# Patient Record
Sex: MALE | Race: AMERICAN INDIAN OR ALASKA NATIVE | URBAN - METROPOLITAN AREA
[De-identification: names, ages, dates, MRNs, and addresses within clinical notes are randomized per-mention and may not be internally consistent; named-entity substitution may affect disease eponyms.]

---

## 2017-01-12 ENCOUNTER — APPOINTMENT (OUTPATIENT)
Dept: CARDIOLOGY | Facility: CLINIC | Age: 47
DRG: 286 | End: 2017-01-12
Attending: INTERNAL MEDICINE
Payer: MEDICAID

## 2017-01-12 ENCOUNTER — HOSPITAL ENCOUNTER (INPATIENT)
Facility: CLINIC | Age: 47
LOS: 31 days | Discharge: ANOTHER HOSPITAL | DRG: 286 | End: 2017-02-12
Attending: INTERNAL MEDICINE | Admitting: INTERNAL MEDICINE
Payer: MEDICAID

## 2017-01-12 DIAGNOSIS — K29.71 GASTROINTESTINAL HEMORRHAGE ASSOCIATED WITH GASTRITIS, UNSPECIFIED GASTRITIS TYPE: ICD-10-CM

## 2017-01-12 DIAGNOSIS — M54.6 CHRONIC BILATERAL THORACIC BACK PAIN: ICD-10-CM

## 2017-01-12 DIAGNOSIS — R10.11 RUQ ABDOMINAL PAIN: Primary | ICD-10-CM

## 2017-01-12 DIAGNOSIS — R04.0 EPISTAXIS: ICD-10-CM

## 2017-01-12 DIAGNOSIS — Z79.899 LONG TERM PRESCRIPTION BENZODIAZEPINE USE: ICD-10-CM

## 2017-01-12 DIAGNOSIS — F11.20 UNCOMPLICATED OPIOID DEPENDENCE (H): ICD-10-CM

## 2017-01-12 DIAGNOSIS — B37.2 CANDIDIASIS OF SKIN: ICD-10-CM

## 2017-01-12 DIAGNOSIS — J98.01 ACUTE BRONCHOSPASM: ICD-10-CM

## 2017-01-12 DIAGNOSIS — F41.9 ANXIETY: ICD-10-CM

## 2017-01-12 DIAGNOSIS — G89.29 CHRONIC BILATERAL THORACIC BACK PAIN: ICD-10-CM

## 2017-01-12 DIAGNOSIS — R11.0 NAUSEA: ICD-10-CM

## 2017-01-12 DIAGNOSIS — K59.00 CONSTIPATION, UNSPECIFIED CONSTIPATION TYPE: ICD-10-CM

## 2017-01-12 DIAGNOSIS — E16.2 HYPOGLYCEMIA: ICD-10-CM

## 2017-01-12 DIAGNOSIS — E03.8 OTHER SPECIFIED HYPOTHYROIDISM: ICD-10-CM

## 2017-01-12 DIAGNOSIS — I95.3 HEMODIALYSIS-ASSOCIATED HYPOTENSION: ICD-10-CM

## 2017-01-12 PROBLEM — I50.9 CHF (CONGESTIVE HEART FAILURE) (H): Status: ACTIVE | Noted: 2017-01-12

## 2017-01-12 LAB
ALBUMIN SERPL-MCNC: 2.6 G/DL (ref 3.4–5)
ALP SERPL-CCNC: 148 U/L (ref 40–150)
ALT SERPL W P-5'-P-CCNC: 208 U/L (ref 0–70)
ANION GAP SERPL CALCULATED.3IONS-SCNC: 11 MMOL/L (ref 3–14)
AST SERPL W P-5'-P-CCNC: 171 U/L (ref 0–45)
BILIRUB DIRECT SERPL-MCNC: 1.3 MG/DL (ref 0–0.2)
BILIRUB SERPL-MCNC: 3.2 MG/DL (ref 0.2–1.3)
BUN SERPL-MCNC: 58 MG/DL (ref 7–30)
CALCIUM SERPL-MCNC: 7.5 MG/DL (ref 8.5–10.1)
CHLORIDE SERPL-SCNC: 96 MMOL/L (ref 94–109)
CO2 SERPL-SCNC: 28 MMOL/L (ref 20–32)
COPATH REPORT: NORMAL
CREAT SERPL-MCNC: 2.02 MG/DL (ref 0.66–1.25)
DAT POLY-SP REAG RBC QL: NORMAL
DIGOXIN SERPL-MCNC: 0.7 UG/L (ref 0.5–2)
ERYTHROCYTE [DISTWIDTH] IN BLOOD BY AUTOMATED COUNT: 18.1 % (ref 10–15)
GFR SERPL CREATININE-BSD FRML MDRD: 35 ML/MIN/1.7M2
GLUCOSE SERPL-MCNC: 127 MG/DL (ref 70–99)
HCT VFR BLD AUTO: 24.4 % (ref 40–53)
HGB BLD-MCNC: 8.3 G/DL (ref 13.3–17.7)
HGB FREE PLAS-MCNC: 160 MG/DL
INR PPP: 2.71 (ref 0.86–1.14)
INR PPP: 3.1 (ref 0.86–1.14)
LACTATE BLD-SCNC: 1.9 MMOL/L (ref 0.7–2.1)
LDH SERPL L TO P-CCNC: 494 U/L (ref 85–227)
LDH SERPL L TO P-CCNC: NORMAL U/L (ref 85–227)
LMWH PPP CHRO-ACNC: NORMAL IU/ML
MCH RBC QN AUTO: 31.2 PG (ref 26.5–33)
MCHC RBC AUTO-ENTMCNC: 34 G/DL (ref 31.5–36.5)
MCV RBC AUTO: 92 FL (ref 78–100)
NT-PROBNP SERPL-MCNC: ABNORMAL PG/ML (ref 0–450)
PLATELET # BLD AUTO: 62 10E9/L (ref 150–450)
POTASSIUM SERPL-SCNC: 4.1 MMOL/L (ref 3.4–5.3)
PROT SERPL-MCNC: 5.2 G/DL (ref 6.8–8.8)
RBC # BLD AUTO: 2.66 10E12/L (ref 4.4–5.9)
SODIUM SERPL-SCNC: 136 MMOL/L (ref 133–144)
TROPONIN I SERPL-MCNC: 0.09 UG/L (ref 0–0.04)
WBC # BLD AUTO: 11.5 10E9/L (ref 4–11)

## 2017-01-12 PROCEDURE — 80162 ASSAY OF DIGOXIN TOTAL: CPT

## 2017-01-12 PROCEDURE — 83615 LACTATE (LD) (LDH) ENZYME: CPT | Performed by: INTERNAL MEDICINE

## 2017-01-12 PROCEDURE — 83010 ASSAY OF HAPTOGLOBIN QUANT: CPT

## 2017-01-12 PROCEDURE — 36415 COLL VENOUS BLD VENIPUNCTURE: CPT | Performed by: INTERNAL MEDICINE

## 2017-01-12 PROCEDURE — 25900017 H RX MED GY IP 259 OP 259 PS 637: Performed by: INTERNAL MEDICINE

## 2017-01-12 PROCEDURE — 40000264 ECHO COMPLETE WITH LUMASON

## 2017-01-12 PROCEDURE — 25000125 ZZHC RX 250: Performed by: INTERNAL MEDICINE

## 2017-01-12 PROCEDURE — 25000132 ZZH RX MED GY IP 250 OP 250 PS 637: Performed by: INTERNAL MEDICINE

## 2017-01-12 PROCEDURE — S0171 BUMETANIDE 0.5 MG: HCPCS

## 2017-01-12 PROCEDURE — 84484 ASSAY OF TROPONIN QUANT: CPT | Performed by: INTERNAL MEDICINE

## 2017-01-12 PROCEDURE — 85027 COMPLETE CBC AUTOMATED: CPT | Performed by: INTERNAL MEDICINE

## 2017-01-12 PROCEDURE — 25500064 ZZH RX 255 OP 636: Performed by: INTERNAL MEDICINE

## 2017-01-12 PROCEDURE — 25000132 ZZH RX MED GY IP 250 OP 250 PS 637

## 2017-01-12 PROCEDURE — 36415 COLL VENOUS BLD VENIPUNCTURE: CPT

## 2017-01-12 PROCEDURE — 80076 HEPATIC FUNCTION PANEL: CPT | Performed by: INTERNAL MEDICINE

## 2017-01-12 PROCEDURE — 86880 COOMBS TEST DIRECT: CPT | Performed by: INTERNAL MEDICINE

## 2017-01-12 PROCEDURE — 83605 ASSAY OF LACTIC ACID: CPT | Performed by: INTERNAL MEDICINE

## 2017-01-12 PROCEDURE — 40000611 ZZHCL STATISTIC MORPHOLOGY W/INTERP HEMEPATH TC 85060: Performed by: INTERNAL MEDICINE

## 2017-01-12 PROCEDURE — 83880 ASSAY OF NATRIURETIC PEPTIDE: CPT | Performed by: INTERNAL MEDICINE

## 2017-01-12 PROCEDURE — 83051 HEMOGLOBIN PLASMA: CPT

## 2017-01-12 PROCEDURE — 99223 1ST HOSP IP/OBS HIGH 75: CPT | Mod: 25 | Performed by: INTERNAL MEDICINE

## 2017-01-12 PROCEDURE — 25000125 ZZHC RX 250

## 2017-01-12 PROCEDURE — 93005 ELECTROCARDIOGRAM TRACING: CPT

## 2017-01-12 PROCEDURE — 87040 BLOOD CULTURE FOR BACTERIA: CPT | Performed by: INTERNAL MEDICINE

## 2017-01-12 PROCEDURE — 21400006 ZZH R&B CCU INTERMEDIATE UMMC

## 2017-01-12 PROCEDURE — 93306 TTE W/DOPPLER COMPLETE: CPT | Mod: 26 | Performed by: INTERNAL MEDICINE

## 2017-01-12 PROCEDURE — 83615 LACTATE (LD) (LDH) ENZYME: CPT

## 2017-01-12 PROCEDURE — 85610 PROTHROMBIN TIME: CPT | Performed by: INTERNAL MEDICINE

## 2017-01-12 PROCEDURE — 80048 BASIC METABOLIC PNL TOTAL CA: CPT | Performed by: INTERNAL MEDICINE

## 2017-01-12 PROCEDURE — 85520 HEPARIN ASSAY: CPT | Performed by: INTERNAL MEDICINE

## 2017-01-12 RX ORDER — METHOCARBAMOL 750 MG/1
750 TABLET, FILM COATED ORAL 2 TIMES DAILY
Status: DISCONTINUED | OUTPATIENT
Start: 2017-01-12 | End: 2017-02-01

## 2017-01-12 RX ORDER — ACETAMINOPHEN 650 MG/1
650 SUPPOSITORY RECTAL EVERY 4 HOURS PRN
Status: DISCONTINUED | OUTPATIENT
Start: 2017-01-12 | End: 2017-01-16

## 2017-01-12 RX ORDER — OXYCODONE HYDROCHLORIDE 5 MG/1
5 TABLET ORAL EVERY 4 HOURS PRN
Status: DISCONTINUED | OUTPATIENT
Start: 2017-01-12 | End: 2017-01-12

## 2017-01-12 RX ORDER — SENNOSIDES 8.6 MG
8.6 TABLET ORAL 2 TIMES DAILY PRN
Status: DISCONTINUED | OUTPATIENT
Start: 2017-01-12 | End: 2017-02-09

## 2017-01-12 RX ORDER — ALBUTEROL SULFATE 90 UG/1
2 AEROSOL, METERED RESPIRATORY (INHALATION) 4 TIMES DAILY PRN
Status: DISCONTINUED | OUTPATIENT
Start: 2017-01-12 | End: 2017-02-12 | Stop reason: HOSPADM

## 2017-01-12 RX ORDER — ALUMINA, MAGNESIA, AND SIMETHICONE 2400; 2400; 240 MG/30ML; MG/30ML; MG/30ML
15-30 SUSPENSION ORAL EVERY 4 HOURS PRN
Status: DISCONTINUED | OUTPATIENT
Start: 2017-01-12 | End: 2017-02-12 | Stop reason: HOSPADM

## 2017-01-12 RX ORDER — BUMETANIDE 1 MG/1
1 TABLET ORAL DAILY
Status: DISCONTINUED | OUTPATIENT
Start: 2017-01-12 | End: 2017-01-12

## 2017-01-12 RX ORDER — LEVOTHYROXINE SODIUM 112 UG/1
224 TABLET ORAL
Status: DISCONTINUED | OUTPATIENT
Start: 2017-01-12 | End: 2017-01-30

## 2017-01-12 RX ORDER — WARFARIN SODIUM 7.5 MG/1
7.5 TABLET ORAL
Status: COMPLETED | OUTPATIENT
Start: 2017-01-12 | End: 2017-01-12

## 2017-01-12 RX ORDER — HEPARIN SODIUM 10000 [USP'U]/100ML
0-3500 INJECTION, SOLUTION INTRAVENOUS CONTINUOUS
Status: DISCONTINUED | OUTPATIENT
Start: 2017-01-12 | End: 2017-01-12

## 2017-01-12 RX ORDER — POLYETHYLENE GLYCOL 3350 17 G/17G
17 POWDER, FOR SOLUTION ORAL DAILY PRN
Status: DISCONTINUED | OUTPATIENT
Start: 2017-01-12 | End: 2017-02-06

## 2017-01-12 RX ORDER — BUMETANIDE 0.25 MG/ML
2 INJECTION INTRAMUSCULAR; INTRAVENOUS EVERY 12 HOURS
Status: DISCONTINUED | OUTPATIENT
Start: 2017-01-12 | End: 2017-01-20

## 2017-01-12 RX ORDER — LIDOCAINE 40 MG/G
CREAM TOPICAL
Status: DISCONTINUED | OUTPATIENT
Start: 2017-01-12 | End: 2017-02-01

## 2017-01-12 RX ORDER — BUMETANIDE 2 MG/1
2 TABLET ORAL
Status: DISCONTINUED | OUTPATIENT
Start: 2017-01-13 | End: 2017-01-12

## 2017-01-12 RX ORDER — ACETAMINOPHEN 325 MG/1
650 TABLET ORAL EVERY 4 HOURS PRN
Status: DISCONTINUED | OUTPATIENT
Start: 2017-01-12 | End: 2017-01-16

## 2017-01-12 RX ORDER — NALOXONE HYDROCHLORIDE 0.4 MG/ML
.1-.4 INJECTION, SOLUTION INTRAMUSCULAR; INTRAVENOUS; SUBCUTANEOUS
Status: DISCONTINUED | OUTPATIENT
Start: 2017-01-12 | End: 2017-02-12 | Stop reason: HOSPADM

## 2017-01-12 RX ORDER — NITROGLYCERIN 0.4 MG/1
0.4 TABLET SUBLINGUAL EVERY 5 MIN PRN
Status: DISCONTINUED | OUTPATIENT
Start: 2017-01-12 | End: 2017-02-12 | Stop reason: HOSPADM

## 2017-01-12 RX ORDER — OXYCODONE HYDROCHLORIDE 5 MG/1
5-10 TABLET ORAL EVERY 4 HOURS PRN
Status: DISCONTINUED | OUTPATIENT
Start: 2017-01-12 | End: 2017-01-23

## 2017-01-12 RX ADMIN — ACETAMINOPHEN 650 MG: 325 TABLET, FILM COATED ORAL at 15:23

## 2017-01-12 RX ADMIN — BUMETANIDE 1 MG: 1 TABLET ORAL at 13:32

## 2017-01-12 RX ADMIN — LEVOTHYROXINE SODIUM 224 MCG: 112 TABLET ORAL at 13:32

## 2017-01-12 RX ADMIN — WARFARIN SODIUM 7.5 MG: 7.5 TABLET ORAL at 18:26

## 2017-01-12 RX ADMIN — OXYCODONE HYDROCHLORIDE 5 MG: 5 TABLET ORAL at 12:02

## 2017-01-12 RX ADMIN — OXYCODONE HYDROCHLORIDE 10 MG: 5 TABLET ORAL at 20:59

## 2017-01-12 RX ADMIN — METHOCARBAMOL 750 MG: 750 TABLET ORAL at 19:09

## 2017-01-12 RX ADMIN — AMIODARONE HYDROCHLORIDE 300 MG: 200 TABLET ORAL at 15:20

## 2017-01-12 RX ADMIN — OXYCODONE HYDROCHLORIDE 5 MG: 5 TABLET ORAL at 16:49

## 2017-01-12 RX ADMIN — Medication 62.5 MCG: at 15:37

## 2017-01-12 RX ADMIN — Medication 12.5 MG: at 16:49

## 2017-01-12 RX ADMIN — SULFUR HEXAFLUORIDE 3 ML: KIT at 13:15

## 2017-01-12 RX ADMIN — BUMETANIDE 2 MG: 0.25 INJECTION, SOLUTION INTRAMUSCULAR; INTRAVENOUS at 19:10

## 2017-01-12 RX ADMIN — HEPARIN SODIUM 1800 UNITS/HR: 10000 INJECTION, SOLUTION INTRAVENOUS at 13:06

## 2017-01-12 NOTE — LETTER
[address] [address]  [phone]      February 11, 2017      Ezra Santacruz [place]  Fax: [phone]  Attn: Alex Rodriguez    To whom it may concern:   Mr. Samir Rodriguez was admitted to the [facility] from January 12th, 2017 to February 12th, 2017.  He wanted us to inform you that his sister, Susanne Hui, came to visit him at the [facility] on February 4th, 2017.      Sincerely,      Amador Carter MD

## 2017-01-12 NOTE — H&P
History and Physical  January 12, 2017      Samir Rodriguez MRN# [id number]   Age: 46 year old YOB: 1970     Date of Admission: 1/12/2017    Primary care provider: No primary care provider on file.         Assessment and Plan:   Samir Rodriguez is a 46 year old  male with past medical history of Rheumatic Heart Disease s/p mechanical MVR x 2 (1980s and 1992) on warfarin (INR goal 2.5-3.5), biventricular CHF (EF 25-30%) s/p dual chamber ICD 2008, chronic afib on amiodarone and digoxine, WPW ablation at age 11, CKD III, hypothyroid,  who is transferred from formerly [facility] for further eval of CHF and concern of hemolysis in setting of mechanical valve.      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 Since 11/16 pt has had worsening LE edema. CHAPMAN at baseline with 2-3 blocks. + orthopnea. + JVD and 3+ LE edema. BNP 50721. Weight on admission 111kg.  - losartan held at OSH due to Hypotension - will resume later  - Continue metoprolol xl 12.5mg daily   - Not on spironolactone - will resume later  - bumex 2mg IV BID   - Device interrogation   - Daily weights   - Strict I/O    # Mechanical mitral valve (MV diastolic gradient 6.5)  # Aortic Insufficiency (mod - severe)  # TR (moderate)  # Anticoagulation  Concern for endocarditis vs MV thrombus   - Blood cultures x 2  - Continue Warfarin   - Stop Heparin, INR is therapeutic   - Awaiting OSH EDEL imaging     # Anemia and thrombocytopenia (pancytopenia at OSH, concern for  KACY)  - Smear  - Haptoglobin   - Hemoglobin plasma  - heme onc consult     # Direct and Indirect hyperbilirubinemia, sclera icterus:   # Transaminates:   Congestive hepatopathy vs hemolysis vs endocarditis   - Abdominal US     # Chronic afib:   - Continue Amiodarone, digoxin    # Right tibia fx and R knee trauma:   - brace in place   - Ortho consult    FEN: cardiac diet   PPX: on warfarin     Code Status: Full CODE     Patient discussed with staff attending, Dr. Cartagena.  Please feel free to page with questions.    Meggan Landers MD   Internal Medicine PGY1  Cardiology Service II  Pager: [phone]         History of Present Illness:   Samir Rodriguez is a 46 year old  male with past medical history of Rheumatic Heart Disease s/p mechanical MVR x 2 (1980s and 1992) on warfarin (INR goal 2.5-3.5), biventricular CHF (EF 25-30%) s/p dual chamber ICD 2008, chronic afib on amiodarone and digoxine, WPW ablation at age 11, CKD III, hypothyroid,  who is transferred from [facility] for management of acute on chronic biventricular heart failure and cytopenia in the setting of MVR.     Patient was last in his normal state of health in November 2016. In 11/2016 he sustained a mechanical fall and R tibia fx for which he was hospitalized and developed cellulitis. During this hospitalization his bumex was decreased from 2mg to 1mg due to hypotensive episodes. Subsequently he has developed worsening LE edema, weight gain. He has orthopnea, CHAPMAN with 2-3 blocks. Denies fever, chills, chest pain, dizziness, syncope, palpitations.     He was re-hospitalized at [facility] on 12/22 after a second mechanical fall. He was found to have pancytopenia. There was a question of abnormal MV. Cardiac fluoroscopy showed normal leaflets without restrictions. Valve scintiangiography 12/31/16 showed normal valves. Patient was transferred due to concern of hemolysis from malfunctioning  MV.            Review of Systems:   General: no fever, chills, + weight gain  HEENT: Denies HA, vision changes  Pulmonary: + CHAPMAN, no wheezing, cough  CVS: No chest pain, palpitations  GI: No changes with BM  Renal: No difficulty with urination  Musculoskeletal: Right leg pain   Skin: + ecchymosis, sclera icterus         Past Medical History:       Rheumatic heart disease    MVR x 2  (1980s and 1992) on warfarin (INR goal 2.5-3.5)    biventricular CHF (EF 25-30%) s/p dual chamber ICD 2008    WPW ablation at age 11    CKD III    Hypothyroid    Chronic afib            Past Surgical History:     Cholecystectomy     Open heart surgery x 2        Allergies:     Allergies   Allergen Reactions     Asa [Aspirin] Other (See Comments) and Diarrhea     goosebumps  nausea     Gabapentin Nausea     Nabumetone Nausea     Sulfamethoxazole Unknown     Trimethoprim Unknown     Allopurinol Rash     Clindamycin Rash            Social History:      Smoking: remote hx of smoking in high school      Alcohol: denies      Illicit drug: denies      Lives in [place] with his son, daughter and two grand children         Family History:   No family history on file.             Medications:     Current Facility-Administered Medications   Medication     lidocaine 1 % 1 mL     lidocaine (LMX4) kit     sodium chloride (PF) 0.9% PF flush 3 mL     sodium chloride (PF) 0.9% PF flush 3 mL     medication instruction     nitroglycerin (NITROSTAT) sublingual tablet 0.4 mg     alum & mag hydroxide-simethicone (MYLANTA ES/MAALOX  ES) suspension 15-30 mL     acetaminophen (TYLENOL) tablet 650 mg     acetaminophen (TYLENOL) Suppository 650 mg     polyethylene glycol (MIRALAX/GLYCOLAX) Packet 17 g     albuterol (PROAIR HFA/PROVENTIL HFA/VENTOLIN HFA) Inhaler 2 puff     oxyCODONE (ROXICODONE) IR tablet 5 mg     sennosides (SENOKOT) tablet 8.6 mg     naloxone (NARCAN) injection 0.1-0.4 mg     levothyroxine (SYNTHROID/LEVOTHROID) tablet 224 mcg     [START ON  1/13/2017] omeprazole (priLOSEC) CR capsule 20 mg     methocarbamol (ROBAXIN) tablet 750 mg     amiodarone (PACERONE/CODARONE) tablet 300 mg     Warfarin Therapy Reminder (Check START DATE - warfarin may be starting in the FUTURE)     digoxin (LANOXIN) half-tab 62.5 mcg     metoprolol (TOPROL-XL) half-tab 12.5 mg     bumetanide (BUMEX) injection 2 mg               Physical Examination:   Temp:  [97.7  F (36.5  C)-98  F (36.7  C)] 98  F (36.7  C)  Heart Rate:  [60] 60  Resp:  [16-18] 18  BP: ()/(48-63) 102/57 mmHg  SpO2:  [98 %-100 %] 98 %    Wt Readings from Last 4 Encounters:   01/12/17 111 kg (244 lb 11.4 oz)     Physical exam:  Gen: AA&Ox3, no acute distress  HEENT: NACT, PERRLA, EOMI, MMM, OP clear  PULM: Clear to auscultation bilaterally, no rales/rhonchi/wheezes  CV: paced rhythm, ABHI at LLSB. + JVD  ABD:  soft, nontender, nondistended.   EXT: +3 sonam edema, no clubbing or cyanosis. Right arm ecchymosis   NEURO: CN II-XII intact, strength 5/5 throughout except R leg that is limited due to pain, sensory intact, moves all extremities   SKIN: Right arm ecchymosis            Data:   CBC    Recent Labs  Lab 01/12/17  1121   WBC 11.5*   RBC 2.66*   HGB 8.3*   HCT 24.4*   MCV 92   MCH 31.2   MCHC 34.0   RDW 18.1*   PLT 62*     CMP    Recent Labs  Lab 01/12/17  1121      POTASSIUM 4.1   CHLORIDE 96   CO2 28   ANIONGAP 11   *   BUN 58*   CR 2.02*   GFRESTIMATED 35*   GFRESTBLACK 43*   DELFINO 7.5*   PROTTOTAL 5.2*   ALBUMIN 2.6*   BILITOTAL 3.2*   ALKPHOS 148   *   *     INR    Recent Labs  Lab 01/12/17  1556   INR 3.10*     Arterial Blood GasNo lab results found in last 7 days.          Imaging/ Studies      Stress Test: OSH 11/14/17:   Scar inferolateral and portion of the anterolateral, inferior wall, apex, and apical septum. No ischemia. EF 27%    Cardiac fluoroscopy: OSH 1/5/17  Normal leaflets without restrictions    Valve scintiangiography: OSH 12/31/16:  Normal valves    Echo: TTE  1/12/17  Severe left ventricular dilation with severe diffuse hypokinesis is present.  The Ejection Fraction is estimated at 20-25% (calculated, 24%.)  Mild right ventricular dilation is present. Global right ventricular function  is mildly reduced.  A mechanical mitral valve is present. The mean diastolic gradient is 6.5 mmHg  at a heart rate of 60 bpm.  Moderate to severe aortic insufficiency is present. The ERO is 30 mm2 and  RVol is 56 mL. The vena contracta width is 0.54 cm.  Moderate tricuspid insufficiency is present. The right ventricular systolic  pressure is 27 mmHg above the right atrial pressure.  The inferior vena cava is dilated at 3.2 cm without respiratory variability,  consistent with increased right atrial pressure. the estimated right atrial  pressure is > 15 mmHg.  No pericardial effusion is present.  Previous study not available for comparison.              I personally saw and examined this patient, reviewed imaging and laboratory studies, confirmed physical examination and discussed results and plan with patient and or family.

## 2017-01-12 NOTE — PROGRESS NOTES
SPIRITUAL HEALTH SERVICES  SPIRITUAL ASSESSMENT Progress Note  [facility] ([facility] 6C     REFERRAL SOURCE: attempted initial 6C unit  per request for hospital  visit as noted in initial nursing assessment. I first reviewed chart and consulted with nursing; my understanding is that pt is here for LVAD work-up.     Pt busy with cares and consults much of day. When I stopped by mid-afternoon, pt sleeping. I let bedside nurse know I had attempted and would come by again Friday morning, but if  visit requested this afternoon I am available until 5:00.    PLAN: visit pt on Friday to assess needs.    Gian Luciano) Lizbeth Mathias M.Div., [facility]  Staff   Pager [phone]

## 2017-01-12 NOTE — IP AVS SNAPSHOT
[address] [address]    Phone:  [phone]                                       After Visit Summary   1/12/2017    Samir Rodriguez    MRN: [id number]           After Visit Summary Signature Page     I have received my discharge instructions, and my questions have been answered. I have discussed any challenges I see with this plan with the nurse or doctor.    ..........................................................................................................................................  Patient/Patient Representative Signature      ..........................................................................................................................................  Patient Representative Print Name and Relationship to Patient    ..................................................               ................................................  Date                                            Time    ..........................................................................................................................................  Reviewed by Signature/Title    ...................................................              ..............................................  Date                                                            Time

## 2017-01-12 NOTE — PHARMACY-ANTICOAGULATION SERVICE
Clinical Pharmacy - Warfarin Dosing Consult     Pharmacy has been consulted to manage this patient s warfarin therapy.  Indication: Mechanical Mitral Valve Replacement  Therapy Goal: INR 2.5-3.5  Warfarin Prior to Admission: Yes  Warfarin PTA Regimen: 7.5 mg daily  Dose Comments: pt says last dose was 1/11.    INR   Date Value Ref Range Status   01/12/2017 3.10* 0.86 - 1.14 Final       Recommend warfarin 7.5 mg today.  Pharmacy will monitor Samir Rodriguez daily and order warfarin doses to achieve specified goal.      Please contact pharmacy as soon as possible if the warfarin needs to be held for a procedure or if the warfarin goals change.

## 2017-01-12 NOTE — PROGRESS NOTES
[facility]  WO Nurse Inpatient Adult Pressure INJURY (PI) Wound Assessment     Initial assessment of PU(s) on pt's:   Bilateral heels and sacrum     Data:   Patient History:      per MD note(s):  Samir Rodriguez is a 46 year old male  male with past medical history of Rheumatic Heart Disease s/p mechanical MVR x 2 ( and ) on warfarin (INR goal 2.5-3.5), biventricular CHF (EF 25-30%) s/p dual chamber ICD , WPW ablation at age 11, CKD III, hypothyroid,  who is transferred from [facility]      Current mattress:  AtmosAir, overlay ordered  Current pressure relieving devices:  Heel lift boots and Pillows    Moisture Management:  Incontinence Protocol    Catheter secured? Not applicable    Current Diet / Nutrition:           Active Diet Order  NPO    Maximilian Assessment and sub scores:   Maximilian Score  Av  Min: 16  Max: 16   Labs:   Recent Labs   Lab Test  17   1121   ALBUMIN  2.6*   HGB  8.3*   WBC  11.5*                                                                                                                          Pressure Injury Assessment : Bilateral heels   17 Left heel   17 right heel    Wound History:   Present on admit  Left posterior heel: 1.1 x 1 x 0.1 cm yellow red moist dermis with erosion of epidermis surrounding.  Draining small amount of serous fluid.    Right posterior heel: 2 x 2 x 0 cm soft, non-blanchable draining blister.  Periwound skin inact, macerated.  Draining moderate amount serous fluid    Temperature  cool      Odor: none    Pain:  absent     Pressure Injury Assessment : sacrum  Unable to obtain picture  Wound History:  Present on admit    Wound Base: mixed yellow     Specific Dimensions (length x width x depth, in cm) :  5 x 6 x 0.1 cm    Palpation of the wound bed:  normal    Slough appearance:  none    Eschar appearance:  none    Periwound Skin: denuded and maceration,      Color: normal and  consistent with surrounding tissue    Temperature  normal     Drainage:  Small amount serous      Odor: mild    Pain:  minimal , aching           Intervention:     Patient's chart evaluated.      Maximilian Interventions:  Current Maximilian Interventions and Care Plan reviewed and updated, appropriate at this time.    Wound was assessed.    Wound Care: was done: Removal of existing dressing    Visual inspection    Cleansing with normal saline solution    Application of clean dressing,    Orders  Written    Supplies  Reviewed    Discussed plan of care with Patient and Nurse           Assessment:     Pressure Injury (PI) located on Sacrum: stage 2, Left heel: Stage 2, Right heel: unstagable    Status: wound  n/a- initial assessment, Stable    Wounds present on admit, Per pt, his wounds are improving.           Plan:     Nursing to notify the Provider(s) and re-consult the WOC Nurse if wound(s) deteriorate(s).    Plan of care for wound located on Sacrum and bilateral heels: cleanse with microklenz and pat dry, apply no sting barrier around wound, cover with mepilex.  Change every other day.    Heel lift boots on at all times while in bed.    WOC Nurse will return: weekly  Face to face time: 25 minutes.

## 2017-01-12 NOTE — IP AVS SNAPSHOT
MRN:[id number]                      After Visit Summary   1/12/2017    Samir Rodriguez    MRN: [id number]           Thank you!     Thank you for choosing [facility] for your care. Our goal is always to provide you with excellent care. Hearing back from our patients is one way we can continue to improve our services. Please take a few minutes to complete the written survey that you may receive in the mail after you visit with us. Thank you!        Patient Information     Date Of Birth          1970        About your hospital stay     You were admitted on:  January 12, 2017 You last received care in the:  Unit [address] [facility]    You were discharged on:  February 8, 2017       Who to Call     For medical emergencies, please call 911.  For non-urgent questions about your medical care, please call your primary care provider or clinic, None  For questions related to your surgery, please call your surgery clinic        Attending Provider     Provider    Shin Cartagena MD Adkisson, MD Elvia Lee Scott, MD Hirsch, Griffin Valles MD       Primary Care Provider    Physician No Ref-Primary       No address on file        Your next 10 appointments already scheduled     Feb 09, 2017  3:00 PM   IR CVC TUNNEL PLACEMENT > 5 YRS OF AGE with [id number]   [facility], [facility], Interventional Radiology ([facility], [facility])    [address] [place] [id number] [phone]           1. Your doctor will need to do a history and physical within 7 days before this procedure. 2. Your doctor will which medications should not be taken the morning of the exam. 3. Laboratory tests are to be obtained by your doctor prior to the exam (Basic Metabolic Panel, CBCP, PTT and INR) (No labs needed if you are having a tunneled catheter exchange or removal) 4. If you have allergies to x-ray contrast or iodine, contact your doctor or a Radiology nurse  prior to the exam day for instructions. 5. Someone will need to drive you to and from the hospital. 6. If you are or may be pregnant, contact your doctor or a Radiology nurse prior to the day of the exam. 7. If you have diabetes, check with your doctor or a Radiology nurse to see if your insulin needs to be adjusted for the exam. 8. If you are taking a medication called Glucophage or Glucovance; these medications need to be held the day of the exam and for approximately 48 hours following. A blood sample must be drawn so your creatinine level can be checked before resuming this medication. 9. If you are taking Coumadin (to thin you blood) please contact your doctor or a Radiology nurse at least 3 days before the exam for special instructions. 10. You should not have received contrast within 48 hours of this exam. 11. The day before your exam you may eat your regular diet and are encouraged to drink at least 2 quarts of clear liquids. Drink no alcoholic beverages for 24 hours prior to the exam. 12. If you have a colostomy you will need to irrigate it with tap water at 8PM the evening before and again at 6AM the morning of the exam. 13. Do not smoke for 24 hours prior to the procedure. 14. Birth to 4 years: - Breast feeding must be stopped 4 hours prior to exam - Solid food or formula must be stopped 6 hours prior to exam - Tube feedings must be stopped 6 hours prior to exam 15. 4-10 years old: - Nothing to eat or drink 6 hours prior to exam 16. 10+ years old: - Nothing to eat or drink 8 hours prior to exam 17. The morning of the exam you may brush your teeth and take medications as directed with a sip of water. 18. When discharged, you cannot drive until morning, and an adult must be with you until then. You should stay in the [facility] overnight. 19. Bring a list of all drugs you are taking; include supplements and over-the-counter medications. Wear comfortable clothes and leave your valuables at home.         "      Pending Results     Date and Time Order Name Status Description    2017 1422 IR PICC Vascular In process             Admission Information        Provider Department Dept Phone    2017 Griffin Skinner MD [facility] [phone]      Your Vitals Were     Blood Pressure Pulse Temperature    91/47 mmHg 80 98.1  F (36.7  C) (Oral)    Respirations Height Weight    16 1.829 m (6') 100.245 kg (221 lb)    BMI (Body Mass Index) Pulse Oximetry       29.97 kg/m2 97%       MyChart Information     [employer] lets you send messages to your doctor, view your test results, renew your prescriptions, schedule appointments and more. To sign up, go to [email or web address] . Click on \"Log in\" on the left side of the screen, which will take you to the Welcome page. Then click on \"Sign up Now\" on the right side of the page.     You will be asked to enter the access code listed below, as well as some personal information. Please follow the directions to create your username and password.     Your access code is: [email or web address]  Expires: 2017  9:36 AM     Your access code will  in 90 days. If you need help or a new code, please call your [facility] clinic or [phone].        Care EveryWhere ID     This is your Care EveryWhere ID. This could be used by other organizations to access your [facility] medical records  [id number]           Review of your medicines      Notice     You have not been prescribed any medications.             Protect others around you: Learn how to safely use, store and throw away your medicines at www.disposemymeds.org.             Medication List: This is a list of all your medications and when to take them. Check marks below indicate your daily home schedule. Keep this list as a reference.      Notice     You have not been prescribed any medications.      "

## 2017-01-13 ENCOUNTER — APPOINTMENT (OUTPATIENT)
Dept: CARDIOLOGY | Facility: CLINIC | Age: 47
DRG: 286 | End: 2017-01-13
Attending: INTERNAL MEDICINE
Payer: MEDICAID

## 2017-01-13 ENCOUNTER — APPOINTMENT (OUTPATIENT)
Dept: ULTRASOUND IMAGING | Facility: CLINIC | Age: 47
DRG: 286 | End: 2017-01-13
Attending: INTERNAL MEDICINE
Payer: MEDICAID

## 2017-01-13 ENCOUNTER — APPOINTMENT (OUTPATIENT)
Dept: GENERAL RADIOLOGY | Facility: CLINIC | Age: 47
DRG: 286 | End: 2017-01-13
Attending: INTERNAL MEDICINE
Payer: MEDICAID

## 2017-01-13 DIAGNOSIS — I50.23 ACUTE ON CHRONIC SYSTOLIC CONGESTIVE HEART FAILURE (H): Primary | ICD-10-CM

## 2017-01-13 LAB
ALBUMIN SERPL-MCNC: 2.5 G/DL (ref 3.4–5)
ALBUMIN UR-MCNC: NEGATIVE MG/DL
ALP SERPL-CCNC: 138 U/L (ref 40–150)
ALT SERPL W P-5'-P-CCNC: 229 U/L (ref 0–70)
ANION GAP SERPL CALCULATED.3IONS-SCNC: 8 MMOL/L (ref 3–14)
ANION GAP SERPL CALCULATED.3IONS-SCNC: 9 MMOL/L (ref 3–14)
APPEARANCE UR: CLEAR
APTT PPP: 35 SEC (ref 22–37)
AST SERPL W P-5'-P-CCNC: 186 U/L (ref 0–45)
BASOPHILS # BLD AUTO: 0 10E9/L (ref 0–0.2)
BASOPHILS NFR BLD AUTO: 0 %
BILIRUB SERPL-MCNC: 3.6 MG/DL (ref 0.2–1.3)
BILIRUB UR QL STRIP: NEGATIVE
BUN SERPL-MCNC: 52 MG/DL (ref 7–30)
BUN SERPL-MCNC: 52 MG/DL (ref 7–30)
CALCIUM SERPL-MCNC: 7.2 MG/DL (ref 8.5–10.1)
CALCIUM SERPL-MCNC: 7.6 MG/DL (ref 8.5–10.1)
CHLORIDE SERPL-SCNC: 98 MMOL/L (ref 94–109)
CHLORIDE SERPL-SCNC: 98 MMOL/L (ref 94–109)
CO2 SERPL-SCNC: 29 MMOL/L (ref 20–32)
CO2 SERPL-SCNC: 30 MMOL/L (ref 20–32)
COLOR UR AUTO: YELLOW
COPATH REPORT: NORMAL
CREAT SERPL-MCNC: 1.97 MG/DL (ref 0.66–1.25)
CREAT SERPL-MCNC: 2.1 MG/DL (ref 0.66–1.25)
CRP SERPL-MCNC: 6.2 MG/L (ref 0–8)
DIFFERENTIAL METHOD BLD: ABNORMAL
EOSINOPHIL # BLD AUTO: 0.1 10E9/L (ref 0–0.7)
EOSINOPHIL NFR BLD AUTO: 0.6 %
ERYTHROCYTE [DISTWIDTH] IN BLOOD BY AUTOMATED COUNT: 18.6 % (ref 10–15)
ERYTHROCYTE [SEDIMENTATION RATE] IN BLOOD BY WESTERGREN METHOD: 17 MM/H (ref 0–15)
FERRITIN SERPL-MCNC: 2664 NG/ML (ref 26–388)
FIBRINOGEN PPP-MCNC: 121 MG/DL (ref 200–420)
FOLATE SERPL-MCNC: 10.8 NG/ML
GFR SERPL CREATININE-BSD FRML MDRD: 34 ML/MIN/1.7M2
GFR SERPL CREATININE-BSD FRML MDRD: 37 ML/MIN/1.7M2
GLUCOSE SERPL-MCNC: 80 MG/DL (ref 70–99)
GLUCOSE SERPL-MCNC: 83 MG/DL (ref 70–99)
GLUCOSE UR STRIP-MCNC: NEGATIVE MG/DL
HAPTOGLOB SERPL-MCNC: ABNORMAL MG/DL (ref 15–200)
HAPTOGLOB SERPL-MCNC: NORMAL MG/DL (ref 15–200)
HCT VFR BLD AUTO: 23.1 % (ref 40–53)
HGB BLD-MCNC: 7.8 G/DL (ref 13.3–17.7)
HGB BLD-MCNC: 8.5 G/DL (ref 13.3–17.7)
HGB UR QL STRIP: NEGATIVE
IMM GRANULOCYTES # BLD: 0 10E9/L (ref 0–0.4)
IMM GRANULOCYTES NFR BLD: 0.3 %
INR PPP: 2.99 (ref 0.86–1.14)
INTERPRETATION ECG - MUSE: NORMAL
IRON SATN MFR SERPL: 32 % (ref 15–46)
IRON SERPL-MCNC: 51 UG/DL (ref 35–180)
KETONES UR STRIP-MCNC: NEGATIVE MG/DL
LEUKOCYTE ESTERASE UR QL STRIP: NEGATIVE
LYMPHOCYTES # BLD AUTO: 0.3 10E9/L (ref 0.8–5.3)
LYMPHOCYTES NFR BLD AUTO: 3.5 %
MCH RBC QN AUTO: 31.2 PG (ref 26.5–33)
MCHC RBC AUTO-ENTMCNC: 33.8 G/DL (ref 31.5–36.5)
MCV RBC AUTO: 92 FL (ref 78–100)
MONOCYTES # BLD AUTO: 0.6 10E9/L (ref 0–1.3)
MONOCYTES NFR BLD AUTO: 6.4 %
NEUTROPHILS # BLD AUTO: 7.8 10E9/L (ref 1.6–8.3)
NEUTROPHILS NFR BLD AUTO: 89.2 %
NITRATE UR QL: NEGATIVE
NRBC # BLD AUTO: 0 10*3/UL
NRBC BLD AUTO-RTO: 0 /100
PH UR STRIP: 6 PH (ref 5–7)
PLATELET # BLD AUTO: 63 10E9/L (ref 150–450)
POTASSIUM SERPL-SCNC: 3.8 MMOL/L (ref 3.4–5.3)
POTASSIUM SERPL-SCNC: 3.9 MMOL/L (ref 3.4–5.3)
PROT SERPL-MCNC: 5.1 G/DL (ref 6.8–8.8)
RBC # BLD AUTO: 2.5 10E12/L (ref 4.4–5.9)
RETICS # AUTO: 190 10E9/L (ref 25–95)
RETICS/RBC NFR AUTO: 7.6 % (ref 0.5–2)
SODIUM SERPL-SCNC: 135 MMOL/L (ref 133–144)
SODIUM SERPL-SCNC: 136 MMOL/L (ref 133–144)
SP GR UR STRIP: 1.01 (ref 1–1.03)
T4 FREE SERPL-MCNC: 2.18 NG/DL (ref 0.76–1.46)
TIBC SERPL-MCNC: 158 UG/DL (ref 240–430)
TSH SERPL DL<=0.05 MIU/L-ACNC: 0.25 MU/L (ref 0.4–4)
URN SPEC COLLECT METH UR: NORMAL
UROBILINOGEN UR STRIP-MCNC: NORMAL MG/DL (ref 0–2)
VIT B12 SERPL-MCNC: 1023 PG/ML (ref 193–986)
WBC # BLD AUTO: 8.8 10E9/L (ref 4–11)

## 2017-01-13 PROCEDURE — 93284 PRGRMG EVAL IMPLANTABLE DFB: CPT | Mod: ZF

## 2017-01-13 PROCEDURE — 36415 COLL VENOUS BLD VENIPUNCTURE: CPT | Performed by: INTERNAL MEDICINE

## 2017-01-13 PROCEDURE — 76000 FLUOROSCOPY <1 HR PHYS/QHP: CPT | Mod: 26 | Performed by: INTERNAL MEDICINE

## 2017-01-13 PROCEDURE — 93289 INTERROG DEVICE EVAL HEART: CPT | Mod: 26 | Performed by: INTERNAL MEDICINE

## 2017-01-13 PROCEDURE — 85027 COMPLETE CBC AUTOMATED: CPT | Performed by: INTERNAL MEDICINE

## 2017-01-13 PROCEDURE — 99221 1ST HOSP IP/OBS SF/LOW 40: CPT | Performed by: INTERNAL MEDICINE

## 2017-01-13 PROCEDURE — 80053 COMPREHEN METABOLIC PANEL: CPT | Performed by: INTERNAL MEDICINE

## 2017-01-13 PROCEDURE — 21400006 ZZH R&B CCU INTERMEDIATE UMMC

## 2017-01-13 PROCEDURE — 84439 ASSAY OF FREE THYROXINE: CPT | Performed by: INTERNAL MEDICINE

## 2017-01-13 PROCEDURE — 85025 COMPLETE CBC W/AUTO DIFF WBC: CPT | Performed by: INTERNAL MEDICINE

## 2017-01-13 PROCEDURE — 82728 ASSAY OF FERRITIN: CPT | Performed by: INTERNAL MEDICINE

## 2017-01-13 PROCEDURE — 82668 ASSAY OF ERYTHROPOIETIN: CPT | Performed by: INTERNAL MEDICINE

## 2017-01-13 PROCEDURE — 80048 BASIC METABOLIC PNL TOTAL CA: CPT | Performed by: INTERNAL MEDICINE

## 2017-01-13 PROCEDURE — 76000 FLUOROSCOPY <1 HR PHYS/QHP: CPT

## 2017-01-13 PROCEDURE — 85384 FIBRINOGEN ACTIVITY: CPT | Performed by: INTERNAL MEDICINE

## 2017-01-13 PROCEDURE — 82746 ASSAY OF FOLIC ACID SERUM: CPT | Performed by: INTERNAL MEDICINE

## 2017-01-13 PROCEDURE — 84443 ASSAY THYROID STIM HORMONE: CPT | Performed by: INTERNAL MEDICINE

## 2017-01-13 PROCEDURE — 82607 VITAMIN B-12: CPT | Performed by: INTERNAL MEDICINE

## 2017-01-13 PROCEDURE — 71010 XR CHEST PORT 1 VW: CPT

## 2017-01-13 PROCEDURE — 83540 ASSAY OF IRON: CPT | Performed by: INTERNAL MEDICINE

## 2017-01-13 PROCEDURE — 85652 RBC SED RATE AUTOMATED: CPT | Performed by: INTERNAL MEDICINE

## 2017-01-13 PROCEDURE — 99233 SBSQ HOSP IP/OBS HIGH 50: CPT | Mod: 25 | Performed by: INTERNAL MEDICINE

## 2017-01-13 PROCEDURE — 76700 US EXAM ABDOM COMPLETE: CPT

## 2017-01-13 PROCEDURE — 25000128 H RX IP 250 OP 636

## 2017-01-13 PROCEDURE — 85045 AUTOMATED RETICULOCYTE COUNT: CPT | Performed by: INTERNAL MEDICINE

## 2017-01-13 PROCEDURE — 85610 PROTHROMBIN TIME: CPT | Performed by: INTERNAL MEDICINE

## 2017-01-13 PROCEDURE — 36415 COLL VENOUS BLD VENIPUNCTURE: CPT | Performed by: PHYSICIAN ASSISTANT

## 2017-01-13 PROCEDURE — S0171 BUMETANIDE 0.5 MG: HCPCS

## 2017-01-13 PROCEDURE — 25000132 ZZH RX MED GY IP 250 OP 250 PS 637: Performed by: INTERNAL MEDICINE

## 2017-01-13 PROCEDURE — 81003 URINALYSIS AUTO W/O SCOPE: CPT | Performed by: INTERNAL MEDICINE

## 2017-01-13 PROCEDURE — 83550 IRON BINDING TEST: CPT | Performed by: INTERNAL MEDICINE

## 2017-01-13 PROCEDURE — 83010 ASSAY OF HAPTOGLOBIN QUANT: CPT | Performed by: INTERNAL MEDICINE

## 2017-01-13 PROCEDURE — 25000125 ZZHC RX 250

## 2017-01-13 PROCEDURE — 85018 HEMOGLOBIN: CPT | Performed by: PHYSICIAN ASSISTANT

## 2017-01-13 PROCEDURE — 86140 C-REACTIVE PROTEIN: CPT | Performed by: INTERNAL MEDICINE

## 2017-01-13 PROCEDURE — 85730 THROMBOPLASTIN TIME PARTIAL: CPT | Performed by: INTERNAL MEDICINE

## 2017-01-13 RX ORDER — WARFARIN SODIUM 6 MG/1
6 TABLET ORAL
Status: COMPLETED | OUTPATIENT
Start: 2017-01-13 | End: 2017-01-13

## 2017-01-13 RX ORDER — PHYTONADIONE 1 MG/.5ML
3 INJECTION, EMULSION INTRAMUSCULAR; INTRAVENOUS; SUBCUTANEOUS ONCE
Status: DISCONTINUED | OUTPATIENT
Start: 2017-01-13 | End: 2017-01-13

## 2017-01-13 RX ORDER — LANOLIN ALCOHOL/MO/W.PET/CERES
3 CREAM (GRAM) TOPICAL ONCE
Status: DISCONTINUED | OUTPATIENT
Start: 2017-01-13 | End: 2017-01-15

## 2017-01-13 RX ORDER — PHYTONADIONE 1 MG/.5ML
1 INJECTION, EMULSION INTRAMUSCULAR; INTRAVENOUS; SUBCUTANEOUS ONCE
Status: DISCONTINUED | OUTPATIENT
Start: 2017-01-13 | End: 2017-01-13

## 2017-01-13 RX ADMIN — AMIODARONE HYDROCHLORIDE 300 MG: 200 TABLET ORAL at 10:18

## 2017-01-13 RX ADMIN — WARFARIN SODIUM 6 MG: 6 TABLET ORAL at 18:42

## 2017-01-13 RX ADMIN — BUMETANIDE 2 MG: 0.25 INJECTION, SOLUTION INTRAMUSCULAR; INTRAVENOUS at 20:42

## 2017-01-13 RX ADMIN — METHOCARBAMOL 750 MG: 750 TABLET ORAL at 10:18

## 2017-01-13 RX ADMIN — OXYCODONE HYDROCHLORIDE 10 MG: 5 TABLET ORAL at 18:42

## 2017-01-13 RX ADMIN — OXYCODONE HYDROCHLORIDE 10 MG: 5 TABLET ORAL at 23:08

## 2017-01-13 RX ADMIN — OXYCODONE HYDROCHLORIDE 10 MG: 5 TABLET ORAL at 01:39

## 2017-01-13 RX ADMIN — OXYCODONE HYDROCHLORIDE 10 MG: 5 TABLET ORAL at 13:34

## 2017-01-13 RX ADMIN — BUMETANIDE 2 MG: 0.25 INJECTION, SOLUTION INTRAMUSCULAR; INTRAVENOUS at 10:19

## 2017-01-13 RX ADMIN — PHYTONADIONE 1 MG: 10 INJECTION, EMULSION INTRAMUSCULAR; INTRAVENOUS; SUBCUTANEOUS at 20:43

## 2017-01-13 RX ADMIN — LEVOTHYROXINE SODIUM 224 MCG: 112 TABLET ORAL at 10:18

## 2017-01-13 RX ADMIN — OXYCODONE HYDROCHLORIDE 10 MG: 5 TABLET ORAL at 06:54

## 2017-01-13 RX ADMIN — OMEPRAZOLE 20 MG: 20 CAPSULE, DELAYED RELEASE ORAL at 10:17

## 2017-01-13 RX ADMIN — METHOCARBAMOL 750 MG: 750 TABLET ORAL at 20:42

## 2017-01-13 RX ADMIN — Medication 62.5 MCG: at 10:19

## 2017-01-13 RX ADMIN — Medication 12.5 MG: at 10:18

## 2017-01-13 NOTE — PROGRESS NOTES
Progress Note  January 13, 2017    Samir Rodriguez MRN# [id number]   Age: 46 year old YOB: 1970   Date of Admission: 1/12/2017           Assessment and Plan:   Samir Rodriguez is a 46 year old  male with past medical history of Rheumatic Heart Disease s/p mechanical MVR x 2 (1980s and 1992) on warfarin (INR goal 2.5-3.5), biventricular CHF (EF 25-30%) s/p dual chamber ICD 2008, chronic afib on amiodarone and digoxine, WPW ablation at age 11, CKD III, hypothyroid,  who is transferred from [facility] for further eval of CHF and concern of hemolysis in setting of mechanical valve.    Today's changes:    Abd US    Hold warfarin and when INR below 2.5 bridge with heparin.     Awaiting EDEL images from OSH     Heme/onc consult    Ortho consult    Fluoroscopy of MV    Problem list:    Acute decompensated heart failure    Mitral valve replacement      New AI    TR    Anemia and thrombocytopenia (pancytopenia at OSH, concern for MAHA)    Hyperbilirubinemia, sclera icterus    Transaminates      Chronic afib    # Non-ischemic dilated cardiomyopathy 2/2 valvular heart disease  # Acute on chronic biventricular heart failure, EF 37% (12/2016) s/p dual chamber ICD 2008  NYHA class III  Patient's bumex dose was decreased from 2 to 1 per day due to hypotension in 11/2016. Since 11/16 pt has had worsening LE edema. CHAPMAN at baseline with 2-3 blocks. + orthopnea. + JVD and 3+ LE edema. BNP 01678. Weight on admission 111kg.  - losartan held at OSH due to Hypotension - will resume later  - Continue metoprolol xl 12.5mg daily    - Not on spironolactone - will resume later  - bumex 2mg IV BID    - Device interrogation    - Daily weights    - Strict I/O    # Mechanical mitral valve (MV diastolic gradient 6.5)  # Aortic Insufficiency (mod - severe)  # TR (moderate)  # Anticoagulation  Concern for endocarditis vs perivalvular leak vs hemolysis from MVR shearing    - Blood cultures x 2:  NGTD   - hold Warfarin    - Awaiting OSH EDEL imaging     # Anemia and thrombocytopenia (pancytopenia at OSH, concern for MAHA)  # positive occult blood at OSH: patient denies melena, hematochezia  # Hematoma right arm  Blood smear: blood smear- RBCs show some target cells, hypochromia, increased polychromasia, no schistocytes or spherocytes. A few PMNs are hypersegmented. Decreased platelets, those present are normal appearing.   Iron 51, TIBC 158, Iron sat 32, ferritin 2664, hemoglobin plasma elevated 160, haptoglobin low < 6, fibrinogen low at 121, INR 3.10, UA normal, CRP/ESR nl, Coomb's test negative,   - Epo pending   - heme onc consult - appreciate recs    # Direct and Indirect hyperbilirubinemia, sclera icterus:   # Transaminates:   Congestive hepatopathy vs hemolysis vs endocarditis. Complete ABd US shows no cirrhosis, splenomegaly or splanchnic venous thrombosis. Mild - mod ascites     # Chronic afib:  digoxin level 0.7  - Continue Amiodarone, digoxin    # Hyperthyroid: hx of hypothyroid but here decreased TSH 0.25 and T4 elevated 2.18 in setting of acute illness. No changes   - levothyroxine 224mcg     # Right tibia fx and R knee trauma:    - brace in place    - Ortho consult    FEN: cardiac diet   PPX: on warfarin     Code Status: Full CODE      Patient discussed with staff attending, Dr. Cartagena.  Please feel free to page with questions.    Meggan Landers MD    Internal Medicine PGY1  Cardiology Service II  Pager: [phone]           Interval History/Subjective   No acute events overnight.  Patient reports doing well this am. Denies CP, SOB, dizziness.         Objective   Temp:  [97.7  F (36.5  C)-98  F (36.7  C)] 97.8  F (36.6  C)  Heart Rate:  [60] 60  Resp:  [16-18] 18  BP: ()/(48-63) 100/57 mmHg  SpO2:  [98 %-100 %] 100 %    Wt Readings from Last 4 Encounters:   01/12/17 111 kg (244 lb 11.4 oz)       Intake/Output Summary (Last 24 hours) at 01/13/17 0706  Last data filed at 01/13/17  0000   Gross per 24 hour   Intake    480 ml   Output    675 ml   Net   -195 ml     Physical exam:  Gen: AA&Ox3, no acute distress  HEENT: NACT, PERRLA, EOMI, MMM, OP clear  PULM: Clear to auscultation bilaterally, no rales/rhonchi/wheezes  CV: paced rhythm, ABHI at LLSB. + JVD  ABD:  soft, nontender, nondistended.    EXT: +3 sonam edema, no clubbing or cyanosis. Right arm ecchymosis    NEURO: CN II-XII intact, strength 5/5 throughout except R leg that is limited due to pain, sensory intact, moves all extremities   SKIN: Right arm ecchymosis          Data:   CBC  Recent Labs  Lab 01/12/17  1121   WBC 11.5*   RBC 2.66*   HGB 8.3*   HCT 24.4*   MCV 92   MCH 31.2   MCHC 34.0   RDW 18.1*   PLT 62*     CMP  Recent Labs  Lab 01/12/17  1121      POTASSIUM 4.1   CHLORIDE 96   CO2 28   ANIONGAP 11   *   BUN 58*   CR 2.02*   GFRESTIMATED 35*   GFRESTBLACK 43*   DELFINO 7.5*   PROTTOTAL 5.2*   ALBUMIN 2.6*   BILITOTAL 3.2*   ALKPHOS 148   *   *     INR  Recent Labs  Lab 01/12/17 2023 01/12/17  1556   INR 2.71* 3.10*             Medications:     Current Facility-Administered Medications   Medication     lidocaine 1 % 1 mL     lidocaine (LMX4) kit     sodium chloride (PF) 0.9% PF flush 3 mL     sodium chloride (PF) 0.9% PF flush 3 mL     medication instruction     nitroglycerin (NITROSTAT) sublingual tablet 0.4 mg     alum & mag hydroxide-simethicone (MYLANTA ES/MAALOX  ES) suspension 15-30 mL     acetaminophen (TYLENOL) tablet 650 mg     acetaminophen (TYLENOL) Suppository 650 mg     polyethylene glycol (MIRALAX/GLYCOLAX) Packet 17 g     albuterol (PROAIR HFA/PROVENTIL HFA/VENTOLIN HFA) Inhaler 2 puff     sennosides (SENOKOT) tablet 8.6 mg     naloxone (NARCAN) injection 0.1-0.4 mg     levothyroxine (SYNTHROID/LEVOTHROID) tablet 224 mcg     omeprazole (priLOSEC) CR capsule 20 mg     methocarbamol (ROBAXIN) tablet 750 mg     amiodarone (PACERONE/CODARONE) tablet 300 mg     Warfarin Therapy Reminder (Check  START DATE - warfarin may be starting in the FUTURE)     digoxin (LANOXIN) half-tab 62.5 mcg     metoprolol (TOPROL-XL) half-tab 12.5 mg     bumetanide (BUMEX) injection 2 mg     oxyCODONE (ROXICODONE) IR tablet 5-10 mg            I personally saw and examined this patient, reviewed imaging and laboratory studies, confirmed physical examination and discussed results and plan with patient and or family.

## 2017-01-13 NOTE — PLAN OF CARE
Problem: Goal Outcome Summary  Goal: Goal Outcome Summary  Outcome: No Change  Arrived from [place], [place] 0800 with CHF exacerbation. VSS. AV paced. C/o RUE pain and right knee pain. Oxycodone and tylenol partially effective. Large RUE hematoma; borders appear to be spreading and marked by nursing. Heparin gtt discontinued. Therapeutic INR. R/L heel and sacrum suspected pressure ulcers. WOC consulted. Specialty mattress ordered. NPO- awaiting further inpatient admission orders.  Continue to monitor and notify MD of changes.

## 2017-01-13 NOTE — PLAN OF CARE
Problem: Goal Outcome Summary  Goal: Goal Outcome Summary  PT 6C: PT orders received. Per chart review, pt has R tibial fracture and has consults to see Ortho for recommendations and weightbearing status. Ortho has not been in to see pt yet, therefore, PT will hold eval at this time. Will check in with pt tomorrow.

## 2017-01-13 NOTE — PROGRESS NOTES
CLINICAL NUTRITION SERVICES - ASSESSMENT NOTE     Nutrition Prescription    RECOMMENDATIONS FOR MDs/PROVIDERS TO ORDER:  1. Given stage 2 pressure injuries would rec order Thera-Vit-M daily (multivitamin w/ minerals)  2. If patient with EF <30% or NYHA Class III-IV heart failure, recommend thiamin supplementation    Malnutrition Status:    Difficult to assess given fluid status may be main contributor to edema and reported weight fluctuations    Recommendations already ordered by Registered Dietitian (RD):  None at this time    Future/Additional Recommendations:  1. Monitor PO intakes and pressure injury healing.  If PO intake poor and/or pressure injuries not improving, would recommend ordering additional wound healing vitamins/minerals per nutrition wound care protocol (10 day course of 500 mg/day Vitamin C, 220 mg/day Zn sulfate, and up to 25,000 international units/day Vitamin A if appropriate)  2. If patient consuming <75% of meals TID or the equivalent, recommend order calorie counts and/or supplements as appropriate     REASON FOR ASSESSMENT  Samir Rodriguez is a/an 46 year old male assessed by the dietitian for Admission Nutrition Risk Screen for stageable pressure injuries or large/non-healing wound or burn    NUTRITION HISTORY  - Patient reports eating well PTA and has been following a low sodium diet and fluid restriction.  Pt avoids grapefruit per MD recommendation given medication interaction.  - Per chart PMH includes Rheumatic Heart Disease, biventricular CHF (EF 25-30%), and CKD III.    CURRENT NUTRITION ORDERS  Diet: Low Saturated Fat/2400 mg Sodium and 1500 mL Fluid Restriction    Intake/Tolerance: Pt reports he is hungry and thirsty and had just ordered breakfast before writer arrived.  Pt reports he is aware of Na+ an Fluid restrictions and reports he can count Na+ intake.      LABS  Labs reviewed  - BUN 52 (H), trending down  - Cr 1.97 (H), trending down  - GFR 37 (L), trending  "up    MEDICATIONS  Medications reviewed  - Bumex IV    ANTHROPOMETRICS  Height: 182.9 cm (6' 0\") per patient report  Most Recent Weight: 111 kg (244 lb 11.4 oz) (with RLE brace in place)    IBW: 80.9 kg   BMI: Obesity Grade I BMI 30-34.9  Weight History: Pt reports weight fluctuates 2/2 fluid and is unsure what his dry weight is.  Wt Readings from Last 10 Encounters:   01/12/17 111 kg (244 lb 11.4 oz)      Dosing Weight: 88 kg (adjusted based on admit wt and IBW 80.9 kg)    ASSESSED NUTRITION NEEDS  Estimated Energy Needs: 7669-2971 kcals/day (25 - 30 kcals/kg)  Justification: Maintenance  Estimated Protein Needs: 114-132 grams protein/day (1.3 - 1.5 grams of pro/kg)  Justification: Increased needs for CHF and Wound healing, pending renal function  Estimated Fluid Needs: 1.5 L fluid restriction per provider  Justification: Per provider pending fluid status    PHYSICAL FINDINGS  See malnutrition section below.  - Pt w/ hyperbilirubinemia, sclera icterus (per provider note \"Congestive hepatopathy vs hemolysis vs endocarditis\")  - WOC note 1/12: stage 2 pressure injury on sacrum; stage 2 pressure injury on left heel; unstagable pressure injury on right heel.  WOC note indicates pt reports wounds are improving.    MALNUTRITION  % Intake: No decreased intake noted  % Weight Loss: Difficult to assess given lack of documented wt hx and pt report of fluid related wt fluctuations  Subcutaneous Fat Loss: None observed per brief visual (RN came in before writer could do full physical assessment)  Muscle Loss: Temporal:  mild and Lower arm  (forearm):  Mild per brief visual (RN came in before writer could do full physical assessment)  Fluid Accumulation/Edema: Moderate per provider note 1/12, suspect edema may not be nutrition related  Malnutrition Diagnosis: Difficult to assess given fluid status may be main contributor to edema and reported weight fluctuations    NUTRITION DIAGNOSIS  Increased nutrient needs (protein) related " to stagable pressure injuries and CHF as evidenced by pt requiring 1.3 - 1.5 grams of pro/kg    INTERVENTIONS  Implementation  Nutrition Education: Provided education on role of RD and nutrition POC.  Explained w/ fluid restriction pt is able to order half his fluid allotment (750 mL/day) from kitchen, and half from nursing  Collaboration with other providers:  Paged team with above recs  Multivitamin/mineral supplement therapy    Goals  Patient to consume % of nutritionally adequate meal trays TID, or the equivalent with supplements/snacks.    Monitoring/Evaluation  Progress toward goals will be monitored and evaluated per protocol.     Barbara Polanco, RD, LD  6C RD Pager: [phone]

## 2017-01-13 NOTE — PROGRESS NOTES
Pt seen on PCU 6C for evaluation and iterative programming of his [employer] CRT-D, per MD order.  Pt was admitted yesterday for CHF, he lives in [facility] and he had his ICD placed there in 2013.  His ICD check does not show any episodes of atrial or ventricular arrhythmias, however when I slowed him down for a sensing test, it looks like he has undersensed P waves and is in an atrial tachycardia with a VR of 30-40's that is irregular.  He is currently atrialy paced at outputs of 4.5V @ 0.6 mV 100% of the time which is just wasting his battery.  I spoke to the cardiology fellow and recommended an ECG without pacing to confirm.  He is Bi V pacing 95% of the time, his ICD battery estimates 3.5 years left and the ICD is functioning normally with the exception of the atrial lead under sensing.      CRT-D

## 2017-01-13 NOTE — PLAN OF CARE
Problem: Goal Outcome Summary  Goal: Goal Outcome Summary    Pt admitted w/ CHF exacerbation. AV paced at 60 on tele, VSS, room air. Oxy given q4h for generalized pain r/t tibial fracture, various hematomas. Diet ordered, pt ate a large meal and is now NPO for abdominal US this am. Turned side-side to use bedpan, +BM. Good UO. 1500 ml FR. Pleasant and cooperative w/ cares. Continue to monitor, notify cards w/ changes.

## 2017-01-13 NOTE — CONSULTS
[facility] Hematology Consult    Samir Rodriguez MRN# [id number]   Age: 46 year old YOB: 1970          Reason for Consult:     Anemia  Thrombocytopenia          Assessment and Plan:   Samir Rodriguez is a 46 year old man with history of rheumatic heart disease s/p mechanical mitral replacement in 1992 on warfarin (INR goal 2.5-3.5), atrial fibrillation with dual-chamber pacing, CKD, HTN, hypothyroidism, and CHF transferred to [facility] on 1/12/2017 from [place] for CHF exacerbation and anemia and thrombocytopenia of unknown origin.     Based on the patients absolute reticulocyte count, his bone marrow appears to be responding to the anemia appropriately. The patient's iron, folate, and B12 are also normal. On review of the patient's peripheral blood smear, many target cells are present. On common cause of target cells is iron deficiency, which does not appear to be the issue in this case. Liver disease can also cause target cells, but aside from mildly elevated transaminases, the patient has no signs of liver disease. Direct antiglobulin test was negative, suggesting against autoimmune hemolytic anemia. Increased plasma free hemoglobin and LD are suggestive of anemia, but very few schistocytes were present on the blood smear.     Considering the patient's physical exam, multiple expanding areas of ecchymosis, bleeding likely is playing some role in anemia and thrombocytopenia. Given significant bleeding risk, plan to transition to short-acting anticoagulation from warfarin.     Recommendations:  - 5 mg IV Vitamin K to reverse warfarin  - 1 day without anticoagulation to stabilize hematoma if possible; otherwise low-intensity heparin  - Transfuse pRBCs and platelets as necessary   - Follow up on Haptoglobin  - Follow up on EPO level     Attending Note:  I have reviewed the patient chart, and interviewed and examined the patient.  I agree with the assessment and plan. Lexa macario  "has acted as a scribe.  I have reviewed and edited the note to reflect my findings and assessment and plan.  Lucy Hess MD  hematology         History of Present Illness:   History obtained from chart review and confirmed with patient.    Today, the patient states that he feels well. The patient denies any fever, chills, SOB, myalgias, cough, abdominal pain, hematuria, or hematochezia. The patient states that he felt like he \"had the flu\" when he was in [place], but feel well now.     The patient states that the bruising on his arms has progressed since arriving in [place]. An additional bruise to the patient's left upper chest is pointed out and the patient believes it is new.     History from [place]:   The patient's issues began on 11/14/2016, when the patient fell at home, fracturing his right tibial plateau. The patient was treated conservatively, without surgery, and discharged home in a brace. Following discharge, the patient had increasing swelling in both of his legs, but right > left, prompting him to return to the ED. The patient was admitted to the hospital 12/8/2016-12/13/2016 for left lower extremity cellulitis and CHF exacerbation. Initially, the patient was treated with vancomycin and ceftriaxone. The patient was quickly narrowed to ceftriaxone only by ID. The patient was discharged on linezolid 600 mg BID x10 days.     The patient initially presented to the hospital again 12/22/2016 complaining of chest pain, SOB, increasing leg swelling, and myalgias, particularly in his right leg. His initial CBC showed a hemoglobin of 9.8, platelets of 112, and WBC of 4.4. A chest x-ray at that time showed a right basilar opacification, questionable for pneumonia. His creatinine upon admission was 2.2, up from his baseline of 1.8.     The patient had negative troponins at that time, but was found to have a CHF exacerbation. The patient was significantly fluid overloaded, and diuresis with " bumetanide was initiated. However, the patient had concurrent, persistently low blood pressures, requiring continuous dobutamine infusion.     Because of the inability to adequately remove fluid on IV medications, a central line was placed on 1/6/2017, in the patient's right internal jugular vein, which served as a dialysis catheter. The patient underwent dialysis on 1/7 and 1/9/2017. Following the second round of dialysis, the dobutamine infusion was finally stopped.     Over the course of his hospitalization in [place], the patient required 5 units of pRBCs and 2 packs of platelets.   - PRBCs: 12/30/2016 x2, 1/7/2017, 1/9/2017, 1/10/2017.   - Platelets: 1/2/2017 x2     During his hospitalization in [place], the prevailing theory was that the patient's anemia and thrombocytopenia were secondary to hemolysis and likely associated with his mechanical mitral valve. Bone marrow biopsy was also discussed, but transfer was initiated before this test could be performed.     Labs/Radiology from [place]:   Cardiology:   - Lexiscan Cardiolite Stress Test (11/14/2016): Evidence of a large scar of the entire inferolateral and portion of the anterolateral, inferior alls, apex, and apical septum. No ischemia. EF 27%.     - Echocardiogram (11/15/2016): EF 25-30%. Global hypokinesis with akinesis of the distal inferior apical and distal anterior segments. Left atrial volume index of 97 mL/m2. Ascending aorta 3.9 cm. Mechanical St. Sebastian prosthetic valve well seated and normally functioning. No paravalvular regurgitation. Mild to moderate AI. Trace TR. Mild pulmonic insufficiency. Mild pulmonary HTN, RVSP 42 mmHg. No change from 3/14/2016.     - Echocardiogram (12/30/2016): mean mitral inflow gradient elevated at 7-8 mmHg at a heart rate of 75 bpm. EF 37%. (Improved EF from 11/15.) Apical akinesis, moderate LV systolic dysfunction. Severe biatrial enlargement. Mild pulmonary HTN. Moderately dilated let ventricle.  "Dilated right ventricle with preserved systolic function. Mild mitral regurgitation. Moderate tricuspid regurgitation.     - Valve Scintiangiography (12/31/2016): \"...normal opening of both tilting disk leaflets. There is no restricted mobility or fixation of either of the tilting disk leaflets.\" Normal valve scintiangiography.     - EDEL (1/3/3017): Mildly dilated left ventricle with normal wall thickness, EF 35% with moderate global systolic dysfunction, the inferior wall appears somewhat worse. Moderate biatrial enlargement, mild right ventricular enlargement with normal systolic function. PFO suspected with a negative agitated saline. Left apical appendage free of thrombus and with relatively low ejection velocities. Sclerotic aortic valve with mild to moderate regurgitation and no stenosis. Mechanical mitral valve, bileaflet prosthesis with possibly limited posterior leaflet mobility, overall mild closure related regurgitation, and moderate stenosis. Moderate to severe tricuspid valve regurgitation.     - Cardiac Fluoroscopy (1/5/2017): Normal functioning bileaflet tilting disk St. Sebastian mechanical mitral valve.      Nephrology:  - Dialysis Dates: 1/7/2017, 1/9/2017     Radiology:  - Renal US (12/22/2016): The size of the kidneys is borderline for being too small. However, both kidneys appear to have a normal cortical thickness and no evidence of hydronephrosis or obstruction.     - CXR (12/22/2016): Low-grade vascular congestion. Right basilar opacity may represent atelectasis or pneumonia.     - CXR (12/30/2016): Improvement on the congestive heart failure/fluid overload changes.     - Abdominal US (12/30/2017): Spleen is normal in size. Liver slightly enlarged but otherwise normal. Gallbladder absent. Normal prominence of the common bile duct post-cholecystectomy.     - Right Upper Extremity US (1/9/2017): multiple focal hyperechoic collections in the right arm, compatible with evolving hematoma/seromas, " hypoechoic collection in the upper arm and shoulder area, possible joint effusions.     - Right Upper Extremity US (1/10/2017): Organizing hematomas are seen in the right upper extremity. Overall size appears similar to the previous exam.          Past Medical History:     Past Medical History   Diagnosis Date     Rheumatic heart disease           Past Surgical History:     Past Surgical History   Procedure Laterality Date     Cholecystectomy       Hernia repair       Appendectomy       Mitral valve replacement       Mechanical (St. Sebastian Tilting Disc); 1992          Social History:     Social History     Social History     Marital Status: Single     Spouse Name: N/A     Number of Children: N/A     Years of Education: N/A     Occupational History     Not on file.     Social History Main Topics     Smoking status: Never Smoker      Smokeless tobacco: Not on file     Alcohol Use: No     Drug Use: No     Sexual Activity: Not on file     Other Topics Concern     Not on file     Social History Narrative          Family History:     Family History   Problem Relation Age of Onset     DIABETES Mother      Hypertension Brother           Allergies:     Allergies   Allergen Reactions     Asa [Aspirin] Other (See Comments) and Diarrhea     goosebumps  nausea     Gabapentin Nausea     Nabumetone Nausea     Sulfamethoxazole Unknown     Trimethoprim Unknown     Allopurinol Rash     Clindamycin Rash          Medications:     No prescriptions prior to admission          Physical Exam:   BP 97/47 mmHg  Temp(Src) 98.1  F (36.7  C) (Oral)  Resp 18  Wt 111 kg (244 lb 11.4 oz)  SpO2 98%  Filed Vitals:    01/12/17 0800 01/12/17 0818   Weight: 107 kg (235 lb 14.3 oz) 111 kg (244 lb 11.4 oz)     General: Appears well, sitting in bed, in no acute distress.  Heme/Lymph: No overt bleeding.   Skin: Area of ecchymosis to right upper extremity (forearm, arm, extending down right flank) is expanding beyond markings made on 1/12/2017. Area of  ecchymosis to left upper extremity (antecubital fossa, associated with old IV) is stable. Area of ecchymosis to left upper chest.   HEENT: NCAT. EOMI, anicteric sclera. Oral mucosa pink and moist with no lesions or thrush.  Respiratory: Non-labored breathing, good air exchange. Pulmonary auscultation limited by inability of patient to move in bed. Did not want to move patient secondary to bleeding risk.   Cardiovascular: Regular rate and rhythm. Prominent S3. Normal S1. S2 difficult to hear secondary to S3.    Gastrointestinal: Normoactive bowel sounds. Abdomen soft, non-distended, and non-tender.  Extremities: 3+ BLE edema extending to pelvis  Neurologic: A&O x 3, CNs 2-12 grossly intact, speech normal, sensation to light touch grossly WNL. Grossly non-focal.          Data:   CBC    Recent Labs  Lab 01/13/17  0650 01/12/17  1121   WBC 8.8 11.5*   RBC 2.50* 2.66*   HGB 7.8* 8.3*   HCT 23.1* 24.4*   MCV 92 92   MCH 31.2 31.2   MCHC 33.8 34.0   RDW 18.6* 18.1*   PLT 63* 62*     CMP    Recent Labs  Lab 01/13/17  0650 01/12/17  1121    136   POTASSIUM 3.8 4.1   CHLORIDE 98 96   CO2 30 28   ANIONGAP 8 11   GLC 83 127*   BUN 52* 58*   CR 1.97* 2.02*   GFRESTIMATED 37* 35*   GFRESTBLACK 44* 43*   DELFINO 7.6* 7.5*   PROTTOTAL 5.1* 5.2*   ALBUMIN 2.5* 2.6*   BILITOTAL 3.6* 3.2*   ALKPHOS 138 148   * 171*   * 208*   TSH (1/13/2017): 0.25  Free T4 (1/13/2017): 2.18  Folate (1/13/2017): 10.8  B12 (1/13/2017): 1023 (high)     INR    Recent Labs  Lab 01/13/17  0650 01/12/17 2023 01/12/17  1556   INR 2.99* 2.71* 3.10*   PTT (1/13/2017): 35  Fibrinogen (1/13/2017): 121  Haptoglobin (1/13/2017): in process   Erythropoetin (1/13/2017): in process   DANK (1/13/2017): negative   Lactate Dehydrogenase (1/13/2017): 494 (high)   Plasma Hemoglobin (1/12/2017): 160 (high)   Reticulocyte % (1/13/2017): 7.6 (high)   Absolute Reticulocyte Count (1/13/2017): 190 (high)  Lactic Acid (1/12/2017): 1.9     Iron (1/13/2017):  51  TIBC (1/13/2017): 158 (low)   Iron Saturation (1/13/2017): 32   Ferritin (1/13/2017): 2664 (high)     Peripheral Blood Smear (1/12/2017):  - Marked normochromic, normocytic anemia; increased erythrocyte regeneration; occasional echinocytes; occasional target cells; very rare red blood cell fragments.   - Slight leukocytosis; toxic neutrophilia   - Lymphocytopenia   - Moderate thrombocytopenia       Recent Labs  Lab 01/13/17  0650   NEUTROPHIL 89.2   LYMPH 3.5   MONOCYTE 6.4   EOSINOPHIL 0.6   BASOPHIL 0.0   IG 0.3   ANEU 7.8   ALYM 0.3*   STEPH 0.6   AEOS 0.1   ABAS 0.0   AIG 0.0   NRBC 0   ANRBC 0.0     CRP (1/13/2017): 6.2  Sed Rate (1/13/2017): 17    UA RESULTS:  Recent Labs   Lab Test  01/13/17   0800   COLOR  Yellow   APPEARANCE  Clear   URINEGLC  Negative   URINEBILI  Negative   URINEKETONE  Negative   SG  1.008   UBLD  Negative   URINEPH  6.0   PROTEIN  Negative   NITRITE  Negative   LEUKEST  Negative     ___________________________________________________________  Lexa Umanzor, MS4  Pager: [phone]  Cell: [phone]    Lexa Umanzor, acting as a scribe for Dr. Lucy Hess.

## 2017-01-13 NOTE — CONSULTS
Attending Note:  I have reviewed the patient chart, and interviewed and examined the patient.  Full note to follow. Complicated patient with anemia and thrombocytopenia transferred from a hospital in [place] with heart failure issues. He has a large hematoma covering his whole R arm, and also large ecchymoses left chest. He had a dialysis catheter in the R chest, above the site of the hematoma, which has been pulled.  He says he did not have nay sort of PICC or IV in the R arm. Reportedly + blood in stool at other hospital.  Patient denies melena, hematochezia or gross hematuria. He has anasarca of legs, abdomen.  Complete ABd US shows no cirrhosis, splenomegaly or splanchnic venous thrombosis.  IINR is 2.22 due to warfarin, PTT is normal, fibrinogen is low.  Mildly elevated bili and LD,a dn elevated liver enzymes. . I reviewed the blood smear- RBCs show some target cells, hypochromia, increased polychromasia, no schistocytes or spherocytes. A few PMNs are hypersegmented. Decreased platelets, those present are normal appearing.   Differential of the anemia and thrombocytopenia includes bleeding, low grade DIC, marrow suppression from medications (He was on linezolid at other hospital).  I am concerned about this large hematoma R arm that is expanding, with no h/o trauma. It is the same side as the previous dialysis catheter in the chest.  I don't know if they are in any way related.   -For now with the low Hgb and expanding hematoma, it may be best to reverse the warfarin with vitamin K 5 mg IV. If can get by without anticoagulation for a day to see if the hematoma will stabilize, that would be good, otherwise start low intensity heparin since he has the artificial valve. Otherwise supportive care for the Hgb and platelets.   Lucy Hess MD

## 2017-01-13 NOTE — PLAN OF CARE
Problem: Goal Outcome Summary  Goal: Goal Outcome Summary  D/I/A: Samir Rodriguez was transferred to [facility] yesterday from [place] for evaluation and management of CHF exacerbation. Bumex 2 mg IV twice daily with adequate urine output, however, presents with +3 edema in bilateral lower extremities and +4 swelling in scrotal and perineal area. Doppler required to get pedal pulses secondary to edema. Perineal cares done, fungal rash noticed with redness and pt c/o itchiness. Order for miconazole powder requested. Pt also has right tibial fracture d/t fall, non-weight bearing status until Ortho Team sees pt; ortho consult placed last evening. Pt shifting weight in bed, staff also assist with turning at least every two hours to help pt stay off coccyx pressure ulcer. Pt given oxycodone for pain in right knee pain with adequate relief. Right arm has extensive bruising/hematoma from unknown etiology. Hgb low at 7.8, Platelets low at 63. Heme consult done today. CHF teaching re: sodium intake; pt able to read meal tickets to identify his sodium intake per meal and is keeping track of his daily sodium intake.   P: Pt will get labs drawn again at 1800. Ortho to see pt by tomorrow am per consult request. Continue with CHF management and teaching. Notify team for any concerns and/or changes.    Ivory Maldonado, ADRI  Cardiology

## 2017-01-14 ENCOUNTER — APPOINTMENT (OUTPATIENT)
Dept: GENERAL RADIOLOGY | Facility: CLINIC | Age: 47
DRG: 286 | End: 2017-01-14
Attending: INTERNAL MEDICINE
Payer: MEDICAID

## 2017-01-14 LAB
ALBUMIN SERPL-MCNC: 2.4 G/DL (ref 3.4–5)
ALP SERPL-CCNC: 135 U/L (ref 40–150)
ALT SERPL W P-5'-P-CCNC: 191 U/L (ref 0–70)
ANION GAP SERPL CALCULATED.3IONS-SCNC: 5 MMOL/L (ref 3–14)
AST SERPL W P-5'-P-CCNC: 110 U/L (ref 0–45)
BACTERIA SPEC CULT: NORMAL
BILIRUB SERPL-MCNC: 3.6 MG/DL (ref 0.2–1.3)
BUN SERPL-MCNC: 45 MG/DL (ref 7–30)
CALCIUM SERPL-MCNC: 7.2 MG/DL (ref 8.5–10.1)
CHLORIDE SERPL-SCNC: 97 MMOL/L (ref 94–109)
CO2 SERPL-SCNC: 32 MMOL/L (ref 20–32)
CREAT SERPL-MCNC: 1.93 MG/DL (ref 0.66–1.25)
ERYTHROCYTE [DISTWIDTH] IN BLOOD BY AUTOMATED COUNT: 19.2 % (ref 10–15)
ERYTHROCYTE [DISTWIDTH] IN BLOOD BY AUTOMATED COUNT: 19.5 % (ref 10–15)
GFR SERPL CREATININE-BSD FRML MDRD: 37 ML/MIN/1.7M2
GLUCOSE SERPL-MCNC: 107 MG/DL (ref 70–99)
HCT VFR BLD AUTO: 24.1 % (ref 40–53)
HCT VFR BLD AUTO: 24.3 % (ref 40–53)
HGB BLD-MCNC: 7.9 G/DL (ref 13.3–17.7)
HGB BLD-MCNC: 8.1 G/DL (ref 13.3–17.7)
INR PPP: 2.86 (ref 0.86–1.14)
LDH SERPL L TO P-CCNC: 423 U/L (ref 85–227)
MCH RBC QN AUTO: 30.9 PG (ref 26.5–33)
MCH RBC QN AUTO: 31.4 PG (ref 26.5–33)
MCHC RBC AUTO-ENTMCNC: 32.5 G/DL (ref 31.5–36.5)
MCHC RBC AUTO-ENTMCNC: 33.6 G/DL (ref 31.5–36.5)
MCV RBC AUTO: 93 FL (ref 78–100)
MCV RBC AUTO: 95 FL (ref 78–100)
MICRO REPORT STATUS: NORMAL
PLATELET # BLD AUTO: 63 10E9/L (ref 150–450)
PLATELET # BLD AUTO: 66 10E9/L (ref 150–450)
POTASSIUM SERPL-SCNC: 3.7 MMOL/L (ref 3.4–5.3)
PROT SERPL-MCNC: 5.1 G/DL (ref 6.8–8.8)
RBC # BLD AUTO: 2.56 10E12/L (ref 4.4–5.9)
RBC # BLD AUTO: 2.58 10E12/L (ref 4.4–5.9)
SODIUM SERPL-SCNC: 134 MMOL/L (ref 133–144)
SPECIMEN SOURCE: NORMAL
WBC # BLD AUTO: 7.8 10E9/L (ref 4–11)
WBC # BLD AUTO: 8.6 10E9/L (ref 4–11)

## 2017-01-14 PROCEDURE — 73590 X-RAY EXAM OF LOWER LEG: CPT | Mod: RT

## 2017-01-14 PROCEDURE — 73560 X-RAY EXAM OF KNEE 1 OR 2: CPT | Mod: RT

## 2017-01-14 PROCEDURE — 25000125 ZZHC RX 250

## 2017-01-14 PROCEDURE — 83615 LACTATE (LD) (LDH) ENZYME: CPT | Performed by: INTERNAL MEDICINE

## 2017-01-14 PROCEDURE — 80053 COMPREHEN METABOLIC PANEL: CPT | Performed by: INTERNAL MEDICINE

## 2017-01-14 PROCEDURE — 25000132 ZZH RX MED GY IP 250 OP 250 PS 637: Performed by: INTERNAL MEDICINE

## 2017-01-14 PROCEDURE — 36415 COLL VENOUS BLD VENIPUNCTURE: CPT | Performed by: INTERNAL MEDICINE

## 2017-01-14 PROCEDURE — S0171 BUMETANIDE 0.5 MG: HCPCS

## 2017-01-14 PROCEDURE — 85610 PROTHROMBIN TIME: CPT | Performed by: INTERNAL MEDICINE

## 2017-01-14 PROCEDURE — 25000125 ZZHC RX 250: Performed by: INTERNAL MEDICINE

## 2017-01-14 PROCEDURE — 99233 SBSQ HOSP IP/OBS HIGH 50: CPT | Mod: GC | Performed by: INTERNAL MEDICINE

## 2017-01-14 PROCEDURE — 21400006 ZZH R&B CCU INTERMEDIATE UMMC

## 2017-01-14 PROCEDURE — 85027 COMPLETE CBC AUTOMATED: CPT | Performed by: INTERNAL MEDICINE

## 2017-01-14 RX ORDER — WARFARIN SODIUM 6 MG/1
6 TABLET ORAL
Status: COMPLETED | OUTPATIENT
Start: 2017-01-14 | End: 2017-01-14

## 2017-01-14 RX ADMIN — Medication 62.5 MCG: at 08:54

## 2017-01-14 RX ADMIN — WARFARIN SODIUM 6 MG: 6 TABLET ORAL at 19:50

## 2017-01-14 RX ADMIN — METHOCARBAMOL 750 MG: 750 TABLET ORAL at 08:54

## 2017-01-14 RX ADMIN — METHOCARBAMOL 750 MG: 750 TABLET ORAL at 21:02

## 2017-01-14 RX ADMIN — OXYCODONE HYDROCHLORIDE 10 MG: 5 TABLET ORAL at 15:26

## 2017-01-14 RX ADMIN — Medication: at 08:55

## 2017-01-14 RX ADMIN — OMEPRAZOLE 20 MG: 20 CAPSULE, DELAYED RELEASE ORAL at 08:54

## 2017-01-14 RX ADMIN — OXYCODONE HYDROCHLORIDE 10 MG: 5 TABLET ORAL at 09:04

## 2017-01-14 RX ADMIN — BUMETANIDE 2 MG: 0.25 INJECTION, SOLUTION INTRAMUSCULAR; INTRAVENOUS at 08:55

## 2017-01-14 RX ADMIN — Medication 12.5 MG: at 08:55

## 2017-01-14 RX ADMIN — Medication: at 21:02

## 2017-01-14 RX ADMIN — LEVOTHYROXINE SODIUM 224 MCG: 112 TABLET ORAL at 08:54

## 2017-01-14 RX ADMIN — OXYCODONE HYDROCHLORIDE 10 MG: 5 TABLET ORAL at 21:02

## 2017-01-14 RX ADMIN — BUMETANIDE 2 MG: 0.25 INJECTION, SOLUTION INTRAMUSCULAR; INTRAVENOUS at 21:02

## 2017-01-14 RX ADMIN — AMIODARONE HYDROCHLORIDE 300 MG: 200 TABLET ORAL at 08:53

## 2017-01-14 RX ADMIN — OXYCODONE HYDROCHLORIDE 10 MG: 5 TABLET ORAL at 04:41

## 2017-01-14 NOTE — PROGRESS NOTES
"SPIRITUAL HEALTH SERVICES  SPIRITUAL ASSESSMENT Progress Note  [facility] 6C     PRIMARY FOCUS:     Goals of care    Support for coping    ILLNESS CIRCUMSTANCES:   Initial 6C unit  visit with pt, per pt request. Reviewed documentation. Reflective conversation shared with pt which integrated elements of illness and family narratives.     Context of Serious Illness/Symptom(s) - pt shared details of his various surgeries throughout his life, and of his present condition of work-up for LVAD and possible transplant.    Resources for Support - pt said he feels deeply connected to his [facility] community where he lives in [place], [place] and on the [facility] - \"my family has very deep roots there, going way back to the 1800s.     DISTRESS:     Emotional/Spiritual/Existential Distress - pt concerned about his \"slowing down\" (\"still I feel pretty good, but I believe the doctors when they say my heart is failing and something needs to be done...\")    Orthodoxy Distress - Not Discussed     Social/Cultural/Economic Distress - Not Discussed     SPIRITUAL/Sabianist COPING:     Sikhism/Aminata - pt follows and practices native spiritual traditions of the [place] - \"I speak [place], and as a relatively young person-not many people of my age still speak the language- that's given me a role in the community of praying for the sick, and singing sacred songs, all those things...being of service to the community\". I asked pt if he would like a visit from Chaplain Resident Francisco Amezquita, a native spiritual leader doing Clinical Pastoral Education at [facility] - pt said he would appreciate that.    Spiritual Practice(s) - pt requested prayer. I asked pt if he could share one of the prayers in [place], which he did.    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    GOALS OF CARE:    Goals of Care - pt shared a desire for healing and strength, to be able to continue serving his community - he is quite appreciative of the chance to receive treatment at [facility].    Meaning/Sense-Making - pt finds meaning in making deep connections with other people and in serving his community.    PLAN: continue to follow, visiting at least 2x weekly while pt on unit. Also I will request visit from Chaplain Resident Francisco Amezquita.    Gian Mathias M.Div. ([place]), [facility]  Staff   Pager [phone]

## 2017-01-14 NOTE — PLAN OF CARE
Problem: Cardiac: Heart Failure (Adult)  Goal: Signs and Symptoms of Listed Potential Problems Will be Absent or Manageable (Cardiac: Heart Failure)  Signs and symptoms of listed potential problems will be absent or manageable by discharge/transition of care (reference Cardiac: Heart Failure (Adult) CPG).  Outcome: No Change  D: Admitted 1/12 from [place] with CHF exacerbation and possible LVAD work up.     No acute events overnight.  Vitamin K given.   Hgb 7.8 -->8.5.   Miconazole added for nicola area.     I: Monitored vitals and assessed pt status.   PRN:Oxycodone 10mg (x2)    A: A0x4. VSS, on RA. Paced. Afebrile. Leg pain controlled with PRN medications. Q2H turns, legs elevated throughout night. Attempting BM without success x2. Bumex given IV push, roughly 1L of UO. 1.5L FR, patient appears to be complying. Weight down 4-5lbs since last weigh in. Dressing CDI, with leg brace on. Patient slept between cares.     Temp:  [97.5  F (36.4  C)-98.4  F (36.9  C)] 98.1  F (36.7  C)  Heart Rate:  [60-89] 60  Resp:  [14-18] 16  BP: ()/(47-57) 94/48 mmHg  SpO2:  [97 %-99 %] 99 %      P: Continue to monitor Pt status and report changes to treatment team.

## 2017-01-14 NOTE — PLAN OF CARE
Problem: Goal Outcome Summary  Goal: Goal Outcome Summary  PT: No orders from ortho have been received regarding WB status of R LE, per RN staff currently using lift to transfer.  Pt busy getting x-ray at this time, will attempt to see tomorrow pending WB orders.      I personally saw and examined this patient, reviewed imaging and laboratory studies, confirmed physical examination and discussed results and plan with patient and or family.

## 2017-01-14 NOTE — PROGRESS NOTES
Progress Note  January 13, 2017    Samir Rodriguez MRN# [id number]   Age: 46 year old YOB: 1970   Date of Admission: 1/12/2017           Assessment and Plan:   Samir Rodriguez is a 46 year old  male with past medical history of Rheumatic Heart Disease s/p mechanical MVR x 2 (1980s and 1992) on warfarin (INR goal 2.5-3.5), biventricular CHF (EF 25-30%) s/p dual chamber ICD 2008, chronic afib on amiodarone and digoxine, WPW ablation at age 11, CKD III, hypothyroid,  who is transferred from [facility] for further eval of CHF and concern of hemolysis in setting of mechanical valve.    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 Since 11/16 pt has had worsening LE edema. CHAPMAN at baseline with 2-3 blocks. + orthopnea. + JVD and 3+ LE edema. BNP 44419. Weight on admission 111kg.  - losartan will resume renal function stable and no longer actively diuresing  - Continue metoprolol xl 12.5mg daily    - Not on spironolactone - will resume later  - bumex 2mg IV BID    - Device interrogation    - Daily weights    - Strict I/O    # Mechanical mitral valve (MV diastolic gradient 6.5)  # Aortic Insufficiency (mod - severe)  # TR (moderate)  # Anticoagulation  Concern for endocarditis vs perivalvular leak vs hemolysis from MVR shearing    - Blood cultures x 2: NGTD   - Awaiting OSH EDEL imaging     # Anemia and thrombocytopenia  (pancytopenia at OSH, concern for MAHA)  # positive occult blood at OSH: patient denies melena, hematochezia  # Hematoma right arm  Blood smear: blood smear- RBCs show some target cells, hypochromia, increased polychromasia, no schistocytes or spherocytes. A few PMNs are hypersegmented. Decreased platelets, those present are normal appearing.   Iron 51, TIBC 158, Iron sat 32, ferritin 2664, hemoglobin plasma elevated 160, haptoglobin low < 6, fibrinogen low at 121, INR 3.10, UA normal, CRP/ESR nl, Coomb's test negative,   - Epo pending   - heme onc consult - appreciate recs    # Direct and Indirect hyperbilirubinemia, sclera icterus:   # Transaminates:   Congestive hepatopathy vs hemolysis vs endocarditis. Complete ABd US shows no cirrhosis, splenomegaly or splanchnic venous thrombosis. Mild - mod ascites     # Chronic afib:  digoxin level 0.7  - Continue Amiodarone, digoxin    # Hyperthyroid: hx of hypothyroid but here decreased TSH 0.25 and T4 elevated 2.18 in setting of acute illness. No changes   - levothyroxine 224mcg     # Right tibia fx and R knee trauma:    - brace in place    - Ortho consult- appreciate recs  - xray tibia and knee     FEN: cardiac diet   PPX: on warfarin     Code Status: Full CODE      Patient discussed with staff attending, Dr. Cartagena.  Please feel free to page with questions.    Meggan Landers MD    Internal Medicine PGY1  Cardiology Service II  Pager: [phone]           Interval History/Subjective   No acute events overnight.  Patient doing well. ecchymosis stable on R arm.          Objective   Temp:  [97.5  F (36.4  C)-98.4  F (36.9  C)] 98  F (36.7  C)  Heart Rate:  [59-89] 62  Resp:  [14-16] 16  BP: ()/(48-57) 95/57 mmHg  SpO2:  [95 %-99 %] 98 %    Weight 245lbs --> 240lbs     Intake/Output Summary (Last 24 hours) at 01/13/17 0706  Last data filed at 01/13/17 0000   Gross per 24 hour   Intake    480 ml   Output    675 ml   Net   -195 ml     Physical exam:  Gen: AA&Ox3,  no acute distress  HEENT: NACT, PERRLA, EOMI, MMM, OP clear  PULM: Clear to auscultation bilaterally, no rales/rhonchi/wheezes  CV: paced rhythm, ABHI at RUSB LLSB. + JVD  ABD:  soft, nontender, nondistended.    EXT: +3 sonam edema, no clubbing or cyanosis. Right arm ecchymosis with normal sensory and strength   NEURO: CN II-XII intact, strength 5/5 throughout except R leg that is limited due to pain, sensory intact, moves all extremities   SKIN: Right arm ecchymosis          Data:   CBC    Recent Labs  Lab 01/14/17  0847 01/13/17  2230 01/13/17  0650 01/13/17  0019 01/12/17  1121   WBC 7.8  --  8.8 8.6 11.5*   RBC 2.56*  --  2.50* 2.58* 2.66*   HGB 7.9* 8.5* 7.8* 8.1* 8.3*   HCT 24.3*  --  23.1* 24.1* 24.4*   MCV 95  --  92 93 92   MCH 30.9  --  31.2 31.4 31.2   MCHC 32.5  --  33.8 33.6 34.0   RDW 19.5*  --  18.6* 19.2* 18.1*   PLT 66*  --  63* 63* 62*     CMP    Recent Labs  Lab 01/14/17  1345 01/13/17  1800 01/13/17  0650 01/12/17  1121    136 135 136   POTASSIUM 3.7 3.9 3.8 4.1   CHLORIDE 97 98 98 96   CO2 32 29 30 28   ANIONGAP 5 9 8 11   * 80 83 127*   BUN 45* 52* 52* 58*   CR 1.93* 2.10* 1.97* 2.02*   GFRESTIMATED 37* 34* 37* 35*   GFRESTBLACK 45* 41* 44* 43*   DELFINO 7.2* 7.2* 7.6* 7.5*   PROTTOTAL 5.1*  --  5.1* 5.2*   ALBUMIN 2.4*  --  2.5* 2.6*   BILITOTAL 3.6*  --  3.6* 3.2*   ALKPHOS 135  --  138 148   *  --  186* 171*   *  --  229* 208*     INR    Recent Labs  Lab 01/14/17  1345 01/13/17  0650 01/12/17 2023 01/12/17  1556   INR 2.86* 2.99* 2.71* 3.10*             Medications:     Current Facility-Administered Medications   Medication     warfarin (COUMADIN) tablet 6 mg     miconazole (MICATIN; MICRO GUARD) 2 % powder     melatonin tablet 3 mg     lidocaine 1 % 1 mL     lidocaine (LMX4) kit     sodium chloride (PF) 0.9% PF flush 3 mL     sodium chloride (PF) 0.9% PF flush 3 mL     medication instruction     nitroglycerin (NITROSTAT) sublingual tablet 0.4 mg     alum & mag  hydroxide-simethicone (MYLANTA ES/MAALOX  ES) suspension 15-30 mL     acetaminophen (TYLENOL) tablet 650 mg     acetaminophen (TYLENOL) Suppository 650 mg     polyethylene glycol (MIRALAX/GLYCOLAX) Packet 17 g     albuterol (PROAIR HFA/PROVENTIL HFA/VENTOLIN HFA) Inhaler 2 puff     sennosides (SENOKOT) tablet 8.6 mg     naloxone (NARCAN) injection 0.1-0.4 mg     levothyroxine (SYNTHROID/LEVOTHROID) tablet 224 mcg     omeprazole (priLOSEC) CR capsule 20 mg     methocarbamol (ROBAXIN) tablet 750 mg     amiodarone (PACERONE/CODARONE) tablet 300 mg     Warfarin Therapy Reminder (Check START DATE - warfarin may be starting in the FUTURE)     digoxin (LANOXIN) half-tab 62.5 mcg     metoprolol (TOPROL-XL) half-tab 12.5 mg     bumetanide (BUMEX) injection 2 mg     oxyCODONE (ROXICODONE) IR tablet 5-10 mg            I personally saw and examined this patient, reviewed imaging and laboratory studies, confirmed physical examination and discussed results and plan with patient and or family.

## 2017-01-15 ENCOUNTER — APPOINTMENT (OUTPATIENT)
Dept: GENERAL RADIOLOGY | Facility: CLINIC | Age: 47
DRG: 286 | End: 2017-01-15
Attending: INTERNAL MEDICINE
Payer: MEDICAID

## 2017-01-15 ENCOUNTER — APPOINTMENT (OUTPATIENT)
Dept: PHYSICAL THERAPY | Facility: CLINIC | Age: 47
DRG: 286 | End: 2017-01-15
Attending: INTERNAL MEDICINE
Payer: MEDICAID

## 2017-01-15 LAB
ANION GAP SERPL CALCULATED.3IONS-SCNC: 6 MMOL/L (ref 3–14)
BUN SERPL-MCNC: 42 MG/DL (ref 7–30)
CALCIUM SERPL-MCNC: 7.3 MG/DL (ref 8.5–10.1)
CHLORIDE SERPL-SCNC: 97 MMOL/L (ref 94–109)
CO2 SERPL-SCNC: 30 MMOL/L (ref 20–32)
CREAT SERPL-MCNC: 1.77 MG/DL (ref 0.66–1.25)
ERYTHROCYTE [DISTWIDTH] IN BLOOD BY AUTOMATED COUNT: 20.2 % (ref 10–15)
GFR SERPL CREATININE-BSD FRML MDRD: 41 ML/MIN/1.7M2
GLUCOSE SERPL-MCNC: 77 MG/DL (ref 70–99)
HCT VFR BLD AUTO: 23.8 % (ref 40–53)
HGB BLD-MCNC: 8 G/DL (ref 13.3–17.7)
INR PPP: 3.1 (ref 0.86–1.14)
MAGNESIUM SERPL-MCNC: 1.9 MG/DL (ref 1.6–2.3)
MCH RBC QN AUTO: 31.6 PG (ref 26.5–33)
MCHC RBC AUTO-ENTMCNC: 33.6 G/DL (ref 31.5–36.5)
MCV RBC AUTO: 94 FL (ref 78–100)
PLATELET # BLD AUTO: 67 10E9/L (ref 150–450)
POTASSIUM SERPL-SCNC: 3.6 MMOL/L (ref 3.4–5.3)
RBC # BLD AUTO: 2.53 10E12/L (ref 4.4–5.9)
SODIUM SERPL-SCNC: 133 MMOL/L (ref 133–144)
WBC # BLD AUTO: 6.1 10E9/L (ref 4–11)

## 2017-01-15 PROCEDURE — S0171 BUMETANIDE 0.5 MG: HCPCS

## 2017-01-15 PROCEDURE — 97116 GAIT TRAINING THERAPY: CPT | Mod: GP | Performed by: REHABILITATION PRACTITIONER

## 2017-01-15 PROCEDURE — 36415 COLL VENOUS BLD VENIPUNCTURE: CPT | Performed by: INTERNAL MEDICINE

## 2017-01-15 PROCEDURE — 97162 PT EVAL MOD COMPLEX 30 MIN: CPT | Mod: GP | Performed by: REHABILITATION PRACTITIONER

## 2017-01-15 PROCEDURE — 97530 THERAPEUTIC ACTIVITIES: CPT | Mod: GP | Performed by: REHABILITATION PRACTITIONER

## 2017-01-15 PROCEDURE — 25000125 ZZHC RX 250

## 2017-01-15 PROCEDURE — 85610 PROTHROMBIN TIME: CPT | Performed by: INTERNAL MEDICINE

## 2017-01-15 PROCEDURE — 25000132 ZZH RX MED GY IP 250 OP 250 PS 637: Performed by: INTERNAL MEDICINE

## 2017-01-15 PROCEDURE — 83735 ASSAY OF MAGNESIUM: CPT | Performed by: INTERNAL MEDICINE

## 2017-01-15 PROCEDURE — 80048 BASIC METABOLIC PNL TOTAL CA: CPT | Performed by: INTERNAL MEDICINE

## 2017-01-15 PROCEDURE — 97110 THERAPEUTIC EXERCISES: CPT | Mod: GP | Performed by: REHABILITATION PRACTITIONER

## 2017-01-15 PROCEDURE — 21400006 ZZH R&B CCU INTERMEDIATE UMMC

## 2017-01-15 PROCEDURE — 99233 SBSQ HOSP IP/OBS HIGH 50: CPT | Mod: GC | Performed by: INTERNAL MEDICINE

## 2017-01-15 PROCEDURE — 85027 COMPLETE CBC AUTOMATED: CPT | Performed by: INTERNAL MEDICINE

## 2017-01-15 PROCEDURE — 40000193 ZZH STATISTIC PT WARD VISIT: Performed by: REHABILITATION PRACTITIONER

## 2017-01-15 PROCEDURE — 73560 X-RAY EXAM OF KNEE 1 OR 2: CPT | Mod: RT

## 2017-01-15 RX ORDER — METOLAZONE 5 MG/1
5 TABLET ORAL ONCE
Status: COMPLETED | OUTPATIENT
Start: 2017-01-15 | End: 2017-01-15

## 2017-01-15 RX ORDER — CLONAZEPAM 0.5 MG/1
0.25 TABLET ORAL DAILY PRN
Status: DISCONTINUED | OUTPATIENT
Start: 2017-01-15 | End: 2017-01-19

## 2017-01-15 RX ORDER — LANOLIN ALCOHOL/MO/W.PET/CERES
3 CREAM (GRAM) TOPICAL
Status: DISCONTINUED | OUTPATIENT
Start: 2017-01-15 | End: 2017-02-12 | Stop reason: HOSPADM

## 2017-01-15 RX ORDER — WARFARIN SODIUM 4 MG/1
4 TABLET ORAL
Status: COMPLETED | OUTPATIENT
Start: 2017-01-15 | End: 2017-01-15

## 2017-01-15 RX ADMIN — METHOCARBAMOL 750 MG: 750 TABLET ORAL at 09:23

## 2017-01-15 RX ADMIN — OXYCODONE HYDROCHLORIDE 10 MG: 5 TABLET ORAL at 09:22

## 2017-01-15 RX ADMIN — AMIODARONE HYDROCHLORIDE 300 MG: 200 TABLET ORAL at 09:24

## 2017-01-15 RX ADMIN — LEVOTHYROXINE SODIUM 224 MCG: 112 TABLET ORAL at 09:24

## 2017-01-15 RX ADMIN — BUMETANIDE 2 MG: 0.25 INJECTION, SOLUTION INTRAMUSCULAR; INTRAVENOUS at 09:31

## 2017-01-15 RX ADMIN — Medication 12.5 MG: at 09:24

## 2017-01-15 RX ADMIN — OXYCODONE HYDROCHLORIDE 10 MG: 5 TABLET ORAL at 22:18

## 2017-01-15 RX ADMIN — METOLAZONE 5 MG: 5 TABLET ORAL at 12:08

## 2017-01-15 RX ADMIN — WARFARIN SODIUM 4 MG: 4 TABLET ORAL at 17:08

## 2017-01-15 RX ADMIN — OXYCODONE HYDROCHLORIDE 10 MG: 5 TABLET ORAL at 01:17

## 2017-01-15 RX ADMIN — OMEPRAZOLE 20 MG: 20 CAPSULE, DELAYED RELEASE ORAL at 09:23

## 2017-01-15 RX ADMIN — Medication: at 20:18

## 2017-01-15 RX ADMIN — Medication: at 09:24

## 2017-01-15 RX ADMIN — OXYCODONE HYDROCHLORIDE 10 MG: 5 TABLET ORAL at 13:56

## 2017-01-15 RX ADMIN — OXYCODONE HYDROCHLORIDE 10 MG: 5 TABLET ORAL at 18:24

## 2017-01-15 RX ADMIN — BUMETANIDE 2 MG: 0.25 INJECTION, SOLUTION INTRAMUSCULAR; INTRAVENOUS at 20:18

## 2017-01-15 RX ADMIN — METHOCARBAMOL 750 MG: 750 TABLET ORAL at 20:18

## 2017-01-15 RX ADMIN — Medication 62.5 MCG: at 09:23

## 2017-01-15 RX ADMIN — Medication 0.25 MG: at 22:18

## 2017-01-15 NOTE — CONSULTS
[facility] Orthopedic Consultation    Samir Rodriguez MRN# [id number]   Age: 46 year old YOB: 1970   Date of Admission:      1/12/2017    Reason for consult: Remote R tibia plateau fx   Requesting physician: Shin Cartagena, *   Level of consult: One-time consult to assist in determining a diagnosis and to recommend an appropriate treatment plan          Impression and Recommendation:   Impression:  Samir Rodriguez is an otherwise healthy 46 year old male who presents s/p R tib plateau fx sustained from GLF on 11/14/16; transferred to  for heart failure w/u.     Recomendations:  Activity:  Guarded WBAT (Ok to bear if not painful, increase as able).     ROM as tolerated (sig deficit, needs to begin AROM/PROM now)  Splint: OK to use HKB when upright and ambulating. OK to remove for hygiene. DC brace in 4wk  DVT PPx:  DVT ppx per primary team.   Imaging:  Repeat lateral R knee (needs brace removed)  PT/OT:  Work on ROM, strengthening, gait training, mobility, and ADLs  Consults:  ortho  Dispo:  Per Primary team    Follow Up:  Patient to follow up with Orthopedic surgery in 4-6 weeks w/ XR R knee ap/lat         Chief Complaint:   R knee stiffness         History of Present Illness:   This patient is a 46 year old male with a significant past medical history of anemia and congestive heart failure who presents with remote nondisplaced tib plateau fracture after GLF in November 2016. Per pt he was treated in a long leg splint initially and transitioned to a HKB before his transfer to the . He has been NWB since the injury. He has no been working on knee ROM. After reviewing his knee XR from today , which were negative; I called the hospital in [place] where he was transferred from to review images and medical history to gather how he came to have the brace in place.      History obtained from patient interview and chart review.        Past Medical History:     Past Medical History    Diagnosis Date     Rheumatic heart disease              Past Surgical History:     Past Surgical History   Procedure Laterality Date     Cholecystectomy       Hernia repair       Appendectomy       Mitral valve replacement       Mechanical ([place] Tilting Disc); 1992             Social History:     Social History     Social History     Marital Status: Single     Spouse Name: N/A     Number of Children: N/A     Years of Education: N/A     Occupational History     Not on file.     Social History Main Topics     Smoking status: Never Smoker      Smokeless tobacco: Not on file     Alcohol Use: No     Drug Use: No     Sexual Activity: Not on file     Other Topics Concern     Not on file     Social History Narrative             Family History:     Family History   Problem Relation Age of Onset     DIABETES Mother      Hypertension Brother               Allergies:     Allergies   Allergen Reactions     Asa [Aspirin] Other (See Comments) and Diarrhea     goosebumps  nausea     Gabapentin Nausea     Nabumetone Nausea     Sulfamethoxazole Unknown     Trimethoprim Unknown     Allopurinol Rash     Clindamycin Rash             Medications:     Current Facility-Administered Medications   Medication     miconazole (MICATIN; MICRO GUARD) 2 % powder     melatonin tablet 3 mg     lidocaine 1 % 1 mL     lidocaine (LMX4) kit     sodium chloride (PF) 0.9% PF flush 3 mL     sodium chloride (PF) 0.9% PF flush 3 mL     medication instruction     nitroglycerin (NITROSTAT) sublingual tablet 0.4 mg     alum & mag hydroxide-simethicone (MYLANTA ES/MAALOX  ES) suspension 15-30 mL     acetaminophen (TYLENOL) tablet 650 mg     acetaminophen (TYLENOL) Suppository 650 mg     polyethylene glycol (MIRALAX/GLYCOLAX) Packet 17 g     albuterol (PROAIR HFA/PROVENTIL HFA/VENTOLIN HFA) Inhaler 2 puff     sennosides (SENOKOT) tablet 8.6 mg     naloxone (NARCAN) injection 0.1-0.4 mg     levothyroxine (SYNTHROID/LEVOTHROID) tablet 224 mcg     omeprazole  (priLOSEC) CR capsule 20 mg     methocarbamol (ROBAXIN) tablet 750 mg     amiodarone (PACERONE/CODARONE) tablet 300 mg     Warfarin Therapy Reminder (Check START DATE - warfarin may be starting in the FUTURE)     digoxin (LANOXIN) half-tab 62.5 mcg     metoprolol (TOPROL-XL) half-tab 12.5 mg     bumetanide (BUMEX) injection 2 mg     oxyCODONE (ROXICODONE) IR tablet 5-10 mg             Physical Exam:     BP 93/53 mmHg  Temp(Src) 98.3  F (36.8  C) (Oral)  Resp 14  Wt 108.863 kg (240 lb)  SpO2 97%  General: awake, alert, cooperative, no apparent distress, appears stated age  HEENT: normocephalic, atraumatic, PERRL, EOMI, no scleral icterus, MMM  Respiratory: breathing non-labored, no wheezing  Cardiovascular: peripheral pulses faint, 2+ edema above knees bilaterally  Skin: thin, frail appearing, trophic changes to BLE  Neurological: A&Ox3, CN II-XII grossly intact  Musculoskeletal:  RLE: Skin intact. Knee ROM limited 0-30, very stiff. Min pain until ends of ROM. Fires TA/Gastroc/EHL/FHL with 4/5 strength. SILT in deep peroneal, superficial peroneal, and tibial nerve distributions. Pulses faint, difficult to palp through sig edema. TTP diffuse about knee (patella, med, lat, post), not localized to plateau.           Imaging:   Review of ap/lat knee and tib/fib  films from 1/14/2017 demonstrate no acute fracture. No abnormality to suggest displaced and possibly healed plateau fracture.     Called OSH to review, per report negative R knee XR from 12/20 as well.     Initial diagnosis is from a knee CT done in November reporting nondisplaced plateau fracture at site of PCL insertion. Images cannot be pushed and are not available for review.           Laboratory date:   CBC:  Lab Results   Component Value Date    WBC 7.8 01/14/2017    HGB 7.9* 01/14/2017    PLT 66* 01/14/2017       BMP:  Lab Results   Component Value Date     01/14/2017    POTASSIUM 3.7 01/14/2017    CHLORIDE 97 01/14/2017    CO2 32 01/14/2017     BUN 45* 01/14/2017    CR 1.93* 01/14/2017    ANIONGAP 5 01/14/2017    DELFINO 7.2* 01/14/2017    * 01/14/2017       Inflammatory Markers:  Lab Results   Component Value Date    WBC 7.8 01/14/2017    CRP 6.2 01/13/2017    SED 17* 01/13/2017       Donaldo Nuñez MD  01/14/2017  Pager [phone]    Attestation:  This patient was discussed with Dr Uriarte who agrees with the above.    ----  7:25 AM 1/15/2017  Reviewed repeat R knee lateral XR. No new changes. No fracture or deformity observed. Continue with plan as above.    Donaldo Nuñez MD  01/15/2017  Pager [phone]

## 2017-01-15 NOTE — PROGRESS NOTES
Progress Note  January 13, 2017    Samir Rodriguez MRN# [id number]   Age: 46 year old YOB: 1970   Date of Admission: 1/12/2017           Assessment and Plan:   Samir Rodriguez is a 46 year old  male with past medical history of Rheumatic Heart Disease s/p mechanical MVR x 2 (1980s and 1992) on warfarin (INR goal 2.5-3.5), biventricular CHF (EF 25-30%) s/p dual chamber ICD 2008, chronic afib on amiodarone and digoxine, WPW ablation at age 11, CKD III, hypothyroid,  who is transferred from [facility] for further eval of CHF and concern of hemolysis in setting of mechanical valve.    Today's changes:    Diuresis  - add metolazone. Goal -2 to -3L UOP per 24 hours    Daily INR     AM LDH, CBC, BMP    Awaiting outside EDEL disc    Melatonin for sleep    Patient wants home clonazepam.   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 Since 11/16 pt has had worsening LE edema. CHAPMAN at baseline with 2-3 blocks. + orthopnea. + JVD and 3+ LE edema. BNP 99845. Weight on admission 111kg.  - losartan will resume renal function stable and no longer actively diuresing  - Continue metoprolol xl 12.5mg daily    - Not on spironolactone - will resume later  - bumex 2mg IV BID    - Device interrogation    - Daily weights    - Strict I/O    # Mechanical mitral valve (MV diastolic gradient 6.5)  # Aortic Insufficiency (mod - severe)  # TR (moderate)  # Anticoagulation  Concern for endocarditis vs perivalvular leak vs hemolysis from  MVR shearing    - Blood cultures x 2: NGTD   - Awaiting OSH EDEL imaging     # Anemia and thrombocytopenia (pancytopenia at OSH, concern for MAHA)  # positive occult blood at OSH: patient denies melena, hematochezia  # Hematoma right arm  Blood smear: blood smear- RBCs show some target cells, hypochromia, increased polychromasia, no schistocytes or spherocytes. A few PMNs are hypersegmented. Decreased platelets, those present are normal appearing.   Iron 51, TIBC 158, Iron sat 32, ferritin 2664, hemoglobin plasma elevated 160, haptoglobin low < 6, fibrinogen low at 121, INR 3.10, UA normal, CRP/ESR nl, Coomb's test negative,   - Epo pending   - heme onc consult - appreciate recs    # Direct and Indirect hyperbilirubinemia, sclera icterus:   # Transaminates:   Congestive hepatopathy vs hemolysis vs endocarditis. Complete ABd US shows no cirrhosis, splenomegaly or splanchnic venous thrombosis. Mild - mod ascites     # Chronic afib:  digoxin level 0.7  - Continue Amiodarone, digoxin    # Hyperthyroid: hx of hypothyroid but here decreased TSH 0.25 and T4 elevated 2.18 in setting of acute illness. No changes here. Will repeat levels at outpatient after 4 weeks of discharge   - levothyroxine 224mcg     # Anxiety    # Right tibia fx and R knee trauma:  xray tibia and knee no fx  - brace in place    - Ortho consult- appreciate recs    FEN: cardiac diet   PPX: on warfarin     Code Status: Full CODE      Patient discussed with staff attending, Dr. Cartagena.  Please feel free to page with questions.    Meggan Landers MD    Internal Medicine PGY1  Cardiology Service II  Pager: [phone]           Interval History/Subjective   No acute events overnight.  Patient doing well. ecchymosis stable on R arm.          Objective   Temp:  [98  F (36.7  C)-98.3  F (36.8  C)] 98  F (36.7  C)  Heart Rate:  [60] 60  Resp:  [10-16] 14  BP: ()/(45-56) 96/45 mmHg  SpO2:  [97 %-99 %] 97 %    Weight 245lbs --> 240lbs --> 233lbs      Intake/Output Summary (Last 24 hours) at 01/13/17 0706  Last data filed at 01/13/17 0000   Gross per 24 hour   Intake    480 ml   Output    675 ml   Net   -195 ml     Physical exam:  Gen: AA&Ox3, no acute distress  HEENT: NACT, poor dentition   PULM: Clear to auscultation bilaterally, no rales/rhonchi/wheezes  CV: paced rhythm, ABHI at RUSB and LLSB. + JVD  ABD:  soft, nontender, nondistended.    EXT: +3 sonam edema, no clubbing or cyanosis. Right arm ecchymosis with normal sensory and strength   NEURO: CN II-XII intact, moves all extremities   SKIN: Right arm ecchymosis          Data:   CBC    Recent Labs  Lab 01/15/17  0742 01/14/17  0847 01/13/17  2230 01/13/17  0650 01/13/17  0019   WBC 6.1 7.8  --  8.8 8.6   RBC 2.53* 2.56*  --  2.50* 2.58*   HGB 8.0* 7.9* 8.5* 7.8* 8.1*   HCT 23.8* 24.3*  --  23.1* 24.1*   MCV 94 95  --  92 93   MCH 31.6 30.9  --  31.2 31.4   MCHC 33.6 32.5  --  33.8 33.6   RDW 20.2* 19.5*  --  18.6* 19.2*   PLT 67* 66*  --  63* 63*     CMP    Recent Labs  Lab 01/15/17  0742 01/14/17  1345 01/13/17  1800 01/13/17  0650 01/12/17  1121    134 136 135 136   POTASSIUM 3.6 3.7 3.9 3.8 4.1   CHLORIDE 97 97 98 98 96   CO2 30 32 29 30 28   ANIONGAP 6 5 9 8 11   GLC 77 107* 80 83 127*   BUN 42* 45* 52* 52* 58*   CR 1.77* 1.93* 2.10* 1.97* 2.02*   GFRESTIMATED 41* 37* 34* 37* 35*   GFRESTBLACK 50* 45* 41* 44* 43*   DELFINO 7.3* 7.2* 7.2* 7.6* 7.5*   MAG 1.9  --   --   --   --    PROTTOTAL  --  5.1*  --  5.1* 5.2*   ALBUMIN  --  2.4*  --  2.5* 2.6*   BILITOTAL  --  3.6*  --  3.6* 3.2*   ALKPHOS  --  135  --  138 148   AST  --  110*  --  186* 171*   ALT  --  191*  --  229* 208*     INR    Recent Labs  Lab 01/15/17  0742 01/14/17  1345 01/13/17  0650 01/12/17 2023   INR 3.10* 2.86* 2.99* 2.71*             Medications:     Current Facility-Administered Medications   Medication     metolazone (ZAROXOLYN) tablet 5 mg     clonazePAM (klonoPIN) half-tab 0.25 mg     melatonin tablet 3 mg     miconazole  (MICATIN; MICRO GUARD) 2 % powder     lidocaine 1 % 1 mL     lidocaine (LMX4) kit     sodium chloride (PF) 0.9% PF flush 3 mL     sodium chloride (PF) 0.9% PF flush 3 mL     medication instruction     nitroglycerin (NITROSTAT) sublingual tablet 0.4 mg     alum & mag hydroxide-simethicone (MYLANTA ES/MAALOX  ES) suspension 15-30 mL     acetaminophen (TYLENOL) tablet 650 mg     acetaminophen (TYLENOL) Suppository 650 mg     polyethylene glycol (MIRALAX/GLYCOLAX) Packet 17 g     albuterol (PROAIR HFA/PROVENTIL HFA/VENTOLIN HFA) Inhaler 2 puff     sennosides (SENOKOT) tablet 8.6 mg     naloxone (NARCAN) injection 0.1-0.4 mg     levothyroxine (SYNTHROID/LEVOTHROID) tablet 224 mcg     omeprazole (priLOSEC) CR capsule 20 mg     methocarbamol (ROBAXIN) tablet 750 mg     amiodarone (PACERONE/CODARONE) tablet 300 mg     Warfarin Therapy Reminder (Check START DATE - warfarin may be starting in the FUTURE)     digoxin (LANOXIN) half-tab 62.5 mcg     metoprolol (TOPROL-XL) half-tab 12.5 mg     bumetanide (BUMEX) injection 2 mg     oxyCODONE (ROXICODONE) IR tablet 5-10 mg      I personally saw and examined this patient, reviewed imaging and laboratory studies, confirmed physical examination and discussed results and plan with patient and or family.

## 2017-01-15 NOTE — PLAN OF CARE
Problem: Cardiac: Heart Failure (Adult)  Goal: Signs and Symptoms of Listed Potential Problems Will be Absent or Manageable (Cardiac: Heart Failure)  Signs and symptoms of listed potential problems will be absent or manageable by discharge/transition of care (reference Cardiac: Heart Failure (Adult) CPG).   Outcome: Improving  D: Admitted 1/12 from [place] with CHF exacerbation and Right Tibia fracture / Knee trauma.    No acute events overnight.  RLE X-ray completed, for better picture.  Patient stating right arm looks darker and more bruised than before, and appears to cross over previously drawn line. (From admit)    I: Monitored vitals and assessed pt status.    PRN:Oxycodone 10mg (x2)  Changed: Coccyx dressing    A: A0x4. VSS, on RA. MAPs noted at 61, but improved upon recheck. Paced. Afebrile. Leg pain controlled with PRN medications. Q2H turns, legs elevated throughout night. Bumex given IV push, roughly 1.4L of UO. 1.5L FR, patient appears to be complying. Weight down 7-8bs since last weigh in. Dressing CDI, with leg brace on. Patient slept between cares.     Temp:  [98  F (36.7  C)-98.3  F (36.8  C)] 98.3  F (36.8  C)  Heart Rate:  [59-62] 60  Resp:  [10-16] 14  BP: ()/(46-57) 99/56 mmHg  SpO2:  [95 %-99 %] 97 %      P: Continue to monitor Pt status and report changes to treatment team.

## 2017-01-15 NOTE — PLAN OF CARE
Problem: Goal Outcome Summary  Goal: Goal Outcome Summary  D/I/A: Pt admitted with CHF exacerbation and possible workup for LVAD. Continues to receive Bumex 2 mg twice daily. Weight down ~4.5 lbs from overnight; continues to have extensive generalized swelling of +3, scrotum and penile swelling of +4. Right arm hematoma appears to be a deeper blue/purple, however, has not changed in size from yesterday. Frequent lab draws today, lab having difficulty getting blood; PICC requested-awaiting to learn if order will be placed for this.  P: Continue with diuresis/CHF management. Notify team of any changes and/or concerns.    Ivory Maldonado RN  Cardiology

## 2017-01-15 NOTE — PLAN OF CARE
Problem: Goal Outcome Summary  Goal: Goal Outcome Summary  D/I/A: Pt transferred from OSH in [place] with CHF exacerbation and possible workup for LVAD. Weight continues to trend down with wt loss of ~7.5 lbs overnight; lower extremity and scrotal area remain edematous at +3-4. Scrotal area elevated on towel; OT eval ordered to assist with scrotal edema. Metolazone 5 mg ordered today, in addition to twice daily bumex; adequate response to addition of metolazone with large amount of urine output of 1450 cc's. Left upper chest and underarm area ecchymosis noticed by pt today when washing up. Area marked and Cards 2 notified. Up in chair today with PT; gait belt, walker, and assist of 2 required. Oxycodone given x2 for pain in right arm pain with relief.  P: Continue with diuresis, lab monitoring, pain management and increasing activity as able. Contact Cards 2 for any concerns and/or changes.\    Ivory Maldonado RN  Cardiology

## 2017-01-15 NOTE — PLAN OF CARE
Problem: Goal Outcome Summary  Goal: Goal Outcome Summary  Outcome: Therapy, progress toward functional goals as expected  6C PT- Evaluation completed and treatment initiated. Pt requires min-mod Ax2r transfers and CGA to min Ax2 for up to chair with FWW. Currently recommend TCU to progress transfers and gait, and adjust to advancement of RLE wt bear status from NWB to WBAT.  Pt reports 24 hr A at home, but high risk of falls, thus would Not recommend home at this time.  Pt highly motivated.    Pt would benefit from OT & lymphedema eval/treat orders to address ADLs and scrotal/LE edema respectively.  Pt to elevate scrotum on linens whenever resting in the mean time.

## 2017-01-16 ENCOUNTER — ALLIED HEALTH/NURSE VISIT (OUTPATIENT)
Dept: CARDIOLOGY | Facility: CLINIC | Age: 47
DRG: 286 | End: 2017-01-16
Attending: INTERNAL MEDICINE
Payer: MEDICAID

## 2017-01-16 ENCOUNTER — APPOINTMENT (OUTPATIENT)
Dept: ULTRASOUND IMAGING | Facility: CLINIC | Age: 47
DRG: 286 | End: 2017-01-16
Attending: INTERNAL MEDICINE
Payer: MEDICAID

## 2017-01-16 ENCOUNTER — APPOINTMENT (OUTPATIENT)
Dept: OCCUPATIONAL THERAPY | Facility: CLINIC | Age: 47
DRG: 286 | End: 2017-01-16
Attending: INTERNAL MEDICINE
Payer: MEDICAID

## 2017-01-16 ENCOUNTER — APPOINTMENT (OUTPATIENT)
Dept: PHYSICAL THERAPY | Facility: CLINIC | Age: 47
DRG: 286 | End: 2017-01-16
Attending: INTERNAL MEDICINE
Payer: MEDICAID

## 2017-01-16 DIAGNOSIS — I50.9 CHF (CONGESTIVE HEART FAILURE) (H): Primary | ICD-10-CM

## 2017-01-16 PROBLEM — Z95.810 ICD (IMPLANTABLE CARDIOVERTER-DEFIBRILLATOR), BIVENTRICULAR, IN SITU: Status: ACTIVE | Noted: 2017-01-16

## 2017-01-16 LAB
ANION GAP SERPL CALCULATED.3IONS-SCNC: 10 MMOL/L (ref 3–14)
BUN SERPL-MCNC: 37 MG/DL (ref 7–30)
CALCIUM SERPL-MCNC: 7.5 MG/DL (ref 8.5–10.1)
CHLORIDE SERPL-SCNC: 96 MMOL/L (ref 94–109)
CO2 SERPL-SCNC: 31 MMOL/L (ref 20–32)
CREAT SERPL-MCNC: 1.87 MG/DL (ref 0.66–1.25)
DIGOXIN SERPL-MCNC: 0.7 UG/L (ref 0.5–2)
ERYTHROCYTE [DISTWIDTH] IN BLOOD BY AUTOMATED COUNT: 20.5 % (ref 10–15)
FIBRINOGEN PPP-MCNC: 191 MG/DL (ref 200–420)
GFR SERPL CREATININE-BSD FRML MDRD: 39 ML/MIN/1.7M2
GLUCOSE SERPL-MCNC: 73 MG/DL (ref 70–99)
HCT VFR BLD AUTO: 26.2 % (ref 40–53)
HGB BLD-MCNC: 8.6 G/DL (ref 13.3–17.7)
INR PPP: 4.1 (ref 0.86–1.14)
LDH SERPL L TO P-CCNC: 418 U/L (ref 85–227)
MAGNESIUM SERPL-MCNC: 1.9 MG/DL (ref 1.6–2.3)
MCH RBC QN AUTO: 31.3 PG (ref 26.5–33)
MCHC RBC AUTO-ENTMCNC: 32.8 G/DL (ref 31.5–36.5)
MCV RBC AUTO: 95 FL (ref 78–100)
PLATELET # BLD AUTO: 93 10E9/L (ref 150–450)
POTASSIUM SERPL-SCNC: 3.3 MMOL/L (ref 3.4–5.3)
POTASSIUM SERPL-SCNC: 3.8 MMOL/L (ref 3.4–5.3)
RBC # BLD AUTO: 2.75 10E12/L (ref 4.4–5.9)
SODIUM SERPL-SCNC: 136 MMOL/L (ref 133–144)
WBC # BLD AUTO: 5.8 10E9/L (ref 4–11)

## 2017-01-16 PROCEDURE — 36415 COLL VENOUS BLD VENIPUNCTURE: CPT | Performed by: INTERNAL MEDICINE

## 2017-01-16 PROCEDURE — S0171 BUMETANIDE 0.5 MG: HCPCS

## 2017-01-16 PROCEDURE — 97166 OT EVAL MOD COMPLEX 45 MIN: CPT | Mod: GO | Performed by: OCCUPATIONAL THERAPIST

## 2017-01-16 PROCEDURE — 97530 THERAPEUTIC ACTIVITIES: CPT | Mod: GP

## 2017-01-16 PROCEDURE — 25000125 ZZHC RX 250: Performed by: INTERNAL MEDICINE

## 2017-01-16 PROCEDURE — 80048 BASIC METABOLIC PNL TOTAL CA: CPT | Performed by: INTERNAL MEDICINE

## 2017-01-16 PROCEDURE — 97535 SELF CARE MNGMENT TRAINING: CPT | Mod: GO | Performed by: OCCUPATIONAL THERAPIST

## 2017-01-16 PROCEDURE — 83615 LACTATE (LD) (LDH) ENZYME: CPT | Performed by: INTERNAL MEDICINE

## 2017-01-16 PROCEDURE — 40000133 ZZH STATISTIC OT WARD VISIT: Performed by: OCCUPATIONAL THERAPIST

## 2017-01-16 PROCEDURE — 97116 GAIT TRAINING THERAPY: CPT | Mod: GP

## 2017-01-16 PROCEDURE — 93931 UPPER EXTREMITY STUDY: CPT | Mod: RT

## 2017-01-16 PROCEDURE — 83735 ASSAY OF MAGNESIUM: CPT | Performed by: INTERNAL MEDICINE

## 2017-01-16 PROCEDURE — 21400006 ZZH R&B CCU INTERMEDIATE UMMC

## 2017-01-16 PROCEDURE — 93284 PRGRMG EVAL IMPLANTABLE DFB: CPT | Mod: ZF

## 2017-01-16 PROCEDURE — 84132 ASSAY OF SERUM POTASSIUM: CPT | Performed by: INTERNAL MEDICINE

## 2017-01-16 PROCEDURE — 25000132 ZZH RX MED GY IP 250 OP 250 PS 637: Performed by: INTERNAL MEDICINE

## 2017-01-16 PROCEDURE — 80162 ASSAY OF DIGOXIN TOTAL: CPT | Performed by: INTERNAL MEDICINE

## 2017-01-16 PROCEDURE — 85027 COMPLETE CBC AUTOMATED: CPT | Performed by: INTERNAL MEDICINE

## 2017-01-16 PROCEDURE — 25000125 ZZHC RX 250

## 2017-01-16 PROCEDURE — 40000193 ZZH STATISTIC PT WARD VISIT

## 2017-01-16 PROCEDURE — 85610 PROTHROMBIN TIME: CPT | Performed by: INTERNAL MEDICINE

## 2017-01-16 PROCEDURE — 85384 FIBRINOGEN ACTIVITY: CPT | Performed by: INTERNAL MEDICINE

## 2017-01-16 PROCEDURE — 93971 EXTREMITY STUDY: CPT | Mod: RT

## 2017-01-16 RX ORDER — ASCORBIC ACID 500 MG
500 TABLET ORAL DAILY
Status: DISCONTINUED | OUTPATIENT
Start: 2017-01-16 | End: 2017-01-22

## 2017-01-16 RX ORDER — POTASSIUM CHLORIDE 750 MG/1
20-40 TABLET, EXTENDED RELEASE ORAL
Status: DISCONTINUED | OUTPATIENT
Start: 2017-01-16 | End: 2017-02-12 | Stop reason: HOSPADM

## 2017-01-16 RX ORDER — POTASSIUM CHLORIDE 1.5 G/1.58G
20-40 POWDER, FOR SOLUTION ORAL
Status: DISCONTINUED | OUTPATIENT
Start: 2017-01-16 | End: 2017-02-12 | Stop reason: HOSPADM

## 2017-01-16 RX ORDER — ZINC SULFATE 50(220)MG
220 CAPSULE ORAL DAILY
Status: DISCONTINUED | OUTPATIENT
Start: 2017-01-16 | End: 2017-01-22

## 2017-01-16 RX ORDER — MAGNESIUM SULFATE HEPTAHYDRATE 40 MG/ML
4 INJECTION, SOLUTION INTRAVENOUS EVERY 4 HOURS PRN
Status: DISCONTINUED | OUTPATIENT
Start: 2017-01-16 | End: 2017-02-12 | Stop reason: HOSPADM

## 2017-01-16 RX ORDER — POTASSIUM CHLORIDE 7.45 MG/ML
10 INJECTION INTRAVENOUS
Status: DISCONTINUED | OUTPATIENT
Start: 2017-01-16 | End: 2017-02-12 | Stop reason: HOSPADM

## 2017-01-16 RX ORDER — POTASSIUM CHLORIDE 29.8 MG/ML
20 INJECTION INTRAVENOUS
Status: DISCONTINUED | OUTPATIENT
Start: 2017-01-16 | End: 2017-02-12 | Stop reason: HOSPADM

## 2017-01-16 RX ORDER — MULTIPLE VITAMINS W/ MINERALS TAB 9MG-400MCG
1 TAB ORAL DAILY
Status: DISCONTINUED | OUTPATIENT
Start: 2017-01-16 | End: 2017-02-08

## 2017-01-16 RX ADMIN — POTASSIUM CHLORIDE 20 MEQ: 750 TABLET, EXTENDED RELEASE ORAL at 20:32

## 2017-01-16 RX ADMIN — OXYCODONE HYDROCHLORIDE 10 MG: 5 TABLET ORAL at 06:06

## 2017-01-16 RX ADMIN — ZINC SULFATE CAP 220 MG (50 MG ELEMENTAL ZN) 220 MG: 220 (50 ZN) CAP at 17:31

## 2017-01-16 RX ADMIN — METHOCARBAMOL 750 MG: 750 TABLET ORAL at 08:46

## 2017-01-16 RX ADMIN — Medication 20000 UNITS: at 17:33

## 2017-01-16 RX ADMIN — OXYCODONE HYDROCHLORIDE 10 MG: 5 TABLET ORAL at 01:33

## 2017-01-16 RX ADMIN — AMIODARONE HYDROCHLORIDE 300 MG: 200 TABLET ORAL at 08:46

## 2017-01-16 RX ADMIN — SENNOSIDES 8.6 MG: 8.6 TABLET, FILM COATED ORAL at 06:06

## 2017-01-16 RX ADMIN — MULTIPLE VITAMINS W/ MINERALS TAB 1 TABLET: TAB at 17:32

## 2017-01-16 RX ADMIN — Medication 2 G: at 09:04

## 2017-01-16 RX ADMIN — OXYCODONE HYDROCHLORIDE 10 MG: 5 TABLET ORAL at 22:46

## 2017-01-16 RX ADMIN — BUMETANIDE 2 MG: 0.25 INJECTION, SOLUTION INTRAMUSCULAR; INTRAVENOUS at 20:32

## 2017-01-16 RX ADMIN — Medication 12.5 MG: at 08:46

## 2017-01-16 RX ADMIN — POTASSIUM CHLORIDE 40 MEQ: 750 TABLET, EXTENDED RELEASE ORAL at 09:02

## 2017-01-16 RX ADMIN — OXYCODONE HYDROCHLORIDE 10 MG: 5 TABLET ORAL at 10:25

## 2017-01-16 RX ADMIN — Medication: at 08:47

## 2017-01-16 RX ADMIN — Medication: at 22:18

## 2017-01-16 RX ADMIN — OXYCODONE HYDROCHLORIDE 10 MG: 5 TABLET ORAL at 18:43

## 2017-01-16 RX ADMIN — METHOCARBAMOL 750 MG: 750 TABLET ORAL at 20:32

## 2017-01-16 RX ADMIN — Medication 0.25 MG: at 22:20

## 2017-01-16 RX ADMIN — OXYCODONE HYDROCHLORIDE 10 MG: 5 TABLET ORAL at 15:12

## 2017-01-16 RX ADMIN — LEVOTHYROXINE SODIUM 224 MCG: 112 TABLET ORAL at 08:46

## 2017-01-16 RX ADMIN — Medication 62.5 MCG: at 08:46

## 2017-01-16 RX ADMIN — OXYCODONE HYDROCHLORIDE AND ACETAMINOPHEN 500 MG: 500 TABLET ORAL at 17:32

## 2017-01-16 RX ADMIN — BUMETANIDE 2 MG: 0.25 INJECTION, SOLUTION INTRAMUSCULAR; INTRAVENOUS at 10:25

## 2017-01-16 RX ADMIN — OMEPRAZOLE 20 MG: 20 CAPSULE, DELAYED RELEASE ORAL at 08:46

## 2017-01-16 RX ADMIN — POTASSIUM CHLORIDE 20 MEQ: 750 TABLET, EXTENDED RELEASE ORAL at 12:50

## 2017-01-16 ASSESSMENT — PAIN DESCRIPTION - DESCRIPTORS
DESCRIPTORS: ACHING
DESCRIPTORS: ACHING

## 2017-01-16 ASSESSMENT — ACTIVITIES OF DAILY LIVING (ADL): IADL_COMMENTS: FAMILY ABLE TO ASSIST

## 2017-01-16 NOTE — PLAN OF CARE
Problem: Cardiac: Heart Failure (Adult)  Goal: Signs and Symptoms of Listed Potential Problems Will be Absent or Manageable (Cardiac: Heart Failure)  Signs and symptoms of listed potential problems will be absent or manageable by discharge/transition of care (reference Cardiac: Heart Failure (Adult) CPG).   Outcome: Improving  D: Admitted 1/12 from [facility] with CHF exacerbation - Needing Valve replacements.     No acute events overnight.  Okay to have brace off while in bed, per Ortho MD.   Patient appears to be weeping from Right flank.   MAPs mostly 60-65, noted lightheadedness and fatigue standing.     I: Monitored vitals and assessed pt status.     PRN:Oxycodone 10mg (Q4H) Senna (x1 - for a start of a bowel plan)  Changed: Coccyx pressure ulcer dressing - (stage 3)    A: A0x4. VSS, on RA. MAPs noted at 61, but improved upon recheck. Paced. Afebrile. Leg pain controlled with PRN medications. Q2H turns, legs elevated throughout night. Bumex given IV push, roughly 3,175cc of UO. 1.5L FR, patient appears to be complying. BM +. Weight down 11bs since last weigh in. Dressing CDI, with leg brace on. Patient slept between cares.     Temp:  [98  F (36.7  C)-98.3  F (36.8  C)] 98.3  F (36.8  C)  Heart Rate:  [59-63] 59  Resp:  [7-16] 16  BP: ()/(45-64) 97/50 mmHg  SpO2:  [97 %-100 %] 100 %      P: Continue to monitor Pt status and report changes to treatment team.

## 2017-01-16 NOTE — PROGRESS NOTES
01/16/17 0938   Quick Adds   Type of Visit Initial Occupational Therapy Evaluation   Living Environment   Lives With child(kendrick), adult;grandchild(kendrick)  (son 28, daughter 22, grandchildren 8 and 13 mo)   Living Arrangements apartment   Home Accessibility tub/shower is not walk in   Number of Stairs to Enter Home 0   Number of Stairs Within Home 0   Transportation Available car;family or friend will provide   Living Environment Comment Home alone during day for a few hours at a time, extended family able to check in   Self-Care   Dominant Hand ambidextrous   Usual Activity Tolerance good   Current Activity Tolerance fair   Regular Exercise yes   Activity/Exercise Type walking   Exercise Amount/Frequency 3-5 times/wk   Equipment Currently Used at Home cane, straight;walker, standard   Activity/Exercise/Self-Care Comment Using walker since LLE fx, had cane prior due to neuropathy   Functional Level Prior   Ambulation 1-->assistive equipment   Transferring 1-->assistive equipment   Toileting 0-->independent   Bathing 0-->independent   Dressing 0-->independent   Eating 0-->independent   Communication 0-->understands/communicates without difficulty   Swallowing 0-->swallows foods/liquids without difficulty   Cognition 0 - no cognition issues reported   Fall history within last six months yes   Number of times patient has fallen within last six months 2   Which of the above functional risks had a recent onset or change? ambulation;transferring;dressing;bathing;toileting;fall history   General Information   Onset of Illness/Injury or Date of Surgery - Date 01/12/17   Referring Physician Susanne Natarajan MD   Patient/Family Goals Statement Pt would like to return home   Additional Occupational Profile Info/Pertinent History of Current Problem Samir Rodriguez is an otherwise healthy 46 year old male who presents s/p R tib plateau fx sustained from GLF on 11/14/16; transferred to  for heart failure w/u.     Precautions/Limitations fall precautions   Weight-Bearing Status - RLE weight-bearing as tolerated   General Observations Pt supine in bed upon arrival   General Info Comments Activity: Up with assist with RLE WBAT   Cognitive Status Examination   Orientation orientation to person, place and time   Level of Consciousness alert   Able to Follow Commands WNL/WFL   Personal Safety (Cognitive) mild impairment   Memory impaired   Cognitive Comment Pt would benefit from continued monitoring and assessment of cognition   Visual Perception   Visual Perception Wears glasses   Sensory Examination   Sensory Quick Adds No deficits were identified   Pain Assessment   Patient Currently in Pain No   Integumentary/Edema   Integumentary/Edema Comments scrotal and sonam LE and RUE edema   Range of Motion (ROM)   ROM Comment ROM WFL   Strength   Strength Comments Generalized weakness noted, RUE hematoma limiting testing   Hand Strength   Hand Strength Comments WFL   Muscle Tone Assessment   Muscle Tone Quick Adds No deficits were identified   Coordination   Upper Extremity Coordination No deficits were identified   Transfer Skill: Bed to Chair/Chair to Bed   Level of [place]: Bed to Chair minimum assist (75% patients effort)   Physical Assist/Nonphysical Assist: Bed to Chair 2 persons   Transfer Skill: Sit to Stand   Level of [place]: Sit/Stand minimum assist (75% patients effort)   Physical Assist/Nonphysical Assist: Sit/Stand 2 persons   Lower Body Dressing   Level of [place]: Dress Lower Body dependent (less than 25% patients effort)   Instrumental Activities of Daily Living (IADL)   IADL Comments Family able to assist   Activities of Daily Living Analysis   Impairments Contributing to Impaired Activities of Daily Living cognition impaired;balance impaired;coordination impaired;fear and anxiety;pain;post surgical precautions;strength decreased   General Therapy Interventions   Planned Therapy Interventions ADL  "retraining;IADL retraining;bed mobility training;cognition;balance training;risk factor education;progressive activity/exercise;home program guidelines;transfer training;strengthening   Clinical Impression   Criteria for Skilled Therapeutic Interventions Met yes, treatment indicated   OT Diagnosis Decreased ADL I   Influenced by the following impairments medical status, WB precautions, pain, strength   Assessment of Occupational Performance 5 or more Performance Deficits   Identified Performance Deficits dressing, transfers, home management, driving, hygiene, ambulation   Clinical Decision Making (Complexity) Moderate complexity   Therapy Frequency 5 times/wk   Predicted Duration of Therapy Intervention (days/wks) 3 weeks   Anticipated Equipment Needs at Discharge (TBD)   Anticipated Discharge Disposition Transitional Care Facility   Risks and Benefits of Treatment have been explained. Yes   Patient, Family & other staff in agreement with plan of care Yes   [facility] TM \"6 Clicks\"   2016, Trustees of [facility], under license to [employer].  All rights reserved.   6 Clicks Short Forms Daily Activity Inpatient Short Form   [facility]  \"6 Clicks\" Daily Activity Inpatient Short Form   1. Putting on and taking off regular lower body clothing? 1 - Total   2. Bathing (including washing, rinsing, drying)? 2 - A Lot   3. Toileting, which includes using toilet, bedpan or urinal? 2 - A Lot   4. Putting on and taking off regular upper body clothing? 3 - A Little   5. Taking care of personal grooming such as brushing teeth? 3 - A Little   6. Eating meals? 3 - A Little   Daily Activity Raw Score (Score out of 24.Lower scores equate to lower levels of function) 14   Total Evaluation Time   Total Evaluation Time (Minutes) 10     "

## 2017-01-16 NOTE — PLAN OF CARE
Problem: Goal Outcome Summary  Goal: Goal Outcome Summary  OT/6C: Eval complete. Pt limited by RLE, strength, transfers, ADL I. Pt min Ax2 bed to chair transfer. Pt completed seated ADLs with set-up.     REC: TCU due to decreased ADL I, transfers, ambulation, safety

## 2017-01-16 NOTE — PLAN OF CARE
Problem: Goal Outcome Summary  Goal: Goal Outcome Summary  Pt AOx4 VSS. Pt continues to diurese with excellent output. Pacer numbers changed now paced at 80 bpm. Oxy 10 mg x 3 for pain. Up to chair x 2. Pt has some anxiety but can be redirected. 2 gram mg give for 1.9 and 60 meq of K for 3.3. Will continue with care plan, continue to monitor and notify MD's of any changes.

## 2017-01-16 NOTE — PLAN OF CARE
Problem: Goal Outcome Summary  Goal: Goal Outcome Summary  PT 6C: Ambulates 6ft and 3 ft with FWW and CGAx1.  STS with modAx 1-2.  Bed mobility with mod Ax1 for BLE due to weakness and pain.  C/o pain in RLE limiting mobility.  Motivated to work with PT.  PT recommends d/c to ARU when medically stable in order to maximize functional mobility and endurance.

## 2017-01-16 NOTE — PROGRESS NOTES
01/15/17 1413   Quick Adds   Type of Visit Initial PT Evaluation   Living Environment   Lives With child(kendrick), adult;grandchild(kendrick)  (son, dtr and 13 mo grandson & 8 yr granddtr)   Living Arrangements apartment   Home Accessibility tub/shower is not walk in   Number of Stairs to Enter Home 0   Number of Stairs Within Home 0   Transportation Available car;family or friend will provide   Self-Care   Dominant Hand ambidextrous   Usual Activity Tolerance good   Current Activity Tolerance fair   Regular Exercise no   Equipment Currently Used at Home walker, rolling;wheelchair;cane, straight   Activity/Exercise/Self-Care Comment Using manual wc since RLE fracture and walker for transfers, used cane prior to fracture   Functional Level Prior   Ambulation 1-->assistive equipment   Transferring 1-->assistive equipment   Toileting 0-->independent   Bathing 0-->independent   Dressing 0-->independent   Eating 0-->independent   Communication 0-->understands/communicates without difficulty   Swallowing 0-->swallows foods/liquids without difficulty   Cognition 0 - no cognition issues reported   Fall history within last six months yes   Number of times patient has fallen within last six months 2   Which of the above functional risks had a recent onset or change? ambulation;fall history   General Information   Onset of Illness/Injury or Date of Surgery - Date 01/12/16   Referring Physician Meggan Landers MD   Patient/Family Goals Statement return to walking   Pertinent History of Current Problem (include personal factors and/or comorbidities that impact the POC) 46 year old  male with past medical history of Rheumatic Heart Disease s/p mechanical MVR x 2 (1980s and 1992) on warfarin (INR goal 2.5-3.5), biventricular CHF (EF 25-30%) s/p dual chamber ICD 2008, chronic afib on amiodarone and digoxine, WPW ablation at age 11, CKD III, hypothyroid,  who is transferred from [facility] for further  "eval of CHF and concern of hemolysis in setting of mechanical valve.   Precautions/Limitations fall precautions   Weight-Bearing Status - RUE weight-bearing as tolerated   General Info Comments Per 1/14/16 ortho note: \"Guarded WBAT (Ok to bear if not painful, increase as able).  ROM as tolerated (sig deficit, needs to begin AROM/PROM now) Splint:  OK to use HKB when upright and ambulating. OK to remove for hygiene. DC brace in 4wk\"   Cognitive Status Examination   Orientation orientation to person, place and time   Level of Consciousness alert   Follows Commands and Answers Questions 100% of the time   Personal Safety and Judgment intact   Memory intact   Pain Assessment   Patient Currently in Pain No  (not at rest, but increased R knee pain when extended to 0)   Integumentary/Edema   Integumentary/Edema Comments painful moderate scrotal and penile edema with BLE edema as well   Posture    Posture Kyphosis;Protracted shoulders;Forward head position   Range of Motion (ROM)   ROM Comment R knee flexion to ~ 35 degrees AAROM, ~ 45 degree passively during transfers with hinged knee brace on   Strength   Strength Comments RLE NT due to knee discomfort, but demos less than 3/5 hip flexion and abduction on R.  3+/5 LLE except ankle DF and knee flexion 4/5 and hip flexion 2+/5   Bed Mobility   Bed Mobility Comments min A for roll and sup to sit   Transfer Skills   Transfer Comments sit to stand unable with mod Ax2 from elevated surface   Balance   Balance Comments close SBA when sitting EOB, decreased with stance, requiring BUE support and Ax2   Sensory Examination   Sensory Perception Comments reports numbness knee and R elbow deep purple bruise   Muscle Tone   Muscle Tone no deficits were identified   Muscle Tone Comments BLE   General Therapy Interventions   Planned Therapy Interventions gait training;transfer training;strengthening;balance training;bed mobility training;neuromuscular re-education;ROM;risk factor " "education;home program guidelines;progressive activity/exercise   Clinical Impression   Criteria for Skilled Therapeutic Intervention yes, treatment indicated   PT Diagnosis impaired functional mobility   Influenced by the following impairments decreased ROM, strength, balance, endurance, posture, increased pain and edema   Functional limitations due to impairments Up to mod Ax2 for transfers   Clinical Presentation Evolving/Changing   Clinical Presentation Rationale advanced wt bearing status, edema fluctuation with heart status, overwhelmed by medical status   Clinical Decision Making (Complexity) High complexity   Therapy Frequency` (6x/wk)   Predicted Duration of Therapy Intervention (days/wks) 1/29/17   Anticipated Discharge Disposition Transitional Care Facility;Acute Rehabilitation Facility   Risk & Benefits of therapy have been explained Yes   Patient, Family & other staff in agreement with plan of care Yes   Clinical Impression Comments Although pt reports 24 hr A at home, would strongly recommend TCU or ARU (motivated, concern if diagnosis would be accepted) for intensity of therapy   [facility] TM \"6 Clicks\"   2016, Trustees of [facility], under license to [employer].  All rights reserved.   6 Clicks Short Forms Basic Mobility Inpatient Short Form   [facility]  \"6 Clicks\" V.2 Basic Mobility Inpatient Short Form   1. Turning from your back to your side while in a flat bed without using bedrails? 3 - A Little   2. Moving from lying on your back to sitting on the side of a flat bed without using bedrails? 3 - A Little   3. Moving to and from a bed to a chair (including a wheelchair)? 2 - A Lot   4. Standing up from a chair using your arms (e.g., wheelchair, or bedside chair)? 2 - A Lot   5. To walk in hospital room? 2 - A Lot   6. Climbing 3-5 steps with a railing? 1 - Total   Basic Mobility Raw Score (Score out of 24.Lower scores equate to lower levels of function) 13 "   Total Evaluation Time   Total Evaluation Time (Minutes) 8

## 2017-01-16 NOTE — PROGRESS NOTES
CLINICAL NUTRITION SERVICES     Nutrition Prescription    RECOMMENDATIONS FOR MDs/PROVIDERS TO ORDER:  None at this time.    Recommendations already ordered by Registered Dietitian (RD):  Due to stage II and unstageable pressure injuries noted in 1/12 WOC nurse note, ordered the following:   - Multivitamin with minerals.    - Ten-day wound protocol recommended and ordered for stage II wounds:   Vitamin A (20,000 International Units) x 10 days   Vitamin C (500 mg/day) x 10 days   Zinc sulfate (220 mg/day) x 10 days    Future/Additional Recommendations:  For all recommendations, see nutrition note 1/13/17.      INTERVENTIONS:  Implementation:  Collaboration with other providers, Multivitamin/mineral supplement therapy: Paged team to notify of wound protocol ordered. Team in agreement.    Follow up/Monitoring:  Will continue to follow pt.    Giana Damon, MS, RD, LD, [facility]   6C Pgr:  [phone]

## 2017-01-16 NOTE — PROGRESS NOTES
Heme Onc Progress Note    Patient name: Samir Rodriguez    MRN: [id number]    Admission date: 1/12/2017          Assessment and plan:     # anemia  # thrombocytopenia  # R arm and L chest hematoma  Cytopenias appear 2/2 meds, hematoma. May have had component of hemolysis related to dialysis and valves. Blood counts have improved. Hematoma stable. Still has valve concerns.    Recs  -appreciate cares by cardiology team  -continue to monitor hematoma  -would be careful about making INR therapeutic as this could result in hematoma expansion  -agree with RUE ultrasound  Patient discussed with Dr. Hess who agrees with assessment and plan.   Ezio Chan MD   PGY4  Heme Onc Fellow        Subjective:   Nursing notes reviewed. Overnight no significant events. Notes hematoma arm is about the same size. Also has mild hematoma L chest. No new significant aches or pains. No overt bruising or bleeding in other areas.     ROS: 4 point ROS including Respiratory, CV, GI and , other than that noted in the HPI, is negative           Objective:   Temp:  [98  F (36.7  C)-98.3  F (36.8  C)] 98.3  F (36.8  C)  Heart Rate:  [59-63] 60  Resp:  [7-16] 16  BP: ()/(45-64) 87/49 mmHg  SpO2:  [97 %-100 %] 98 %  Weights  Filed Vitals:    01/12/17 0800 01/12/17 0818 01/14/17 0400 01/15/17 0200   Weight: 107 kg (235 lb 14.3 oz) 111 kg (244 lb 11.4 oz) 108.863 kg (240 lb) 105.461 kg (232 lb 8 oz)    01/16/17 0100   Weight: 101.379 kg (223 lb 8 oz)       I/O  I/O last 3 completed shifts:  In: 1500 [P.O.:1500]  Out: 5850 [Urine:5850]  General: NAD, appears comfortable  HEENT: MMM. No oral lesions  CV: ABHI L sternal border, normal S1 and S2  Resp: CTA bilaterally, normal vesicular breath sounds    GI: soft, non-tender, non-distended. Normoactive bs    Skin: ecchymosis R arm appears similar in size. Mild ecchymosis L chest area-unchanged from before.     Extremities: 2+ LE edema    Neuro: alert, conversing appropriately. Grossly w/o  focal deficits.     Msk: 5/5 R hand .          Medications:       warfarin-No DOSE today  1 each Does not apply no dose today (warfarin)     miconazole   Topical BID     sodium chloride (PF)  3 mL Intracatheter Q8H     levothyroxine  224 mcg Oral QAM AC     omeprazole  20 mg Oral QAM AC     methocarbamol  750 mg Oral BID     amiodarone  300 mg Oral Daily     digoxin  62.5 mcg Oral Daily     metoprolol  12.5 mg Oral Daily     bumetanide  2 mg Intravenous Q12H            Labs:   The following labs were reviewed  CMP:  Recent Labs  Lab 01/16/17  0709 01/15/17  0742 01/14/17  1345 01/13/17  1800 01/13/17  0650 01/12/17  1121    133 134 136 135 136   POTASSIUM 3.3* 3.6 3.7 3.9 3.8 4.1   CHLORIDE 96 97 97 98 98 96   CO2 31 30 32 29 30 28   ANIONGAP 10 6 5 9 8 11   GLC 73 77 107* 80 83 127*   BUN 37* 42* 45* 52* 52* 58*   CR 1.87* 1.77* 1.93* 2.10* 1.97* 2.02*   GFRESTIMATED 39* 41* 37* 34* 37* 35*   GFRESTBLACK 47* 50* 45* 41* 44* 43*   DELFINO 7.5* 7.3* 7.2* 7.2* 7.6* 7.5*   MAG 1.9 1.9  --   --   --   --    PROTTOTAL  --   --  5.1*  --  5.1* 5.2*   ALBUMIN  --   --  2.4*  --  2.5* 2.6*   BILITOTAL  --   --  3.6*  --  3.6* 3.2*   ALKPHOS  --   --  135  --  138 148   AST  --   --  110*  --  186* 171*   ALT  --   --  191*  --  229* 208*     CBC:  Recent Labs  Lab 01/16/17  0709 01/15/17  0742 01/14/17  0847 01/13/17  2230 01/13/17  0650   WBC 5.8 6.1 7.8  --  8.8   RBC 2.75* 2.53* 2.56*  --  2.50*   HGB 8.6* 8.0* 7.9* 8.5* 7.8*   HCT 26.2* 23.8* 24.3*  --  23.1*   MCV 95 94 95  --  92   MCH 31.3 31.6 30.9  --  31.2   MCHC 32.8 33.6 32.5  --  33.8   RDW 20.5* 20.2* 19.5*  --  18.6*   PLT 93* 67* 66*  --  63*     INR:  Recent Labs  Lab 01/16/17  0709 01/15/17  0742 01/14/17  1345 01/13/17  0650   INR 4.10* 3.10* 2.86* 2.99*           Studies:   reviewed

## 2017-01-16 NOTE — PROGRESS NOTES
Patient seen on 6C for evaluation and iterative programming of his [employer] multi lead ICD per MD orders.   No arrythmias recorded.  Intrinsic rhythm = Atrial tachycardia (atrial rate ~ 120 bpm) with ventricular rate @ ~ 40-60 bpm.  AP = 100%.   = 96%.  BVP = 95%.  Undersensing of P waves noted.  Unable to obtain P wave measurements today with atrial sensitivity programmed at 0.15 mV.  Unable to assess atrial threshold.  AP = 100%.  Atrial pacing output is programmed 4.5 V @ 0.6 ms.  Estimated battery longevity to JOSELUIS = 3 years. Dr. Cartagena notified of interrogation results.  Pacing mode programmed from DDDR to VVIR.  LRL increased from 60 to 80 bpm.  Programming changes made per Dr. Cartagena's orders.    Multi lead ICD

## 2017-01-16 NOTE — PROGRESS NOTES
Care Coordinator Progress Note     Admission Date/Time:  1/12/2017  Attending MD:  Shin Cartagena   Data  Chart reviewed, discussed with interdisciplinary team.   Patient with h/o Rheumatic Heart Disease s/p mechanical MVR x 2 (1980s and 1992) on warfarin (INR goal 2.5-3.5), biventricular CHF (EF 25-30%) s/p dual chamber ICD 2008, chronic afib on amiodarone and digoxine, WPW ablation at age 11, CKD III, hypothyroid; transferred [facility] for further eval of CHF and concern of hemolysis in setting of mechanical valve.      Barriers to Discharge: need for diuresis   Assessment  Pt currently receiving Bumex IV, telemetry cardiac monitoring.   PT/OT currently recommending TCU.    Plan  Anticipated Discharge Date:  TBD  Anticipated Discharge Plan:  TBD  CC will continue to monitor pt's medical condition and progress toward discharge.   QUINTON FUNG, RN BSN  Care Coordinator

## 2017-01-17 ENCOUNTER — APPOINTMENT (OUTPATIENT)
Dept: OCCUPATIONAL THERAPY | Facility: CLINIC | Age: 47
DRG: 286 | End: 2017-01-17
Attending: INTERNAL MEDICINE
Payer: MEDICAID

## 2017-01-17 LAB
ANION GAP SERPL CALCULATED.3IONS-SCNC: 6 MMOL/L (ref 3–14)
BUN SERPL-MCNC: 31 MG/DL (ref 7–30)
CALCIUM SERPL-MCNC: 7.7 MG/DL (ref 8.5–10.1)
CHLORIDE SERPL-SCNC: 90 MMOL/L (ref 94–109)
CO2 SERPL-SCNC: 34 MMOL/L (ref 20–32)
CREAT SERPL-MCNC: 1.81 MG/DL (ref 0.66–1.25)
EPO SERPL-ACNC: 33
ERYTHROCYTE [DISTWIDTH] IN BLOOD BY AUTOMATED COUNT: 20.8 % (ref 10–15)
GFR SERPL CREATININE-BSD FRML MDRD: 40 ML/MIN/1.7M2
GLUCOSE SERPL-MCNC: 77 MG/DL (ref 70–99)
HCT VFR BLD AUTO: 24.5 % (ref 40–53)
HGB BLD-MCNC: 8.1 G/DL (ref 13.3–17.7)
INR PPP: 4.34 (ref 0.86–1.14)
LDH SERPL L TO P-CCNC: 401 U/L (ref 85–227)
MAGNESIUM SERPL-MCNC: 2 MG/DL (ref 1.6–2.3)
MCH RBC QN AUTO: 31.4 PG (ref 26.5–33)
MCHC RBC AUTO-ENTMCNC: 33.1 G/DL (ref 31.5–36.5)
MCV RBC AUTO: 95 FL (ref 78–100)
PHOSPHATE SERPL-MCNC: 2.4 MG/DL (ref 2.5–4.5)
PLATELET # BLD AUTO: 93 10E9/L (ref 150–450)
POTASSIUM SERPL-SCNC: 3.7 MMOL/L (ref 3.4–5.3)
RBC # BLD AUTO: 2.58 10E12/L (ref 4.4–5.9)
SODIUM SERPL-SCNC: 130 MMOL/L (ref 133–144)
WBC # BLD AUTO: 5.8 10E9/L (ref 4–11)

## 2017-01-17 PROCEDURE — 84100 ASSAY OF PHOSPHORUS: CPT | Performed by: INTERNAL MEDICINE

## 2017-01-17 PROCEDURE — 36415 COLL VENOUS BLD VENIPUNCTURE: CPT | Performed by: INTERNAL MEDICINE

## 2017-01-17 PROCEDURE — 85610 PROTHROMBIN TIME: CPT | Performed by: INTERNAL MEDICINE

## 2017-01-17 PROCEDURE — 25000125 ZZHC RX 250

## 2017-01-17 PROCEDURE — 99233 SBSQ HOSP IP/OBS HIGH 50: CPT | Mod: GC | Performed by: INTERNAL MEDICINE

## 2017-01-17 PROCEDURE — 25000125 ZZHC RX 250: Performed by: INTERNAL MEDICINE

## 2017-01-17 PROCEDURE — 25000132 ZZH RX MED GY IP 250 OP 250 PS 637: Performed by: INTERNAL MEDICINE

## 2017-01-17 PROCEDURE — 85027 COMPLETE CBC AUTOMATED: CPT | Performed by: INTERNAL MEDICINE

## 2017-01-17 PROCEDURE — 83615 LACTATE (LD) (LDH) ENZYME: CPT | Performed by: INTERNAL MEDICINE

## 2017-01-17 PROCEDURE — 80048 BASIC METABOLIC PNL TOTAL CA: CPT | Performed by: INTERNAL MEDICINE

## 2017-01-17 PROCEDURE — 21400006 ZZH R&B CCU INTERMEDIATE UMMC

## 2017-01-17 PROCEDURE — 40000141 ZZH STATISTIC PERIPHERAL IV START W/O US GUIDANCE

## 2017-01-17 PROCEDURE — 97535 SELF CARE MNGMENT TRAINING: CPT | Mod: GO

## 2017-01-17 PROCEDURE — 83735 ASSAY OF MAGNESIUM: CPT | Performed by: INTERNAL MEDICINE

## 2017-01-17 PROCEDURE — S0171 BUMETANIDE 0.5 MG: HCPCS

## 2017-01-17 PROCEDURE — 99232 SBSQ HOSP IP/OBS MODERATE 35: CPT | Mod: GC | Performed by: INTERNAL MEDICINE

## 2017-01-17 PROCEDURE — 40000133 ZZH STATISTIC OT WARD VISIT

## 2017-01-17 RX ADMIN — Medication: at 08:30

## 2017-01-17 RX ADMIN — Medication 2 G: at 20:22

## 2017-01-17 RX ADMIN — ZINC SULFATE CAP 220 MG (50 MG ELEMENTAL ZN) 220 MG: 220 (50 ZN) CAP at 08:30

## 2017-01-17 RX ADMIN — MULTIPLE VITAMINS W/ MINERALS TAB 1 TABLET: TAB at 08:28

## 2017-01-17 RX ADMIN — LEVOTHYROXINE SODIUM 224 MCG: 112 TABLET ORAL at 08:27

## 2017-01-17 RX ADMIN — OXYCODONE HYDROCHLORIDE AND ACETAMINOPHEN 500 MG: 500 TABLET ORAL at 08:29

## 2017-01-17 RX ADMIN — POTASSIUM PHOSPHATE, MONOBASIC AND POTASSIUM PHOSPHATE, DIBASIC 15 MMOL: 224; 236 INJECTION, SOLUTION INTRAVENOUS at 21:27

## 2017-01-17 RX ADMIN — POTASSIUM CHLORIDE 20 MEQ: 750 TABLET, EXTENDED RELEASE ORAL at 11:33

## 2017-01-17 RX ADMIN — BUMETANIDE 2 MG: 0.25 INJECTION, SOLUTION INTRAMUSCULAR; INTRAVENOUS at 08:27

## 2017-01-17 RX ADMIN — OMEPRAZOLE 20 MG: 20 CAPSULE, DELAYED RELEASE ORAL at 08:28

## 2017-01-17 RX ADMIN — Medication 0.25 MG: at 22:59

## 2017-01-17 RX ADMIN — OXYCODONE HYDROCHLORIDE 10 MG: 5 TABLET ORAL at 21:00

## 2017-01-17 RX ADMIN — Medication 20000 UNITS: at 08:27

## 2017-01-17 RX ADMIN — METHOCARBAMOL 750 MG: 750 TABLET ORAL at 08:28

## 2017-01-17 RX ADMIN — Medication 12.5 MG: at 08:29

## 2017-01-17 RX ADMIN — AMIODARONE HYDROCHLORIDE 300 MG: 200 TABLET ORAL at 08:29

## 2017-01-17 RX ADMIN — OXYCODONE HYDROCHLORIDE 10 MG: 5 TABLET ORAL at 04:14

## 2017-01-17 RX ADMIN — OXYCODONE HYDROCHLORIDE 10 MG: 5 TABLET ORAL at 16:47

## 2017-01-17 RX ADMIN — BUMETANIDE 2 MG: 0.25 INJECTION, SOLUTION INTRAMUSCULAR; INTRAVENOUS at 20:23

## 2017-01-17 RX ADMIN — METHOCARBAMOL 750 MG: 750 TABLET ORAL at 20:23

## 2017-01-17 RX ADMIN — Medication: at 20:23

## 2017-01-17 RX ADMIN — Medication 2.5 MG: at 16:47

## 2017-01-17 RX ADMIN — OXYCODONE HYDROCHLORIDE 10 MG: 5 TABLET ORAL at 08:28

## 2017-01-17 RX ADMIN — OXYCODONE HYDROCHLORIDE 10 MG: 5 TABLET ORAL at 13:14

## 2017-01-17 ASSESSMENT — PAIN DESCRIPTION - DESCRIPTORS
DESCRIPTORS: ACHING
DESCRIPTORS: ACHING

## 2017-01-17 NOTE — PROGRESS NOTES
Heme Progress Note    Samir Rodriguez MRN# [id number]   Age: 46 year old YOB: 1970           Assessment and Plan:   Recommendations:     # hematomas particularly R arm - stable- fotunately not worse with supratherapueitc INR.  Cause of this is not clear.  Patient denies any trauma in the few days prior to transfer to [facility], and thesea are clearly not due to his fall in November- much newer than ta.   Continue to monitor size    #supratheraputic INR- likely due to vitamin K deficiency from previous antibiotics and poor po intake.   - consider reversal with vit K as INR is trending upward. Considering the risk of stroke is 10 percent per year without anticoagulation wheras the risk of the hematoma expanding and his counts dropping further are higher. But defer decision to cardiology.    #bicytopenia: improved  Probably due to meds,hematoma, anemia of chronic disease, and hemolysis related to mechanical valves.  Also, epo level inappropriately low for degree of anemia.     Patient seen and discussed with Dr. Hess, who agrees with above plan.    Ezio Chan MD   PGY4  Heme Onc Fellow    Scribed by avel perez    Attending Note:  I have reviewed the patient chart, and interviewed and examined the patient.  I agree with the assessment and plan.  I have edited the note to refect my findings, assessment and plan.  Lucy Hess MD  Hematology          Interval History:   New bruise on the right upper back. The large hematoma in the right arm, R lateral chest and L anterior chest has remained stable.Denies fever, chills, sweats, headache, dizziness, chest pain, cough, shortness of breath, abdominal pain, N/V/D/C, dysuria, extremity numbness/tingling, signs of active bleeding. His edema is improving. No other immediate concerns or acute issues.     ROS: Negative other than as stated in above interval history.         Medications:     Medications reviewed.           Physical Exam:   Vitals  were reviewed  Blood pressure 98/57, temperature 99.1  F (37.3  C), temperature source Oral, resp. rate 16, height 1.829 m (6'), weight 97.75 kg (215 lb 8 oz), SpO2 96 %.    Physical Exam:   General: awake, laying in bed, appears comfortable, in NAD  HEENT: MMM, EOM intact, sclerae clear and anicteric  CV: RRR, no murmurs appreciated  Resp: Clear to auscultation bilaterally, breathing comfortably on room air, no wheezes, rhonchi  Abd: Soft, non-tender, BS+, no masses appreciated  MSK:  1+ edema bothlegs- significantly improved from a few days ago no longer has significant edema of the arm.   Skin: no rash or lesions on limited exam, Huge eccymoses noted on R lateral chest,R upper back and right arm and L anterior chest- unchanged from a few days ago.   Neuro: CN2-12 grossly intact, no lateralizing symptoms or focal neurologic deficits  Psych: Mood and affect WNL           Data:     Labs and imaging reviewed.     CBC  Recent Labs  Lab 01/17/17  0825 01/16/17  0709 01/15/17  0742 01/14/17  0847   WBC 5.8 5.8 6.1 7.8   RBC 2.58* 2.75* 2.53* 2.56*   HGB 8.1* 8.6* 8.0* 7.9*   HCT 24.5* 26.2* 23.8* 24.3*   MCV 95 95 94 95   MCH 31.4 31.3 31.6 30.9   MCHC 33.1 32.8 33.6 32.5   RDW 20.8* 20.5* 20.2* 19.5*   PLT 93* 93* 67* 66*     CMP  Recent Labs  Lab 01/16/17  1828 01/16/17  0709 01/15/17  0742 01/14/17  1345 01/13/17  1800 01/13/17  0650 01/12/17  1121   NA  --  136 133 134 136 135 136   POTASSIUM 3.8 3.3* 3.6 3.7 3.9 3.8 4.1   CHLORIDE  --  96 97 97 98 98 96   CO2  --  31 30 32 29 30 28   ANIONGAP  --  10 6 5 9 8 11   GLC  --  73 77 107* 80 83 127*   BUN  --  37* 42* 45* 52* 52* 58*   CR  --  1.87* 1.77* 1.93* 2.10* 1.97* 2.02*   GFRESTIMATED  --  39* 41* 37* 34* 37* 35*   GFRESTBLACK  --  47* 50* 45* 41* 44* 43*   DELFINO  --  7.5* 7.3* 7.2* 7.2* 7.6* 7.5*   MAG  --  1.9 1.9  --   --   --   --    PROTTOTAL  --   --   --  5.1*  --  5.1* 5.2*   ALBUMIN  --   --   --  2.4*  --  2.5* 2.6*   BILITOTAL  --   --   --  3.6*  --   3.6* 3.2*   ALKPHOS  --   --   --  135  --  138 148   AST  --   --   --  110*  --  186* 171*   ALT  --   --   --  191*  --  229* 208*     INR  Recent Labs  Lab 01/17/17  0825 01/16/17  0709 01/15/17  0742 01/14/17  1345 01/13/17  1252   INR 4.34* 4.10* 3.10* 2.86*  --    PTT  --   --   --   --  35

## 2017-01-17 NOTE — PROGRESS NOTES
"CLINICAL NUTRITION SERVICES    Reason for Assessment:  Low-sodium (2 g/day) nutrition education, received consult for \"Patient with CHF. Needs low sodium diet. Patient very motivated and interested in learning about monitoring his sodium and eating better. He would benefit from a session with dietician. thank you.\"    Diet History:  Pt reports he was watching his sodium intake PTA. States he is aware of his fluid restriction. Per discussion with pt, interested in learning more about low-sodium meal plan.     Nutrition Diagnosis:  Food- and nutrition-related knowledge deficit r/t no previous knowledge of low-sodium diet AEB pt requesting additional nutrition education on low-sodium diet.    Nutrition Prescription/Recs:  Rec low-sodium diet order. Continue fluid restriction.    Interventions:  Nutrition Education  1.  Provided verbal instruction on a heart-healthy diet with emphasis on low-sodium meal planning. Discussed food choices to limit that are especially high in sodium. Encouraged low-sodium foods. Showed label reading. Discussed fluid restriction.   2.  Provided the following handouts:  How to Read Nutrition Labels, Low-Sodium Foods and Drinks, Tips for a Low-Sodium Diet, Seasoning Your Foods Without Adding Salt, Managing Fluid Restriction, and Nutrition Care Manual handout on the sodium amounts in various foods and beverages.      Collaboration with other providers: Paged team to rec transition to a low-sodium diet instead of the current cardiac diet.     Goals:    Pt will verbalize at least five high sodium foods and the importance of avoiding added salt to foods for cooking or seasoning foods.     Follow-up:   Patient to ask any further nutrition-related questions before discharge.  In addition, pt may request outpatient RD appointment.     Nutrition will continue to follow.    Giana Damon, MS, RD, LD, [facility]   6C Pgr:  [phone]   "

## 2017-01-17 NOTE — PLAN OF CARE
Problem: Goal Outcome Summary  Goal: Goal Outcome Summary  Vss. Monitor shows paced rhythm.  Voiding well after iv bumex given on pms.  Continues with edema, and bruising.  New area of bruising noted on upper right back.  md here to see it.  Area marked and remains unchanged.  Pt denies pain at the site.  Continues to weep at right thigh.  Linens changed q 2 hours.  Leg brace off while pt in bed.  Oxycodone given prn for leg pain.  K+ 3.8 on pms and pt given 20 meq replacement.  Pressure sores on sacrum and heels and abrasion under penis covered with mepilex.  Patient expresses anxiety about need for valve surgery.  Will monitor and notify md of changes or issues.

## 2017-01-17 NOTE — PROGRESS NOTES
Brief Social Work Consult Note:    Outcome:   SW introduced self and role in consult.  Pt was asking for help with his family for hotel rooms and meal tickets.  Pt is registered as a  [employer].  SW discussed with pt calling his [employer] liaison at the [facility] to get coverage for pt's family.  SW explained that the liaison can call accomodations or call the unit SW with any documentation needed to get coverage.  SHAQUILLE further explained that each [employer] contract with the hospital for various services which is why it is important for the family to work with the [employer] liaison for any and all service needs.  SW will continue to follow if the pt needs a letter from [employer] or other documentation for his [employer].  Updated bedside RN.    REJI Hernandez, APSW  6C Unit   Pager: [phone]  Phone: [phone]

## 2017-01-17 NOTE — CONSULTS
[facility] Consultation by CVTS    Samir Rodriguez MRN# [id number]   Age: 46 year old YOB: 1970     Date of Admission:  1/12/2017    Reason for consult: Evaluate for valve replacement       Requesting physician: Dr. Landers       Level of consult: Consult, follow and place orders           Assessment and Plan:   Assessment:   Mr. Rodriguez is a 46 year old gentleman with history of rheumatic valve disease s/p mechanical MVR x 2, now with moderate aortic and tricuspid valve insufficiency with EF of 24% per echo..        Plan:   Mr. Rodriguez is a challenging patient since this would be a 3rd time redo.  Currently his creatinine is elevated at 1.81 and he is actively being diuresed with bumex 2 mg IV bid. INR is 4.34.  Blood cultures no growth x 5 days. He remains thrombocytopenic with platelets mid 90s. At this time Samir will need medical optimization prior to any surgery.  Recommend dental consult due to poor dentition.  Most likely he will benefit from rehabilitation prior to surgery due to sternal precautions postop and his current right tibial fracture.    Attending Staff Note:    Patient is seen and examined by me.  History reviewed. Lab data and image studies were reviewed.   Very sick patient with CHF and low LVEF.  Not a good candidate for only AVR , TVR.  He might need advanced therapy such as AVR+ LVAD and/or heart Tx.  Need heart failure service to evaluate patient's candidacy for VAD.    Discussed with Dr. Lance and patient. The different options were discucssed with the patient. Patient will be evaluated and managed by Dr. Lance.     Total time spent in this consultation was 60 minutes.             Chief Complaint:   Worsening leg swelling and weight gain.     History is obtained from the patient and the patient and chart         History of Present Illness:   Mr. Samir Rodriguez is a 46 year old gentleman who presented to [facility]  with concern for worsening leg swelling as well as weight gain.      Mr. Rodriguez has a history of rheumatic heart disease s/p mechanical MVR x 2 in 1980s and 1992 who is maintained on warfarin with INR goal 2.5 - 3.5.  As well, he has biventricular CHF with an EF 25-30% s/p dual chamber ICD 2008 and chronic AFib on amiodarone and digoxin.  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  He has a history of CKD III, hypothyroidism.      Patient reports he was doing well at home until he tripped on concrete patio 11/14/16 fracturing his right tibia.  He was hospitalized, no surgery was done and he was discharged home in a brace.  Bumex dose was decreased due to hypotension. He was readmitted to the hospital from 12/8 to 12/13 after he developed cellulitis and CHF exacerbation.  Discharged home on linezolid.     He was returned to the hospital on 12/22/16 after a second fall when he slipped on ice.  In addition to weight gain and peripheral edema, he was noted to have low blood counts, felt to be due to his mechanical valve.  However cardiac echo revealed normal MV leaflets, no restrictions.       Mr. Rodriguez was transferred from OSH to this facility for management of acute biventricular heart failure and pancytopenia.  He was admitted to the cardiology service.  Seen by heme-onc for cytopenias. Subsequent evaluation including ECHO and catheterization show moderate to severe aortic insufficiency and moderate tricuspid insufficiency; mechanical mitral valve MIRZA Dawson denies any recent fevers, chills, CP, SOB, palpitations, orthopnea. Occasional headaches. Hearing loss L ear.  No recent sore throat, nasal drainage.  No abscessed teeth or gums but dentition is poor and few teeth were removed couple of years ago. Mild N/V this morning. No abdominal issues, blood in stool or urine or BRBPR, no dysuria. Still has right HD cath present, received hemodialysis twice during first hospitalization. Right leg in brace due to  fracture.     CVTS has been requested to assess for possible aortic and tricuspid valve replacement.           Past Medical History:     Past Medical History   Diagnosis Date     Rheumatic heart disease           Past Surgical History:     Past Surgical History   Procedure Laterality Date     Cholecystectomy ~ 1985       Hernia repair L groin 2013       Appendectomy       Mitral valve replacement       Mechanical ([employer] Tilting Disc); 1992 and 1981             Social History:     Social History     Social History     Marital Status:      Spouse Name: Dinora     Number of Children: Son and daughter     Years of Education: N/A     Occupational History     disabled.     Social History Main Topics     Smoking status: Never Smoker      Smokeless tobacco: No     Alcohol Use: No     Drug Use: No     Sexual Activity: Not on file     Other Topics Concern     Lives in [place], [place].  Part of [facility]               Family History:     Family History   Problem Relation Age of Onset     DIABETES Mother      Hypertension Brother            Immunizations:   Immunization status is unknown          Allergies:     Allergies   Allergen Reactions     Asa [Aspirin] Other (See Comments) and Diarrhea     goosebumps  nausea     Gabapentin Nausea     Nabumetone Nausea     Sulfamethoxazole Unknown     Trimethoprim Unknown     Allopurinol Rash     Clindamycin Rash             Medications:     No prescriptions prior to admission          Review of Systems:   The Review of Systems is negative other than noted in the HPI          Physical Exam:   Vitals were reviewed  Temp: 98.2  F (36.8  C) Temp src: Axillary BP: 100/54 mmHg   Heart Rate: 80 Resp: 16 SpO2: 97 % O2 Device: None (Room air)    Constitutional:   awake, alert, cooperative, no apparent distress, and appears stated age; sitting up in chair     Eyes:   Lids and lashes normal, pupils equal, round and reactive to light, extra ocular muscles intact, sclera clear,  conjunctiva normal     ENT:   Normocephalic, atraumatic, sinuses nontender on palpation, oral pharynx with moist mucous membranes, tonsils without erythema or exudates.  Poor dentition with few teeth missing     Lungs:   No increased work of breathing, good air exchange, clear to auscultation bilaterally, no crackles or wheezing     Cardiovascular:   S1, S2, no murmurs, rubs or gallops, loud valve click, paced rhythm.  Well healed sternal incision     Abdomen:   No scars, normal bowel sounds, soft, non-distended, non-tender, no masses palpated, no hepatosplenomegally     Genitounirinary:   deferred     Musculoskeletal:   There is no redness, warmth, or swelling of the joints.  RLE with brace due to tibial fracture, normal ROM all other extremities     Neurologic:   Awake, alert, oriented to name, place and time.  Cranial nerves II-XII are grossly intact.      Neuropsychiatric:   General: normal, calm and normal eye contact     Skin:   no redness, warmth, or swelling, no rashes, no lesions and no jaundice             Data:   INR     4.34   1/17/2017  INR     4.10   1/16/2017  INR     3.10   1/15/2017    WBC      5.8   1/17/2017  RBC     2.58   1/17/2017  HGB      8.1   1/17/2017  HCT     24.5   1/17/2017  MCV       95   1/17/2017  MCH     31.4   1/17/2017  MCHC     33.1   1/17/2017  RDW     20.8   1/17/2017  PLT       93   1/17/2017    Last Basic Metabolic Panel:  NA      130   1/17/2017   POTASSIUM      3.7   1/17/2017  CHLORIDE       90   1/17/2017  DELFINO      7.7   1/17/2017  CO2       34   1/17/2017  BUN       31   1/17/2017  CR     1.81   1/17/2017  GLC       77   1/17/2017    Liver Function Studies -   Recent Labs   Lab Test  01/14/17   1345   PROTTOTAL  5.1*   ALBUMIN  2.4*   BILITOTAL  3.6*   ALKPHOS  135   AST  110*   ALT  191*     COLOR URINE (no units)   Date Value   01/13/2017 Yellow     APPEARANCE URINE (no units)   Date Value   01/13/2017 Clear     GLUCOSE URINE (mg/dL)   Date Value   01/13/2017 Negative      BILIRUBIN URINE (no units)   Date Value   01/13/2017 Negative     KETONES URINE (mg/dL)   Date Value   01/13/2017 Negative     SPECIFIC GRAVITY URINE (no units)   Date Value   01/13/2017 1.008     PH URINE (pH)   Date Value   01/13/2017 6.0     PROTEIN ALBUMIN URINE (mg/dL)   Date Value   01/13/2017 Negative     NITRITE URINE (no units)   Date Value   01/13/2017 Negative     LEUKOCYTE ESTERASE URINE (no units)   Date Value   01/13/2017 Negative     Lab Results   Component Value Date    TROPI 0.091* 01/12/2017     ECHO 1/12/17: Interpretation Summary  Severe left ventricular dilation with severe diffuse hypokinesis is present.  The Ejection Fraction is estimated at 20-25% (calculated, 24%.)  Mild right ventricular dilation is present. Global right ventricular function  is mildly reduced.  A mechanical mitral valve is present. The mean diastolic gradient is 6.5 mmHg  at a heart rate of 60 bpm.  Moderate to severe aortic insufficiency is present. The ERO is 30 mm2 and  RVol is 56 mL. The vena contracta width is 0.54 cm.  Moderate tricuspid insufficiency is present. The right ventricular systolic  pressure is 27 mmHg above the right atrial pressure.  The inferior vena cava is dilated at 3.2 cm without respiratory variability,  consistent with increased right atrial pressure. the estimated right atrial  pressure is > 15 mmHg.  No pericardial effusion is present.  Previous study not available for comparison.    Left Ventricle  Severe left ventricular dilation is present. The Ejection Fraction is  estimated at 20-25%. Severe diffuse hypokinesis is present. The LV diastolic  function cannot be evaluated.     Right Ventricle  Mild right ventricular dilation is present. Global right ventricular function  is mildly reduced.  Atria  Severe biatrial enlargement is present. A pacemaker lead is noted in the  right atrium.     Mitral Valve  A mechanical mitral valve is present. The mean diastolic gradient is 6.5 mmHg  at a  heart rate of 60 bpm.     Aortic Valve  Moderate to severe aortic insufficiency is present. The ERO is 30 mm2 and  RVol is 56 mL. The vena contracta width is 0.54 cm.     Tricuspid Valve  Moderate tricuspid insufficiency is present. Right ventricular systolic  pressure is 27mmHg above the right atrial pressure.     Pulmonic Valve  Trace pulmonic insufficiency is present.     Vessels  The aorta root is normal. The inferior vena cava was dilated at 3.2 cm  without respiratory variability, consistent with increased right atrial  pressure. Estimated right atrial pressure is > 15 mmHg.  Pericardium  No pericardial effusion is present.     Compared to Previous Study  Previous study not available for comparison.     MMode/2D Measurements & Calculations  IVSd: 0.72 cm  LVIDd: 7.2 cm  LVIDs: 6.1 cm  LVPWd: 0.76 cm  FS: 15.6 %  EDV(Teich): 273.7 ml  ESV(Teich): 186.2 ml  LV mass(C)d: 236.8 grams  asc Aorta Diam: 3.2 cm  LVOT diam: 2.4 cm  LVOT area: 4.4 cm2  LA Volume (BP): 209.9 ml        Time Measurements  Mitral HR: 67.9 BPM  MM HR: 60.2 BPM        Doppler Measurements & Calculations  MV max P.6 mmHg  MV mean P.5 mmHg  MV V2 VTI: 57.5 cm  MVA(VTI): 1.4 cm2  AI P1/2t: 394.9 msec  LV V1 max P.7 mmHg  LV V1 max: 82.1 cm/sec  LV V1 VTI: 18.4 cm  SV(LVOT): 81.4 ml  TR max jonah: 253.5 cm/sec  TR max P.6 mmHg        Chaparrita [facility]  Pager: [phone]  Rounding pager: [phone]

## 2017-01-18 ENCOUNTER — APPOINTMENT (OUTPATIENT)
Dept: PHYSICAL THERAPY | Facility: CLINIC | Age: 47
DRG: 286 | End: 2017-01-18
Attending: INTERNAL MEDICINE
Payer: MEDICAID

## 2017-01-18 ENCOUNTER — APPOINTMENT (OUTPATIENT)
Dept: OCCUPATIONAL THERAPY | Facility: CLINIC | Age: 47
DRG: 286 | End: 2017-01-18
Attending: INTERNAL MEDICINE
Payer: MEDICAID

## 2017-01-18 ENCOUNTER — APPOINTMENT (OUTPATIENT)
Dept: GENERAL RADIOLOGY | Facility: CLINIC | Age: 47
DRG: 286 | End: 2017-01-18
Attending: INTERNAL MEDICINE
Payer: MEDICAID

## 2017-01-18 LAB
ALBUMIN SERPL-MCNC: 2.1 G/DL (ref 3.4–5)
ALP SERPL-CCNC: 128 U/L (ref 40–150)
ALT SERPL W P-5'-P-CCNC: 102 U/L (ref 0–70)
ANION GAP SERPL CALCULATED.3IONS-SCNC: 9 MMOL/L (ref 3–14)
AST SERPL W P-5'-P-CCNC: 60 U/L (ref 0–45)
BACTERIA SPEC CULT: NO GROWTH
BILIRUB SERPL-MCNC: 3.8 MG/DL (ref 0.2–1.3)
BLD PROD TYP BPU: NORMAL
BLD UNIT ID BPU: 0
BLOOD PRODUCT CODE: NORMAL
BPU ID: NORMAL
BUN SERPL-MCNC: 26 MG/DL (ref 7–30)
CALCIUM SERPL-MCNC: 6.9 MG/DL (ref 8.5–10.1)
CHLORIDE SERPL-SCNC: 91 MMOL/L (ref 94–109)
CO2 SERPL-SCNC: 31 MMOL/L (ref 20–32)
CREAT SERPL-MCNC: 1.8 MG/DL (ref 0.66–1.25)
ERYTHROCYTE [DISTWIDTH] IN BLOOD BY AUTOMATED COUNT: 20.7 % (ref 10–15)
GFR SERPL CREATININE-BSD FRML MDRD: 41 ML/MIN/1.7M2
GLUCOSE SERPL-MCNC: 77 MG/DL (ref 70–99)
HCT VFR BLD AUTO: 22.2 % (ref 40–53)
HGB BLD-MCNC: 7.4 G/DL (ref 13.3–17.7)
HGB BLD-MCNC: 8.7 G/DL (ref 13.3–17.7)
INR PPP: 3.4 (ref 0.86–1.14)
MCH RBC QN AUTO: 31.8 PG (ref 26.5–33)
MCHC RBC AUTO-ENTMCNC: 33.3 G/DL (ref 31.5–36.5)
MCV RBC AUTO: 95 FL (ref 78–100)
MICRO REPORT STATUS: NORMAL
PHOSPHATE SERPL-MCNC: 2.8 MG/DL (ref 2.5–4.5)
PLATELET # BLD AUTO: 91 10E9/L (ref 150–450)
POTASSIUM SERPL-SCNC: 3.6 MMOL/L (ref 3.4–5.3)
PROT SERPL-MCNC: 5.1 G/DL (ref 6.8–8.8)
RBC # BLD AUTO: 2.33 10E12/L (ref 4.4–5.9)
SODIUM SERPL-SCNC: 131 MMOL/L (ref 133–144)
SPECIMEN SOURCE: NORMAL
TRANSFUSION STATUS PATIENT QL: NORMAL
TRANSFUSION STATUS PATIENT QL: NORMAL
WBC # BLD AUTO: 5.2 10E9/L (ref 4–11)

## 2017-01-18 PROCEDURE — 70355 PANORAMIC X-RAY OF JAWS: CPT

## 2017-01-18 PROCEDURE — 25000132 ZZH RX MED GY IP 250 OP 250 PS 637: Performed by: INTERNAL MEDICINE

## 2017-01-18 PROCEDURE — 97535 SELF CARE MNGMENT TRAINING: CPT | Mod: GO | Performed by: OCCUPATIONAL THERAPIST

## 2017-01-18 PROCEDURE — 93010 ELECTROCARDIOGRAM REPORT: CPT | Performed by: INTERNAL MEDICINE

## 2017-01-18 PROCEDURE — 93005 ELECTROCARDIOGRAM TRACING: CPT

## 2017-01-18 PROCEDURE — 40000133 ZZH STATISTIC OT WARD VISIT: Performed by: OCCUPATIONAL THERAPIST

## 2017-01-18 PROCEDURE — 86900 BLOOD TYPING SEROLOGIC ABO: CPT | Performed by: INTERNAL MEDICINE

## 2017-01-18 PROCEDURE — 25000125 ZZHC RX 250

## 2017-01-18 PROCEDURE — S0171 BUMETANIDE 0.5 MG: HCPCS

## 2017-01-18 PROCEDURE — 99233 SBSQ HOSP IP/OBS HIGH 50: CPT | Mod: GC | Performed by: INTERNAL MEDICINE

## 2017-01-18 PROCEDURE — 86905 BLOOD TYPING RBC ANTIGENS: CPT | Performed by: INTERNAL MEDICINE

## 2017-01-18 PROCEDURE — 85018 HEMOGLOBIN: CPT | Performed by: INTERNAL MEDICINE

## 2017-01-18 PROCEDURE — 86901 BLOOD TYPING SEROLOGIC RH(D): CPT | Performed by: INTERNAL MEDICINE

## 2017-01-18 PROCEDURE — 97530 THERAPEUTIC ACTIVITIES: CPT | Mod: GP

## 2017-01-18 PROCEDURE — 97116 GAIT TRAINING THERAPY: CPT | Mod: GP

## 2017-01-18 PROCEDURE — 36415 COLL VENOUS BLD VENIPUNCTURE: CPT | Performed by: INTERNAL MEDICINE

## 2017-01-18 PROCEDURE — 86870 RBC ANTIBODY IDENTIFICATION: CPT | Performed by: INTERNAL MEDICINE

## 2017-01-18 PROCEDURE — 86922 COMPATIBILITY TEST ANTIGLOB: CPT | Performed by: INTERNAL MEDICINE

## 2017-01-18 PROCEDURE — 21400006 ZZH R&B CCU INTERMEDIATE UMMC

## 2017-01-18 PROCEDURE — P9016 RBC LEUKOCYTES REDUCED: HCPCS | Performed by: INTERNAL MEDICINE

## 2017-01-18 PROCEDURE — 86902 BLOOD TYPE ANTIGEN DONOR EA: CPT | Performed by: INTERNAL MEDICINE

## 2017-01-18 PROCEDURE — 40000193 ZZH STATISTIC PT WARD VISIT

## 2017-01-18 PROCEDURE — 80053 COMPREHEN METABOLIC PANEL: CPT | Performed by: INTERNAL MEDICINE

## 2017-01-18 PROCEDURE — 84100 ASSAY OF PHOSPHORUS: CPT | Performed by: INTERNAL MEDICINE

## 2017-01-18 PROCEDURE — 86850 RBC ANTIBODY SCREEN: CPT | Performed by: INTERNAL MEDICINE

## 2017-01-18 PROCEDURE — 85027 COMPLETE CBC AUTOMATED: CPT | Performed by: INTERNAL MEDICINE

## 2017-01-18 PROCEDURE — 25000125 ZZHC RX 250: Performed by: INTERNAL MEDICINE

## 2017-01-18 PROCEDURE — 85610 PROTHROMBIN TIME: CPT | Performed by: INTERNAL MEDICINE

## 2017-01-18 RX ORDER — DOCUSATE SODIUM 100 MG/1
100 CAPSULE, LIQUID FILLED ORAL 2 TIMES DAILY
Status: DISCONTINUED | OUTPATIENT
Start: 2017-01-18 | End: 2017-02-09

## 2017-01-18 RX ORDER — ACETAMINOPHEN 325 MG/1
650 TABLET ORAL EVERY 8 HOURS PRN
Status: DISCONTINUED | OUTPATIENT
Start: 2017-01-18 | End: 2017-01-19

## 2017-01-18 RX ORDER — ONDANSETRON 2 MG/ML
4 INJECTION INTRAMUSCULAR; INTRAVENOUS ONCE
Status: COMPLETED | OUTPATIENT
Start: 2017-01-18 | End: 2017-01-18

## 2017-01-18 RX ORDER — ONDANSETRON 4 MG/1
4 TABLET, FILM COATED ORAL ONCE
Status: DISCONTINUED | OUTPATIENT
Start: 2017-01-18 | End: 2017-01-18

## 2017-01-18 RX ADMIN — ALUMINUM HYDROXIDE, MAGNESIUM HYDROXIDE, AND DIMETHICONE 30 ML: 400; 400; 40 SUSPENSION ORAL at 14:05

## 2017-01-18 RX ADMIN — METHOCARBAMOL 750 MG: 750 TABLET ORAL at 07:53

## 2017-01-18 RX ADMIN — Medication: at 08:06

## 2017-01-18 RX ADMIN — METHOCARBAMOL 750 MG: 750 TABLET ORAL at 20:37

## 2017-01-18 RX ADMIN — BUMETANIDE 2 MG: 0.25 INJECTION, SOLUTION INTRAMUSCULAR; INTRAVENOUS at 20:37

## 2017-01-18 RX ADMIN — Medication 0.25 MG: at 22:47

## 2017-01-18 RX ADMIN — OXYCODONE HYDROCHLORIDE 10 MG: 5 TABLET ORAL at 13:23

## 2017-01-18 RX ADMIN — OXYCODONE HYDROCHLORIDE AND ACETAMINOPHEN 500 MG: 500 TABLET ORAL at 07:53

## 2017-01-18 RX ADMIN — BUMETANIDE 2 MG: 0.25 INJECTION, SOLUTION INTRAMUSCULAR; INTRAVENOUS at 07:52

## 2017-01-18 RX ADMIN — Medication: at 20:46

## 2017-01-18 RX ADMIN — Medication 12.5 MG: at 07:53

## 2017-01-18 RX ADMIN — POLYETHYLENE GLYCOL 3350 17 G: 17 POWDER, FOR SOLUTION ORAL at 09:38

## 2017-01-18 RX ADMIN — OXYCODONE HYDROCHLORIDE 10 MG: 5 TABLET ORAL at 03:02

## 2017-01-18 RX ADMIN — ZINC SULFATE CAP 220 MG (50 MG ELEMENTAL ZN) 220 MG: 220 (50 ZN) CAP at 07:53

## 2017-01-18 RX ADMIN — ONDANSETRON 4 MG: 2 INJECTION INTRAMUSCULAR; INTRAVENOUS at 09:38

## 2017-01-18 RX ADMIN — OXYCODONE HYDROCHLORIDE 10 MG: 5 TABLET ORAL at 22:47

## 2017-01-18 RX ADMIN — Medication 20000 UNITS: at 07:52

## 2017-01-18 RX ADMIN — MULTIPLE VITAMINS W/ MINERALS TAB 1 TABLET: TAB at 07:53

## 2017-01-18 RX ADMIN — DOCUSATE SODIUM 100 MG: 100 CAPSULE, LIQUID FILLED ORAL at 20:38

## 2017-01-18 RX ADMIN — OXYCODONE HYDROCHLORIDE 10 MG: 5 TABLET ORAL at 07:52

## 2017-01-18 RX ADMIN — OMEPRAZOLE 20 MG: 20 CAPSULE, DELAYED RELEASE ORAL at 07:53

## 2017-01-18 RX ADMIN — LEVOTHYROXINE SODIUM 224 MCG: 112 TABLET ORAL at 07:51

## 2017-01-18 RX ADMIN — ALUMINUM HYDROXIDE, MAGNESIUM HYDROXIDE, AND DIMETHICONE 30 ML: 400; 400; 40 SUSPENSION ORAL at 09:41

## 2017-01-18 RX ADMIN — POTASSIUM CHLORIDE 20 MEQ: 750 TABLET, EXTENDED RELEASE ORAL at 09:46

## 2017-01-18 RX ADMIN — AMIODARONE HYDROCHLORIDE 300 MG: 200 TABLET ORAL at 07:52

## 2017-01-18 RX ADMIN — OXYCODONE HYDROCHLORIDE 10 MG: 5 TABLET ORAL at 17:29

## 2017-01-18 ASSESSMENT — PAIN DESCRIPTION - DESCRIPTORS
DESCRIPTORS: ACHING
DESCRIPTORS: ACHING
DESCRIPTORS: HEADACHE;ACHING

## 2017-01-18 NOTE — PLAN OF CARE
Problem: Goal Outcome Summary  Goal: Goal Outcome Summary  OT 6C: Recommend discharge to TCU to increase ADL I and activity tolerance. Pt limited by pain, weakness, and RLE fracture. Pt completed seated ADLs at the EOB, requiring max A to transfer from supine to sit to manage RLE. Pt transferred from EOB to chair with max AX2 using FWW.

## 2017-01-18 NOTE — PLAN OF CARE
Problem: Goal Outcome Summary  Goal: Goal Outcome Summary  Temp 99.4-99.5.  Other vss.  Monitor shows paced rhythm.  Weight down this am.  Voiding large amounts after iv bumex.  Mg+ and phos replaced on pms.  C/o headache all day yesterday.  neuros intact.  Headache went away after he was able to eat most of his dinner and with oxycodone. No coumadin yesterday due to high inr.  Recheck inr in the am.   Continues with hip,perineal,scrotal and leg edema.  Right hip continues to weep clear fluid.  Leg edema seems less.  Uses leg brace to right leg when oob.  Plans for dental consult.  Requesting an opthalmology consult as he needs new glasses.  Plans for rehab stay after diuresis then to return for tvr and avr in the future.  Will monitor and notify md of changes or issues.

## 2017-01-18 NOTE — PLAN OF CARE
Problem: Goal Outcome Summary  Goal: Goal Outcome Summary  Pt AOx4 VSS. Pt c/o of headache today treated with oxy x 10 x 3. Pt had emesis after morning medications. Did take on empty stomach, suggested that eat first then take meds. Pt's coccyx wound dressing changed. Pt continues to have good UO. Edema slowly moving out. Pt talked with  and is happy that kids will be able to get help with lodging and meals when they are here. Plan is to continue to diurese, strengthen and then schedule valve surgery. Probably being handed off to [facility] toorrow. Will continue with care plan, continue to monitor and notify MD's of any changes.

## 2017-01-18 NOTE — PROGRESS NOTES
Brief heme onc note:    Chart reviewed. Notably INR in goal, stable hematoma. No other bleeding.Bicytopenia improving. Getting coumadin. HD stable. Team called to anticipate discharge plans    Recs  -check factors 2,5,7,8 can given indication of patient's coagulation and liver function and this may be helpful for anticoagulation assessment  -consider a goal INR closer to 2.5 for a while considering risk of hematoma expanison  -holding warfarin for a day would be reasonable    Ezio Chan MD   PGY4  Heme Onc Fellow    Scribe:Lauro Hernandez.,MS    Discussed wt Dr. Jones

## 2017-01-18 NOTE — PLAN OF CARE
Problem: Goal Outcome Summary  Goal: Goal Outcome Summary  PT 6C: Ambulates 6ft with FWW and CGAx1.  Experiences significant pain limiting mobility in RLE.  STS with mod Ax2 and FWW.  PT recommends d/c to TCU when medically stable.

## 2017-01-18 NOTE — PLAN OF CARE
Problem: Goal Outcome Summary  Goal: Goal Outcome Summary  Outcome: Therapy, progress towards functional goals is fair  OT-6C: Pt completed the [facility] cognitive screen scoring 23/30 indicating a possible mild impairment in cognition. Pt had most difficulty with STM, and executive function tasks. Therapist educated pt on compensatory strategies for STM. Pt tolerated therapy session well. VSS.     REC: Discharge to TCU when medically appropriate.

## 2017-01-18 NOTE — PLAN OF CARE
Problem: Goal Outcome Summary  Goal: Goal Outcome Summary  PT 6C: Per chart review, INR is above 4.0 increasing risk of falls and bleeding.  RN notified.  Will reschedule tomorrow.

## 2017-01-18 NOTE — PROGRESS NOTES
"SPIRITUAL HEALTH SERVICES  SPIRITUAL ASSESSMENT Progress Note  [facility] ([place] 6C     Follow-up visit with pt. I let pt know that Chaplain Resident Francisco Amezquita had hoped to visit yesterday but was not able to because of on-call duties. She will attempt to visit on Wednesday. Pt shared that \"I had a pretty hard day, even though I slept well last night. But I'm doing better now. And the good thing is now we have a plan. It seems the doctors are saying they think it's better for me to have surgery, and do it pretty soon - that this would be the plan instead of a transplant. That sounds good to me.\"  Pt requested a prayer - I shared a prayer in song.    PLAN: I will be gone Wednesday. Chaplain Resident Francisco Amezquita will attempt to see pt.    Gian Mathias M.Div. (Bill), [facility]  Staff   Pager [phone]      "

## 2017-01-18 NOTE — PROGRESS NOTES
Cardiology 1 Progress Note    Samir Rodriguez MRN# [id number]   Age: 46 year old YOB: 1970   Date of Admission: 1/12/2017           Assessment and Plan:   Samir Rodriguez is a 46 year old  male with past medical history of Rheumatic Heart Disease s/p mechanical MVR x 2 (1980s and 1992) on warfarin (INR goal 2.5-3.5), biventricular CHF (EF 25-30%) s/p dual chamber ICD 2008, chronic afib on amiodarone and previously on digoxine (stopped 1/16/17 due to concern for toxicity), WPW ablation at age 11, CKD III, hypothyroid,  who is transferred from [facility] for further eval of CHF and concern of hemolysis in setting of mechanical valve.    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 Since 11/16 pt has had worsening LE edema. CHAPMAN at baseline with 2-3 blocks. + orthopnea. + JVD and 3+ LE edema. BNP 73730. Weight on admission 111kg. 1/16/17 device interrogation: atrial tachycardia to 150s with AV block. Lower rate setting changed to 80bpm from 60bmp and device changed from DDDR to VVIR on 01/16. Elevated LFTs likely due to congestive hepatopathy.  - Continue Bumex 2mg IV bid with goal -2 to -3L net negative daily  - Continue metoprolol xl 12.5mg daily; holding ACEI and spironolactone with resolving CARSON    - Daily weights; Strict I/O  - dietician consult - appreciate recs     # Mechanical mitral valve (MV diastolic gradient 6.5)  # Aortic Insufficiency (mod -  severe)  # TR (moderate)  History of BiV failure attributed to valvular disease. Echocardiogram on admission demonstrated moderate-severe aortic insufficiency which is his most acute cardiac pathology. His tricuspid regurgitation is likely due to his signficantly fluid overloaded state. Blood cultures x 2: NGTD. Mechanical valve maintained on warfarin w/ INR 2.5-3.5 prior to this admission.  - Continue diuresis and obtain EDEL when near euvolemic  - Dental consult to evaluate for pre-valve replacement work-up  - Cardiovascular surgery consulted; appreciate recommendations   -> anticipate diuresis, short term rehab, repeat imaging, and pre-operative optimization for valve replacement procedure    # Anemia and thrombocytopenia (pancytopenia at OSH, concern for MAHA)  # Right Arm Hematoma   Blood smear: blood smear- RBCs show some target cells, hypochromia, increased polychromasia, no schistocytes or spherocytes. Low haptoglobin, high LDH, and plasma free hemoglobin concerning for intra-vascular hemolysis. No evidence of overt infection causing DIC. EPO inappropriately low. Etiology of hematoma unclear besides supratherapeutic INR- 4.6 on arrival.  - H/O following; appreciate recommendations  - Will continue to hold warfarin and let INR drift towards his baseline  - Can start heparin gtt if sub-therapeutic  - Planning for DC on warfarin w/ goal INR 2.5-3.5    # Paroxsymal afib:  digoxin level 0.7 on 1/12 and 1/17. Device interrogation with atrial tachycardia and AV block and V pacing. The patient has been in an atrial flutter that is undersensed, hence the device was switched to VVI mode. There no no point continuing amiodarone at this stage. We will use the follwing days to  whether restoration of sinus rhythm is needed. It does look like it is not for the time being. We will stop amiodarone.    # Hyperthyroid: Will need repeat TSH/T4 1-2 months post DC. Continue levothyroxine 224mcg   # Anxiety and insomnia: On  home clonazepam.    # Right tibia fx and R knee trauma:  Fall in November prompted decompensation. Xray tibia and knee no fx this admission.  - Ortho evaluated; planning for outpatient follow up with orthopedic surgery in 4-6 weeks  - PT/OT    FEN: cardiac diet   PPX: Holding warfarin for now    Code Status: Full CODE      Patient discussed with staff attending, Dr. Carmichael.  Please feel free to page with questions.    Dwight Zurita DO  Internal Medicine PG2  Cardiology Service I  Pager: [phone]    CARDIOLOGY STAFF NOTE  I have seen and examined the patient with the Cards 1 team. I agree with the note above that reflects my assessment and plan of care.  Lev Carmichael MD [facility]  Cardiology - Electrophysiology           Interval History/Subjective   No acute events overnight. Hematoma stable. No evidence of further bleeding. Nauseous this morning with multiple episodes of emesis following breakfast/medications. No chest pain or shortness of breath. Headache worse in last 24 hours.          Objective   Temp:  [98  F (36.7  C)-99.5  F (37.5  C)] 99.4  F (37.4  C)  Heart Rate:  [79-80] 80  Resp:  [15-16] 16  BP: (86-96)/(47-54) 96/52 mmHg  SpO2:  [92 %-99 %] 95 %      Intake/Output Summary (Last 24 hours) at 01/18/17 1330  Last data filed at 01/18/17 1117   Gross per 24 hour   Intake    530 ml   Output   3950 ml   Net  -3420 ml         Physical exam:  Gen: AA&Ox3, no acute distress  HEENT: NACT, poor dentition   PULM: Crackles in bases b/l, lung sounds distant, no wheezing  CV: RRR w/ 2+ systolic murmur at LUSB and LISB  ABD:  soft, nontender, nondistended.    EXT: +3 sonam edema to thighs, no clubbing or cyanosis. Right arm ecchymosis with normal sensory and strength.   NEURO: CN II-XII intact, moves all extremities   SKIN: Right arm ecchymosis outlined w/ marker and table. Sacral pressure ulcer. Heel ulcers bilateral heels.           Data:   CBC    Recent Labs  Lab 01/18/17  0707 01/17/17  0825 01/16/17  0709  01/15/17  0742   WBC 5.2 5.8 5.8 6.1   RBC 2.33* 2.58* 2.75* 2.53*   HGB 7.4* 8.1* 8.6* 8.0*   HCT 22.2* 24.5* 26.2* 23.8*   MCV 95 95 95 94   MCH 31.8 31.4 31.3 31.6   MCHC 33.3 33.1 32.8 33.6   RDW 20.7* 20.8* 20.5* 20.2*   PLT 91* 93* 93* 67*     CMP    Recent Labs  Lab 01/18/17  0707 01/17/17  0825 01/16/17  1828 01/16/17  0709 01/15/17  0742 01/14/17  1345  01/13/17  0650 01/12/17  1121   * 130*  --  136 133 134  < > 135 136   POTASSIUM 3.6 3.7 3.8 3.3* 3.6 3.7  < > 3.8 4.1   CHLORIDE 91* 90*  --  96 97 97  < > 98 96   CO2 31 34*  --  31 30 32  < > 30 28   ANIONGAP 9 6  --  10 6 5  < > 8 11   GLC 77 77  --  73 77 107*  < > 83 127*   BUN 26 31*  --  37* 42* 45*  < > 52* 58*   CR 1.80* 1.81*  --  1.87* 1.77* 1.93*  < > 1.97* 2.02*   GFRESTIMATED 41* 40*  --  39* 41* 37*  < > 37* 35*   GFRESTBLACK 49* 49*  --  47* 50* 45*  < > 44* 43*   DELFINO 6.9* 7.7*  --  7.5* 7.3* 7.2*  < > 7.6* 7.5*   MAG  --  2.0  --  1.9 1.9  --   --   --   --    PHOS 2.8 2.4*  --   --   --   --   --   --   --    PROTTOTAL 5.1*  --   --   --   --  5.1*  --  5.1* 5.2*   ALBUMIN 2.1*  --   --   --   --  2.4*  --  2.5* 2.6*   BILITOTAL 3.8*  --   --   --   --  3.6*  --  3.6* 3.2*   ALKPHOS 128  --   --   --   --  135  --  138 148   AST 60*  --   --   --   --  110*  --  186* 171*   *  --   --   --   --  191*  --  229* 208*   < > = values in this interval not displayed.  INR    Recent Labs  Lab 01/18/17  0707 01/17/17  0825 01/16/17  0709 01/15/17  0742   INR 3.40* 4.34* 4.10* 3.10*             Medications:     Current Facility-Administered Medications   Medication     potassium chloride SA (K-DUR/KLOR-CON M) CR tablet 20-40 mEq     potassium chloride (KLOR-CON) Packet 20-40 mEq     potassium chloride 10 mEq in 100 mL intermittent infusion     potassium chloride 10 mEq in 100 mL intermittent infusion with 10 mg lidocaine     potassium chloride 20 mEq in 50 mL intermittent infusion     magnesium sulfate 2 g in NS intermittent infusion  (PharMEDium or FV Cmpd)     magnesium sulfate 4 g in 100 mL sterile water (premade)     potassium phosphate 15 mmol in D5W 250 mL intermittent infusion     potassium phosphate 20 mmol in D5W 500 mL intermittent infusion     potassium phosphate 20 mmol in D5W 250 mL intermittent infusion     potassium phosphate 25 mmol in D5W 500 mL intermittent infusion     multivitamin, therapeutic with minerals (THERA-VIT-M) tablet 1 tablet     zinc sulfate (ZINCATE) capsule 220 mg     ascorbic acid (VITAMIN C) tablet 500 mg     vitamin A capsule 20,000 Units     clonazePAM (klonoPIN) half-tab 0.25 mg     melatonin tablet 3 mg     miconazole (MICATIN; MICRO GUARD) 2 % powder     lidocaine 1 % 1 mL     lidocaine (LMX4) kit     sodium chloride (PF) 0.9% PF flush 3 mL     sodium chloride (PF) 0.9% PF flush 3 mL     medication instruction     nitroglycerin (NITROSTAT) sublingual tablet 0.4 mg     alum & mag hydroxide-simethicone (MYLANTA ES/MAALOX  ES) suspension 15-30 mL     polyethylene glycol (MIRALAX/GLYCOLAX) Packet 17 g     albuterol (PROAIR HFA/PROVENTIL HFA/VENTOLIN HFA) Inhaler 2 puff     sennosides (SENOKOT) tablet 8.6 mg     naloxone (NARCAN) injection 0.1-0.4 mg     levothyroxine (SYNTHROID/LEVOTHROID) tablet 224 mcg     omeprazole (priLOSEC) CR capsule 20 mg     methocarbamol (ROBAXIN) tablet 750 mg     metoprolol (TOPROL-XL) half-tab 12.5 mg     bumetanide (BUMEX) injection 2 mg     oxyCODONE (ROXICODONE) IR tablet 5-10 mg

## 2017-01-18 NOTE — PROGRESS NOTES
Progress Note  January 13, 2017    Samir Rodriguez MRN# [id number]   Age: 46 year old YOB: 1970   Date of Admission: 1/12/2017           Assessment and Plan:   Samir Rodriguez is a 46 year old  male with past medical history of Rheumatic Heart Disease s/p mechanical MVR x 2 (1980s and 1992) on warfarin (INR goal 2.5-3.5), biventricular CHF (EF 25-30%) s/p dual chamber ICD 2008, chronic afib on amiodarone and previously on digoxine (stopped 1/16/17 due to concern for toxicity), WPW ablation at age 11, CKD III, hypothyroid,  who is transferred from [facility] for further eval of CHF and concern of hemolysis in setting of mechanical valve.    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 Since 11/16 pt has had worsening LE edema. CHAPMAN at baseline with 2-3 blocks. + orthopnea. + JVD and 3+ LE edema. BNP 47973. Weight on admission 111kg. 1/16/17 device interrogation: tachycardia to 150s with AV block. Lower rate setting changed to 80bpm from 60bmp  - losartan will resume once renal function stable and no longer actively diuresing  - Continue metoprolol xl 12.5mg daily    - Not on spironolactone - will resume later  - bumex 2mg IV BID      - Daily weights    - Strict I/O  - dietician  consult - appreciate recs     # Mechanical mitral valve (MV diastolic gradient 6.5)  # Aortic Insufficiency (mod - severe)  # TR (moderate)  # Anticoagulation  Concern for endocarditis vs perivalvular leak vs hemolysis from MVR shearing vs hemolysis from right IJ/subclavian vein clot   - Blood cultures x 2: NGTD   - Awaiting OSH EDEL imaging     # Anemia and thrombocytopenia (pancytopenia at OSH, concern for MAHA)  # positive occult bleeding at OSH: patient denies melena, hematochezia  # Hematoma right arm  Blood smear: blood smear- RBCs show some target cells, hypochromia, increased polychromasia, no schistocytes or spherocytes. A few PMNs are hypersegmented. Decreased platelets, those present are normal appearing.   Iron 51, TIBC 158, Iron sat 32, ferritin 2664, hemoglobin plasma elevated 160, haptoglobin low < 6, fibrinogen low at 121, INR 3.10, UA normal, CRP/ESR nl, Coomb's test negative. Right UE Ultrasound: no arterial occlusive disease and right internal jugular vein was not seen and multiple collaterals were visualized in the low neck, suggestive of chronic thrombosis.  - Epo pending   - heme onc consult - appreciate recs    # Direct and Indirect hyperbilirubinemia, sclera icterus:   # Transaminates: improving  Complete Abd US shows no cirrhosis, splenomegaly or splanchnic venous thrombosis. Mild - mod ascites. Likely congestive hepatopathy     # Paroxsymal afib:  digoxin level 0.7 on 1/12 and 1/17. Device interrogation with atrial tachycardia and AV block  - Continue Amiodarone  - Stop digoxin    # Hyperthyroid: hx of hypothyroid but here decreased TSH 0.25 and T4 elevated 2.18 in setting of acute illness. No changes here. Will repeat levels at outpatient after 4 weeks of discharge   - levothyroxine 224mcg     # Anxiety and insomnia: On home clonazepam. Discussed with patient concern for long term use of clonazepam. Patient agreed to take only as needed for right now for anxiety/worrying about his on going  health concerns.     # Right tibia fx and R knee trauma:  xray tibia and knee no fx  - brace in place    - Ortho consult- appreciate recs  - PT/OT    # Wound: sacral pressure ulcer, stage 2. Left heel and right heel wound.   - Wound consult     FEN: cardiac diet   PPX: on warfarin     Code Status: Full CODE      Patient discussed with staff attending, Dr. Lance.  Please feel free to page with questions.    Meggan Landers MD    Internal Medicine PGY1  Cardiology Service II  Pager: [phone]           Interval History/Subjective   No acute events overnight. INR above 4. New bruising at right flank. Ecchymosis stable on R arm.          Objective   Temp:  [98  F (36.7  C)-99.1  F (37.3  C)] 98.9  F (37.2  C)  Heart Rate:  [79-80] 79  Resp:  [15-16] 16  BP: ()/(49-57) 86/49 mmHg  SpO2:  [92 %-100 %] 92 %    Weight 245lbs --> 240lbs --> 233lbs  --> 215lbs    Intake/Output Summary (Last 24 hours) at 01/13/17 0706  Last data filed at 01/13/17 0000   Gross per 24 hour   Intake    480 ml   Output    675 ml   Net   -195 ml     Physical exam:  Gen: AA&Ox3, no acute distress  HEENT: NACT, poor dentition   PULM: Clear to auscultation bilaterally, no rales/rhonchi/wheezes  CV: paced rhythm, ABHI at RUSB and LLSB. + JVD  ABD:  soft, nontender, nondistended.    EXT: +3 sonam edema, no clubbing or cyanosis. Right arm ecchymosis with normal sensory and strength. Right arm circumference at biceps 9 cm and forearm 6 cm.   NEURO: CN II-XII intact, moves all extremities   SKIN: Right arm ecchymosis. Sacral pressure ulcer. Heel ulcers bilateral heels.           Data:   CBC    Recent Labs  Lab 01/17/17  0825 01/16/17  0709 01/15/17  0742 01/14/17  0847   WBC 5.8 5.8 6.1 7.8   RBC 2.58* 2.75* 2.53* 2.56*   HGB 8.1* 8.6* 8.0* 7.9*   HCT 24.5* 26.2* 23.8* 24.3*   MCV 95 95 94 95   MCH 31.4 31.3 31.6 30.9   MCHC 33.1 32.8 33.6 32.5   RDW 20.8* 20.5* 20.2* 19.5*   PLT 93* 93* 67* 66*     CMP    Recent Labs  Lab 01/17/17  0838  01/16/17  1828 01/16/17  0709 01/15/17  0742 01/14/17  1345  01/13/17  0650 01/12/17  1121   *  --  136 133 134  < > 135 136   POTASSIUM 3.7 3.8 3.3* 3.6 3.7  < > 3.8 4.1   CHLORIDE 90*  --  96 97 97  < > 98 96   CO2 34*  --  31 30 32  < > 30 28   ANIONGAP 6  --  10 6 5  < > 8 11   GLC 77  --  73 77 107*  < > 83 127*   BUN 31*  --  37* 42* 45*  < > 52* 58*   CR 1.81*  --  1.87* 1.77* 1.93*  < > 1.97* 2.02*   GFRESTIMATED 40*  --  39* 41* 37*  < > 37* 35*   GFRESTBLACK 49*  --  47* 50* 45*  < > 44* 43*   DELFINO 7.7*  --  7.5* 7.3* 7.2*  < > 7.6* 7.5*   MAG 2.0  --  1.9 1.9  --   --   --   --    PHOS 2.4*  --   --   --   --   --   --   --    PROTTOTAL  --   --   --   --  5.1*  --  5.1* 5.2*   ALBUMIN  --   --   --   --  2.4*  --  2.5* 2.6*   BILITOTAL  --   --   --   --  3.6*  --  3.6* 3.2*   ALKPHOS  --   --   --   --  135  --  138 148   AST  --   --   --   --  110*  --  186* 171*   ALT  --   --   --   --  191*  --  229* 208*   < > = values in this interval not displayed.  INR    Recent Labs  Lab 01/17/17  0825 01/16/17  0709 01/15/17  0742 01/14/17  1345   INR 4.34* 4.10* 3.10* 2.86*             Medications:     Current Facility-Administered Medications   Medication     warfarin-No DOSE today     potassium chloride SA (K-DUR/KLOR-CON M) CR tablet 20-40 mEq     potassium chloride (KLOR-CON) Packet 20-40 mEq     potassium chloride 10 mEq in 100 mL intermittent infusion     potassium chloride 10 mEq in 100 mL intermittent infusion with 10 mg lidocaine     potassium chloride 20 mEq in 50 mL intermittent infusion     magnesium sulfate 2 g in NS intermittent infusion (PharMEDium or FV Cmpd)     magnesium sulfate 4 g in 100 mL sterile water (premade)     potassium phosphate 15 mmol in D5W 250 mL intermittent infusion     potassium phosphate 20 mmol in D5W 500 mL intermittent infusion     potassium phosphate 20 mmol in D5W 250 mL intermittent infusion     potassium phosphate 25 mmol in D5W 500 mL intermittent infusion      multivitamin, therapeutic with minerals (THERA-VIT-M) tablet 1 tablet     zinc sulfate (ZINCATE) capsule 220 mg     ascorbic acid (VITAMIN C) tablet 500 mg     vitamin A capsule 20,000 Units     clonazePAM (klonoPIN) half-tab 0.25 mg     melatonin tablet 3 mg     miconazole (MICATIN; MICRO GUARD) 2 % powder     lidocaine 1 % 1 mL     lidocaine (LMX4) kit     sodium chloride (PF) 0.9% PF flush 3 mL     sodium chloride (PF) 0.9% PF flush 3 mL     medication instruction     nitroglycerin (NITROSTAT) sublingual tablet 0.4 mg     alum & mag hydroxide-simethicone (MYLANTA ES/MAALOX  ES) suspension 15-30 mL     polyethylene glycol (MIRALAX/GLYCOLAX) Packet 17 g     albuterol (PROAIR HFA/PROVENTIL HFA/VENTOLIN HFA) Inhaler 2 puff     sennosides (SENOKOT) tablet 8.6 mg     naloxone (NARCAN) injection 0.1-0.4 mg     levothyroxine (SYNTHROID/LEVOTHROID) tablet 224 mcg     omeprazole (priLOSEC) CR capsule 20 mg     methocarbamol (ROBAXIN) tablet 750 mg     amiodarone (PACERONE/CODARONE) tablet 300 mg     metoprolol (TOPROL-XL) half-tab 12.5 mg     bumetanide (BUMEX) injection 2 mg     oxyCODONE (ROXICODONE) IR tablet 5-10 mg

## 2017-01-19 ENCOUNTER — APPOINTMENT (OUTPATIENT)
Dept: PHYSICAL THERAPY | Facility: CLINIC | Age: 47
DRG: 286 | End: 2017-01-19
Attending: INTERNAL MEDICINE
Payer: MEDICAID

## 2017-01-19 ENCOUNTER — APPOINTMENT (OUTPATIENT)
Dept: OCCUPATIONAL THERAPY | Facility: CLINIC | Age: 47
DRG: 286 | End: 2017-01-19
Attending: INTERNAL MEDICINE
Payer: MEDICAID

## 2017-01-19 LAB
ANION GAP SERPL CALCULATED.3IONS-SCNC: 10 MMOL/L (ref 3–14)
BACTERIA SPEC CULT: NO GROWTH
BUN SERPL-MCNC: 23 MG/DL (ref 7–30)
CALCIUM SERPL-MCNC: 7.5 MG/DL (ref 8.5–10.1)
CHLORIDE SERPL-SCNC: 89 MMOL/L (ref 94–109)
CO2 SERPL-SCNC: 31 MMOL/L (ref 20–32)
CREAT SERPL-MCNC: 1.76 MG/DL (ref 0.66–1.25)
ERYTHROCYTE [DISTWIDTH] IN BLOOD BY AUTOMATED COUNT: 20.6 % (ref 10–15)
FIBRINOGEN PPP-MCNC: 243 MG/DL (ref 200–420)
GFR SERPL CREATININE-BSD FRML MDRD: 42 ML/MIN/1.7M2
GLUCOSE SERPL-MCNC: 92 MG/DL (ref 70–99)
HCT VFR BLD AUTO: 27.3 % (ref 40–53)
HGB BLD-MCNC: 9.2 G/DL (ref 13.3–17.7)
INR PPP: 2.18 (ref 0.86–1.14)
INTERPRETATION ECG - MUSE: NORMAL
LMWH PPP CHRO-ACNC: 0.14 IU/ML
MCH RBC QN AUTO: 31.6 PG (ref 26.5–33)
MCHC RBC AUTO-ENTMCNC: 33.7 G/DL (ref 31.5–36.5)
MCV RBC AUTO: 94 FL (ref 78–100)
MICRO REPORT STATUS: NORMAL
PLATELET # BLD AUTO: 106 10E9/L (ref 150–450)
POTASSIUM SERPL-SCNC: 3.7 MMOL/L (ref 3.4–5.3)
RBC # BLD AUTO: 2.91 10E12/L (ref 4.4–5.9)
SODIUM SERPL-SCNC: 129 MMOL/L (ref 133–144)
SPECIMEN SOURCE: NORMAL
WBC # BLD AUTO: 7.3 10E9/L (ref 4–11)

## 2017-01-19 PROCEDURE — 85610 PROTHROMBIN TIME: CPT | Performed by: INTERNAL MEDICINE

## 2017-01-19 PROCEDURE — 97140 MANUAL THERAPY 1/> REGIONS: CPT | Mod: GO

## 2017-01-19 PROCEDURE — 21400006 ZZH R&B CCU INTERMEDIATE UMMC

## 2017-01-19 PROCEDURE — 85220 BLOOC CLOT FACTOR V TEST: CPT | Performed by: INTERNAL MEDICINE

## 2017-01-19 PROCEDURE — 25000125 ZZHC RX 250

## 2017-01-19 PROCEDURE — 93010 ELECTROCARDIOGRAM REPORT: CPT | Performed by: INTERNAL MEDICINE

## 2017-01-19 PROCEDURE — 00000328 ZZHCL STATISTIC PTT NC: Performed by: INTERNAL MEDICINE

## 2017-01-19 PROCEDURE — 80048 BASIC METABOLIC PNL TOTAL CA: CPT | Performed by: INTERNAL MEDICINE

## 2017-01-19 PROCEDURE — 97530 THERAPEUTIC ACTIVITIES: CPT | Mod: GP | Performed by: REHABILITATION PRACTITIONER

## 2017-01-19 PROCEDURE — 25000132 ZZH RX MED GY IP 250 OP 250 PS 637: Performed by: INTERNAL MEDICINE

## 2017-01-19 PROCEDURE — 00000401 ZZHCL STATISTIC THROMBIN TIME NC: Performed by: INTERNAL MEDICINE

## 2017-01-19 PROCEDURE — 25000125 ZZHC RX 250: Performed by: INTERNAL MEDICINE

## 2017-01-19 PROCEDURE — 85230 CLOT FACTOR VII PROCONVERTIN: CPT | Performed by: INTERNAL MEDICINE

## 2017-01-19 PROCEDURE — 00000167 ZZHCL STATISTIC INR NC: Performed by: INTERNAL MEDICINE

## 2017-01-19 PROCEDURE — 97535 SELF CARE MNGMENT TRAINING: CPT | Mod: GO | Performed by: OCCUPATIONAL THERAPIST

## 2017-01-19 PROCEDURE — 85520 HEPARIN ASSAY: CPT | Performed by: INTERNAL MEDICINE

## 2017-01-19 PROCEDURE — 93005 ELECTROCARDIOGRAM TRACING: CPT

## 2017-01-19 PROCEDURE — 97110 THERAPEUTIC EXERCISES: CPT | Mod: GP | Performed by: REHABILITATION PRACTITIONER

## 2017-01-19 PROCEDURE — 85027 COMPLETE CBC AUTOMATED: CPT | Performed by: INTERNAL MEDICINE

## 2017-01-19 PROCEDURE — 85210 CLOT FACTOR II PROTHROM SPEC: CPT | Performed by: INTERNAL MEDICINE

## 2017-01-19 PROCEDURE — S0171 BUMETANIDE 0.5 MG: HCPCS

## 2017-01-19 PROCEDURE — 85240 CLOT FACTOR VIII AHG 1 STAGE: CPT | Performed by: INTERNAL MEDICINE

## 2017-01-19 PROCEDURE — 85384 FIBRINOGEN ACTIVITY: CPT | Performed by: INTERNAL MEDICINE

## 2017-01-19 PROCEDURE — 40000193 ZZH STATISTIC PT WARD VISIT: Performed by: REHABILITATION PRACTITIONER

## 2017-01-19 PROCEDURE — 99212 OFFICE O/P EST SF 10 MIN: CPT

## 2017-01-19 PROCEDURE — 36415 COLL VENOUS BLD VENIPUNCTURE: CPT | Performed by: INTERNAL MEDICINE

## 2017-01-19 PROCEDURE — 40000133 ZZH STATISTIC OT WARD VISIT: Performed by: OCCUPATIONAL THERAPIST

## 2017-01-19 PROCEDURE — 99233 SBSQ HOSP IP/OBS HIGH 50: CPT | Mod: GC | Performed by: INTERNAL MEDICINE

## 2017-01-19 PROCEDURE — 40000133 ZZH STATISTIC OT WARD VISIT

## 2017-01-19 RX ORDER — HEPARIN SODIUM 10000 [USP'U]/100ML
0-3500 INJECTION, SOLUTION INTRAVENOUS CONTINUOUS
Status: DISCONTINUED | OUTPATIENT
Start: 2017-01-19 | End: 2017-01-22

## 2017-01-19 RX ORDER — CLONAZEPAM 0.5 MG/1
0.25 TABLET ORAL 2 TIMES DAILY PRN
Status: DISCONTINUED | OUTPATIENT
Start: 2017-01-19 | End: 2017-01-28

## 2017-01-19 RX ORDER — ONDANSETRON 4 MG/1
4 TABLET, FILM COATED ORAL
Status: COMPLETED | OUTPATIENT
Start: 2017-01-19 | End: 2017-01-19

## 2017-01-19 RX ORDER — ACETAMINOPHEN 325 MG/1
650 TABLET ORAL
Status: DISCONTINUED | OUTPATIENT
Start: 2017-01-19 | End: 2017-01-22

## 2017-01-19 RX ADMIN — HEPARIN SODIUM 1150 UNITS/HR: 10000 INJECTION, SOLUTION INTRAVENOUS at 12:33

## 2017-01-19 RX ADMIN — Medication 20000 UNITS: at 08:08

## 2017-01-19 RX ADMIN — BUMETANIDE 2 MG: 0.25 INJECTION, SOLUTION INTRAMUSCULAR; INTRAVENOUS at 07:30

## 2017-01-19 RX ADMIN — ALUMINUM HYDROXIDE, MAGNESIUM HYDROXIDE, AND DIMETHICONE 30 ML: 400; 400; 40 SUSPENSION ORAL at 07:36

## 2017-01-19 RX ADMIN — Medication 12.5 MG: at 08:08

## 2017-01-19 RX ADMIN — Medication: at 08:13

## 2017-01-19 RX ADMIN — ACETAMINOPHEN 650 MG: 325 TABLET, FILM COATED ORAL at 21:38

## 2017-01-19 RX ADMIN — ACETAMINOPHEN 650 MG: 325 TABLET, FILM COATED ORAL at 16:37

## 2017-01-19 RX ADMIN — OXYCODONE HYDROCHLORIDE 10 MG: 5 TABLET ORAL at 12:33

## 2017-01-19 RX ADMIN — OXYCODONE HYDROCHLORIDE 10 MG: 5 TABLET ORAL at 17:14

## 2017-01-19 RX ADMIN — METHOCARBAMOL 750 MG: 750 TABLET ORAL at 08:08

## 2017-01-19 RX ADMIN — POTASSIUM CHLORIDE 20 MEQ: 750 TABLET, EXTENDED RELEASE ORAL at 12:37

## 2017-01-19 RX ADMIN — MULTIPLE VITAMINS W/ MINERALS TAB 1 TABLET: TAB at 08:07

## 2017-01-19 RX ADMIN — ACETAMINOPHEN 650 MG: 325 TABLET, FILM COATED ORAL at 09:59

## 2017-01-19 RX ADMIN — DOCUSATE SODIUM 100 MG: 100 CAPSULE, LIQUID FILLED ORAL at 08:08

## 2017-01-19 RX ADMIN — Medication 0.25 MG: at 08:11

## 2017-01-19 RX ADMIN — OMEPRAZOLE 20 MG: 20 CAPSULE, DELAYED RELEASE ORAL at 07:29

## 2017-01-19 RX ADMIN — DOCUSATE SODIUM 100 MG: 100 CAPSULE, LIQUID FILLED ORAL at 19:24

## 2017-01-19 RX ADMIN — OXYCODONE HYDROCHLORIDE 10 MG: 5 TABLET ORAL at 21:38

## 2017-01-19 RX ADMIN — ONDANSETRON HYDROCHLORIDE 4 MG: 4 TABLET, FILM COATED ORAL at 08:06

## 2017-01-19 RX ADMIN — Medication: at 19:25

## 2017-01-19 RX ADMIN — OXYCODONE HYDROCHLORIDE AND ACETAMINOPHEN 500 MG: 500 TABLET ORAL at 08:08

## 2017-01-19 RX ADMIN — METHOCARBAMOL 750 MG: 750 TABLET ORAL at 19:23

## 2017-01-19 RX ADMIN — OXYCODONE HYDROCHLORIDE 10 MG: 5 TABLET ORAL at 07:27

## 2017-01-19 RX ADMIN — LEVOTHYROXINE SODIUM 224 MCG: 112 TABLET ORAL at 07:29

## 2017-01-19 RX ADMIN — ZINC SULFATE CAP 220 MG (50 MG ELEMENTAL ZN) 220 MG: 220 (50 ZN) CAP at 08:08

## 2017-01-19 RX ADMIN — POLYETHYLENE GLYCOL 3350 17 G: 17 POWDER, FOR SOLUTION ORAL at 19:23

## 2017-01-19 ASSESSMENT — PAIN DESCRIPTION - DESCRIPTORS: DESCRIPTORS: ACHING

## 2017-01-19 NOTE — PROGRESS NOTES
D: Rheumatic heart disease, [facility] MVR 1980, again in 1992, CHF, Broken bone right leg w/ brace.    I: Monitored vitals and assessed pt status.   Changed: Heel and coccyx mepliex  Running:  PRN: zofran, oxycodone, K replaced    A: A0x4. VSS. Afebrile. Up with assist 2 and walker. Up with therapy. Generalized, RLE/LLE, scrotal edema. Unchanged since yesterday. Push bumex BID. Adequate urine output. Constipated. Headache throughout day, patient suspects that it is related to his glasses and incorrect prescription.     P: Continue to monitor Pt status and report changes to treatment team.

## 2017-01-19 NOTE — PROGRESS NOTES
"Brief Social Work Note    SW called and left a message with Valencia Liang ([phone]) and the \"[facility] office\" at [phone].  SW called to ask for help with getting the pt and his family accomodations support.  SHAQUILLE was able to talk with Adali Polanco directly.  Per Adali the family needs to apply at the [facility] office before they can get accommodation support.  SHAQUILLE asked that if the family is in the [facility], if there was a way to apply from the hospital.  SW will wait to hear back from [facility] office.    Secondly, SHAQUILLE left a message with [facility] about insurance coverage for TCU in the [facility].  Pt has [place] Medicaid and  Health which, may not cover services in the area.  Plan is for pt to participate in rehab and pt is electing to be nearby the hospital.    REJI Hernandez, APSW  [address]   Pager: [phone]  Phone: [phone]        "

## 2017-01-19 NOTE — PROGRESS NOTES
Cardiology 1 Progress Note    Samir Rodriguez MRN# [id number]   Age: 46 year old YOB: 1970   Date of Admission: 1/12/2017           Assessment and Plan:   Samir Rodriguez is a 46 year old  male with past medical history of Rheumatic Heart Disease s/p mechanical MVR x 2 (1980s and 1992) on warfarin (INR goal 2.5-3.5), biventricular CHF (EF 25-30%) s/p dual chamber ICD 2008, chronic afib on amiodarone and previously on digoxine (stopped 1/16/17 due to concern for toxicity), WPW ablation at age 11, CKD III, hypothyroid,  who was transferred from [facility] on 01/12 for further eval of CHF and concern of hemolysis in setting of mechanical valve.    Today:  - Continue diuresis with 2mg IV bid  - Plan for biplane angiogram in AM  - Start heparin gtt w/ INR <2.5; will restart warfarin tomorrow  - Music therapy consultation  - Increased clonazepam to bid      # Non-ischemic dilated cardiomyopathy 2/2 valvular heart disease  # Acute on chronic biventricular heart failure, EF 37% (12/2016) s/p dual chamber ICD 2008  NYHA class III  Patient's bumex dose was decreased from 2 to 1 per day due to hypotension in 11/2016. Since 11/16 pt has had worsening LE edema. CHAPMAN at baseline with 2-3 blocks. + orthopnea. + JVD and 3+ LE edema. BNP 16353. Weight on admission 111kg. 1/16/17 device interrogation: atrial tachycardia to 150s with AV block. Lower rate setting changed to 80bpm from 60bmp and device changed from DDDR to VVIR on 01/16. Elevated LFTs likely due to congestive hepatopathy.  - Continue Bumex 2mg IV bid with goal -1 to -2L net negative today  - Continue metoprolol xl 12.5mg daily; holding ACEI and spironolactone with resolving CARSON  and low BP  - Daily weights; Strict I/O    # Mechanical mitral valve (MV diastolic gradient 6.5)  # Aortic Insufficiency (mod - severe)  # TR (moderate)  History of BiV failure attributed to valvular disease. Echocardiogram on admission  demonstrated moderate-severe aortic insufficiency which is his most acute cardiac pathology. His tricuspid regurgitation is likely due to his signficantly fluid overloaded state. Blood cultures x 2: NGTD. Mechanical valve maintained on warfarin w/ INR 2.5-3.5 prior to this admission.  - Continue diuresis and obtain EDEL early next week (Monday) when euvolemic  - Dental advising mandibular teeth extraction before valve surgery  -> procedure planning appointment scheduled for 01/25 @ 1100; needs INR and Amoxicillin 2g 1 hr before procedure  - Cardiovascular surgery consulted; appreciate recommendations   - Angiogram tomorrow (biplane with poor renal function)    # Anemia and thrombocytopenia (pancytopenia at OSH, concern for MAHA)  # Right Arm Hematoma   Blood smear: blood smear- RBCs show some target cells, hypochromia, increased polychromasia, no schistocytes or spherocytes. Low haptoglobin, high LDH, and plasma free hemoglobin concerning for intra-vascular hemolysis. No evidence of overt infection causing DIC. EPO inappropriately low. Etiology of hematoma unclear besides supratherapeutic INR- 4.6 on arrival, now improved to 2.2  - H/O following; appreciate recommendations  - Factor levels pending  - Will continue to hold warfarin w/ procedure tomorrow; restart warfarin tomorrow PM  - Start low dose heparin gtt  - Planning for DC on warfarin w/ goal INR 2.5-3.5    # Paroxsymal afib:  digoxin level 0.7 on 1/12 and 1/17. Device interrogation with atrial tachycardia and AV block and V pacing. The patient has been in an atrial flutter that is undersensed, hence the device was switched to VVI mode. There no no point continuing amiodarone at this stage. We will use the follwing days to  whether restoration of sinus rhythm is needed. It does look like it is not for the time being. We will   - stopped amiodarone.    # Hyperthyroid: Will need repeat TSH/T4 1-2 months post DC. Continue levothyroxine 224mcg   # Anxiety and  insomnia: Increased clonazepam to bid. Consulted music therapy.    # Right tibia fx and R knee trauma:  Fall in November prompted decompensation. Xray tibia and knee no fx this admission.  - Ortho evaluated; planning for outpatient follow up with orthopedic surgery in 4-6 weeks  - PT/OT    FEN: cardiac diet   PPX: Heparin gtt    Code Status: Full CODE      Patient discussed with staff attending, Dr. Carmichael.  Please feel free to page with questions.    Dwight Zurita DO  Internal Medicine PG2  Cardiology Service I  Pager: [phone]    CARDIOLOGY STAFF NOTE  I have seen and examined the patient with the Cards 1 team. I agree with the note above that reflects my assessment and plan of care.  Lev Carmichael MD [facility]  Cardiology - Electrophysiology         Interval History/Subjective   No acute events overnight. No evidence of active bleeding. Headache persists. No nausea/vomiting this AM. No fever, chills, abdominal pain. Still weak but working well with therapy.         Objective   Temp:  [97.4  F (36.3  C)-99.6  F (37.6  C)] 99  F (37.2  C)  Heart Rate:  [79-82] 80  Resp:  [16] 16  BP: ()/(48-57) 91/52 mmHg  SpO2:  [96 %-97 %] 96 %      Intake/Output Summary (Last 24 hours) at 01/19/17 1321  Last data filed at 01/19/17 0900   Gross per 24 hour   Intake   1300 ml   Output   2100 ml   Net   -800 ml         Physical exam:  Gen: AA&Ox3, no acute distress  HEENT: NACT, poor dentition   PULM: Crackles in bases b/l, lung sounds distant, no wheezing  CV: RRR w/ 2+ systolic murmur at LUSB and LISB  ABD:  soft, nontender, nondistended.    EXT: +3 sonam edema to thighs, no clubbing or cyanosis. Right arm ecchymosis with normal sensory and strength.   NEURO: CN II-XII intact, moves all extremities   SKIN: Right arm ecchymosis outlined w/ marker and table. Sacral pressure ulcer. Heel ulcers bilateral heels.           Data:   CBC    Recent Labs  Lab 01/19/17  0900 01/18/17  2113 01/18/17  0707 01/17/17  0825  01/16/17  0709   WBC 7.3  --  5.2 5.8 5.8   RBC 2.91*  --  2.33* 2.58* 2.75*   HGB 9.2* 8.7* 7.4* 8.1* 8.6*   HCT 27.3*  --  22.2* 24.5* 26.2*   MCV 94  --  95 95 95   MCH 31.6  --  31.8 31.4 31.3   MCHC 33.7  --  33.3 33.1 32.8   RDW 20.6*  --  20.7* 20.8* 20.5*   *  --  91* 93* 93*     CMP    Recent Labs  Lab 01/19/17  0900 01/18/17  0707 01/17/17  0825 01/16/17  1828 01/16/17  0709 01/15/17  0742 01/14/17  1345  01/13/17  0650   * 131* 130*  --  136 133 134  < > 135   POTASSIUM 3.7 3.6 3.7 3.8 3.3* 3.6 3.7  < > 3.8   CHLORIDE 89* 91* 90*  --  96 97 97  < > 98   CO2 31 31 34*  --  31 30 32  < > 30   ANIONGAP 10 9 6  --  10 6 5  < > 8   GLC 92 77 77  --  73 77 107*  < > 83   BUN 23 26 31*  --  37* 42* 45*  < > 52*   CR 1.76* 1.80* 1.81*  --  1.87* 1.77* 1.93*  < > 1.97*   GFRESTIMATED 42* 41* 40*  --  39* 41* 37*  < > 37*   GFRESTBLACK 50* 49* 49*  --  47* 50* 45*  < > 44*   DELFINO 7.5* 6.9* 7.7*  --  7.5* 7.3* 7.2*  < > 7.6*   MAG  --   --  2.0  --  1.9 1.9  --   --   --    PHOS  --  2.8 2.4*  --   --   --   --   --   --    PROTTOTAL  --  5.1*  --   --   --   --  5.1*  --  5.1*   ALBUMIN  --  2.1*  --   --   --   --  2.4*  --  2.5*   BILITOTAL  --  3.8*  --   --   --   --  3.6*  --  3.6*   ALKPHOS  --  128  --   --   --   --  135  --  138   AST  --  60*  --   --   --   --  110*  --  186*   ALT  --  102*  --   --   --   --  191*  --  229*   < > = values in this interval not displayed.  INR    Recent Labs  Lab 01/19/17  0900 01/18/17  0707 01/17/17  0825 01/16/17  0709   INR 2.18* 3.40* 4.34* 4.10*             Medications:     Current Facility-Administered Medications   Medication     clonazePAM (klonoPIN) half-tab 0.25 mg     acetaminophen (TYLENOL) tablet 650 mg     heparin  drip 25,000 units in 0.45% NaCl 250 mL (see additional administration details for dose)     heparin bolus from infusion pump     docusate sodium (COLACE) capsule 100 mg     potassium chloride SA (K-DUR/KLOR-CON M) CR tablet 20-40 mEq      potassium chloride (KLOR-CON) Packet 20-40 mEq     potassium chloride 10 mEq in 100 mL intermittent infusion     potassium chloride 10 mEq in 100 mL intermittent infusion with 10 mg lidocaine     potassium chloride 20 mEq in 50 mL intermittent infusion     magnesium sulfate 2 g in NS intermittent infusion (PharMEDium or [facility] Cmpd)     magnesium sulfate 4 g in 100 mL sterile water (premade)     potassium phosphate 15 mmol in D5W 250 mL intermittent infusion     potassium phosphate 20 mmol in D5W 500 mL intermittent infusion     potassium phosphate 20 mmol in D5W 250 mL intermittent infusion     potassium phosphate 25 mmol in D5W 500 mL intermittent infusion     multivitamin, therapeutic with minerals (THERA-VIT-M) tablet 1 tablet     zinc sulfate (ZINCATE) capsule 220 mg     ascorbic acid (VITAMIN C) tablet 500 mg     vitamin A capsule 20,000 Units     melatonin tablet 3 mg     miconazole (MICATIN; MICRO GUARD) 2 % powder     lidocaine 1 % 1 mL     lidocaine (LMX4) kit     sodium chloride (PF) 0.9% PF flush 3 mL     sodium chloride (PF) 0.9% PF flush 3 mL     medication instruction     nitroglycerin (NITROSTAT) sublingual tablet 0.4 mg     alum & mag hydroxide-simethicone (MYLANTA ES/MAALOX  ES) suspension 15-30 mL     polyethylene glycol (MIRALAX/GLYCOLAX) Packet 17 g     albuterol (PROAIR HFA/PROVENTIL HFA/VENTOLIN HFA) Inhaler 2 puff     sennosides (SENOKOT) tablet 8.6 mg     naloxone (NARCAN) injection 0.1-0.4 mg     levothyroxine (SYNTHROID/LEVOTHROID) tablet 224 mcg     omeprazole (priLOSEC) CR capsule 20 mg     methocarbamol (ROBAXIN) tablet 750 mg     metoprolol (TOPROL-XL) half-tab 12.5 mg     bumetanide (BUMEX) injection 2 mg     oxyCODONE (ROXICODONE) IR tablet 5-10 mg

## 2017-01-19 NOTE — PROGRESS NOTES
Brief Social Work Note    SHAQUILLE discussed POC with pt.  Pt will be calling his son to go to the [employer] office at the [facility] for help with accommodations in the [facility].    SHAQUILLE also got the phone number for pt's Medicaid : [place] ([phone]).  SHAQUILLE will call tomorrow to start working on TCU placement.  Placement may be difficult due to pt having [place] and [employer] insurances.  Will start a referral with [facility] in case pt cannot be placed close to the hospital.    REJI Hernandez, APSW  6C Unit   Pager: [phone]  Phone: [phone]

## 2017-01-19 NOTE — PROGRESS NOTES
01/19/17 0938   General Information   Discipline OT   Onset of Edema 01/12/17  (per pt)   Affected Body Part(s) Left LE;Right LE;Other (see comments)  (penis)   Etiology Comments HF, CKD 3, elevated creatinine (1.8), hypoalbuminemia (2.1)   Pertinent history of current problem (PT: include personal factors and/or comorbidities that impact the POC; OT: include additional occupational profile info) 46M with two prior MVR for rheumatic disease. Now with AI and TI, severely dilated LV, and volume overload.   Edema Precautions Cardiac Edema/CHF   Edema Precaution Comments Watch platelets (106), significant weeping noted in R hip   Pain   Patient currently in pain Yes, see Vital Signs flowsheet   Edema Examination / Assessment   Skin Condition Pitting;Dryness   Skin Condition Comments Pt with significant edema in groin and B LE. Penis bulbous, though only briefly visualized by writer. Pt declined intervention to penis, states that it is less swollen than it has been. B LEs with bumpy texture, skin dry and scaly. Pt with shiny, taut skin. Pt with soft, pillowy 3+ edema in R LE to groin, 2+-3+ in L LE. Pt wearing knee stabilizer to R LE, OK per Cards MDs to wrap under brace. Feet dry with calluses on plantar surface. Toenails thick. Pt with mepilex dressings to bilateral heels, WOC RN present to examine.    Capillary Refill Comments ZAK, thick toenails   Dorsal Pedal Pulse Symmetrical   Girth Measurements   Girth Measurements Refer to separate Girth Measurement flowsheet   Sensation   Sensation (WFL) no deficits were identified   Assessment/Plan   Patient presents with Edema   Assessment Pt with significant edema in B LEs and groin. Pt will benefit from skilled OT intervention to facilitate fluid mobilization, skin integrity, and increase IND/ease with ADLs and mobility. See daily flowsheet for details regarding today's treatment.   Assessment of Occupational Performance (See prior OT eval)   Planned Edema Interventions  Gradient compression bandaging;Fit for compression garment;Exercises;Precautions to prevent infection/exacerbation;Education;ADL training   Treatment Frequency 5 times/wk  (BID OT/edema)   Treatment Duration 1/26   Patient, Family and/or Staff in agreement with plan of care. Yes   Risks and benefits of treatment have been explained. Yes   Total Evaluation Time   Total Evaluation Time (Minutes) 3

## 2017-01-19 NOTE — PLAN OF CARE
Problem: Goal Outcome Summary  Goal: Goal Outcome Summary  OT 6C: Recommend discharge to TCU when medically appropriate to increase ADL I and activity tolerance. Pt completed ADLs while seated with A for set-up. Pt required max A to done RLE brace and manage RLE while transferring from supine to EOB. Pt required max Ax2 for sit<>stand transfer. Pt ambulated to chair with min Ax2 using FWW.

## 2017-01-19 NOTE — PROGRESS NOTES
CVTS Fellow Note      D/W Dr Naqvi      See note from NP Xavier Garcia for full history      46M with two prior MVR for rheumatic disease. Now with AI and TI, severely dilated LV, and volume overload.      Needs medical optimization prior to surgery (eg diuresis, further rehab from leg injury)  Will need evaluation of coronary arteries pre op, determine if any ischemic component to his cardiomyopathy  Will need dental evaluation pre op  Severely decreased LV function, may need mechanical support post operatively

## 2017-01-19 NOTE — PLAN OF CARE
Problem: Goal Outcome Summary  Goal: Goal Outcome Summary  PT - per plan established by the Physical Therapist, according to functional mobility the  discharge recommendation is TCU for cont skilled PT to progress functional mobility. Pt needing min to mod A for sit to stand x 3 to WW. Pt able to stand for up to 3-4 min each stand with WW. Pt limited by pain weakness and fatigue. Pt demo seated there ex program x 10

## 2017-01-19 NOTE — PLAN OF CARE
Problem: Goal Outcome Summary  Goal: Goal Outcome Summary  Outcome: No Change  D/I/A: Vital signs stable. Alert, oriented x4. Oxycodone 10mg given for headache and R leg/knee pain. Nausea managed with aromatherapy. Voiding good amounts. Diet: Cardiac with 1500ml FR. RBC running at 75ml/hr, no reaction observed/reported.   P: Update MD if questions/concerns.   Christine Hamilton RN

## 2017-01-19 NOTE — PROGRESS NOTES
SPIRITUAL HEALTH SERVICES  SPIRITUAL ASSESSMENT Progress Note  [id number]   ON-CALL VISIT    REFERRAL SOURCE: Request to see Samir by Chaplain Pereyra.    Outcome:  Samir shared some of his family history and his journey with his heart.  Samir requested a pray one was spoken in my language to the Spirits of his ancestors for his health, strength, and family.  A gift of sacred cedar oil was given to Samir.         PLAN: [facility] will be available for Samir for the duration of his stay.  I will check in with Samir during his time at the hospital.    Francisco Amezquita  Chaplain Resident  Pager [phone]

## 2017-01-19 NOTE — PLAN OF CARE
Problem: Goal Outcome Summary  Goal: Goal Outcome Summary  Edema 6C: Assessment completed, pt with significant 2+-3+ pillowy edema in B LEs. OK per MD to wrap under R leg brace. GCBs applied using quick wrap technique to facilitate additional fluid mobilization. OK for pt to wear x 24 hours, but please remove if they cause numbness, tingling, pain, or become soiled. Will follow up with pt.

## 2017-01-19 NOTE — CONSULTS
"Dental Service Consultation        Samir Rodriguez MRN# [id number]   YOB: 1970 Age: 46 year old   Date of Admission: 1/12/2017     Reason for consult: I was asked by Dr. Dwight Zurita MD to evaluate this patient for dental clearance before heart valve replacement.           Assessment and Plan:   Assessment: Patient is edentulous on the maxilla and has teeth #18, 19, 20, 21, 22, 23, 24, 25, 26, 27, 28, 29, 30, and 31 remaining on the mandible. Majority of the teeth on the mandible are grossly decayed and the teeth that appear grossly intact have caries approaching the pulp as indicated by the orthopantomogram. The remaining teeth on the mandible could potentially be sources of infection and are indicated for extraction before heart valve replacement.     Plan: The patient should be referred to the [facility] ([phone]) for comprehensive patient clearance exam and extractions of the remaining mandibular teeth when medically stable and before heart valve replacement. An INR will need to be taken before the exam and the patient should take 2g Amoxicillin 1 hr prior to dental appt for premed.              Chief Complaint:   \"I got my upper teeth out a couple of years ago and they left my lower teeth. I think my lower teeth are doing pretty good.\"    History is obtained from the patient         History of Present Illness:   This patient is a 46 year old  male who presents with past medical history of Rheumatic Heart Disease s/p mechanical MVR x 2 (1980s and 1992) on warfarin (INR goal 2.5-3.5), biventricular CHF (EF 25-30%) s/p dual chamber ICD 2008, chronic afib on amiodarone and previously on digoxine (stopped 1/16/17 due to concern for toxicity), WPW ablation at age 11, CKD III, hypothyroid,  who is transferred from [facility] for further eval of CHF and concern of hemolysis in setting of mechanical valve. The patient does not have a maxillary " complete denture or mandibular partial. He states that he has no problem eating and has no dental pain or dental complaints.               Past Medical History:     Past Medical History   Diagnosis Date     Rheumatic heart disease              Past Surgical History:     Past Surgical History   Procedure Laterality Date     Cholecystectomy       Hernia repair       Appendectomy       Mitral valve replacement       Mechanical ([place] Tilting Disc); 1992               Social History:     Social History   Substance Use Topics     Smoking status: Never Smoker      Smokeless tobacco: Not on file     Alcohol Use: No             Family History:     Family History   Problem Relation Age of Onset     DIABETES Mother      Hypertension Brother              Immunizations:     There is no immunization history on file for this patient.          Allergies:     Allergies   Allergen Reactions     Asa [Aspirin] Other (See Comments) and Diarrhea     goosebumps  nausea     Gabapentin Nausea     Nabumetone Nausea     Sulfamethoxazole Unknown     Trimethoprim Unknown     Allopurinol Rash     Clindamycin Rash             Medications:     Current Facility-Administered Medications Ordered in Epic   Medication Dose Route Frequency Last Rate Last Dose     docusate sodium (COLACE) capsule 100 mg  100 mg Oral BID   100 mg at 01/18/17 2038     acetaminophen (TYLENOL) tablet 650 mg  650 mg Oral Q8H PRN         potassium chloride SA (K-DUR/KLOR-CON M) CR tablet 20-40 mEq  20-40 mEq Oral Q2H PRN   20 mEq at 01/18/17 0946     potassium chloride (KLOR-CON) Packet 20-40 mEq  20-40 mEq Oral or Feeding Tube Q2H PRN         potassium chloride 10 mEq in 100 mL intermittent infusion  10 mEq Intravenous Q1H PRN         potassium chloride 10 mEq in 100 mL intermittent infusion with 10 mg lidocaine  10 mEq Intravenous Q1H PRN         potassium chloride 20 mEq in 50 mL intermittent infusion  20 mEq Intravenous Q1H PRN         magnesium sulfate 2 g in NS  intermittent infusion (PharMEDium or [facility] Cmpd)  2 g Intravenous Daily PRN   2 g at 01/17/17 2022     magnesium sulfate 4 g in 100 mL sterile water (premade)  4 g Intravenous Q4H PRN         potassium phosphate 15 mmol in D5W 250 mL intermittent infusion  15 mmol Intravenous Daily PRN   15 mmol at 01/17/17 2127     potassium phosphate 20 mmol in D5W 500 mL intermittent infusion  20 mmol Intravenous Q6H PRN         potassium phosphate 20 mmol in D5W 250 mL intermittent infusion  20 mmol Intravenous Q6H PRN         potassium phosphate 25 mmol in D5W 500 mL intermittent infusion  25 mmol Intravenous Q8H PRN         multivitamin, therapeutic with minerals (THERA-VIT-M) tablet 1 tablet  1 tablet Oral Daily   1 tablet at 01/18/17 0753     zinc sulfate (ZINCATE) capsule 220 mg  220 mg Oral Daily   220 mg at 01/18/17 0753     ascorbic acid (VITAMIN C) tablet 500 mg  500 mg Oral Daily   500 mg at 01/18/17 0753     vitamin A capsule 20,000 Units  20,000 Units Oral Daily   20,000 Units at 01/18/17 0752     clonazePAM (klonoPIN) half-tab 0.25 mg  0.25 mg Oral Daily PRN   0.25 mg at 01/17/17 2259     melatonin tablet 3 mg  3 mg Oral At Bedtime PRN         miconazole (MICATIN; MICRO GUARD) 2 % powder   Topical BID         lidocaine 1 % 1 mL  1 mL Other Q1H PRN         lidocaine (LMX4) kit   Topical Q1H PRN         sodium chloride (PF) 0.9% PF flush 3 mL  3 mL Intracatheter Q1H PRN         sodium chloride (PF) 0.9% PF flush 3 mL  3 mL Intracatheter Q8H   3 mL at 01/18/17 1707     medication instruction   Does not apply Continuous PRN         nitroglycerin (NITROSTAT) sublingual tablet 0.4 mg  0.4 mg Sublingual Q5 Min PRN         alum & mag hydroxide-simethicone (MYLANTA ES/MAALOX  ES) suspension 15-30 mL  15-30 mL Oral Q4H PRN   30 mL at 01/18/17 1405     polyethylene glycol (MIRALAX/GLYCOLAX) Packet 17 g  17 g Oral Daily PRN   17 g at 01/18/17 0938     albuterol (PROAIR HFA/PROVENTIL HFA/VENTOLIN HFA) Inhaler 2 puff  2 puff  Inhalation 4x Daily PRN         sennosides (SENOKOT) tablet 8.6 mg  8.6 mg Oral BID PRN   8.6 mg at 01/16/17 0606     naloxone (NARCAN) injection 0.1-0.4 mg  0.1-0.4 mg Intravenous Q2 Min PRN         levothyroxine (SYNTHROID/LEVOTHROID) tablet 224 mcg  224 mcg Oral QAM AC   224 mcg at 01/18/17 0751     omeprazole (priLOSEC) CR capsule 20 mg  20 mg Oral QAM AC   20 mg at 01/18/17 0753     methocarbamol (ROBAXIN) tablet 750 mg  750 mg Oral BID   750 mg at 01/18/17 2037     metoprolol (TOPROL-XL) half-tab 12.5 mg  12.5 mg Oral Daily   12.5 mg at 01/18/17 0753     bumetanide (BUMEX) injection 2 mg  2 mg Intravenous Q12H   2 mg at 01/18/17 2037     oxyCODONE (ROXICODONE) IR tablet 5-10 mg  5-10 mg Oral Q4H PRN   10 mg at 01/18/17 1729     No current [facility]-ordered outpatient prescriptions on file.             Review of Systems:   The 10 point Review of Systems is negative other than noted in the HPI            Physical Exam:   Vitals were reviewed  Temp: 98.4  F (36.9  C) Temp src: Oral BP: 102/55 mmHg   Heart Rate: 80 Resp: 16 SpO2: 96 % O2 Device: None (Room air)      Head and neck exam: No lymphadenopathy detected. No gross swelling noted. Opening within normal limits. No pain upon opening.    Intraoral exam: No gross swelling noted. No pain upon palpation of maxillary and mandibular alveolar ridges. No soft tissue lesions noted. No teeth present in maxilla. Areas of trauma or possibly retained root near the edentulous sites of #6 and #12. Buccal exostosis in the maxillary posterior left. Gross decay noted on most of the remaining mandibular teeth: #18, 19, 24, 25, 26, 27, 28, 29, 30, and 31. Generalized erythemic margins on remaining mandibular teeth.       Data:   Radiographic interpretation: Normal bone trabeculation. Teeth #18, 19, 30, and 31 retained root tips on mandible. Coronal radiolucencies indicative of caries approaching the pulp #20, 21, 27, 28, and 29. Unable to visualize most of the apices of the  remaining mandibular teeth. Slight widening of the periodontal ligament at apex #29. Unable to visualize midline and condyles on orthopantomogram.  Orthopantomogram: taken and interpreted  Osseous pathology: none detected  Pulpal Pathology: Slight widening of the periodontal ligament at apex #29  Periodontal Pathology: none detected (probing not done at consult)  Caries: detected on all remaining mandibular teeth (teeth #18, 19, 20, 21, 22, 23, 24, 25, 26, 27, 28, 29, 30, 31)  Odontogenic pathology: none detected    Manolo Cruz DDS  PGY1  Pager: [phone]

## 2017-01-19 NOTE — PROGRESS NOTES
[facility]  WO Nurse Inpatient Adult Pressure INJURY (PI) Wound Assessment     Initial assessment of PU(s) on pt's:   Bilateral heels and sacrum     Data:   Patient History:      per MD note(s):  Samir Rodriguez is a 46 year old male  male with past medical history of Rheumatic Heart Disease s/p mechanical MVR x 2 ( and ) on warfarin (INR goal 2.5-3.5), biventricular CHF (EF 25-30%) s/p dual chamber ICD , WPW ablation at age 11, CKD III, hypothyroid,  who is transferred from [facility]      Current mattress:  AtmosAir, overlay ordered  Current pressure relieving devices:  Heel lift boots and Pillows    Moisture Management:  Incontinence Protocol    Catheter secured? Not applicable    Current Diet / Nutrition:       Active Diet Order  Low Saturated Fat Na <2400 mg    Maximilian Assessment and sub scores:   Maximilian Score  Av  Min: 16  Max: 16   Labs:   Recent Labs   Lab Test  17   1121   ALBUMIN  2.6*   HGB  8.3*   WBC  11.5*                                                                                                                          Pressure Injury Assessment : Bilateral heels   17 Left heel   17 right heel    Wound History:   Present on admit  Left posterior heel: 1.1 x 1 x 0.1 cm yellow red moist dermis with erosion of epidermis surrounding.  Draining small amount of seroussangounous fluid.    Right posterior heel: 2 x 2 x 0 cm soft, non-blanchable draining blister.  Periwound skin inact, macerated.  Draining scant amount serous fluid    Temperature  cool      Odor: none    Pain:  absent   Bilateral legs were +3-4 pitting edema on admit, now improved and lymphedema seeing to wrap legs.       Pressure Injury Assessment : sacrum  Unable to obtain picture  Wound History:  Present on admit, edema to pelvic region remains.      Wound Base: mixed yellow red dermis with hair follicles    Specific Dimensions (length x width x depth,  in cm) :  4.1 x 5.2 x 0.1 cm    Palpation of the wound bed:  normal    Slough appearance:  none    Eschar appearance:  none    Periwound Skin:erosion of epidermis decreasing      Color: normal and consistent with surrounding tissue    Temperature  normal     Drainage:  Small amount serous      Odor: mild    Pain:  minimal , aching           Intervention:     Patient's chart evaluated.      Maximilian Interventions:  Current Maximilian Interventions and Care Plan reviewed and updated, appropriate at this time.    Wound was assessed.    Wound Care: was done: Removal of existing dressing    Visual inspection    Cleansing with normal saline solution    Application of clean dressing,    Orders  Written    Supplies  Reviewed    Discussed plan of care with Patient and Nurse           Assessment:     Pressure Injury (PI) located on Sacrum: stage 2, Left heel: Stage 2, Right heel: unstagable    Status: wound  Stable, decreased size on sacral wound    Wounds present on admit, Per pt, his wounds are improving.           Plan:     Nursing to notify the Provider(s) and re-consult the [facility] Nurse if wound(s) deteriorate(s).    Plan of care for wound located on Sacrum and bilateral heels: cleanse with microklenz and pat dry, apply no sting barrier around wound, cover with mepilex.  Change every other day.    Heel lift boots on at all times while in bed.    [facility] Nurse will return: weekly  Face to face time: 20 minutes.

## 2017-01-20 ENCOUNTER — APPOINTMENT (OUTPATIENT)
Dept: OCCUPATIONAL THERAPY | Facility: CLINIC | Age: 47
DRG: 286 | End: 2017-01-20
Attending: INTERNAL MEDICINE
Payer: MEDICAID

## 2017-01-20 ENCOUNTER — APPOINTMENT (OUTPATIENT)
Dept: CARDIOLOGY | Facility: CLINIC | Age: 47
DRG: 286 | End: 2017-01-20
Attending: INTERNAL MEDICINE
Payer: MEDICAID

## 2017-01-20 LAB
ANION GAP SERPL CALCULATED.3IONS-SCNC: 6 MMOL/L (ref 3–14)
ANION GAP SERPL CALCULATED.3IONS-SCNC: 9 MMOL/L (ref 3–14)
BLD PROD TYP BPU: NORMAL
BLD UNIT ID BPU: 0
BLOOD PRODUCT CODE: NORMAL
BPU ID: NORMAL
BUN SERPL-MCNC: 22 MG/DL (ref 7–30)
BUN SERPL-MCNC: 23 MG/DL (ref 7–30)
CALCIUM SERPL-MCNC: 7 MG/DL (ref 8.5–10.1)
CALCIUM SERPL-MCNC: 7.2 MG/DL (ref 8.5–10.1)
CHLORIDE SERPL-SCNC: 89 MMOL/L (ref 94–109)
CHLORIDE SERPL-SCNC: 90 MMOL/L (ref 94–109)
CO2 SERPL-SCNC: 30 MMOL/L (ref 20–32)
CO2 SERPL-SCNC: 32 MMOL/L (ref 20–32)
CREAT SERPL-MCNC: 1.73 MG/DL (ref 0.66–1.25)
CREAT SERPL-MCNC: 1.74 MG/DL (ref 0.66–1.25)
ERYTHROCYTE [DISTWIDTH] IN BLOOD BY AUTOMATED COUNT: 20.3 % (ref 10–15)
ERYTHROCYTE [DISTWIDTH] IN BLOOD BY AUTOMATED COUNT: 20.3 % (ref 10–15)
FACT V ACT/NOR PPP: 58 % (ref 60–140)
FACT VII ACT/NOR PPP: 31 % (ref 50–129)
FACT VIII ACT/NOR PPP: 350 % (ref 60–140)
GFR SERPL CREATININE-BSD FRML MDRD: 42 ML/MIN/1.7M2
GFR SERPL CREATININE-BSD FRML MDRD: 42 ML/MIN/1.7M2
GLUCOSE SERPL-MCNC: 82 MG/DL (ref 70–99)
GLUCOSE SERPL-MCNC: 90 MG/DL (ref 70–99)
HCT VFR BLD AUTO: 23.6 % (ref 40–53)
HCT VFR BLD AUTO: 24.6 % (ref 40–53)
HGB BLD-MCNC: 7.7 G/DL (ref 13.3–17.7)
HGB BLD-MCNC: 8.1 G/DL (ref 13.3–17.7)
INR PPP: 2.16 (ref 0.86–1.14)
INR PPP: 2.29 (ref 0.86–1.14)
INTERPRETATION ECG - MUSE: NORMAL
LMWH PPP CHRO-ACNC: 0.34 IU/ML
MAGNESIUM SERPL-MCNC: 2.1 MG/DL (ref 1.6–2.3)
MCH RBC QN AUTO: 31 PG (ref 26.5–33)
MCH RBC QN AUTO: 31.5 PG (ref 26.5–33)
MCHC RBC AUTO-ENTMCNC: 32.6 G/DL (ref 31.5–36.5)
MCHC RBC AUTO-ENTMCNC: 32.9 G/DL (ref 31.5–36.5)
MCV RBC AUTO: 95 FL (ref 78–100)
MCV RBC AUTO: 96 FL (ref 78–100)
PLATELET # BLD AUTO: 80 10E9/L (ref 150–450)
PLATELET # BLD AUTO: 85 10E9/L (ref 150–450)
POTASSIUM SERPL-SCNC: 3.6 MMOL/L (ref 3.4–5.3)
POTASSIUM SERPL-SCNC: 3.6 MMOL/L (ref 3.4–5.3)
PROTHROM ACT/NOR PPP: 22 % (ref 60–140)
RBC # BLD AUTO: 2.48 10E12/L (ref 4.4–5.9)
RBC # BLD AUTO: 2.57 10E12/L (ref 4.4–5.9)
SODIUM SERPL-SCNC: 127 MMOL/L (ref 133–144)
SODIUM SERPL-SCNC: 129 MMOL/L (ref 133–144)
TRANSFUSION STATUS PATIENT QL: NORMAL
TRANSFUSION STATUS PATIENT QL: NORMAL
WBC # BLD AUTO: 4.9 10E9/L (ref 4–11)
WBC # BLD AUTO: 5 10E9/L (ref 4–11)

## 2017-01-20 PROCEDURE — 80048 BASIC METABOLIC PNL TOTAL CA: CPT | Performed by: INTERNAL MEDICINE

## 2017-01-20 PROCEDURE — 40000133 ZZH STATISTIC OT WARD VISIT: Performed by: OCCUPATIONAL THERAPIST

## 2017-01-20 PROCEDURE — 93458 L HRT ARTERY/VENTRICLE ANGIO: CPT

## 2017-01-20 PROCEDURE — 85027 COMPLETE CBC AUTOMATED: CPT | Performed by: INTERNAL MEDICINE

## 2017-01-20 PROCEDURE — 27211089 ZZH KIT ACIST INJECTOR CR3

## 2017-01-20 PROCEDURE — S0171 BUMETANIDE 0.5 MG: HCPCS | Performed by: INTERNAL MEDICINE

## 2017-01-20 PROCEDURE — 99233 SBSQ HOSP IP/OBS HIGH 50: CPT | Mod: GC | Performed by: INTERNAL MEDICINE

## 2017-01-20 PROCEDURE — 25000132 ZZH RX MED GY IP 250 OP 250 PS 637: Performed by: INTERNAL MEDICINE

## 2017-01-20 PROCEDURE — 27210787 ZZH MANIFOLD CR2

## 2017-01-20 PROCEDURE — 25000128 H RX IP 250 OP 636: Performed by: INTERNAL MEDICINE

## 2017-01-20 PROCEDURE — 83735 ASSAY OF MAGNESIUM: CPT | Performed by: INTERNAL MEDICINE

## 2017-01-20 PROCEDURE — 85610 PROTHROMBIN TIME: CPT | Performed by: INTERNAL MEDICINE

## 2017-01-20 PROCEDURE — 4A023N7 MEASUREMENT OF CARDIAC SAMPLING AND PRESSURE, LEFT HEART, PERCUTANEOUS APPROACH: ICD-10-PCS | Performed by: INTERNAL MEDICINE

## 2017-01-20 PROCEDURE — 99153 MOD SED SAME PHYS/QHP EA: CPT

## 2017-01-20 PROCEDURE — 21400006 ZZH R&B CCU INTERMEDIATE UMMC

## 2017-01-20 PROCEDURE — P9016 RBC LEUKOCYTES REDUCED: HCPCS | Performed by: INTERNAL MEDICINE

## 2017-01-20 PROCEDURE — 25000125 ZZHC RX 250: Performed by: INTERNAL MEDICINE

## 2017-01-20 PROCEDURE — 97110 THERAPEUTIC EXERCISES: CPT | Mod: GO | Performed by: OCCUPATIONAL THERAPIST

## 2017-01-20 PROCEDURE — 36415 COLL VENOUS BLD VENIPUNCTURE: CPT | Performed by: INTERNAL MEDICINE

## 2017-01-20 PROCEDURE — C1769 GUIDE WIRE: HCPCS

## 2017-01-20 PROCEDURE — 27210742 ZZH CATH CR1

## 2017-01-20 PROCEDURE — 27210946 ZZH KIT HC TOTES DISP CR8

## 2017-01-20 PROCEDURE — 85520 HEPARIN ASSAY: CPT | Performed by: INTERNAL MEDICINE

## 2017-01-20 PROCEDURE — 93458 L HRT ARTERY/VENTRICLE ANGIO: CPT | Mod: 26 | Performed by: INTERNAL MEDICINE

## 2017-01-20 PROCEDURE — B2111ZZ FLUOROSCOPY OF MULTIPLE CORONARY ARTERIES USING LOW OSMOLAR CONTRAST: ICD-10-PCS | Performed by: INTERNAL MEDICINE

## 2017-01-20 PROCEDURE — 99152 MOD SED SAME PHYS/QHP 5/>YRS: CPT

## 2017-01-20 PROCEDURE — C1894 INTRO/SHEATH, NON-LASER: HCPCS

## 2017-01-20 PROCEDURE — 97535 SELF CARE MNGMENT TRAINING: CPT | Mod: GO | Performed by: OCCUPATIONAL THERAPIST

## 2017-01-20 RX ORDER — FENTANYL CITRATE 50 UG/ML
25-50 INJECTION, SOLUTION INTRAMUSCULAR; INTRAVENOUS
Status: DISCONTINUED | OUTPATIENT
Start: 2017-01-20 | End: 2017-01-20 | Stop reason: HOSPADM

## 2017-01-20 RX ORDER — POTASSIUM CHLORIDE 29.8 MG/ML
20 INJECTION INTRAVENOUS
Status: DISCONTINUED | OUTPATIENT
Start: 2017-01-20 | End: 2017-01-20 | Stop reason: HOSPADM

## 2017-01-20 RX ORDER — NITROGLYCERIN 5 MG/ML
100-200 VIAL (ML) INTRAVENOUS
Status: DISCONTINUED | OUTPATIENT
Start: 2017-01-20 | End: 2017-01-20 | Stop reason: HOSPADM

## 2017-01-20 RX ORDER — LORAZEPAM 2 MG/ML
.5-2 INJECTION INTRAMUSCULAR EVERY 4 HOURS PRN
Status: DISCONTINUED | OUTPATIENT
Start: 2017-01-20 | End: 2017-01-20 | Stop reason: HOSPADM

## 2017-01-20 RX ORDER — NALOXONE HYDROCHLORIDE 0.4 MG/ML
0.4 INJECTION, SOLUTION INTRAMUSCULAR; INTRAVENOUS; SUBCUTANEOUS EVERY 5 MIN PRN
Status: DISCONTINUED | OUTPATIENT
Start: 2017-01-20 | End: 2017-01-20 | Stop reason: HOSPADM

## 2017-01-20 RX ORDER — FLUMAZENIL 0.1 MG/ML
0.2 INJECTION, SOLUTION INTRAVENOUS
Status: ACTIVE | OUTPATIENT
Start: 2017-01-20 | End: 2017-01-21

## 2017-01-20 RX ORDER — DOBUTAMINE HYDROCHLORIDE 200 MG/100ML
2-20 INJECTION INTRAVENOUS CONTINUOUS PRN
Status: DISCONTINUED | OUTPATIENT
Start: 2017-01-20 | End: 2017-01-20 | Stop reason: HOSPADM

## 2017-01-20 RX ORDER — WARFARIN SODIUM 4 MG/1
4 TABLET ORAL
Status: COMPLETED | OUTPATIENT
Start: 2017-01-20 | End: 2017-01-20

## 2017-01-20 RX ORDER — FENTANYL CITRATE 50 UG/ML
25-50 INJECTION, SOLUTION INTRAMUSCULAR; INTRAVENOUS
Status: ACTIVE | OUTPATIENT
Start: 2017-01-20 | End: 2017-01-21

## 2017-01-20 RX ORDER — NITROGLYCERIN 20 MG/100ML
.07-2 INJECTION INTRAVENOUS CONTINUOUS PRN
Status: DISCONTINUED | OUTPATIENT
Start: 2017-01-20 | End: 2017-01-20 | Stop reason: HOSPADM

## 2017-01-20 RX ORDER — PROTAMINE SULFATE 10 MG/ML
25-100 INJECTION, SOLUTION INTRAVENOUS EVERY 5 MIN PRN
Status: DISCONTINUED | OUTPATIENT
Start: 2017-01-20 | End: 2017-01-20 | Stop reason: HOSPADM

## 2017-01-20 RX ORDER — METHYLPREDNISOLONE SODIUM SUCCINATE 125 MG/2ML
125 INJECTION, POWDER, LYOPHILIZED, FOR SOLUTION INTRAMUSCULAR; INTRAVENOUS
Status: DISCONTINUED | OUTPATIENT
Start: 2017-01-20 | End: 2017-01-20 | Stop reason: HOSPADM

## 2017-01-20 RX ORDER — VERAPAMIL HYDROCHLORIDE 2.5 MG/ML
1-2.5 INJECTION, SOLUTION INTRAVENOUS
Status: COMPLETED | OUTPATIENT
Start: 2017-01-20 | End: 2017-01-20

## 2017-01-20 RX ORDER — NITROGLYCERIN 5 MG/ML
100-500 VIAL (ML) INTRAVENOUS
Status: COMPLETED | OUTPATIENT
Start: 2017-01-20 | End: 2017-01-20

## 2017-01-20 RX ORDER — CLOPIDOGREL BISULFATE 75 MG/1
75 TABLET ORAL
Status: DISCONTINUED | OUTPATIENT
Start: 2017-01-20 | End: 2017-01-20 | Stop reason: HOSPADM

## 2017-01-20 RX ORDER — ARGATROBAN 1 MG/ML
150 INJECTION, SOLUTION INTRAVENOUS
Status: DISCONTINUED | OUTPATIENT
Start: 2017-01-20 | End: 2017-01-20 | Stop reason: HOSPADM

## 2017-01-20 RX ORDER — POTASSIUM CHLORIDE 7.45 MG/ML
10 INJECTION INTRAVENOUS
Status: DISCONTINUED | OUTPATIENT
Start: 2017-01-20 | End: 2017-01-20 | Stop reason: HOSPADM

## 2017-01-20 RX ORDER — ENALAPRILAT 1.25 MG/ML
1.25-2.5 INJECTION INTRAVENOUS
Status: DISCONTINUED | OUTPATIENT
Start: 2017-01-20 | End: 2017-01-20 | Stop reason: HOSPADM

## 2017-01-20 RX ORDER — DOPAMINE HYDROCHLORIDE 160 MG/100ML
2-20 INJECTION, SOLUTION INTRAVENOUS CONTINUOUS PRN
Status: DISCONTINUED | OUTPATIENT
Start: 2017-01-20 | End: 2017-01-20 | Stop reason: HOSPADM

## 2017-01-20 RX ORDER — BUMETANIDE 0.25 MG/ML
2 INJECTION INTRAMUSCULAR; INTRAVENOUS
Status: DISCONTINUED | OUTPATIENT
Start: 2017-01-20 | End: 2017-01-20

## 2017-01-20 RX ORDER — LIDOCAINE HYDROCHLORIDE 10 MG/ML
30 INJECTION, SOLUTION EPIDURAL; INFILTRATION; INTRACAUDAL; PERINEURAL
Status: DISCONTINUED | OUTPATIENT
Start: 2017-01-20 | End: 2017-01-20 | Stop reason: HOSPADM

## 2017-01-20 RX ORDER — HEPARIN SODIUM 1000 [USP'U]/ML
1000-10000 INJECTION, SOLUTION INTRAVENOUS; SUBCUTANEOUS EVERY 5 MIN PRN
Status: DISCONTINUED | OUTPATIENT
Start: 2017-01-20 | End: 2017-01-20 | Stop reason: HOSPADM

## 2017-01-20 RX ORDER — HYDRALAZINE HYDROCHLORIDE 20 MG/ML
10-20 INJECTION INTRAMUSCULAR; INTRAVENOUS
Status: DISCONTINUED | OUTPATIENT
Start: 2017-01-20 | End: 2017-01-20 | Stop reason: HOSPADM

## 2017-01-20 RX ORDER — PRASUGREL 10 MG/1
10-60 TABLET, FILM COATED ORAL
Status: DISCONTINUED | OUTPATIENT
Start: 2017-01-20 | End: 2017-01-20 | Stop reason: HOSPADM

## 2017-01-20 RX ORDER — BUMETANIDE 0.25 MG/ML
2 INJECTION INTRAMUSCULAR; INTRAVENOUS
Status: DISCONTINUED | OUTPATIENT
Start: 2017-01-20 | End: 2017-01-21

## 2017-01-20 RX ORDER — DIPHENHYDRAMINE HYDROCHLORIDE 50 MG/ML
25-50 INJECTION INTRAMUSCULAR; INTRAVENOUS
Status: DISCONTINUED | OUTPATIENT
Start: 2017-01-20 | End: 2017-01-20 | Stop reason: HOSPADM

## 2017-01-20 RX ORDER — NIFEDIPINE 10 MG/1
10 CAPSULE ORAL
Status: DISCONTINUED | OUTPATIENT
Start: 2017-01-20 | End: 2017-01-20 | Stop reason: HOSPADM

## 2017-01-20 RX ORDER — ADENOSINE 3 MG/ML
12-12000 INJECTION, SOLUTION INTRAVENOUS
Status: DISCONTINUED | OUTPATIENT
Start: 2017-01-20 | End: 2017-01-20 | Stop reason: HOSPADM

## 2017-01-20 RX ORDER — LIDOCAINE 40 MG/G
CREAM TOPICAL
Status: DISCONTINUED | OUTPATIENT
Start: 2017-01-20 | End: 2017-01-30

## 2017-01-20 RX ORDER — FUROSEMIDE 10 MG/ML
20-100 INJECTION INTRAMUSCULAR; INTRAVENOUS
Status: DISCONTINUED | OUTPATIENT
Start: 2017-01-20 | End: 2017-01-20 | Stop reason: HOSPADM

## 2017-01-20 RX ORDER — NALOXONE HYDROCHLORIDE 0.4 MG/ML
.1-.4 INJECTION, SOLUTION INTRAMUSCULAR; INTRAVENOUS; SUBCUTANEOUS
Status: DISCONTINUED | OUTPATIENT
Start: 2017-01-20 | End: 2017-01-20

## 2017-01-20 RX ORDER — NITROGLYCERIN 0.4 MG/1
0.4 TABLET SUBLINGUAL EVERY 5 MIN PRN
Status: DISCONTINUED | OUTPATIENT
Start: 2017-01-20 | End: 2017-01-20 | Stop reason: HOSPADM

## 2017-01-20 RX ORDER — NALOXONE HYDROCHLORIDE 0.4 MG/ML
.2-.4 INJECTION, SOLUTION INTRAMUSCULAR; INTRAVENOUS; SUBCUTANEOUS
Status: ACTIVE | OUTPATIENT
Start: 2017-01-20 | End: 2017-01-21

## 2017-01-20 RX ORDER — IOPAMIDOL 755 MG/ML
46 INJECTION, SOLUTION INTRAVASCULAR ONCE
Status: COMPLETED | OUTPATIENT
Start: 2017-01-20 | End: 2017-01-20

## 2017-01-20 RX ORDER — ARGATROBAN 1 MG/ML
350 INJECTION, SOLUTION INTRAVENOUS
Status: DISCONTINUED | OUTPATIENT
Start: 2017-01-20 | End: 2017-01-20 | Stop reason: HOSPADM

## 2017-01-20 RX ORDER — ONDANSETRON 2 MG/ML
4 INJECTION INTRAMUSCULAR; INTRAVENOUS EVERY 4 HOURS PRN
Status: DISCONTINUED | OUTPATIENT
Start: 2017-01-20 | End: 2017-01-20 | Stop reason: HOSPADM

## 2017-01-20 RX ORDER — PROMETHAZINE HYDROCHLORIDE 25 MG/ML
6.25-25 INJECTION, SOLUTION INTRAMUSCULAR; INTRAVENOUS EVERY 4 HOURS PRN
Status: DISCONTINUED | OUTPATIENT
Start: 2017-01-20 | End: 2017-01-20 | Stop reason: HOSPADM

## 2017-01-20 RX ORDER — PHENYLEPHRINE HCL IN 0.9% NACL 1 MG/10 ML
20-100 SYRINGE (ML) INTRAVENOUS
Status: DISCONTINUED | OUTPATIENT
Start: 2017-01-20 | End: 2017-01-20 | Stop reason: HOSPADM

## 2017-01-20 RX ORDER — FLUMAZENIL 0.1 MG/ML
0.2 INJECTION, SOLUTION INTRAVENOUS
Status: DISCONTINUED | OUTPATIENT
Start: 2017-01-20 | End: 2017-01-20 | Stop reason: HOSPADM

## 2017-01-20 RX ORDER — SODIUM NITROPRUSSIDE 25 MG/ML
100-200 INJECTION INTRAVENOUS
Status: DISCONTINUED | OUTPATIENT
Start: 2017-01-20 | End: 2017-01-20 | Stop reason: HOSPADM

## 2017-01-20 RX ORDER — NICARDIPINE HYDROCHLORIDE 2.5 MG/ML
100 INJECTION INTRAVENOUS
Status: DISCONTINUED | OUTPATIENT
Start: 2017-01-20 | End: 2017-01-20 | Stop reason: HOSPADM

## 2017-01-20 RX ORDER — CLOPIDOGREL BISULFATE 75 MG/1
300-600 TABLET ORAL
Status: DISCONTINUED | OUTPATIENT
Start: 2017-01-20 | End: 2017-01-20 | Stop reason: HOSPADM

## 2017-01-20 RX ORDER — DEXTROSE MONOHYDRATE 25 G/50ML
12.5-5 INJECTION, SOLUTION INTRAVENOUS EVERY 30 MIN PRN
Status: DISCONTINUED | OUTPATIENT
Start: 2017-01-20 | End: 2017-01-20 | Stop reason: HOSPADM

## 2017-01-20 RX ORDER — PROTAMINE SULFATE 10 MG/ML
1-5 INJECTION, SOLUTION INTRAVENOUS
Status: DISCONTINUED | OUTPATIENT
Start: 2017-01-20 | End: 2017-01-20 | Stop reason: HOSPADM

## 2017-01-20 RX ORDER — EPTIFIBATIDE 2 MG/ML
180 INJECTION, SOLUTION INTRAVENOUS EVERY 10 MIN PRN
Status: DISCONTINUED | OUTPATIENT
Start: 2017-01-20 | End: 2017-01-20 | Stop reason: HOSPADM

## 2017-01-20 RX ORDER — EPTIFIBATIDE 2 MG/ML
2 INJECTION, SOLUTION INTRAVENOUS CONTINUOUS PRN
Status: DISCONTINUED | OUTPATIENT
Start: 2017-01-20 | End: 2017-01-20 | Stop reason: HOSPADM

## 2017-01-20 RX ADMIN — ACETAMINOPHEN 650 MG: 325 TABLET, FILM COATED ORAL at 22:46

## 2017-01-20 RX ADMIN — FENTANYL CITRATE 50 MCG: 50 INJECTION, SOLUTION INTRAMUSCULAR; INTRAVENOUS at 12:47

## 2017-01-20 RX ADMIN — METHOCARBAMOL 750 MG: 750 TABLET ORAL at 08:21

## 2017-01-20 RX ADMIN — ALUMINUM HYDROXIDE, MAGNESIUM HYDROXIDE, AND DIMETHICONE 30 ML: 400; 400; 40 SUSPENSION ORAL at 09:48

## 2017-01-20 RX ADMIN — OXYCODONE HYDROCHLORIDE 10 MG: 5 TABLET ORAL at 14:30

## 2017-01-20 RX ADMIN — MELATONIN TAB 3 MG 3 MG: 3 TAB at 01:19

## 2017-01-20 RX ADMIN — Medication 0.25 MG: at 01:19

## 2017-01-20 RX ADMIN — POTASSIUM CHLORIDE 20 MEQ: 1.5 POWDER, FOR SOLUTION ORAL at 18:08

## 2017-01-20 RX ADMIN — IOPAMIDOL 46 ML: 755 INJECTION, SOLUTION INTRAVASCULAR at 13:47

## 2017-01-20 RX ADMIN — OMEPRAZOLE 20 MG: 20 CAPSULE, DELAYED RELEASE ORAL at 08:21

## 2017-01-20 RX ADMIN — Medication: at 20:23

## 2017-01-20 RX ADMIN — WARFARIN SODIUM 4 MG: 4 TABLET ORAL at 18:08

## 2017-01-20 RX ADMIN — OXYCODONE HYDROCHLORIDE 10 MG: 5 TABLET ORAL at 20:36

## 2017-01-20 RX ADMIN — METHOCARBAMOL 750 MG: 750 TABLET ORAL at 20:23

## 2017-01-20 RX ADMIN — LEVOTHYROXINE SODIUM 224 MCG: 112 TABLET ORAL at 08:21

## 2017-01-20 RX ADMIN — DOCUSATE SODIUM 100 MG: 100 CAPSULE, LIQUID FILLED ORAL at 08:22

## 2017-01-20 RX ADMIN — ZINC SULFATE CAP 220 MG (50 MG ELEMENTAL ZN) 220 MG: 220 (50 ZN) CAP at 08:22

## 2017-01-20 RX ADMIN — FENTANYL CITRATE 50 MCG: 50 INJECTION, SOLUTION INTRAMUSCULAR; INTRAVENOUS at 13:29

## 2017-01-20 RX ADMIN — MIDAZOLAM HYDROCHLORIDE 0.5 MG: 1 INJECTION, SOLUTION INTRAMUSCULAR; INTRAVENOUS at 12:48

## 2017-01-20 RX ADMIN — OXYCODONE HYDROCHLORIDE 10 MG: 5 TABLET ORAL at 08:20

## 2017-01-20 RX ADMIN — MIDAZOLAM HYDROCHLORIDE 0.5 MG: 1 INJECTION, SOLUTION INTRAMUSCULAR; INTRAVENOUS at 12:49

## 2017-01-20 RX ADMIN — OXYCODONE HYDROCHLORIDE AND ACETAMINOPHEN 500 MG: 500 TABLET ORAL at 08:22

## 2017-01-20 RX ADMIN — MULTIPLE VITAMINS W/ MINERALS TAB 1 TABLET: TAB at 08:22

## 2017-01-20 RX ADMIN — Medication: at 08:22

## 2017-01-20 RX ADMIN — BUMETANIDE 2 MG: 0.25 INJECTION, SOLUTION INTRAMUSCULAR; INTRAVENOUS at 18:08

## 2017-01-20 RX ADMIN — Medication 5000 UNITS: at 13:06

## 2017-01-20 RX ADMIN — MIDAZOLAM HYDROCHLORIDE 0.5 MG: 1 INJECTION, SOLUTION INTRAMUSCULAR; INTRAVENOUS at 12:55

## 2017-01-20 RX ADMIN — VERAPAMIL HYDROCHLORIDE 2.5 MG: 2.5 INJECTION, SOLUTION INTRAVENOUS at 13:02

## 2017-01-20 RX ADMIN — POTASSIUM CHLORIDE 20 MEQ: 750 TABLET, EXTENDED RELEASE ORAL at 00:26

## 2017-01-20 RX ADMIN — Medication 0.25 MG: at 09:46

## 2017-01-20 RX ADMIN — Medication 20000 UNITS: at 08:22

## 2017-01-20 RX ADMIN — NITROGLYCERIN 200 MCG: 5 INJECTION, SOLUTION INTRAVENOUS at 13:01

## 2017-01-20 RX ADMIN — MIDAZOLAM HYDROCHLORIDE 0.5 MG: 1 INJECTION, SOLUTION INTRAMUSCULAR; INTRAVENOUS at 13:08

## 2017-01-20 RX ADMIN — ACETAMINOPHEN 650 MG: 325 TABLET, FILM COATED ORAL at 15:59

## 2017-01-20 RX ADMIN — Medication 12.5 MG: at 08:21

## 2017-01-20 RX ADMIN — ACETAMINOPHEN 650 MG: 325 TABLET, FILM COATED ORAL at 08:20

## 2017-01-20 ASSESSMENT — PAIN DESCRIPTION - DESCRIPTORS: DESCRIPTORS: ACHING

## 2017-01-20 NOTE — PROGRESS NOTES
SPIRITUAL HEALTH SERVICES  SPIRITUAL ASSESSMENT Progress Note  [facility] ([place]) 6C     Follow-up visit with pt, who updated me regarding plan of care. I shared prayer and song at his request.   PLAN: will continue to follow, in coordination with Chaplain Resident Francisco Adams.     Gian Luciano) Lizbeth Mathias M.Div., [facility]  Staff   Pager [phone]

## 2017-01-20 NOTE — PROGRESS NOTES
Cardiology 1 Progress Note    Samir Rodriguez MRN# [id number]   Age: 46 year old YOB: 1970   Date of Admission: 1/12/2017           Assessment and Plan:   Samir Rodriguez is a 46 year old  male with past medical history of Rheumatic Heart Disease s/p mechanical MVR x 2 (1980s and 1992) on warfarin (INR goal 2.5-3.5), biventricular CHF (EF 25-30%) s/p dual chamber ICD 2008, chronic afib on amiodarone and previously on digoxine (stopped 1/16/17 due to concern for toxicity), WPW ablation at age 11, CKD III, hypothyroid,  who was transferred from [facility] on 01/12 for further eval of CHF and concern of hemolysis in setting of mechanical valve.    Today:  - hold diuretics with lower BP; likely near intravascular euvolemia  - angiogram today  - blood transfusion for drop in hgb  - cont heparin gtt and restart warfarin this evening    # Non-ischemic dilated cardiomyopathy 2/2 valvular heart disease  # Acute on chronic biventricular heart failure, EF 37% (12/2016) s/p dual chamber ICD 2008  NYHA class III  Patient's bumex dose was decreased from 2 to 1 per day due to hypotension in 11/2016. Since 11/16 pt has had worsening LE edema. CHAPMAN at baseline with 2-3 blocks. + orthopnea. + JVD and 3+ LE edema. BNP 67894. Weight on admission 111kg. 1/16/17 device interrogation: atrial tachycardia to 150s with AV block. Lower rate setting changed to 80bpm from 60bmp and device changed from DDDR to VVIR on 01/16. Elevated LFTs likely due to congestive hepatopathy.  - hold diuretic today with lower BPs and exposure to contrast  - Continue metoprolol xl 12.5mg daily; holding ACEI and spironolactone with resolving CARSON and low BP  - Daily weights; Strict I/O    # Mechanical mitral valve (MV diastolic gradient 6.5)  # Aortic Insufficiency (mod - severe)  # TR (moderate)  History of BiV failure attributed to valvular disease. Echocardiogram on admission demonstrated moderate-severe  aortic insufficiency which is his most acute cardiac pathology. His tricuspid regurgitation is likely due to his signficantly fluid overloaded state. Blood cultures x 2: NGTD. Mechanical valve maintained on warfarin w/ INR 2.5-3.5 prior to this admission.  - Continue diuresis and obtain EDEL early next week (Monday) when euvolemic  - Dental advising mandibular teeth extraction before valve surgery  -> procedure planning appointment scheduled for 01/25 @ 1100; needs INR and Amoxicillin 2g 1 hr before procedure  - Cardiovascular surgery consulted; appreciate recommendations   - Angiogram today (biplane with poor renal function)    # Anemia and thrombocytopenia (pancytopenia at OSH, concern for MAHA)  # Right Arm Hematoma   Blood smear: blood smear- RBCs show some target cells, hypochromia, increased polychromasia, no schistocytes or spherocytes. Low haptoglobin, high LDH, and plasma free hemoglobin concerning for intra-vascular hemolysis. No evidence of overt infection causing DIC. EPO inappropriately low. Etiology of hematoma unclear besides supratherapeutic INR- 4.6 on arrival, now improved to 2.2  - H/O following; appreciate recommendations  - Factor levels resulted; awaiting heme recs  - restart warfarin tonight  - low dose heparin gtt  - Planning for DC on warfarin w/ goal INR 2.5-3    # Paroxsymal afib:  digoxin level 0.7 on 1/12 and 1/17. Device interrogation with atrial tachycardia and AV block and V pacing. The patient has been in an atrial flutter that is undersensed, hence the device was switched to VVI mode. There is no point continuing amiodarone at this stage. We will use the following days to  whether restoration of sinus rhythm is needed. It does look like it is not for the time being. We will   - stopped amiodarone.    # Hyperthyroid: Will need repeat TSH/T4 1-2 months post DC. Continue levothyroxine 224mcg   # Anxiety and insomnia: Increased clonazepam to bid. Consulted music therapy.    # Right  tibia fx and R knee trauma:  Fall in November prompted decompensation. Xray tibia and knee no fx this admission.  - Ortho evaluated; planning for outpatient follow up with orthopedic surgery in 4-6 weeks  - PT/OT    FEN: cardiac diet   PPX: Heparin gtt    Code Status: Full CODE      Patient discussed with staff attending, Dr. Gan.  Please feel free to page with questions.    Gary Silva MD  Cardiovascular Medicine Fellow, PGY-5  [phone]     CARDIOLOGY STAFF  Patient seen and examined by me.  History and physical examination discussed with Dr. Valerio Silva whose note reflects our joint assessment and recommendation/plans.  I am covering the Cardiology 1 service today for Dr. Carmichael.  Mr. Rodriguez is being considered for aortic valve replacement.  Coronary angiography today showed widely patent arteries.  He has acute on chronic systolic heart failure that requires further IV diuresis.  He needs dental work (anticipated next week) prior to probable valve surgery.  Ganesh Gan           Interval History/Subjective   No acute events overnight. No evidence of active bleeding but hemoglobin drop this morning. Headache persists. No nausea/vomiting this AM. No fever, chills, abdominal pain. Still weak but working well with therapy.         Objective   Temp:  [97.4  F (36.3  C)-98.7  F (37.1  C)] 98.5  F (36.9  C)  Heart Rate:  [79-80] 79  Resp:  [15-20] 15  BP: (81-96)/(46-59) 84/47 mmHg  SpO2:  [94 %-99 %] 95 %    Physical exam:  Gen: AA&Ox3, no acute distress  HEENT: NACT, poor dentition   PULM: Crackles in bases b/l, lung sounds distant, no wheezing  CV: RRR w/ 2+ systolic murmur at LUSB and LISB  ABD:  soft, nontender, nondistended.    EXT: +3 sonam edema to thighs, no clubbing or cyanosis. Right arm ecchymosis with normal sensory and strength.   SKIN: Right arm ecchymosis outlined w/ marker and table.           Data:   CBC    Recent Labs  Lab 01/20/17  0804 01/20/17  0413 01/19/17  0900  01/18/17  2113 01/18/17  0707   WBC 5.0 4.9 7.3  --  5.2   RBC 2.57* 2.48* 2.91*  --  2.33*   HGB 8.1* 7.7* 9.2* 8.7* 7.4*   HCT 24.6* 23.6* 27.3*  --  22.2*   MCV 96 95 94  --  95   MCH 31.5 31.0 31.6  --  31.8   MCHC 32.9 32.6 33.7  --  33.3   RDW 20.3* 20.3* 20.6*  --  20.7*   PLT 80* 85* 106*  --  91*     CMP    Recent Labs  Lab 01/20/17  0804 01/20/17  0413 01/19/17  0900 01/18/17  0707 01/17/17  0825  01/16/17  0709 01/15/17  0742 01/14/17  1345   * 127* 129* 131* 130*  --  136 133 134   POTASSIUM 3.6 3.6 3.7 3.6 3.7  < > 3.3* 3.6 3.7   CHLORIDE 90* 89* 89* 91* 90*  --  96 97 97   CO2 30 32 31 31 34*  --  31 30 32   ANIONGAP 9 6 10 9 6  --  10 6 5   GLC 82 90 92 77 77  --  73 77 107*   BUN 23 22 23 26 31*  --  37* 42* 45*   CR 1.74* 1.73* 1.76* 1.80* 1.81*  --  1.87* 1.77* 1.93*   GFRESTIMATED 42* 42* 42* 41* 40*  --  39* 41* 37*   GFRESTBLACK 51* 51* 50* 49* 49*  --  47* 50* 45*   DELFINO 7.0* 7.2* 7.5* 6.9* 7.7*  --  7.5* 7.3* 7.2*   MAG  --  2.1  --   --  2.0  --  1.9 1.9  --    PHOS  --   --   --  2.8 2.4*  --   --   --   --    PROTTOTAL  --   --   --  5.1*  --   --   --   --  5.1*   ALBUMIN  --   --   --  2.1*  --   --   --   --  2.4*   BILITOTAL  --   --   --  3.8*  --   --   --   --  3.6*   ALKPHOS  --   --   --  128  --   --   --   --  135   AST  --   --   --  60*  --   --   --   --  110*   ALT  --   --   --  102*  --   --   --   --  191*   < > = values in this interval not displayed.  INR    Recent Labs  Lab 01/20/17  0804 01/20/17  0413 01/19/17  0900 01/18/17  0707   INR 2.16* 2.29* 2.18* 3.40*             Medications:     Current Facility-Administered Medications   Medication     clonazePAM (klonoPIN) half-tab 0.25 mg     acetaminophen (TYLENOL) tablet 650 mg     heparin  drip 25,000 units in 0.45% NaCl 250 mL (see additional administration details for dose)     heparin bolus from infusion pump     HOLD: metformin and metformin containing medications     docusate sodium (COLACE) capsule 100 mg      potassium chloride SA (K-DUR/KLOR-CON M) CR tablet 20-40 mEq     potassium chloride (KLOR-CON) Packet 20-40 mEq     potassium chloride 10 mEq in 100 mL intermittent infusion     potassium chloride 10 mEq in 100 mL intermittent infusion with 10 mg lidocaine     potassium chloride 20 mEq in 50 mL intermittent infusion     magnesium sulfate 2 g in NS intermittent infusion (Phar[facility]ium or FV Cmpd)     magnesium sulfate 4 g in 100 mL sterile water (premade)     potassium phosphate 15 mmol in D5W 250 mL intermittent infusion     potassium phosphate 20 mmol in D5W 500 mL intermittent infusion     potassium phosphate 20 mmol in D5W 250 mL intermittent infusion     potassium phosphate 25 mmol in D5W 500 mL intermittent infusion     multivitamin, therapeutic with minerals (THERA-VIT-M) tablet 1 tablet     zinc sulfate (ZINCATE) capsule 220 mg     ascorbic acid (VITAMIN C) tablet 500 mg     vitamin A capsule 20,000 Units     melatonin tablet 3 mg     miconazole (MICATIN; MICRO GUARD) 2 % powder     lidocaine 1 % 1 mL     lidocaine (LMX4) kit     sodium chloride (PF) 0.9% PF flush 3 mL     sodium chloride (PF) 0.9% PF flush 3 mL     medication instruction     nitroglycerin (NITROSTAT) sublingual tablet 0.4 mg     alum & mag hydroxide-simethicone (MYLANTA ES/MAALOX  ES) suspension 15-30 mL     polyethylene glycol (MIRALAX/GLYCOLAX) Packet 17 g     albuterol (PROAIR HFA/PROVENTIL HFA/VENTOLIN HFA) Inhaler 2 puff     sennosides (SENOKOT) tablet 8.6 mg     naloxone (NARCAN) injection 0.1-0.4 mg     levothyroxine (SYNTHROID/LEVOTHROID) tablet 224 mcg     omeprazole (priLOSEC) CR capsule 20 mg     methocarbamol (ROBAXIN) tablet 750 mg     metoprolol (TOPROL-XL) half-tab 12.5 mg     oxyCODONE (ROXICODONE) IR tablet 5-10 mg

## 2017-01-20 NOTE — PLAN OF CARE
Problem: Goal Outcome Summary  Goal: Goal Outcome Summary  Outcome: No Change  D: CHF, Rheumatic heart disease, [facility] MVR 1980, again in 1992, CHF S/P Dual chamber ICD 2008 , Broken bone right leg w/ brace.  I: Monitored vitals and assessed pt status.     Running: Heparin gtt to be stopped at 6am prior to angiogram.  Heparin therapeutical no change prior to stopping.    A: A&0x4. Intermittent low BP and V-paced rhythm. Potassium 3.9, replaced with 20mEq PO.  Denies pain but requested clonopin and melatonin for sleep.  L-leg brace intact and using pillows to elevate the feet.  Dressing intact of the R-feet, was seen by wound consult for heel ulcer.  Has a large BM and mepliex changed at the coccyx.      I/O this shift:  In: 60 [P.O.:60]  Out: 400 [Urine:400]    Temp:  [97.4  F (36.3  C)-99  F (37.2  C)] 97.7  F (36.5  C)  Heart Rate:  [80-82] 80  Resp:  [16-20] 20  BP: (81-96)/(46-59) 83/46 mmHg  SpO2:  [94 %-99 %] 94 %      P: Continue to monitor Pt status and report changes to treatment team.  Keep patient NPO for angiogram.

## 2017-01-20 NOTE — PLAN OF CARE
Problem: Goal Outcome Summary  Goal: Goal Outcome Summary  Outcome: No Change    D. Pt is stable.  BP= 80's/40 s, this evening (see provider notification note).  Held PM dose of Bumex and currently BP running 90 /60 s. C/o headache and R knee pain.  PRN Oxycodone and scheduled Tylenol were given for discomfort and pt report some relief following pain meds.   On Heparin gtt due to sub therapeutic INR.  Next 10a at 0430.   I. Strict I and O s.    A. pt is stable.   P. NPO after midnight for angiogram in am.

## 2017-01-20 NOTE — PROGRESS NOTES
D: Rheumatic heart disease, [facility] MVR 1980, again in 1992, CHF, Broken bone right leg w/ brace.    I: Monitored vitals and assessed pt status.   Changed:-  Running:-  PRN:- Oxycodone, 1 unit RBC's    A: A0x4. VSS. Afebrile. Up with assist 2 and walker. Up with therapy. Generalized, RLE/LLE, scrotal edema. Unchanged since yesterday. Push bumex BID. Adequate urine output.  Headache throughout day, patient suspects that it is related to his glasses and incorrect prescription. Angio. Left radial access. 12 cc air.     P: Continue to monitor Pt status and report changes to treatment team.

## 2017-01-20 NOTE — PLAN OF CARE
Problem: Goal Outcome Summary  Goal: Goal Outcome Summary  OT 6C: Recommend TCU to increase endurance and I/ADL Ind. Pt limited by weakness, fatigue, pain, RLE brace. Pt refused OOB activity but completed UE exercises and grooming and hygiene routine while supine in bed with HOB raised.

## 2017-01-20 NOTE — PLAN OF CARE
Problem: Goal Outcome Summary  Goal: Goal Outcome Summary  PT 6C:  Cancel.  Patient returned from heart cath, however, on bedrest for 2 hours post sheath removal.  Will reschedule and see as appropriate on 1/21/17.

## 2017-01-20 NOTE — PROGRESS NOTES
CLINICAL NUTRITION SERVICES - REASSESSMENT NOTE     Nutrition Prescription    RECOMMENDATIONS FOR MDs/PROVIDERS TO ORDER:  Consider ordering thiamine, 100 mg/day, due to cardiac status.    Malnutrition Status:    Non-severe malnutrition in the context of chronic illness    Recommendations already ordered by Registered Dietitian (RD):  Prn oral supplement order    Future/Additional Recommendations:  1. Once able to resume diet after procedure, rec a 2 g sodium diet. Encourage small, frequent meals.   2. Continue fluid restriction as per team.   3. Continue wound protocol as ordered, including vitamin A, vitamin C, and zinc x 10 days and multivitamin with minerals daily.   4. Consider scheduling antiemetics/antinauseants ~20 minutes prior to meals to help optimize nausea control.        EVALUATION OF THE PROGRESS TOWARD GOALS   Diet: NPO currently for angiogram. On a 1.5 L fluid restriction. Pt was previously on a cardiac diet.  Intake: Fair diet tolerance. Flowsheets indicate pt consuming 100% of meals with a good appetite 1/12-1/13, 100% of meals with a good appetite 1/14, 50-75% of meals with a good appetite 1/15, % of meals with a good appetite 1/16, 50% of meals on 1/17, and 75% of meals with a fair appetite 1/18.  Per discussion with pt, his appetite remains decreased. Admits he is eating about 50% of his usual oral intake. This is due to N/V at times and early satiety (has ascites). Per discussion with pt, states he becomes nauseous with taking a lot of pills. States he limits his lactose and able to tolerate some foods with lactose, such as ice cream.     NEW FINDINGS   On pt's whiteboard it states that his wt in [place] was 180# (81.8 kg) - Suspect in November or December 2016.    [facility] nurse (1/19): Pressure Injury (PI) located on Sacrum: stage 2, Left heel: Stage 2, Right heel: unstageable. Status: wound  Stable, decreased size on sacral wound. Pt on wound protocol (see above) 1/16-1/26.     ASSESSED  NUTRITION NEEDS (updated):  Dosing wt: 93 kg (based on lowest wt this admission of 92.8 kg on 1/18/16)  Estimated Energy Needs: 2937-5120+ kcals/day (25 - 30+ kcals/kg)  Justification: Maintenance needs  Estimated Protein Needs: 121-140 grams protein/day (1.3 - 1.5 grams of pro/kg)  Justification: Increased needs with heart failure and wound healing, pending renal function with CKD hx  Estimated Fluid Needs: 1.5 L fluid restriction per provider  Justification: Per provider pending fluid status    MALNUTRITION  % Intake: < 75% for > 7 days (non-severe)  % Weight Loss: Difficult to assess with fluid status changes.  Subcutaneous Fat Loss: Facial region:  Mild, although difficult to assess with fluid status  Muscle Loss: Temporal:  Mild. Generalized, although difficult to assess with fluid status  Fluid Accumulation/Edema: Moderate  Malnutrition Diagnosis: Non-severe malnutrition in the context of chronic illness    Previous Goals   Patient to consume % of nutritionally adequate meal trays TID, or the equivalent with supplements/snacks.  Evaluation: Not met    Previous Nutrition Diagnosis  Increased nutrient needs (protein) related to stageable pressure injuries and CHF as evidenced by pt requiring 1.3 - 1.5 grams of pro/kg  Evaluation: Unresolved. Changed to new nutrition dx below.    CURRENT NUTRITION DIAGNOSIS  Inadequate oral intake related to N/V at times and early satiety as evidenced by pt report of eating 50% of his usual oral intake on average.    INTERVENTIONS  Implementation  1. Medical food supplement therapy: Discussed oral supplement options with pt. He prefers orange, berry, or butter pecan flavors. He limits his lactose intake. Provided an oral supplement menu.  2. Nutrition education for nutrition relationship to health/disease: Encouraged oral intake including adequate kcals and protein for healing. Suggested he try oral supplements between meals due to early satiety.  3. Offered to change the  wound protocol to be administered with supper to help with his nausea control. Pt would not like the timing of this changes.    Goals  Patient to consume % of nutritionally adequate meal trays TID, or the equivalent with supplements/snacks.    Monitoring/Evaluation  Progress toward goals will be monitored and evaluated per protocol.     Nutrition will continue to follow.    Giana Damon MS, RD, LD, [facility]   6C Pgr:  [phone]

## 2017-01-20 NOTE — PHARMACY-ANTICOAGULATION SERVICE
Clinical Pharmacy - Warfarin Dosing Consult     Pharmacy has been consulted to manage this patient s warfarin therapy.  Indication: Mechanical Mitral Valve Replacement  Therapy Goal: Other - see comments (2.5-3)  Warfarin Prior to Admission: Yes  Warfarin PTA Regimen: 7.5mg/day  Recent documented change in oral intake/nutrition: Unknown  Dose Comments: last dose 1/15, vit K on 1/17    INR   Date Value Ref Range Status   01/20/2017 2.16* 0.86 - 1.14 Final   01/20/2017 2.29* 0.86 - 1.14 Final     FACTOR 2 ASSAY   Date Value Ref Range Status   01/19/2017 22* 60 - 140 % Final       Recommend warfarin 4 mg today.  Pharmacy will monitor Samir Rodriguez daily and order warfarin doses to achieve specified goal.      Please contact pharmacy as soon as possible if the warfarin needs to be held for a procedure or if the warfarin goals change.      Gordon Buck, pharmD

## 2017-01-20 NOTE — PROVIDER NOTIFICATION
Time: 2020  Notified: Dr. uBrks ([id number])  Reason: BP=low 80-40's. MAP=57-62.  Pt c/o headache, dizziness, letargic but pt said this is not new.   MD ordered: to hold Bumex and encourage  to increase PO fluid intake.

## 2017-01-20 NOTE — PROCEDURES
FINAL CARDIAC CATH REPORT:     PROCEDURES PERFORMED:   Coronary Angiography and Left Heart Catheterization    PHYSICIANS:  Attending Physician: Griffin Harmon MD  Cardiology Fellow: Shayla Agrawal    INDICATION:  Samir Rodriguez is a 46 year old male with risk factor profile (-) HTN, (-) DM, (-) hypercholesterolemia, (-) tobacco use, (-) fam Hx premature CAD, who presents on an elective basis. The patient has no chest pain. The indication for the procedure was pre-operative evaluation for possible cardiac surgery.    DESCRIPTION:  1. Consent obtained with discussion of risks.  All questions were answered.  2. Sterile prep and procedure.  3. Location with Sheaths:   Left radial sheath 4 Fr  4. Access: Local anesthetic with lidocaine.  A micropuncture needle 21 G with ultrasound guidance was used to establish vascular access using a modified Seldinger technique.  5. Diagnostic catheter: JL4, 3DRC and AL1 (all 4 Fr)  5. Estimated blood loss: < 25 ml    MEDICATIONS:  The procedure was performed under conscious sedation for 60 minutes. Starting at 12:45 - 13:35  Midazolam 2 mg and Fentanyl 100 mcg were administered.  Heart rate, BP, respiration, oxygen saturation and patient responses were monitored throughout the procedure with the assistance of the RN.  The patient was monitored by dept nursing staff under my supervision.    CORONARY ANGIOGRAM:   1. Both coronary arteries arise from their respective cusps.  2. Right dominant.  3. LM is without angiographic evidence of disease.   4. LAD large and short, type I vessel, gives rise to septal perforators, very large D1, which bifurcates into 2 large branches, one of which provides dual flow to anterolateral wall.  The entire LAD system has no angiographically significant stenosis.    5. LCX gives rise to small OM1, medium OM2, small OM3 and medium OM4 vessels.  The entire LCx system has <25% disease throughout.    6. RCA gives rise to 2 PL branches and supplies PDA.  The entire RCA system is normal and has no disease.    LEFT HEART CATHETERIZATION:  1. LVEDP 25 mmHg.  2. No gradient across the aortic valve on pull back.    Sheath Removal:  The left radial sheath was manually removed in the cardiac catheterization laboratory.    Contrast: Isovue, 46 ml     Fluoroscopy Time: 8.2 min    COMPLICATIONS:  1. None    SUMMARY:   Non-obstructive coronary artery disease.  Elevated left ventricular end-diastolic pressure.  Normal function of both leaflets of the mitral valve.    PLAN:   >> TR band placed, removal of the band per protocol on the floor  >> Continued medical management and lifestyle modification for cardiovascular risk factor optimization.   >>. Return to the primary inpatient team for further evaluation and management.    The attending interventional cardiologist was present and supervised all critical aspects the procedure.  See CVIS report for final draft.    Shayla Agrawal  Cardiology Fellow  [phone]      Cardiology Staff Attestation: I supervised the cardiology fellow and reviewed the coronary angiogram and I agree with the documentation above.    Griffin Harmon MD  Staff Cardiologist

## 2017-01-20 NOTE — PLAN OF CARE
Problem: Goal Outcome Summary  Goal: Goal Outcome Summary  Edema 6C: Cancel and reschedule - patient off the unit at heart cath.

## 2017-01-21 ENCOUNTER — APPOINTMENT (OUTPATIENT)
Dept: CT IMAGING | Facility: CLINIC | Age: 47
DRG: 286 | End: 2017-01-21
Attending: INTERNAL MEDICINE
Payer: MEDICAID

## 2017-01-21 ENCOUNTER — APPOINTMENT (OUTPATIENT)
Dept: PHYSICAL THERAPY | Facility: CLINIC | Age: 47
DRG: 286 | End: 2017-01-21
Attending: INTERNAL MEDICINE
Payer: MEDICAID

## 2017-01-21 ENCOUNTER — APPOINTMENT (OUTPATIENT)
Dept: OCCUPATIONAL THERAPY | Facility: CLINIC | Age: 47
DRG: 286 | End: 2017-01-21
Attending: INTERNAL MEDICINE
Payer: MEDICAID

## 2017-01-21 LAB
ANION GAP SERPL CALCULATED.3IONS-SCNC: 7 MMOL/L (ref 3–14)
BUN SERPL-MCNC: 19 MG/DL (ref 7–30)
CALCIUM SERPL-MCNC: 7.4 MG/DL (ref 8.5–10.1)
CHLORIDE SERPL-SCNC: 91 MMOL/L (ref 94–109)
CO2 SERPL-SCNC: 30 MMOL/L (ref 20–32)
CREAT SERPL-MCNC: 1.59 MG/DL (ref 0.66–1.25)
ERYTHROCYTE [DISTWIDTH] IN BLOOD BY AUTOMATED COUNT: 19.7 % (ref 10–15)
GFR SERPL CREATININE-BSD FRML MDRD: 47 ML/MIN/1.7M2
GLUCOSE SERPL-MCNC: 88 MG/DL (ref 70–99)
HCT VFR BLD AUTO: 26.9 % (ref 40–53)
HGB BLD-MCNC: 8.9 G/DL (ref 13.3–17.7)
INR PPP: 2.25 (ref 0.86–1.14)
LMWH PPP CHRO-ACNC: 0.14 IU/ML
LMWH PPP CHRO-ACNC: 0.41 IU/ML
LMWH PPP CHRO-ACNC: 0.47 IU/ML
MAGNESIUM SERPL-MCNC: 2.1 MG/DL (ref 1.6–2.3)
MCH RBC QN AUTO: 31.6 PG (ref 26.5–33)
MCHC RBC AUTO-ENTMCNC: 33.1 G/DL (ref 31.5–36.5)
MCV RBC AUTO: 95 FL (ref 78–100)
PHOSPHATE SERPL-MCNC: 2.2 MG/DL (ref 2.5–4.5)
PLATELET # BLD AUTO: 87 10E9/L (ref 150–450)
POTASSIUM SERPL-SCNC: 3.6 MMOL/L (ref 3.4–5.3)
RBC # BLD AUTO: 2.82 10E12/L (ref 4.4–5.9)
SODIUM SERPL-SCNC: 127 MMOL/L (ref 133–144)
WBC # BLD AUTO: 5.6 10E9/L (ref 4–11)

## 2017-01-21 PROCEDURE — 97116 GAIT TRAINING THERAPY: CPT | Mod: GP

## 2017-01-21 PROCEDURE — 70450 CT HEAD/BRAIN W/O DYE: CPT

## 2017-01-21 PROCEDURE — 84100 ASSAY OF PHOSPHORUS: CPT | Performed by: INTERNAL MEDICINE

## 2017-01-21 PROCEDURE — 36415 COLL VENOUS BLD VENIPUNCTURE: CPT | Performed by: INTERNAL MEDICINE

## 2017-01-21 PROCEDURE — 25000125 ZZHC RX 250: Performed by: INTERNAL MEDICINE

## 2017-01-21 PROCEDURE — 97530 THERAPEUTIC ACTIVITIES: CPT | Mod: GP

## 2017-01-21 PROCEDURE — 40000133 ZZH STATISTIC OT WARD VISIT: Performed by: OCCUPATIONAL THERAPIST

## 2017-01-21 PROCEDURE — 40000193 ZZH STATISTIC PT WARD VISIT

## 2017-01-21 PROCEDURE — 25000132 ZZH RX MED GY IP 250 OP 250 PS 637

## 2017-01-21 PROCEDURE — 97140 MANUAL THERAPY 1/> REGIONS: CPT | Mod: GO | Performed by: OCCUPATIONAL THERAPIST

## 2017-01-21 PROCEDURE — 21400006 ZZH R&B CCU INTERMEDIATE UMMC

## 2017-01-21 PROCEDURE — 25000132 ZZH RX MED GY IP 250 OP 250 PS 637: Performed by: INTERNAL MEDICINE

## 2017-01-21 PROCEDURE — 40000141 ZZH STATISTIC PERIPHERAL IV START W/O US GUIDANCE

## 2017-01-21 PROCEDURE — 80048 BASIC METABOLIC PNL TOTAL CA: CPT | Performed by: INTERNAL MEDICINE

## 2017-01-21 PROCEDURE — 85610 PROTHROMBIN TIME: CPT | Performed by: INTERNAL MEDICINE

## 2017-01-21 PROCEDURE — 99233 SBSQ HOSP IP/OBS HIGH 50: CPT | Mod: GC | Performed by: INTERNAL MEDICINE

## 2017-01-21 PROCEDURE — 40000802 ZZH SITE CHECK

## 2017-01-21 PROCEDURE — 85520 HEPARIN ASSAY: CPT | Performed by: INTERNAL MEDICINE

## 2017-01-21 PROCEDURE — 25900017 H RX MED GY IP 259 OP 259 PS 637: Performed by: INTERNAL MEDICINE

## 2017-01-21 PROCEDURE — 83735 ASSAY OF MAGNESIUM: CPT | Performed by: INTERNAL MEDICINE

## 2017-01-21 PROCEDURE — 85027 COMPLETE CBC AUTOMATED: CPT | Performed by: INTERNAL MEDICINE

## 2017-01-21 RX ORDER — ONDANSETRON 4 MG/1
4 TABLET, ORALLY DISINTEGRATING ORAL DAILY PRN
Status: DISCONTINUED | OUTPATIENT
Start: 2017-01-21 | End: 2017-01-24

## 2017-01-21 RX ORDER — BUMETANIDE 2 MG/1
2 TABLET ORAL
Status: DISCONTINUED | OUTPATIENT
Start: 2017-01-21 | End: 2017-01-22

## 2017-01-21 RX ORDER — LISINOPRIL 2.5 MG/1
2.5 TABLET ORAL DAILY
Status: DISCONTINUED | OUTPATIENT
Start: 2017-01-21 | End: 2017-01-22

## 2017-01-21 RX ORDER — WARFARIN SODIUM 5 MG/1
5 TABLET ORAL
Status: COMPLETED | OUTPATIENT
Start: 2017-01-21 | End: 2017-01-21

## 2017-01-21 RX ADMIN — HEPARIN SODIUM 1150 UNITS/HR: 10000 INJECTION, SOLUTION INTRAVENOUS at 18:47

## 2017-01-21 RX ADMIN — MULTIPLE VITAMINS W/ MINERALS TAB 1 TABLET: TAB at 08:01

## 2017-01-21 RX ADMIN — Medication 0.25 MG: at 07:56

## 2017-01-21 RX ADMIN — ALUMINUM HYDROXIDE, MAGNESIUM HYDROXIDE, AND DIMETHICONE 30 ML: 400; 400; 40 SUSPENSION ORAL at 17:14

## 2017-01-21 RX ADMIN — ACETAMINOPHEN 650 MG: 325 TABLET, FILM COATED ORAL at 08:01

## 2017-01-21 RX ADMIN — Medication: at 08:02

## 2017-01-21 RX ADMIN — HEPARIN SODIUM 1300 UNITS/HR: 10000 INJECTION, SOLUTION INTRAVENOUS at 15:45

## 2017-01-21 RX ADMIN — Medication: at 22:26

## 2017-01-21 RX ADMIN — ACETAMINOPHEN 650 MG: 325 TABLET, FILM COATED ORAL at 19:43

## 2017-01-21 RX ADMIN — OXYCODONE HYDROCHLORIDE 10 MG: 5 TABLET ORAL at 06:25

## 2017-01-21 RX ADMIN — DOCUSATE SODIUM 100 MG: 100 CAPSULE, LIQUID FILLED ORAL at 19:43

## 2017-01-21 RX ADMIN — Medication 0.25 MG: at 22:53

## 2017-01-21 RX ADMIN — BUMETANIDE 2 MG: 2 TABLET ORAL at 15:43

## 2017-01-21 RX ADMIN — POTASSIUM CHLORIDE 20 MEQ: 750 TABLET, EXTENDED RELEASE ORAL at 11:25

## 2017-01-21 RX ADMIN — ALUMINUM HYDROXIDE, MAGNESIUM HYDROXIDE, AND DIMETHICONE 30 ML: 400; 400; 40 SUSPENSION ORAL at 13:42

## 2017-01-21 RX ADMIN — WARFARIN SODIUM 5 MG: 5 TABLET ORAL at 17:14

## 2017-01-21 RX ADMIN — BUMETANIDE 2 MG: 2 TABLET ORAL at 10:37

## 2017-01-21 RX ADMIN — METHOCARBAMOL 750 MG: 750 TABLET ORAL at 19:43

## 2017-01-21 RX ADMIN — OXYCODONE HYDROCHLORIDE 10 MG: 5 TABLET ORAL at 15:47

## 2017-01-21 RX ADMIN — DOCUSATE SODIUM 100 MG: 100 CAPSULE, LIQUID FILLED ORAL at 08:01

## 2017-01-21 RX ADMIN — Medication 12.5 MG: at 07:58

## 2017-01-21 RX ADMIN — ONDANSETRON 4 MG: 4 TABLET, ORALLY DISINTEGRATING ORAL at 11:28

## 2017-01-21 RX ADMIN — ACETAMINOPHEN 650 MG: 325 TABLET, FILM COATED ORAL at 14:13

## 2017-01-21 RX ADMIN — OMEPRAZOLE 20 MG: 20 CAPSULE, DELAYED RELEASE ORAL at 08:02

## 2017-01-21 RX ADMIN — LEVOTHYROXINE SODIUM 224 MCG: 112 TABLET ORAL at 08:01

## 2017-01-21 RX ADMIN — ZINC SULFATE CAP 220 MG (50 MG ELEMENTAL ZN) 220 MG: 220 (50 ZN) CAP at 08:01

## 2017-01-21 RX ADMIN — OXYCODONE HYDROCHLORIDE 10 MG: 5 TABLET ORAL at 23:42

## 2017-01-21 RX ADMIN — Medication 20000 UNITS: at 08:00

## 2017-01-21 RX ADMIN — HEPARIN SODIUM 1300 UNITS/HR: 10000 INJECTION, SOLUTION INTRAVENOUS at 10:38

## 2017-01-21 RX ADMIN — LISINOPRIL 2.5 MG: 2.5 TABLET ORAL at 11:28

## 2017-01-21 RX ADMIN — OXYCODONE HYDROCHLORIDE 10 MG: 5 TABLET ORAL at 00:36

## 2017-01-21 RX ADMIN — METHOCARBAMOL 750 MG: 750 TABLET ORAL at 08:01

## 2017-01-21 RX ADMIN — OXYCODONE HYDROCHLORIDE AND ACETAMINOPHEN 500 MG: 500 TABLET ORAL at 08:01

## 2017-01-21 RX ADMIN — OXYCODONE HYDROCHLORIDE 10 MG: 5 TABLET ORAL at 11:28

## 2017-01-21 RX ADMIN — OXYCODONE HYDROCHLORIDE 10 MG: 5 TABLET ORAL at 19:44

## 2017-01-21 RX ADMIN — Medication 0.25 MG: at 00:14

## 2017-01-21 ASSESSMENT — PAIN DESCRIPTION - DESCRIPTORS
DESCRIPTORS: HEADACHE

## 2017-01-21 NOTE — PLAN OF CARE
Problem: Goal Outcome Summary  Goal: Goal Outcome Summary  Outcome: No Change  D: Fall at home. Angiogram done yesterday with no interventions. Heart failure.  I/A: Pt's pain is controlled with PRN oxycodone and scheduled tylenol. VSS. Paced rhythm. Dressings intact. Small bowel movement this shift with no damage to sacral dressing. Heparin drip continues with rate change done at 0120. Rate is now 1450u/hr. Angio access site intact.  P: Continue to monitor. Heparin Xa recheck at 0730.

## 2017-01-21 NOTE — PROGRESS NOTES
Cardiology 1 Progress Note    Samir Rodriguez MRN# [id number]   Age: 46 year old YOB: 1970   Date of Admission: 1/12/2017           Assessment and Plan:   Samir Rodriguez is a 46 year old  male with past medical history of Rheumatic Heart Disease s/p mechanical MVR x 2 (1980s and 1992) on warfarin (INR goal 2.5-3.5), biventricular CHF (EF 25-30%) s/p dual chamber ICD 2008, chronic afib on amiodarone and previously on digoxine (stopped 1/16/17 due to concern for toxicity), WPW ablation at age 11, CKD III, hypothyroid,  who was transferred from [facility] on 01/12 for further eval of CHF and concern of hemolysis in setting of mechanical valve.    Today:  - Start Bumex 2mg PO bid to maintain euvolemia  - CT head this AM  - Planning for EDEL Monday  - Creatinine improved today  - Restart lisinopril @2.5mg daily    # Non-ischemic dilated cardiomyopathy 2/2 valvular heart disease  # Acute on chronic systolic heart failure, EF 37% (12/2016) s/p dual chamber ICD 2008  NYHA class III  Patient's bumex dose was decreased from 2 to 1 per day due to hypotension in 11/2016. Since 11/16 pt has had worsening LE edema. CHAPMAN at baseline with 2-3 blocks. + orthopnea. + JVD and 3+ LE edema. BNP 05603. Weight on admission 111kg. 1/16/17 device interrogation: atrial tachycardia to 150s with AV block. Lower rate setting changed to 80bpm from 60bmp and device changed from DDDR to VVIR on 01/16. Elevated LFTs likely due to congestive hepatopathy.  - Start Bumex 2mg PO bid to maintain euvolemia  - Continue metoprolol xl 12.5mg daily; holding spironolactone with resolving CARSON and low BP  - Restart lisinopril at 2.5mg daily  - Daily weights; Strict I/O    # Mechanical mitral valve (MV diastolic gradient 6.5)  # Aortic Insufficiency (mod - severe)  # TR (moderate)  History of BiV failure attributed to valvular disease. Echocardiogram on admission demonstrated moderate-severe aortic  insufficiency which is his most acute cardiac pathology. His tricuspid regurgitation is likely due to his signficantly fluid overloaded state. Blood cultures x 2: NGTD. Mechanical valve maintained on warfarin w/ INR 2.5-3.5 prior to this admission. Angiogram performed on 01/20 revealed nonobstructive CAD.  - Obtain EDEL early next week (Monday)   - Dental advising mandibular teeth extraction before valve surgery  -> procedure planning appointment scheduled for 01/25 @ 1100; needs INR and Amoxicillin 2g 1 hr before procedure  - Cardiovascular surgery consulted; appreciate recommendations -> planning for outpatient rehab stay and tooth extraction prior to surgery    # Anemia and thrombocytopenia (pancytopenia at OSH, concern for MAHA)  # Right Arm Hematoma   Blood smear: blood smear- RBCs show some target cells, hypochromia, increased polychromasia, no schistocytes or spherocytes. Low haptoglobin, high LDH, and plasma free hemoglobin concerning for intra-vascular hemolysis. No evidence of overt infection causing DIC. EPO inappropriately low. Etiology of hematoma unclear besides supratherapeutic INR- 4.6 on arrival, now improved.  - H/O following; appreciate recommendations  - Restarted warfarin w/ goal INR 2.5-3.0  - low dose heparin gtt    # Paroxsymal afib:  digoxin level 0.7 on 1/12 and 1/17. Device interrogation with atrial tachycardia and AV block and V pacing. The patient has been in an atrial flutter that is undersensed, hence the device was switched to VVI mode. There is no point continuing amiodarone at this stage. We will use the following days to  whether restoration of sinus rhythm is needed. It does look like it is not for the time being. We will   - stopped amiodarone.    # Hyperthyroid: Will need repeat TSH/T4 1-2 months post DC. Continue levothyroxine 224mcg   # Anxiety and insomnia: Increased clonazepam to bid. Consulted music therapy.    # Right tibia fx and R knee trauma:  Fall in November  prompted decompensation. Xray tibia and knee no fx this admission.  - Ortho evaluated; planning for outpatient follow up with orthopedic surgery in 4-6 weeks  - PT/OT    FEN: cardiac diet   PPX: Heparin gtt    Code Status: Full CODE      Patient discussed with staff attending, Dr. Carmichael.  Please feel free to page with questions.    CARDIOLOGY STAFF NOTE  I have seen and examined the patient with the [facility] team. I agree with the note above that reflects my assessment and plan of care.  Lev Carmichael MD [facility]  Cardiology - Electrophysiology             Interval History/Subjective   No acute events overnight. Morning headache persists today w/ associated nausea. Multiple loose stools yesterday. No chest pain or shortness of breath. Working well w/ therapies.         Objective   Temp:  [97.9  F (36.6  C)-99.1  F (37.3  C)] 99  F (37.2  C)  Heart Rate:  [79-83] 79  Resp:  [12-18] 16  BP: ()/(47-69) 108/51 mmHg  SpO2:  [95 %-97 %] 97 %    Physical exam:  Gen: AA&Ox3, no acute distress  HEENT: NACT, poor dentition   PULM: Crackles in bases b/l, lung sounds distant, no wheezing  CV: RRR w/ 2+ systolic murmur at LUSB and LISB  ABD:  soft, nontender, nondistended.    EXT: +2 sonam edema to thighs, no clubbing or cyanosis. Right arm ecchymosis with normal sensory and strength.   SKIN: Right arm resolving ecchymosis outlined w/ marker   NEURO: Strength +5/5 in UE and LE b/l, speech intact         Data:   CBC    Recent Labs  Lab 01/21/17  0730 01/20/17  0804 01/20/17  0413 01/19/17  0900   WBC 5.6 5.0 4.9 7.3   RBC 2.82* 2.57* 2.48* 2.91*   HGB 8.9* 8.1* 7.7* 9.2*   HCT 26.9* 24.6* 23.6* 27.3*   MCV 95 96 95 94   MCH 31.6 31.5 31.0 31.6   MCHC 33.1 32.9 32.6 33.7   RDW 19.7* 20.3* 20.3* 20.6*   PLT 87* 80* 85* 106*     CMP    Recent Labs  Lab 01/21/17  0730 01/20/17  0804 01/20/17  0413 01/19/17  0900 01/18/17  0707 01/17/17  0825  01/16/17  0709  01/14/17  1345   * 129* 127* 129* 131* 130*  --  136  < > 134    POTASSIUM 3.6 3.6 3.6 3.7 3.6 3.7  < > 3.3*  < > 3.7   CHLORIDE 91* 90* 89* 89* 91* 90*  --  96  < > 97   CO2 30 30 32 31 31 34*  --  31  < > 32   ANIONGAP 7 9 6 10 9 6  --  10  < > 5   GLC 88 82 90 92 77 77  --  73  < > 107*   BUN 19 23 22 23 26 31*  --  37*  < > 45*   CR 1.59* 1.74* 1.73* 1.76* 1.80* 1.81*  --  1.87*  < > 1.93*   GFRESTIMATED 47* 42* 42* 42* 41* 40*  --  39*  < > 37*   GFRESTBLACK 57* 51* 51* 50* 49* 49*  --  47*  < > 45*   DELFINO 7.4* 7.0* 7.2* 7.5* 6.9* 7.7*  --  7.5*  < > 7.2*   MAG 2.1  --  2.1  --   --  2.0  --  1.9  < >  --    PHOS  --   --   --   --  2.8 2.4*  --   --   --   --    PROTTOTAL  --   --   --   --  5.1*  --   --   --   --  5.1*   ALBUMIN  --   --   --   --  2.1*  --   --   --   --  2.4*   BILITOTAL  --   --   --   --  3.8*  --   --   --   --  3.6*   ALKPHOS  --   --   --   --  128  --   --   --   --  135   AST  --   --   --   --  60*  --   --   --   --  110*   ALT  --   --   --   --  102*  --   --   --   --  191*   < > = values in this interval not displayed.  INR    Recent Labs  Lab 01/20/17  0804 01/20/17  0413 01/19/17  0900 01/18/17  0707   INR 2.16* 2.29* 2.18* 3.40*             Medications:     Current Facility-Administered Medications   Medication     bumetanide (BUMEX) tablet 2 mg     ondansetron (ZOFRAN-ODT) ODT tab 4 mg     Warfarin Therapy Reminder (Check START DATE - warfarin may be starting in the FUTURE)     lidocaine 1 % 1 mL     lidocaine (LMX4) kit     sodium chloride (PF) 0.9% PF flush 3 mL     sodium chloride (PF) 0.9% PF flush 3 mL     HOLD:  Metformin and metformin containing medications if patient received IV contrast     clonazePAM (klonoPIN) half-tab 0.25 mg     acetaminophen (TYLENOL) tablet 650 mg     heparin  drip 25,000 units in 0.45% NaCl 250 mL (see additional administration details for dose)     heparin bolus from infusion pump     docusate sodium (COLACE) capsule 100 mg     potassium chloride SA (K-DUR/KLOR-CON M) CR tablet 20-40 mEq     potassium  chloride (KLOR-CON) Packet 20-40 mEq     potassium chloride 10 mEq in 100 mL intermittent infusion     potassium chloride 10 mEq in 100 mL intermittent infusion with 10 mg lidocaine     potassium chloride 20 mEq in 50 mL intermittent infusion     magnesium sulfate 2 g in NS intermittent infusion (PharMEDium or FV Cmpd)     magnesium sulfate 4 g in 100 mL sterile water (premade)     potassium phosphate 15 mmol in D5W 250 mL intermittent infusion     potassium phosphate 20 mmol in D5W 500 mL intermittent infusion     potassium phosphate 20 mmol in D5W 250 mL intermittent infusion     potassium phosphate 25 mmol in D5W 500 mL intermittent infusion     multivitamin, therapeutic with minerals (THERA-VIT-M) tablet 1 tablet     zinc sulfate (ZINCATE) capsule 220 mg     ascorbic acid (VITAMIN C) tablet 500 mg     vitamin A capsule 20,000 Units     melatonin tablet 3 mg     miconazole (MICATIN; MICRO GUARD) 2 % powder     lidocaine 1 % 1 mL     lidocaine (LMX4) kit     sodium chloride (PF) 0.9% PF flush 3 mL     sodium chloride (PF) 0.9% PF flush 3 mL     medication instruction     nitroglycerin (NITROSTAT) sublingual tablet 0.4 mg     alum & mag hydroxide-simethicone (MYLANTA ES/MAALOX  ES) suspension 15-30 mL     polyethylene glycol (MIRALAX/GLYCOLAX) Packet 17 g     albuterol (PROAIR HFA/PROVENTIL HFA/VENTOLIN HFA) Inhaler 2 puff     sennosides (SENOKOT) tablet 8.6 mg     naloxone (NARCAN) injection 0.1-0.4 mg     levothyroxine (SYNTHROID/LEVOTHROID) tablet 224 mcg     omeprazole (priLOSEC) CR capsule 20 mg     methocarbamol (ROBAXIN) tablet 750 mg     metoprolol (TOPROL-XL) half-tab 12.5 mg     oxyCODONE (ROXICODONE) IR tablet 5-10 mg

## 2017-01-21 NOTE — PLAN OF CARE
Problem: Goal Outcome Summary  Goal: Goal Outcome Summary  Outcome: No Change  D/I/A. Pt is stable.  AVSS.  Pt continues c/o headache.  Pt is on schedule Tylenol and PRN Oxycodone and reporting good pain relief following g pain meds.  Pt is sitting up in recliner.  Pt able to reposition self while in vita q1-2hrs.   Up with assist x2+ a walker. Heparin gtt until INR is therapeutic.  Strict I and O s.   P. Continue to monitor.

## 2017-01-21 NOTE — PLAN OF CARE
Problem: Goal Outcome Summary  Goal: Goal Outcome Summary  Edema 6C: Wraps removed and measurements taken with 12/12 noted reductions bilaterally. Mepilex dressings changed on bilateral heals - minor drainage noted. Generalized 1+ pitting noted distally with venous discoloration to skin. Skin cares performed prior to reapplication of GCB from MTPs to knee creases for continued edema management. Knee brace in place over R LE. Remove if causing pain/numbness or wraps become soiled.

## 2017-01-21 NOTE — PLAN OF CARE
Problem: Goal Outcome Summary  Goal: Goal Outcome Summary  PT 6C: Demonstrates decline in functional mobility secondary to R knee pain.  Pain has worsened and requires increase A with STS and gait.  STS with mod-max Ax2 and posterior lean.  Ambulates 8ft with FWW and CGAx2.  Session completed due to R knee pain.  C/o dizziness when initial standing in part due to holding breath.  PT recommends d/c to TCU when medically stable in order to increase functional strength, ROM and decrease R knee pain.

## 2017-01-21 NOTE — PROGRESS NOTES
Hematology Staff Note:  Factor levels returned yesterday:  Results for AIDA PAN (MRN [id number]) as of 1/21/2017 11:52   Ref. Range 1/19/2017 15:40   Factor 2 Assay Latest Ref Range:  % 22 (L)   Factor 5 Assay Latest Ref Range:  % 58 (L)   Factor 7 Assay Latest Ref Range:  % 31 (L)   Factor 8 Assay Latest Ref Range:  % 350 (H)     INR is thus primarly prolonged due to Vit K dependent factors (warfarin) as opposed to a significant contribution by liver dysfunction as his FV is reasonably robust.      His etiology for UE hemorrhage remains unclear but stable.  Mechanical hemolysis and thrombocytopenia appear improved with optimization of his cardiac function.  Will sign off at this point but please do not hesitate to call with questions.        Sanjay Jones MD   of Medicine  [facility] [employer]

## 2017-01-21 NOTE — PLAN OF CARE
Problem: Goal Outcome Summary  Goal: Goal Outcome Summary  Outcome: No Change  D. S/o coronary angiogram and Non-obstructive coronary artery disease.  Pt came with a TR band on L wrist.  TR band was removed per protocol without any issues. L radial access site covered with drsg and it CDI.  Pt continues c/o headache and R lower leg.  Discomfort is tolerable with scheduled Tylenol and PRN Oxycodone.   I. [place] and reposition pt q2hrs.  Coccyx would care done and drsg changed following a BM. Strict I and O s.   A. pt is stable.   P. Continue to monitor

## 2017-01-21 NOTE — PLAN OF CARE
"D/I: Had head CT today d/t persistent headache--\"no acute intracranial pathology.\" Headache mildly improved after scheduled tylenol given. Stood at bedside with assist of 2 today, plus gait belt and walker. Up in chair this afternoon. Sacral dressing changed. Lymphedema re-wrapped legs today and changed bilateral heel dressings. Giving PRN oxycodone for leg pain. Replacing phos. Replaced K. Heparin gtt decreased to 1,300 units/hr per protocol, lab will redraw Xa level at 15:00. Clonipin and zofran given for mild nausea with some relief (gets nauseous when eating, but no emesis, has good appetite and is able to eat).   A/P:  Monitor for 15:00 Xa level results. Continue to monitor. Continue plan of care.   "

## 2017-01-22 ENCOUNTER — APPOINTMENT (OUTPATIENT)
Dept: PHYSICAL THERAPY | Facility: CLINIC | Age: 47
DRG: 286 | End: 2017-01-22
Attending: INTERNAL MEDICINE
Payer: MEDICAID

## 2017-01-22 LAB
ANION GAP SERPL CALCULATED.3IONS-SCNC: 6 MMOL/L (ref 3–14)
BUN SERPL-MCNC: 16 MG/DL (ref 7–30)
CALCIUM SERPL-MCNC: 7.4 MG/DL (ref 8.5–10.1)
CHLORIDE SERPL-SCNC: 91 MMOL/L (ref 94–109)
CO2 SERPL-SCNC: 30 MMOL/L (ref 20–32)
CREAT SERPL-MCNC: 1.48 MG/DL (ref 0.66–1.25)
ERYTHROCYTE [DISTWIDTH] IN BLOOD BY AUTOMATED COUNT: 19.9 % (ref 10–15)
GFR SERPL CREATININE-BSD FRML MDRD: 51 ML/MIN/1.7M2
GLUCOSE SERPL-MCNC: 76 MG/DL (ref 70–99)
HCT VFR BLD AUTO: 27.1 % (ref 40–53)
HGB BLD-MCNC: 8.9 G/DL (ref 13.3–17.7)
INR PPP: 2.73 (ref 0.86–1.14)
LMWH PPP CHRO-ACNC: 0.23 IU/ML
LMWH PPP CHRO-ACNC: 0.24 IU/ML
MAGNESIUM SERPL-MCNC: 1.9 MG/DL (ref 1.6–2.3)
MCH RBC QN AUTO: 31.4 PG (ref 26.5–33)
MCHC RBC AUTO-ENTMCNC: 32.8 G/DL (ref 31.5–36.5)
MCV RBC AUTO: 96 FL (ref 78–100)
PHOSPHATE SERPL-MCNC: 2.4 MG/DL (ref 2.5–4.5)
PLATELET # BLD AUTO: 82 10E9/L (ref 150–450)
POTASSIUM SERPL-SCNC: 3.8 MMOL/L (ref 3.4–5.3)
RBC # BLD AUTO: 2.83 10E12/L (ref 4.4–5.9)
SODIUM SERPL-SCNC: 127 MMOL/L (ref 133–144)
WBC # BLD AUTO: 5.4 10E9/L (ref 4–11)

## 2017-01-22 PROCEDURE — 25000132 ZZH RX MED GY IP 250 OP 250 PS 637

## 2017-01-22 PROCEDURE — 21400006 ZZH R&B CCU INTERMEDIATE UMMC

## 2017-01-22 PROCEDURE — 85027 COMPLETE CBC AUTOMATED: CPT | Performed by: INTERNAL MEDICINE

## 2017-01-22 PROCEDURE — 83735 ASSAY OF MAGNESIUM: CPT | Performed by: INTERNAL MEDICINE

## 2017-01-22 PROCEDURE — 25000132 ZZH RX MED GY IP 250 OP 250 PS 637: Performed by: INTERNAL MEDICINE

## 2017-01-22 PROCEDURE — 36415 COLL VENOUS BLD VENIPUNCTURE: CPT | Performed by: INTERNAL MEDICINE

## 2017-01-22 PROCEDURE — 99233 SBSQ HOSP IP/OBS HIGH 50: CPT | Mod: GC | Performed by: INTERNAL MEDICINE

## 2017-01-22 PROCEDURE — 97116 GAIT TRAINING THERAPY: CPT | Mod: GP

## 2017-01-22 PROCEDURE — 85610 PROTHROMBIN TIME: CPT | Performed by: INTERNAL MEDICINE

## 2017-01-22 PROCEDURE — 97530 THERAPEUTIC ACTIVITIES: CPT | Mod: GP

## 2017-01-22 PROCEDURE — 25900017 H RX MED GY IP 259 OP 259 PS 637: Performed by: INTERNAL MEDICINE

## 2017-01-22 PROCEDURE — 25000125 ZZHC RX 250: Performed by: INTERNAL MEDICINE

## 2017-01-22 PROCEDURE — 85520 HEPARIN ASSAY: CPT | Performed by: INTERNAL MEDICINE

## 2017-01-22 PROCEDURE — 84100 ASSAY OF PHOSPHORUS: CPT | Performed by: INTERNAL MEDICINE

## 2017-01-22 PROCEDURE — 80048 BASIC METABOLIC PNL TOTAL CA: CPT | Performed by: INTERNAL MEDICINE

## 2017-01-22 PROCEDURE — 40000193 ZZH STATISTIC PT WARD VISIT

## 2017-01-22 RX ORDER — WARFARIN SODIUM 5 MG/1
5 TABLET ORAL
Status: COMPLETED | OUTPATIENT
Start: 2017-01-22 | End: 2017-01-22

## 2017-01-22 RX ORDER — BUMETANIDE 1 MG/1
1 TABLET ORAL
Status: DISCONTINUED | OUTPATIENT
Start: 2017-01-22 | End: 2017-01-24

## 2017-01-22 RX ORDER — ACETAMINOPHEN 500 MG
1000 TABLET ORAL 3 TIMES DAILY
Status: DISCONTINUED | OUTPATIENT
Start: 2017-01-22 | End: 2017-01-24

## 2017-01-22 RX ORDER — LISINOPRIL 5 MG/1
5 TABLET ORAL DAILY
Status: DISCONTINUED | OUTPATIENT
Start: 2017-01-22 | End: 2017-01-23

## 2017-01-22 RX ADMIN — ZINC SULFATE CAP 220 MG (50 MG ELEMENTAL ZN) 220 MG: 220 (50 ZN) CAP at 09:31

## 2017-01-22 RX ADMIN — WARFARIN SODIUM 5 MG: 5 TABLET ORAL at 17:58

## 2017-01-22 RX ADMIN — LISINOPRIL 5 MG: 5 TABLET ORAL at 11:43

## 2017-01-22 RX ADMIN — OXYCODONE HYDROCHLORIDE 10 MG: 5 TABLET ORAL at 21:45

## 2017-01-22 RX ADMIN — OXYCODONE HYDROCHLORIDE 10 MG: 5 TABLET ORAL at 09:31

## 2017-01-22 RX ADMIN — LEVOTHYROXINE SODIUM 224 MCG: 112 TABLET ORAL at 09:32

## 2017-01-22 RX ADMIN — OMEPRAZOLE 20 MG: 20 CAPSULE, DELAYED RELEASE ORAL at 09:33

## 2017-01-22 RX ADMIN — METHOCARBAMOL 750 MG: 750 TABLET ORAL at 09:32

## 2017-01-22 RX ADMIN — Medication 20000 UNITS: at 09:32

## 2017-01-22 RX ADMIN — POTASSIUM PHOSPHATE, MONOBASIC AND POTASSIUM PHOSPHATE, DIBASIC 15 MMOL: 224; 236 INJECTION, SOLUTION INTRAVENOUS at 15:10

## 2017-01-22 RX ADMIN — OXYCODONE HYDROCHLORIDE AND ACETAMINOPHEN 500 MG: 500 TABLET ORAL at 09:32

## 2017-01-22 RX ADMIN — Medication 0.25 MG: at 09:59

## 2017-01-22 RX ADMIN — Medication: at 20:13

## 2017-01-22 RX ADMIN — ONDANSETRON 4 MG: 4 TABLET, ORALLY DISINTEGRATING ORAL at 11:43

## 2017-01-22 RX ADMIN — Medication 0.25 MG: at 20:09

## 2017-01-22 RX ADMIN — Medication: at 09:35

## 2017-01-22 RX ADMIN — Medication 12.5 MG: at 09:50

## 2017-01-22 RX ADMIN — Medication 2 G: at 09:52

## 2017-01-22 RX ADMIN — HEPARIN SODIUM 1150 UNITS/HR: 10000 INJECTION, SOLUTION INTRAVENOUS at 04:34

## 2017-01-22 RX ADMIN — DOCUSATE SODIUM 100 MG: 100 CAPSULE, LIQUID FILLED ORAL at 09:32

## 2017-01-22 RX ADMIN — ACETAMINOPHEN 1000 MG: 500 TABLET, FILM COATED ORAL at 20:10

## 2017-01-22 RX ADMIN — OXYCODONE HYDROCHLORIDE 10 MG: 5 TABLET ORAL at 04:28

## 2017-01-22 RX ADMIN — POTASSIUM CHLORIDE 20 MEQ: 750 TABLET, EXTENDED RELEASE ORAL at 09:48

## 2017-01-22 RX ADMIN — ACETAMINOPHEN 1000 MG: 500 TABLET, FILM COATED ORAL at 13:42

## 2017-01-22 RX ADMIN — OXYCODONE HYDROCHLORIDE 10 MG: 5 TABLET ORAL at 17:58

## 2017-01-22 RX ADMIN — BUMETANIDE 1 MG: 1 TABLET ORAL at 09:40

## 2017-01-22 RX ADMIN — BUMETANIDE 1 MG: 1 TABLET ORAL at 16:46

## 2017-01-22 RX ADMIN — DOCUSATE SODIUM 100 MG: 100 CAPSULE, LIQUID FILLED ORAL at 20:12

## 2017-01-22 RX ADMIN — MULTIPLE VITAMINS W/ MINERALS TAB 1 TABLET: TAB at 09:31

## 2017-01-22 RX ADMIN — OXYCODONE HYDROCHLORIDE 10 MG: 5 TABLET ORAL at 13:42

## 2017-01-22 RX ADMIN — METHOCARBAMOL 750 MG: 750 TABLET ORAL at 20:12

## 2017-01-22 ASSESSMENT — PAIN DESCRIPTION - DESCRIPTORS
DESCRIPTORS: ACHING;CONSTANT
DESCRIPTORS: ACHING;HEADACHE
DESCRIPTORS: HEADACHE
DESCRIPTORS: ACHING;CONSTANT

## 2017-01-22 NOTE — PLAN OF CARE
Problem: Cardiac: Heart Failure (Adult)  Goal: Signs and Symptoms of Listed Potential Problems Will be Absent or Manageable (Cardiac: Heart Failure)  Signs and symptoms of listed potential problems will be absent or manageable by discharge/transition of care (reference Cardiac: Heart Failure (Adult) CPG).   Outcome: No Change  D/says the wraps on his legs help, but he was too tired to do therapy today. He has fracture after his fall B/4 admission, and now all day right shoulder is sore too. Told me that they changed him to pills for his fluid  A/He is sitting up in the recliner watching a sports game on TV.   P/monitor for changes  D/He asked for anxiety med and admits he is worrying about possible surgery.   I/med given and we talked about EDEL, possible valve surgery (TVR this time). He has had this procedure and these surgeries before. The team is also talking about possible VAD placement. I suggested that he put his worrying on a shelf until the team tells you what plan they recommend. At that point you can have more specific worries, and can ask questions to give you specific information about these items.   D/He thanked me and told me that was a good suggestion. He will talk to the day nurse tomorrow to find out when his EDEL will be done. He does not want to wait too long to have it done.  P/monitor for changes, NPO p 0000 for EDEL.    Fluids  D/ at 1600, he talked to me about being over his fluid restriction for the day  A/It looks like a huge amount was recorded at 0230 in the am , and then some throughout the day  I/offered him a big cup of ice with a spoon, so he can suck on ice to quench his thirst and take less fluids in  D/He said the ice chips really work well  A/he desires to comply with the fluid restriction  P/monitor for changes, continue education, and support for changes  P/monitor for changes

## 2017-01-22 NOTE — PLAN OF CARE
Problem: Goal Outcome Summary  Goal: Goal Outcome Summary  PT 6C: Only able to toelrate 4 ft of ambulates due to R knee pain with FWW and CGAx2.  STS x4 with mod Ax2 and FWW.  Frequent vc to lean forward and difficulty with bringing RLE within ALBINA.  C/o new onset of R shoulder pain with associated weakness in elevation.  Tender with palpation on posterior R shoulder.  Unable to ER/IR R shoulder with great strength possibly due to RTC injury.  PT recommends d/c to TCU when medically stable.

## 2017-01-22 NOTE — PLAN OF CARE
Problem: Goal Outcome Summary  Goal: Goal Outcome Summary  Outcome: No Change  D: Admitted 1/12 from OSH for further evaluation of CHF  I/A: A&Ox4. VSS on RA. V-paced 80s. R leg pain and headache partially relieved by PRN oxycodone and scheduled tylenol. PRN klonopin given at HS. Heparin gtt continues at 1150 units/hr. Lymph wraps in place. R leg brace removed while patient in bed overnight. +3 R hip edema continues to weep. Assisted to reposition in bed. Mepilex on buttocks in place and CDI. Scrotal edema, antifungal powder and interdry applied to groin folds. Adequate uop. Appeared to sleep well between cares.   P: Continues to diurese. EDEL planned for 1/23. Possible upcoming valve replacement. Possible tooth extraction prior to heart surgery. Continue to monitor and notify Cards I with questions/concerns.

## 2017-01-22 NOTE — PROGRESS NOTES
Cardiology 1 Progress Note    Samir Rodriguez MRN# [id number]   Age: 46 year old YOB: 1970   Date of Admission: 1/12/2017           Assessment and Plan:   Samir Rodriguez is a 46 year old  male with past medical history of Rheumatic Heart Disease s/p mechanical MVR x 2 (1980s and 1992) on warfarin (INR goal 2.5-3.5), biventricular CHF (EF 25-30%) s/p dual chamber ICD 2008, chronic afib on amiodarone and previously on digoxine (stopped 1/16/17 due to concern for toxicity), WPW ablation at age 11, CKD III, hypothyroid,  who was transferred from [facility] on 01/12 for further eval of CHF and concern of hemolysis in setting of mechanical valve.    Today:  - Decrease Bumex to 1mg PO bid to maintain euvolemia  - NPO @ midnight for EDEL tomorrow  - Increase lisinopril to 5mg daily if BP tolerates  - Scheduled acetaminophen 1g tid for headache  - Heart failure team to evaluate for perisurgical LVAD tomorrow      # Non-ischemic dilated cardiomyopathy 2/2 valvular heart disease  # Acute on chronic systolic heart failure, EF 37% (12/2016) s/p dual chamber ICD 2008  NYHA class III  Patient's bumex dose was decreased from 2 to 1 per day due to hypotension in 11/2016. Since 11/16 pt has had worsening LE edema. CHAPMAN at baseline with 2-3 blocks. + orthopnea. + JVD and 3+ LE edema. BNP 27738. Weight on admission 111kg with dry weight ~91kg. 1/16/17 device interrogation: atrial tachycardia to 150s with AV block. Lower rate setting changed to 80bpm from 60bmp and device changed from DDDR to VVIR on 01/16. Elevated LFTs likely due to congestive hepatopathy.  - Decrease Bumex to 1mg PO bid to maintain euvolemia  - Continue metoprolol xl 12.5mg daily;   - Will start low dose spironolactone tomorrow  - Increase lisinopril to 5mg daily  - Daily weights; Strict I/O    # Mechanical mitral valve (MV diastolic gradient 6.5)  # Aortic Insufficiency (mod - severe)  # TR  (moderate)  History of BiV failure attributed to valvular disease. Echocardiogram on admission demonstrated moderate-severe aortic insufficiency which is his most acute cardiac pathology. His tricuspid regurgitation is likely due to his signficantly fluid overloaded state. Blood cultures x 2: NGTD. Mechanical valve maintained on warfarin w/ INR 2.5-3.5 prior to this admission. Angiogram performed on 01/20 revealed nonobstructive CAD.  - Obtain EDEL tomorrow  - Dental advising mandibular teeth extraction before valve surgery  -> procedure planning appointment scheduled for 01/25 @ 1100; needs INR and Amoxicillin 2g 1 hr before procedure  - Cardiovascular surgery consulted; appreciate recommendations -> planning for outpatient rehab stay and tooth extraction prior to surgery    # Anemia and thrombocytopenia  # Right Arm Hematoma   Blood smear: blood smear- RBCs show some target cells, hypochromia, increased polychromasia, no schistocytes or spherocytes. Low haptoglobin, high LDH, and plasma free hemoglobin concerning for intra-vascular hemolysis possibly due to valvular disease. No evidence of overt infection causing DIC. EPO inappropriately low w/ recent ARF. Etiology of hematoma likely due to supratherapeutic INR- 4.6 on arrival, now improved.  - Restarted warfarin w/ goal INR 2.5-3.0  - low dose heparin gtt as bridge    # Paroxsymal afib:  digoxin level 0.7 on 1/12 and 1/17. Device interrogation with atrial tachycardia and AV block and V pacing. The patient has been in an atrial flutter that is undersensed, hence the device was switched to VVI mode. There is no point continuing amiodarone at this stage.  - stopped amiodarone.    # Hyperthyroid: Will need repeat TSH/T4 1-2 months post DC. Continue levothyroxine 224mcg   # Anxiety and insomnia: Increased clonazepam to bid    # Right tibia fx and R knee trauma:  Fall in November prompted decompensation. Xray tibia and knee no fx this admission.  - Ortho evaluated;  planning for outpatient follow up with orthopedic surgery in 4-6 weeks  - PT/OT  - Plan for DC to rehab early this week    FEN: cardiac diet   PPX: Heparin gtt    Code Status: Full CODE      Patient discussed with staff attending, Dr. Carmichael.     Dwight DO Parminder  [id number]  [phone]    CARDIOLOGY STAFF NOTE  I have seen and examined the patient with the [facility] team. I agree with the note above that reflects my assessment and plan of care.  Lev Carmichael MD [facility]  Cardiology - Electrophysiology           Interval History/Subjective   No acute events overnight. Mild headache persists but without nausea, 2x bowel movement yesterday with no evidence of bleeding. Family planning to come visit in the next 1-2 days. No CP, SOB, fever, or chills.          Objective   Temp:  [98.2  F (36.8  C)-98.4  F (36.9  C)] 98.2  F (36.8  C)  Heart Rate:  [80] 80  Resp:  [16-18] 18  BP: ()/(50-70) 107/65 mmHg  SpO2:  [96 %-100 %] 97 %    Physical exam:  Gen: AA&Ox3, no acute distress  HEENT: NACT, poor dentition   PULM: Crackles in bases b/l, lung sounds distant, no wheezing  CV: RRR w/ 2+ systolic murmur at LUSB and LISB  ABD:  soft, nontender, nondistended.    EXT: +1 sonam edema to knees, no clubbing or cyanosis. Right arm ecchymosis significantly improved  SKIN: Right arm resolving ecchymosis outlined w/ marker   NEURO: Strength +5/5 in UE and LE b/l, speech intact         Data:   CBC    Recent Labs  Lab 01/21/17  0730 01/20/17  0804 01/20/17  0413 01/19/17  0900   WBC 5.6 5.0 4.9 7.3   RBC 2.82* 2.57* 2.48* 2.91*   HGB 8.9* 8.1* 7.7* 9.2*   HCT 26.9* 24.6* 23.6* 27.3*   MCV 95 96 95 94   MCH 31.6 31.5 31.0 31.6   MCHC 33.1 32.9 32.6 33.7   RDW 19.7* 20.3* 20.3* 20.6*   PLT 87* 80* 85* 106*     CMP    Recent Labs  Lab 01/21/17  0730 01/20/17  0804 01/20/17  0413 01/19/17  0900 01/18/17  0707 01/17/17  0825  01/16/17  0709   * 129* 127* 129* 131* 130*  --  136   POTASSIUM 3.6 3.6 3.6 3.7 3.6 3.7  < > 3.3*    CHLORIDE 91* 90* 89* 89* 91* 90*  --  96   CO2 30 30 32 31 31 34*  --  31   ANIONGAP 7 9 6 10 9 6  --  10   GLC 88 82 90 92 77 77  --  73   BUN 19 23 22 23 26 31*  --  37*   CR 1.59* 1.74* 1.73* 1.76* 1.80* 1.81*  --  1.87*   GFRESTIMATED 47* 42* 42* 42* 41* 40*  --  39*   GFRESTBLACK 57* 51* 51* 50* 49* 49*  --  47*   DELFINO 7.4* 7.0* 7.2* 7.5* 6.9* 7.7*  --  7.5*   MAG 2.1  --  2.1  --   --  2.0  --  1.9   PHOS 2.2*  --   --   --  2.8 2.4*  --   --    PROTTOTAL  --   --   --   --  5.1*  --   --   --    ALBUMIN  --   --   --   --  2.1*  --   --   --    BILITOTAL  --   --   --   --  3.8*  --   --   --    ALKPHOS  --   --   --   --  128  --   --   --    AST  --   --   --   --  60*  --   --   --    ALT  --   --   --   --  102*  --   --   --    < > = values in this interval not displayed.  INR    Recent Labs  Lab 01/21/17  0730 01/20/17  0804 01/20/17  0413 01/19/17  0900   INR 2.25* 2.16* 2.29* 2.18*             Medications:     Current Facility-Administered Medications   Medication     lisinopril (PRINIVIL/ZESTRIL) tablet 5 mg     sodium chloride (PF) 0.9% PF flush 3 mL     sodium chloride (PF) 0.9% PF flush 3 mL     May take oral meds with a sip of water, the morning of EDEL procedure.     HOLD: Insulin - REGULAR AM of procedure IF diabetic     HOLD: Insulin - RAPID/SHORT acting AM of procedure IF diabetic     HOLD: Oral hypoglycemics AM of procedure IF diabetic     Give   of usual dose of LONG ACTING insulin AM of procedure IF diabetic     bumetanide (BUMEX) tablet 1 mg     acetaminophen (TYLENOL) tablet 1,000 mg     ondansetron (ZOFRAN-ODT) ODT tab 4 mg     Warfarin Therapy Reminder (Check START DATE - warfarin may be starting in the FUTURE)     lidocaine 1 % 1 mL     lidocaine (LMX4) kit     sodium chloride (PF) 0.9% PF flush 3 mL     sodium chloride (PF) 0.9% PF flush 3 mL     HOLD:  Metformin and metformin containing medications if patient received IV contrast     clonazePAM (klonoPIN) half-tab 0.25 mg      heparin  drip 25,000 units in 0.45% NaCl 250 mL (see additional administration details for dose)     heparin bolus from infusion pump     docusate sodium (COLACE) capsule 100 mg     potassium chloride SA (K-DUR/KLOR-CON M) CR tablet 20-40 mEq     potassium chloride (KLOR-CON) Packet 20-40 mEq     potassium chloride 10 mEq in 100 mL intermittent infusion     potassium chloride 10 mEq in 100 mL intermittent infusion with 10 mg lidocaine     potassium chloride 20 mEq in 50 mL intermittent infusion     magnesium sulfate 2 g in NS intermittent infusion (PharMEDium or FV Cmpd)     magnesium sulfate 4 g in 100 mL sterile water (premade)     potassium phosphate 15 mmol in D5W 250 mL intermittent infusion     potassium phosphate 20 mmol in D5W 500 mL intermittent infusion     potassium phosphate 20 mmol in D5W 250 mL intermittent infusion     potassium phosphate 25 mmol in D5W 500 mL intermittent infusion     multivitamin, therapeutic with minerals (THERA-VIT-M) tablet 1 tablet     zinc sulfate (ZINCATE) capsule 220 mg     ascorbic acid (VITAMIN C) tablet 500 mg     vitamin A capsule 20,000 Units     melatonin tablet 3 mg     miconazole (MICATIN; MICRO GUARD) 2 % powder     lidocaine 1 % 1 mL     lidocaine (LMX4) kit     sodium chloride (PF) 0.9% PF flush 3 mL     sodium chloride (PF) 0.9% PF flush 3 mL     medication instruction     nitroglycerin (NITROSTAT) sublingual tablet 0.4 mg     alum & mag hydroxide-simethicone (MYLANTA ES/MAALOX  ES) suspension 15-30 mL     polyethylene glycol (MIRALAX/GLYCOLAX) Packet 17 g     albuterol (PROAIR HFA/PROVENTIL HFA/VENTOLIN HFA) Inhaler 2 puff     sennosides (SENOKOT) tablet 8.6 mg     naloxone (NARCAN) injection 0.1-0.4 mg     levothyroxine (SYNTHROID/LEVOTHROID) tablet 224 mcg     omeprazole (priLOSEC) CR capsule 20 mg     methocarbamol (ROBAXIN) tablet 750 mg     metoprolol (TOPROL-XL) half-tab 12.5 mg     oxyCODONE (ROXICODONE) IR tablet 5-10 mg

## 2017-01-22 NOTE — PROGRESS NOTES
Patient A&O X 4 . VSS, V paced HR 80. Headache and right shoulder pain managed with PRN oxycodone and scheduled tylenol. Klonopin given x 1 for some anxiety. Potassium, magnesium, and phosphorus replaced per protocol. Good UOP. Continues to have right hip edema, weeping at site. Right leg must be on brace while out of bed. Will continue to monitor and notify team of any questions or concerns.

## 2017-01-23 ENCOUNTER — APPOINTMENT (OUTPATIENT)
Dept: OCCUPATIONAL THERAPY | Facility: CLINIC | Age: 47
DRG: 286 | End: 2017-01-23
Attending: INTERNAL MEDICINE
Payer: MEDICAID

## 2017-01-23 ENCOUNTER — APPOINTMENT (OUTPATIENT)
Dept: CT IMAGING | Facility: CLINIC | Age: 47
DRG: 286 | End: 2017-01-23
Attending: INTERNAL MEDICINE
Payer: MEDICAID

## 2017-01-23 ENCOUNTER — APPOINTMENT (OUTPATIENT)
Dept: PHYSICAL THERAPY | Facility: CLINIC | Age: 47
DRG: 286 | End: 2017-01-23
Attending: INTERNAL MEDICINE
Payer: MEDICAID

## 2017-01-23 LAB
ANION GAP SERPL CALCULATED.3IONS-SCNC: 6 MMOL/L (ref 3–14)
BUN SERPL-MCNC: 16 MG/DL (ref 7–30)
CALCIUM SERPL-MCNC: 7.2 MG/DL (ref 8.5–10.1)
CHLORIDE SERPL-SCNC: 90 MMOL/L (ref 94–109)
CO2 SERPL-SCNC: 30 MMOL/L (ref 20–32)
CREAT SERPL-MCNC: 1.5 MG/DL (ref 0.66–1.25)
ERYTHROCYTE [DISTWIDTH] IN BLOOD BY AUTOMATED COUNT: 19.9 % (ref 10–15)
GFR SERPL CREATININE-BSD FRML MDRD: 50 ML/MIN/1.7M2
GLUCOSE SERPL-MCNC: 88 MG/DL (ref 70–99)
HCT VFR BLD AUTO: 25.3 % (ref 40–53)
HGB BLD-MCNC: 8.4 G/DL (ref 13.3–17.7)
INR PPP: 3.47 (ref 0.86–1.14)
MAGNESIUM SERPL-MCNC: 2.1 MG/DL (ref 1.6–2.3)
MCH RBC QN AUTO: 31.9 PG (ref 26.5–33)
MCHC RBC AUTO-ENTMCNC: 33.2 G/DL (ref 31.5–36.5)
MCV RBC AUTO: 96 FL (ref 78–100)
PLATELET # BLD AUTO: 72 10E9/L (ref 150–450)
POTASSIUM SERPL-SCNC: 4 MMOL/L (ref 3.4–5.3)
RADIOLOGIST FLAGS: NORMAL
RBC # BLD AUTO: 2.63 10E12/L (ref 4.4–5.9)
SODIUM SERPL-SCNC: 126 MMOL/L (ref 133–144)
WBC # BLD AUTO: 4.5 10E9/L (ref 4–11)

## 2017-01-23 PROCEDURE — 40000133 ZZH STATISTIC OT WARD VISIT

## 2017-01-23 PROCEDURE — 83735 ASSAY OF MAGNESIUM: CPT | Performed by: INTERNAL MEDICINE

## 2017-01-23 PROCEDURE — 36415 COLL VENOUS BLD VENIPUNCTURE: CPT | Performed by: INTERNAL MEDICINE

## 2017-01-23 PROCEDURE — 85027 COMPLETE CBC AUTOMATED: CPT | Performed by: INTERNAL MEDICINE

## 2017-01-23 PROCEDURE — 25900017 H RX MED GY IP 259 OP 259 PS 637: Performed by: INTERNAL MEDICINE

## 2017-01-23 PROCEDURE — 40000133 ZZH STATISTIC OT WARD VISIT: Performed by: OCCUPATIONAL THERAPIST

## 2017-01-23 PROCEDURE — 97535 SELF CARE MNGMENT TRAINING: CPT | Mod: GO

## 2017-01-23 PROCEDURE — 80048 BASIC METABOLIC PNL TOTAL CA: CPT | Performed by: INTERNAL MEDICINE

## 2017-01-23 PROCEDURE — 99222 1ST HOSP IP/OBS MODERATE 55: CPT | Performed by: NURSE PRACTITIONER

## 2017-01-23 PROCEDURE — 97140 MANUAL THERAPY 1/> REGIONS: CPT | Mod: GO | Performed by: OCCUPATIONAL THERAPIST

## 2017-01-23 PROCEDURE — 25000132 ZZH RX MED GY IP 250 OP 250 PS 637: Performed by: INTERNAL MEDICINE

## 2017-01-23 PROCEDURE — 85610 PROTHROMBIN TIME: CPT | Performed by: INTERNAL MEDICINE

## 2017-01-23 PROCEDURE — 99233 SBSQ HOSP IP/OBS HIGH 50: CPT | Mod: GC | Performed by: INTERNAL MEDICINE

## 2017-01-23 PROCEDURE — 25000128 H RX IP 250 OP 636: Performed by: INTERNAL MEDICINE

## 2017-01-23 PROCEDURE — 40000193 ZZH STATISTIC PT WARD VISIT

## 2017-01-23 PROCEDURE — 97110 THERAPEUTIC EXERCISES: CPT | Mod: GP

## 2017-01-23 PROCEDURE — 21400006 ZZH R&B CCU INTERMEDIATE UMMC

## 2017-01-23 PROCEDURE — 74176 CT ABD & PELVIS W/O CONTRAST: CPT

## 2017-01-23 RX ORDER — LIDOCAINE 50 MG/G
1-3 PATCH TOPICAL
Status: DISCONTINUED | OUTPATIENT
Start: 2017-01-23 | End: 2017-02-12 | Stop reason: HOSPADM

## 2017-01-23 RX ORDER — OXYCODONE HYDROCHLORIDE 5 MG/1
5-10 TABLET ORAL EVERY 6 HOURS PRN
Status: DISCONTINUED | OUTPATIENT
Start: 2017-01-23 | End: 2017-01-24

## 2017-01-23 RX ADMIN — DOCUSATE SODIUM 100 MG: 100 CAPSULE, LIQUID FILLED ORAL at 08:22

## 2017-01-23 RX ADMIN — BUMETANIDE 1 MG: 1 TABLET ORAL at 16:34

## 2017-01-23 RX ADMIN — LEVOTHYROXINE SODIUM 224 MCG: 112 TABLET ORAL at 08:21

## 2017-01-23 RX ADMIN — BUMETANIDE 1 MG: 1 TABLET ORAL at 08:22

## 2017-01-23 RX ADMIN — POTASSIUM CHLORIDE 20 MEQ: 750 TABLET, EXTENDED RELEASE ORAL at 14:06

## 2017-01-23 RX ADMIN — Medication 0.25 MG: at 00:39

## 2017-01-23 RX ADMIN — OMEPRAZOLE 20 MG: 20 CAPSULE, DELAYED RELEASE ORAL at 08:22

## 2017-01-23 RX ADMIN — ACETAMINOPHEN 1000 MG: 500 TABLET, FILM COATED ORAL at 20:46

## 2017-01-23 RX ADMIN — MULTIPLE VITAMINS W/ MINERALS TAB 1 TABLET: TAB at 08:22

## 2017-01-23 RX ADMIN — ACETAMINOPHEN 1000 MG: 500 TABLET, FILM COATED ORAL at 08:22

## 2017-01-23 RX ADMIN — ACETAMINOPHEN 1000 MG: 500 TABLET, FILM COATED ORAL at 14:07

## 2017-01-23 RX ADMIN — METHOCARBAMOL 750 MG: 750 TABLET ORAL at 08:22

## 2017-01-23 RX ADMIN — DOCUSATE SODIUM 100 MG: 100 CAPSULE, LIQUID FILLED ORAL at 20:47

## 2017-01-23 RX ADMIN — OXYCODONE HYDROCHLORIDE 10 MG: 5 TABLET ORAL at 19:00

## 2017-01-23 RX ADMIN — OXYCODONE HYDROCHLORIDE 10 MG: 5 TABLET ORAL at 12:38

## 2017-01-23 RX ADMIN — OXYCODONE HYDROCHLORIDE 10 MG: 5 TABLET ORAL at 04:17

## 2017-01-23 RX ADMIN — SODIUM CHLORIDE 250 ML: 9 INJECTION, SOLUTION INTRAVENOUS at 05:54

## 2017-01-23 RX ADMIN — LIDOCAINE 3 PATCH: 50 PATCH CUTANEOUS at 20:46

## 2017-01-23 RX ADMIN — Medication: at 20:46

## 2017-01-23 RX ADMIN — Medication 0.25 MG: at 11:49

## 2017-01-23 RX ADMIN — METHOCARBAMOL 750 MG: 750 TABLET ORAL at 20:46

## 2017-01-23 RX ADMIN — OXYCODONE HYDROCHLORIDE 10 MG: 5 TABLET ORAL at 08:22

## 2017-01-23 ASSESSMENT — PAIN DESCRIPTION - DESCRIPTORS
DESCRIPTORS: ACHING;DISCOMFORT
DESCRIPTORS: ACHING

## 2017-01-23 NOTE — PHARMACY-ANTICOAGULATION SERVICE
Warfarin Therapy Hold Note  This patient is currently receiving warfarin for Mechanical heart valve.    Goal INR:  2.5-3.      Anticoagulation Dose History     Recent Dosing and Labs Latest Ref Rng 1/19/2017 1/20/2017 1/20/2017 1/20/2017 1/21/2017 1/22/2017 1/23/2017    Warfarin 4 mg - - - - 4 mg - - -    Warfarin 5 mg - - - - - 5 mg 5 mg -    INR 0.86 - 1.14 2.18(H) 2.29(H) 2.16(H) - 2.25(H) 2.73(H) 3.47(H)    Factor 2 60 - 140 % 22(L) - - - - - -            Bleeding Signs/Symptoms:  None    Assessment:  Current INR is slightly supratherapeutic.  Also there are plans for patient to have EDEL on 1/24/17 and teeth extraction later this week.      Plan:  1) Spoke with Dr. Lenny Silva -- HOLD today s warfarin dose.   An order has been placed in EPIC for  Warfarin- No Dose Today    2) Do not recommend reversal with vitamin K or FFP at this time.    3) Recheck next INR tomorrow with AM labs    The primary team has been contacted about the above plan/primary team is aware of need to hold dose/team notification not necessary.    Gordon Price, PharmD -- pager [phone]

## 2017-01-23 NOTE — PLAN OF CARE
Problem: Goal Outcome Summary  Goal: Goal Outcome Summary  PT 6C: Pt received sitting up in chair, states he has been up since around 9, but does not want to return to bed yet. States his R shoulder is still sore and has a HA. BP 86/52 mmHg, therefore pt completed LE ROM and strengthening exercises while reclined in chair with min-mod A, states he has been doing ankle exercises on his own throughout the day.     Discharge to TCU when medically appropriate.

## 2017-01-23 NOTE — PLAN OF CARE
Problem: Goal Outcome Summary  Goal: Goal Outcome Summary  Edema 6C: Wraps removed and measurements taken with 6/12 noted reductions bilaterally. Dressings changed on bilateral heels with minimal drainage noted. Area of redness noted at lateral base of right knee; skin intact. Generalized 1+ pitting remains distally with skin dryness. Skin cares performed prior to reapplication of GCB from  MTPs to knee creases for continued edema management. Leg brace placed over compression wraps on R LE. Remove wraps if causing pain/numbness or become soiled.

## 2017-01-23 NOTE — PROGRESS NOTES
Brief Social Work Note    SW called and left a message for the [employer] office at the pt's reservation to ask for assistance in getting the pt's family accommodations for hotel and meals.  SHAQUILLE also called pt's [place] Medicaid  [place] and left a message for help with getting TCU covered near the hospital.  Pt will have dental work completed on Wednesday at the [facility] then will need a few weeks of rehab before he can have surgery.  SW will continue to follow for discharge planning.    REJI Hernandez, APSW  6C Unit   Pager: [phone]  Phone: [phone]      ___________________________________________________________________________________________________________________________________________________    Referrals in process:   Pending insurance coverage, TCU recommended.     Referrals Discontinued:  None    Community Case Management/Community Services in place:   [facility] staff:    - Valencia Liang [phone]   - [place] [phone]    [place]IR[place] Medicaid  [phone]   -  on Call for [place]: [phone]

## 2017-01-23 NOTE — PLAN OF CARE
Problem: Goal Outcome Summary  Goal: Goal Outcome Summary  OT/6C:  Max A for donning R LE brace and min A for supine to sit.  Pt c/o R shoulder pain and presents with limited ROM due to pain and weakness.  Pt educated on modified techniques for performing ADLs using single UE and R UE within pain parameters.  Max A for washing L UE and back.  Max A for donning shower cap for modified hair washing activity, combing and braiding hair.  Pt performs sit to stand from EOB with mod AX2 and performs stand pivot transfer from bed to chair with CGAX2 and FWW.  Limited by strength, R UE pain and weakness, and R LE fracture.     REC: TCU for improved safety and IND with ADLs, transfers, and mobility.

## 2017-01-23 NOTE — PLAN OF CARE
Problem: Cardiac: Heart Failure (Adult)  Goal: Signs and Symptoms of Listed Potential Problems Will be Absent or Manageable (Cardiac: Heart Failure)  Signs and symptoms of listed potential problems will be absent or manageable by discharge/transition of care (reference Cardiac: Heart Failure (Adult) CPG).   Outcome: Improving  D/He is up in the recliner with brace on RLE, and wrap on LLE.  He told me he was very pleased that Dr Luna answered all of his his questions today. His EDEL was changed until tomorrow due to am hypotension. Another MD came and asked him some questions about support system in case of a VAD  D/he told me that some of the MD's disagree about the plan  I/I talked to him, isn't that the [place] citizen way where everyone talks and expresses their concerns and what they think is best and then an agreement is made?  D/He told me that is true, then the [place] citizen way is everyone must some to agree or disagree  P/He is waiting for the results of the EDEL tomorrow, and this will give the team more information to decide on the best recommended plan of care.  P/NPO after midnight for EDEL and then to talk to cards and surgery re possible TVR and/or VAD surgery.

## 2017-01-23 NOTE — PROGRESS NOTES
Cardiology 1 Progress Note    Samir Rodriguez MRN# [id number]   Age: 46 year old YOB: 1970   Date of Admission: 1/12/2017           Assessment and Plan:   Samir Rodriguez is a 46 year old  male with past medical history of Rheumatic Heart Disease s/p mechanical MVR x 2 (1980s and 1992) on warfarin (INR goal 2.5-3.5), biventricular CHF (EF 25-30%) s/p dual chamber ICD 2008, chronic afib on amiodarone and previously on digoxine (stopped 1/16/17 due to concern for toxicity), WPW ablation at age 11, CKD III, hypothyroid,  who was transferred from [facility] on 01/12 for further eval of CHF and concern of hemolysis in setting of mechanical valve.    Today:  - hold lisinopril due to hypotension preventing EDEL  - NPO @ midnight for EDEL tomorrow  - optometry consult   - pain consult  - Heart failure team to evaluate for perisurgical LVAD  - CT chest/abd/pelvis for surgical planning and to evaluate liver    # Non-ischemic dilated cardiomyopathy 2/2 valvular heart disease  # Acute on chronic systolic heart failure, EF 37% (12/2016) s/p dual chamber ICD 2008  NYHA class III  Patient's bumex dose was decreased from 2 to 1 per day due to hypotension in 11/2016. Since 11/16 pt has had worsening LE edema. CHAPMAN at baseline with 2-3 blocks. + orthopnea. + JVD and 3+ LE edema. BNP 35404. Weight on admission 111kg with dry weight ~91kg. 1/16/17 device interrogation: atrial tachycardia to 150s with AV block. Lower rate setting changed to 80bpm from 60bmp and device changed from DDDR to VVIR on 01/16. Elevated LFTs likely due to congestive hepatopathy.  - cont Bumex 1mg PO bid to maintain euvolemia  - Continue metoprolol xl 12.5mg daily;   - add low dose spironolactone when BP can tolerate  - hold lisinopril due to hypotension  - Daily weights; Strict I/O    # Mechanical mitral valve (MV diastolic gradient 6.5)  # Aortic Insufficiency (mod - severe)  # TR (moderate)  History of  BiV failure attributed to valvular disease. Echocardiogram on admission demonstrated moderate-severe aortic insufficiency which is his most acute cardiac pathology. His tricuspid regurgitation is likely due to his signficantly fluid overloaded state. Blood cultures x 2: NGTD. Mechanical valve maintained on warfarin w/ INR 2.5-3.5 prior to this admission. Angiogram performed on 01/20 revealed nonobstructive CAD.  - Obtain EDEL tomorrow  - Dental advising mandibular teeth extraction before valve surgery  -> procedure planning appointment scheduled for 01/25 @ 1100; needs INR and Amoxicillin 2g 1 hr before procedure  - Cardiovascular surgery consulted; appreciate recommendations -> planning for outpatient rehab stay and tooth extraction prior to surgery    # Anemia and thrombocytopenia  # Right Arm Hematoma   Blood smear: blood smear- RBCs show some target cells, hypochromia, increased polychromasia, no schistocytes or spherocytes. Low haptoglobin, high LDH, and plasma free hemoglobin concerning for intra-vascular hemolysis possibly due to valvular disease. No evidence of overt infection causing DIC. EPO inappropriately low w/ recent ARF. Etiology of hematoma likely due to supratherapeutic INR- 4.6 on arrival, now improved.  - warfarin w/ goal INR 2.5-3.0  - abd u/s negative for liver pathology  - will obtain CT abd/pelvis for better evaluation of liver    # Paroxsymal afib:  digoxin level 0.7 on 1/12 and 1/17. Device interrogation with atrial tachycardia and AV block and V pacing. The patient has been in an atrial flutter that is undersensed, hence the device was switched to VVI mode. There is no point continuing amiodarone at this stage.  - stopped amiodarone.    # Hyperthyroid: Will need repeat TSH/T4 1-2 months post DC. Continue levothyroxine 224mcg   # Anxiety and insomnia: Increased clonazepam to bid    # Right tibia fx and R knee trauma:  Fall in November prompted decompensation. Xray tibia and knee no fx this  admission.  - Ortho evaluated; planning for outpatient follow up with orthopedic surgery in 4-6 weeks  - PT/OT  - Plan for DC to rehab early this week    FEN: cardiac diet   PPX: Heparin gtt    Code Status: Full CODE      Patient discussed with staff attending, Dr. Tovar.     Gary Silva MD  Cardiovascular Medicine Fellow, PGY-5  [phone]     Attending: Patient seen and examined with Dr. Valerio Hudson (Cards I PGY5). The H&P is as noted. Any additional findings, if any, have been added to the body of the note. All labs and imaging studies reviewed personally. The assessment and recommendations outlined above reflect our joint plan and was reached after careful review and discussion of the case.     Koffi Tovar MD         Interval History/Subjective   No acute events overnight. Headache persists with intermittent nausea. Family planning to come visit in the next 1-2 days. No CP, SOB, fever, or chills.          Objective   Temp:  [98  F (36.7  C)-98.4  F (36.9  C)] 98  F (36.7  C)  Heart Rate:  [79-84] 80  Resp:  [16] 16  BP: ()/(40-60) 91/50 mmHg  SpO2:  [96 %-100 %] 96 %    Physical exam:  Gen: AA&Ox3, no acute distress  HEENT: NACT, poor dentition   PULM: Crackles in bases b/l, lung sounds distant, no wheezing  CV: RRR w/ 2+ systolic murmur at LUSB and LISB: JVP of 6-8cm  ABD:  soft, nontender, nondistended.    EXT: +1 sonam edema to knees, no clubbing or cyanosis. Right arm ecchymosis significantly improved  SKIN: Right arm resolving ecchymosis outlined w/ marker   NEURO: alert and oriented; speech intact         Data:   CBC    Recent Labs  Lab 01/23/17  0715 01/22/17  0806 01/21/17  0730 01/20/17  0804   WBC 4.5 5.4 5.6 5.0   RBC 2.63* 2.83* 2.82* 2.57*   HGB 8.4* 8.9* 8.9* 8.1*   HCT 25.3* 27.1* 26.9* 24.6*   MCV 96 96 95 96   MCH 31.9 31.4 31.6 31.5   MCHC 33.2 32.8 33.1 32.9   RDW 19.9* 19.9* 19.7* 20.3*   PLT 72* 82* 87* 80*     CMP    Recent Labs  Lab 01/23/17  0715 01/22/17  0806  01/21/17  0730 01/20/17  0804 01/20/17  0413  01/18/17  0707 01/17/17  0825   * 127* 127* 129* 127*  < > 131* 130*   POTASSIUM 4.0 3.8 3.6 3.6 3.6  < > 3.6 3.7   CHLORIDE 90* 91* 91* 90* 89*  < > 91* 90*   CO2 30 30 30 30 32  < > 31 34*   ANIONGAP 6 6 7 9 6  < > 9 6   GLC 88 76 88 82 90  < > 77 77   BUN 16 16 19 23 22  < > 26 31*   CR 1.50* 1.48* 1.59* 1.74* 1.73*  < > 1.80* 1.81*   GFRESTIMATED 50* 51* 47* 42* 42*  < > 41* 40*   GFRESTBLACK 61 61 57* 51* 51*  < > 49* 49*   DELFINO 7.2* 7.4* 7.4* 7.0* 7.2*  < > 6.9* 7.7*   MAG 2.1 1.9 2.1  --  2.1  --   --  2.0   PHOS  --  2.4* 2.2*  --   --   --  2.8 2.4*   PROTTOTAL  --   --   --   --   --   --  5.1*  --    ALBUMIN  --   --   --   --   --   --  2.1*  --    BILITOTAL  --   --   --   --   --   --  3.8*  --    ALKPHOS  --   --   --   --   --   --  128  --    AST  --   --   --   --   --   --  60*  --    ALT  --   --   --   --   --   --  102*  --    < > = values in this interval not displayed.  INR    Recent Labs  Lab 01/23/17  0715 01/22/17  0806 01/21/17  0730 01/20/17  0804   INR 3.47* 2.73* 2.25* 2.16*             Medications:     Current Facility-Administered Medications   Medication     lisinopril (PRINIVIL/ZESTRIL) tablet 5 mg     sodium chloride (PF) 0.9% PF flush 3 mL     sodium chloride (PF) 0.9% PF flush 3 mL     May take oral meds with a sip of water, the morning of EDEL procedure.     HOLD: Insulin - REGULAR AM of procedure IF diabetic     HOLD: Insulin - RAPID/SHORT acting AM of procedure IF diabetic     HOLD: Oral hypoglycemics AM of procedure IF diabetic     Give   of usual dose of LONG ACTING insulin AM of procedure IF diabetic     bumetanide (BUMEX) tablet 1 mg     acetaminophen (TYLENOL) tablet 1,000 mg     ondansetron (ZOFRAN-ODT) ODT tab 4 mg     Warfarin Therapy Reminder (Check START DATE - warfarin may be starting in the FUTURE)     lidocaine 1 % 1 mL     lidocaine (LMX4) kit     sodium chloride (PF) 0.9% PF flush 3 mL     sodium chloride (PF)  0.9% PF flush 3 mL     clonazePAM (klonoPIN) half-tab 0.25 mg     docusate sodium (COLACE) capsule 100 mg     potassium chloride SA (K-DUR/KLOR-CON M) CR tablet 20-40 mEq     potassium chloride (KLOR-CON) Packet 20-40 mEq     potassium chloride 10 mEq in 100 mL intermittent infusion     potassium chloride 10 mEq in 100 mL intermittent infusion with 10 mg lidocaine     potassium chloride 20 mEq in 50 mL intermittent infusion     magnesium sulfate 2 g in NS intermittent infusion (PharMEDium or FV Cmpd)     magnesium sulfate 4 g in 100 mL sterile water (premade)     potassium phosphate 15 mmol in D5W 250 mL intermittent infusion     potassium phosphate 20 mmol in D5W 500 mL intermittent infusion     potassium phosphate 20 mmol in D5W 250 mL intermittent infusion     potassium phosphate 25 mmol in D5W 500 mL intermittent infusion     multivitamin, therapeutic with minerals (THERA-VIT-M) tablet 1 tablet     melatonin tablet 3 mg     miconazole (MICATIN; MICRO GUARD) 2 % powder     lidocaine 1 % 1 mL     lidocaine (LMX4) kit     sodium chloride (PF) 0.9% PF flush 3 mL     sodium chloride (PF) 0.9% PF flush 3 mL     medication instruction     nitroglycerin (NITROSTAT) sublingual tablet 0.4 mg     alum & mag hydroxide-simethicone (MYLANTA ES/MAALOX  ES) suspension 15-30 mL     polyethylene glycol (MIRALAX/GLYCOLAX) Packet 17 g     albuterol (PROAIR HFA/PROVENTIL HFA/VENTOLIN HFA) Inhaler 2 puff     sennosides (SENOKOT) tablet 8.6 mg     naloxone (NARCAN) injection 0.1-0.4 mg     levothyroxine (SYNTHROID/LEVOTHROID) tablet 224 mcg     omeprazole (priLOSEC) CR capsule 20 mg     methocarbamol (ROBAXIN) tablet 750 mg     metoprolol (TOPROL-XL) half-tab 12.5 mg     oxyCODONE (ROXICODONE) IR tablet 5-10 mg

## 2017-01-23 NOTE — CONSULTS
Inpatient Pain Management Service: Consultation      DATE OF CONSULT: January 23, 2017        REASON FOR PAIN CONSULTATION:  Samir Rodriguez is a 46 year old male I am seeing in consultation at the request of Gary Silva for evaluation and recommendations for his daily headache; right tibial fracture; taking frequent PO opioid.       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  Pt fell again 12/17 with no new fracture.  There is no new acute process in R) leg from the most recent fall.  Would not treat headaches with opioids.   - CURRENTLY oxycodone 5-10mg po q4hrs PRN - pt used 40mg in last 24 hrs     - DECREASE oxycodone to 5-10mg po q8hrs PRN X 24hrs - max 30mg/24 hrs then   - DECREASE oxycodone 5mg po QID PRN  X 24hrs - max 20mg/24hrs then   - DECREASE oxycodone 5mg po TID PRN X 24hrs - max 15mg/24 hrs then   - DECREASE oxycodone to 5mg po BID PRN X 24hrs - max 10mg then off all opioids    START Flector Patch PRN changing q 12 hrs to R) lower leg if not helpful   - START Lidoderm Patch on 12 hrs off 12 hrs PRN max 3 patches  q 12 hrs to intact skin only - if not helpful   - START Menthol Patch q 8 hrs PRN    Apply heat/cold therapy PRN    Keep R) leg elevated when sitting - Pt reports this decreases the pain.    Continue Methocarbomol 750mg po BID - this is a home medication    Continue Acetaminophen 1,000mg po TID - scheduled - change to PRN when able - monitor LFT's    Continue clonazePAM 0.25mg po BID PRN anxiety - Pt reports this is effective, however significantly lower than home dose.    Continue aggressive bowel regimen to prevent opioid induced constipation - last BM 1/22/17 per pt report    DO NOT treat headaches or anxiety with opioids   - Pt refused Psych Consult since he reports anxiety is manageable at this time   - If headaches become significant would consult Neurology since they are the experts in headaches    Will sign off. Call for questions or concerns.        ASSESSMENT AND RECOMMENDATIONS DISCUSSED WITH: Pain Team - Dr. Aureliano Antonio, Max Suero, PharmD, and Maximilian Ibrahim, APRN, CNP,      Thank you for consulting the Inpatient Pain Management Service.   The above recommendations are to be acted upon at the primary team s discretion.    To reach us:  Mon - Friday 8 AM - 3 PM: Pager [phone]   After hours, weekends and holidays: Primary service should call [phone] for the on-call pain specialist    HISTORY OF PRESENT ILLNESS: Per Dr. Cartagena's H&P on 1/14/17:  Samir Rodriguez is a 46 year old  male with past medical history of rheumatic heart disease s/p mechanical MVR x 2 (1980s and 1992) on warfarin (INR goal 2.5-3.5), biventricular CHF (EF 25-30%) s/p dual chamber ICD 2008, chronic afib on amiodarone and digoxine, WPW ablation at age 11, CKD III, hypothyroid. On 11/2016 he sustained a mechanical fall and sustained a R) tibia plateau fx for which he was hospitalized and developed cellulitis.  He was re-hospitalized at [facility], [place] on 12/22 after a second mechanical  "fall. He was found to have pancytopenia. There was a question of abnormal MV. Patient was transferred due to concern of hemolysis from malfunctioning MV.     Pt reports he fell on the ice and fractured R) tibia 11/2016.  He fell again 12/2016 with no new fracture, but ongoing R) lower leg pain.  He states he uses a brace which is currently in place.  He just finished with PT and legs are wrapped.  He is also complaining of a heachach for the last few days which is new and R) shoulder pain.  He thinks the R) shoulder pain is beause he slept wrong.  He states he has been taking acetaminophen and oxycodone for the headache and R) leg pain. Discussed with the patient that opioids are not indicated for headaches and R) tibia fracture was 2 months ago.  Explained when he fell the second time last December there was no new acute fracture or process going on and opioids are not indicated at this time.  Explained we will taper him off opioids since he is opiate naive, continue methocarbomol, acetaminophen and add topicals.  Pt reports he is nauseated with gabapentin.  Pt reports he takes Methocarbomol 750mg usually at bedtime but sometimes BID.  He takes clonazepam usually at bedtime and on rare occassions TID  for anxiety.   He states his anxiety is under control during this hospitalization. He reports he received a script for percocet after the R) tibia fx but is not on opioids daily for chronic pain.        PAIN HISTORY:   Location: R) lower leg  Duration: Since November 2016  Pain intensity:  5-10 on a 0/10 VAS  Quality of the pain: \"just hurts\"  Aggravating factors: walking, moving R) leg  Relieving factors : rest, heat, ice, elevation      CAPA (Clinically Aligned Pain Assessment)  Comfort (How is your pain?): Tolerable with discomfort  Change in Pain (Since your last medication/intervention?): Getting better since  Initial fall 11/2016  Pain Control (How are your pain treatments working?): Partially effective pain " control  Functioning (Are you able to do activities to get better?) : using walker  Sleep (Does your pain management allow you to sleep or rest?): Awake with occasional pain       FUNCTIONAL STATUS:  Change:      improving  Oral intake:     Regular  Bowel function:    Normal - last BM 1/22/17  Activity level:     Up with assistance ambulating in room with walker  Sleep:      Normal with medication  Mood:      Anxiety - stable at this time        REVIEW OF 10 BODY SYSTEMS: 10 point ROS of systems including Constitutional, Eyes, Respiratory, Cardiovascular, Gastroenterology, Genitourinary, Integumentary, Musculoskeletal, Psychiatric were all negative except for pertinent positives noted in my HPI.         INPATIENT MEDICATIONS PERTINENT TO PAIN CONSULT:     Opioids:  Oxycodone 5-10mg po q4hrs PRN    Muscle Relaxants:  Methocarbomol 750mg po BID - scheduled    Adjuvants:  Acetaminophen 1,000mg po TID - scheduled    Antianxiety/Antidepressants:  clonaePAM 0.25mg po BID PRN anxiety    Sleep:  Melatonin 3mg po a HS PRN    Anticoagulants:  Coumadin - dosed per pharmacy daily    Bowel Regimen:  Docusate sodium 100mg po BID  Polyethylene glycol 17gms  PO daily PRN  Sennosides 8.6mg po BID PRN        CURRENT MEDICATIONS:   Current Facility-Administered Medications Ordered in Epic   Medication Dose Route Frequency Provider Last Rate Last Dose     lisinopril (PRINIVIL/ZESTRIL) tablet 5 mg  5 mg Oral Daily Dwight Zurita MD   5 mg at 01/22/17 1143     sodium chloride (PF) 0.9% PF flush 3 mL  3 mL Intravenous Q1H PRN Dwight Zurita MD         sodium chloride (PF) 0.9% PF flush 3 mL  3 mL Intravenous Q8H Dwight Zurita MD   3 mL at 01/22/17 1758     May take oral meds with a sip of water, the morning of EDEL procedure.   Does not apply Continuous PRN Dwight Zurita MD         HOLD: Insulin - REGULAR AM of procedure IF diabetic   Does not apply HOLD Dwight Zurita MD         HOLD:  Insulin - RAPID/SHORT acting AM of procedure IF diabetic   Does not apply HOLD Dwight Zurita MD         HOLD: Oral hypoglycemics AM of procedure IF diabetic   Does not apply HOLD Dwight Zurita MD         Give   of usual dose of LONG ACTING insulin AM of procedure IF diabetic   Does not apply Continuous PRN Dwight Zurita MD         bumetanide (BUMEX) tablet 1 mg  1 mg Oral BID Dwight Zurita MD   1 mg at 01/23/17 0822     acetaminophen (TYLENOL) tablet 1,000 mg  1,000 mg Oral TID Dwight Zurita MD   1,000 mg at 01/23/17 0822     ondansetron (ZOFRAN-ODT) ODT tab 4 mg  4 mg Oral Daily PRN Dwight Zurita MD   4 mg at 01/22/17 1143     Warfarin Therapy Reminder (Check START DATE - warfarin may be starting in the FUTURE)  1 each Does not apply Continuous PRN Gary Smart MD         lidocaine 1 % 1 mL  1 mL Other Q1H PRN Shayla Agrawal MD         lidocaine (LMX4) kit   Topical Q1H PRN Shayla Agrawal MD         sodium chloride (PF) 0.9% PF flush 3 mL  3 mL Intracatheter Q1H PRN Shayla Agrawal MD         sodium chloride (PF) 0.9% PF flush 3 mL  3 mL Intracatheter Q8H Shayla Agrawal MD   3 mL at 01/22/17 0936     clonazePAM (klonoPIN) half-tab 0.25 mg  0.25 mg Oral BID PRN Dwight Zurita MD   0.25 mg at 01/23/17 0039     docusate sodium (COLACE) capsule 100 mg  100 mg Oral BID Dwight Zurita MD   100 mg at 01/23/17 0822     potassium chloride SA (K-DUR/KLOR-CON M) CR tablet 20-40 mEq  20-40 mEq Oral Q2H PRN Meggan Landers MD   20 mEq at 01/22/17 0948     potassium chloride (KLOR-CON) Packet 20-40 mEq  20-40 mEq Oral or Feeding Tube Q2H PRN Meggan Landers MD   20 mEq at 01/20/17 1808     potassium chloride 10 mEq in 100 mL intermittent infusion  10 mEq Intravenous Q1H PRN Meggan Landers MD         potassium chloride 10 mEq in 100 mL intermittent infusion with 10 mg  lidocaine  10 mEq Intravenous Q1H PRN Meggan Landers MD         potassium chloride 20 mEq in 50 mL intermittent infusion  20 mEq Intravenous Q1H PRN Meggan Landers MD         magnesium sulfate 2 g in NS intermittent infusion (PharMEDium or FV Cmpd)  2 g Intravenous Daily PRN Meggan Landers MD 50 mL/hr at 01/22/17 0952 2 g at 01/22/17 0952     magnesium sulfate 4 g in 100 mL sterile water (premade)  4 g Intravenous Q4H PRN Meggan Landers MD         potassium phosphate 15 mmol in D5W 250 mL intermittent infusion  15 mmol Intravenous Daily PRN Meggan Landers MD 63 mL/hr at 01/22/17 1510 15 mmol at 01/22/17 1510     potassium phosphate 20 mmol in D5W 500 mL intermittent infusion  20 mmol Intravenous Q6H PRN Meggan Landers MD         potassium phosphate 20 mmol in D5W 250 mL intermittent infusion  20 mmol Intravenous Q6H PRN Meggan Landers MD         potassium phosphate 25 mmol in D5W 500 mL intermittent infusion  25 mmol Intravenous Q8H PRN Meggan Landers MD         multivitamin, therapeutic with minerals (THERA-VIT-M) tablet 1 tablet  1 tablet Oral Daily Meggan Landers MD   1 tablet at 01/23/17 0822     melatonin tablet 3 mg  3 mg Oral At Bedtime PRN Meggan Landers MD   3 mg at 01/20/17 0119     miconazole (MICATIN; MICRO GUARD) 2 % powder   Topical BID Trey Gerber MD         lidocaine 1 % 1 mL  1 mL Other Q1H PRN Fidel French MD         lidocaine (LMX4) kit   Topical Q1H PRN Fidel French MD         sodium chloride (PF) 0.9% PF flush 3 mL  3 mL Intracatheter Q1H PRN Fidel French MD   3 mL at 01/20/17 0826     sodium chloride (PF) 0.9% PF flush 3 mL  3 mL Intracatheter Q8H Fidel French MD   3 mL at 01/19/17 0813     medication instruction   Does not apply Continuous PRN Fidel French MD         nitroglycerin (NITROSTAT) sublingual tablet 0.4 mg  0.4 mg Sublingual Q5 Min PRN Fidel French MD         alum & mag hydroxide-simethicone (MYLANTA ES/MAALOX  ES)  suspension 15-30 mL  15-30 mL Oral Q4H PRN Fidel French MD   30 mL at 01/21/17 1714     polyethylene glycol (MIRALAX/GLYCOLAX) Packet 17 g  17 g Oral Daily PRN Fidel French MD   17 g at 01/19/17 1923     albuterol (PROAIR HFA/PROVENTIL HFA/VENTOLIN HFA) Inhaler 2 puff  2 puff Inhalation 4x Daily PRN Meggan Landers MD         sennosides (SENOKOT) tablet 8.6 mg  8.6 mg Oral BID PRN Meggan Landers MD   8.6 mg at 01/16/17 0606     naloxone (NARCAN) injection 0.1-0.4 mg  0.1-0.4 mg Intravenous Q2 Min PRN Shin Cartagena MD         levothyroxine (SYNTHROID/LEVOTHROID) tablet 224 mcg  224 mcg Oral QAM  Meggan Landers MD   224 mcg at 01/23/17 0821     omeprazole (priLOSEC) CR capsule 20 mg  20 mg Oral QAM AC Meggan Landers MD   20 mg at 01/23/17 0822     methocarbamol (ROBAXIN) tablet 750 mg  750 mg Oral BID Meggan Landers MD   750 mg at 01/23/17 0822     metoprolol (TOPROL-XL) half-tab 12.5 mg  12.5 mg Oral Daily Meggan Landers MD   12.5 mg at 01/22/17 0950     oxyCODONE (ROXICODONE) IR tablet 5-10 mg  5-10 mg Oral Q4H PRN Meggan Landers MD   10 mg at 01/23/17 0822     No current Epic-ordered outpatient prescriptions on file.       OUTPATIENT OPIOIDS PRESCRIBED BY:      PRIMARY CARE PROVIDER: No Ref-Primary, Physician  PAIN SPECIALIST:  none    Multistate  database reviewed: nothing recorded  [facility] in [place] reports pt received a script for percocet 5/325 1 tab po q 5-6hrs PRN qty 15 tabs on 12/21/2016, Clonazepam 2mg po TID qty 84 and Methocarbomol 750mg BID.      HOME/PREVIOUS MEDICATIONS:   Prior to Admission medications    Not on File       ALLERGIES:    Allergies   Allergen Reactions     Asa [Aspirin] Other (See Comments) and Diarrhea     goosebumps  nausea     Gabapentin Nausea     Nabumetone Nausea     Sulfamethoxazole Unknown     Trimethoprim Unknown     Allopurinol Rash     Clindamycin Rash        PAST MEDICAL AND PSYCHIATRIC HISTORY:    Past Medical History    Diagnosis Date     Rheumatic heart disease        PAST SURGICAL HISTORY:   Past Surgical History   Procedure Laterality Date     Cholecystectomy       Hernia repair       Appendectomy       Mitral valve replacement       Mechanical ([place] Tilting Disc); 1992     Fusion cervical anterior three+ levels         FAMILY HISTORY: family history includes DIABETES in his mother; Hypertension in his brother.      HEALTH & LIFESTYLE PRACTICES:   Tobacco:  reports that he has never smoked. He does not have any smokeless tobacco history on file.  Alcohol:  reports that he does not drink alcohol.  Illicit drugs:  reports that he does not use illicit drugs.      SOCIAL HISTORY: Lives in [place] with son, daughter and two grand children.  Pt reports medical assistant comes every Thursday. Pt is disabled.      LABORATORY VALUES:   Last Basic Metabolic Panel:  NA      126   1/23/2017   POTASSIUM      4.0   1/23/2017  CHLORIDE       90   1/23/2017  DELFINO      7.2   1/23/2017  CO2       30   1/23/2017  BUN       16   1/23/2017  CR     1.50   1/23/2017  GLC       88   1/23/2017    CBC results:  WBC      4.5   1/23/2017  HGB      8.4   1/23/2017  HCT     25.3   1/23/2017  PLT       72   1/23/2017      DIAGNOSTIC TESTS:   2 views right tibia/fibula radiographs     History: recent mechnanical fall     Comparison: None.     Findings:     AP and lateral views of the right  tibia/fibula were obtained.       No acute osseous abnormality.  Questionable minimal periosteal  reaction at the medial aspect of the mid tibial shaft approximately 28  cm above the ankle joint.     Knee and ankle joints are incompletely assessed but grossly congruent.     Focal asymmetric sclerosis in the distal tibia, likely nonossifying  fibroma.  Patchy lucency of fibular shaft, nonspecific.     Diffuse subcutaneous soft tissue strandings.                                                                Impression:  1. No acute osseous abnormality.  2. Questionable  minimal periosteal reaction at the medial aspect of  the mid tibial shaft approximately 28 cm above the ankle joint, of  uncertain etiology but unlikely to be related to acute trauma.  Follow  up radiographs may be helpful to assess interval change.         MIRANDA MACIAS    Labs above reviewed as well as additional relevant diagnostic studies from the EPIC record.       PHYSICAL EXAMINATION:  VITAL SIGNS:  B/P: 75/43, T: 98.4, P: Data Unavailable, R: 16    CONSTITUTIONAL/GENERAL APPEARANCE: male sitting in a chair NAD.  EYES: PERRLA, pupils 2mm  ENT: MMM, no nasal drainage  RESPIRATORY: unlabored on room air, symmetric chest rise  : voids using urinal  MUSCULOSKELETAL/EXTREMITIES: moves all extremities spontaneously, brace on F) leg, B/L Legs are wrapped sensation intact to L)leg, sensation intact to R) upper thigh and knee, Pt could not feel me touch R) lower extremity that was wrapped, able to dorsi and plantar flex B/L feet with more difficulty on right.   GAIT: not observed  NEURO:  Alert, oriented, answers questions appropriately  SKIN EXAM: difficult to assess due to B/L lower extremities were wrapped and brace on R) leg.  Healed scars on R) knee.  PSYCHIATRIC/BEHAVIORAL/OBSERVATIONS:  No objective signs of pain observed during our interview.   Judgment/Insight - unclear   Orientation - intact   Memory - intact   Mood and affect - frustrated        VILMA Kelsey CNP  January 23, 2017, 9:08 AM  Inpatient Pain Management Service

## 2017-01-23 NOTE — PROGRESS NOTES
Patient A&O X 4 . VSS. BP better after 250cc NS this AM. AM lisinopril and metoprolol also held. Latest BP 91/50. EDEL pushed back to tomorrow d/t low BP. Klonopin given x 1 for anxiety re: EDEL. Continues to have headaches, right shoulder pain, and right leg pain, somewhat managed with PRN oxycodone and scheduled tylenol. Pain service consulted and saw patient this AM. Pt waiting to see CVTS and Cards 2 team. Good appetite, good UOP. Potassium replaced per protocol. Old right CVC site dressing changed, site healing well. Continues to have weeping right hip from edema, exu-dry in place. Interdry in groin sites to absorb moisture. NPO at midnight. Will continue to monitor and notify team of any questions or concerns.

## 2017-01-24 ENCOUNTER — APPOINTMENT (OUTPATIENT)
Dept: OCCUPATIONAL THERAPY | Facility: CLINIC | Age: 47
DRG: 286 | End: 2017-01-24
Attending: INTERNAL MEDICINE
Payer: MEDICAID

## 2017-01-24 ENCOUNTER — APPOINTMENT (OUTPATIENT)
Dept: PHYSICAL THERAPY | Facility: CLINIC | Age: 47
DRG: 286 | End: 2017-01-24
Attending: INTERNAL MEDICINE
Payer: MEDICAID

## 2017-01-24 LAB
ALBUMIN SERPL-MCNC: 1.8 G/DL (ref 3.4–5)
ALP SERPL-CCNC: 121 U/L (ref 40–150)
ALT SERPL W P-5'-P-CCNC: 58 U/L (ref 0–70)
ANION GAP SERPL CALCULATED.3IONS-SCNC: 7 MMOL/L (ref 3–14)
AST SERPL W P-5'-P-CCNC: 60 U/L (ref 0–45)
BILIRUB DIRECT SERPL-MCNC: 0.8 MG/DL (ref 0–0.2)
BILIRUB SERPL-MCNC: 1.9 MG/DL (ref 0.2–1.3)
BUN SERPL-MCNC: 16 MG/DL (ref 7–30)
CALCIUM SERPL-MCNC: 7.6 MG/DL (ref 8.5–10.1)
CHLORIDE SERPL-SCNC: 93 MMOL/L (ref 94–109)
CO2 SERPL-SCNC: 28 MMOL/L (ref 20–32)
CREAT SERPL-MCNC: 1.43 MG/DL (ref 0.66–1.25)
ERYTHROCYTE [DISTWIDTH] IN BLOOD BY AUTOMATED COUNT: 20.3 % (ref 10–15)
GFR SERPL CREATININE-BSD FRML MDRD: 53 ML/MIN/1.7M2
GLUCOSE SERPL-MCNC: 73 MG/DL (ref 70–99)
HBV CORE AB SERPL QL IA: NONREACTIVE
HBV SURFACE AB SERPL IA-ACNC: 1.33 M[IU]/ML
HBV SURFACE AG SERPL QL IA: NONREACTIVE
HCT VFR BLD AUTO: 26.7 % (ref 40–53)
HCV AB SERPL QL IA: NORMAL
HGB BLD-MCNC: 8.7 G/DL (ref 13.3–17.7)
INR PPP: 3.5 (ref 0.86–1.14)
LACTATE BLD-SCNC: 1.2 MMOL/L (ref 0.7–2.1)
MAGNESIUM SERPL-MCNC: 2 MG/DL (ref 1.6–2.3)
MCH RBC QN AUTO: 31.6 PG (ref 26.5–33)
MCHC RBC AUTO-ENTMCNC: 32.6 G/DL (ref 31.5–36.5)
MCV RBC AUTO: 97 FL (ref 78–100)
PLATELET # BLD AUTO: 73 10E9/L (ref 150–450)
POTASSIUM SERPL-SCNC: 4.5 MMOL/L (ref 3.4–5.3)
PROT SERPL-MCNC: 5.1 G/DL (ref 6.8–8.8)
RBC # BLD AUTO: 2.75 10E12/L (ref 4.4–5.9)
SODIUM SERPL-SCNC: 128 MMOL/L (ref 133–144)
WBC # BLD AUTO: 3.8 10E9/L (ref 4–11)

## 2017-01-24 PROCEDURE — 85027 COMPLETE CBC AUTOMATED: CPT | Performed by: INTERNAL MEDICINE

## 2017-01-24 PROCEDURE — 83605 ASSAY OF LACTIC ACID: CPT | Performed by: INTERNAL MEDICINE

## 2017-01-24 PROCEDURE — 80048 BASIC METABOLIC PNL TOTAL CA: CPT | Performed by: INTERNAL MEDICINE

## 2017-01-24 PROCEDURE — 85610 PROTHROMBIN TIME: CPT | Performed by: INTERNAL MEDICINE

## 2017-01-24 PROCEDURE — 97530 THERAPEUTIC ACTIVITIES: CPT | Mod: GP | Performed by: REHABILITATION PRACTITIONER

## 2017-01-24 PROCEDURE — 25000132 ZZH RX MED GY IP 250 OP 250 PS 637: Performed by: INTERNAL MEDICINE

## 2017-01-24 PROCEDURE — 40000133 ZZH STATISTIC OT WARD VISIT: Performed by: OCCUPATIONAL THERAPIST

## 2017-01-24 PROCEDURE — 40000193 ZZH STATISTIC PT WARD VISIT: Performed by: REHABILITATION PRACTITIONER

## 2017-01-24 PROCEDURE — 99233 SBSQ HOSP IP/OBS HIGH 50: CPT | Mod: GC | Performed by: INTERNAL MEDICINE

## 2017-01-24 PROCEDURE — 87340 HEPATITIS B SURFACE AG IA: CPT | Performed by: INTERNAL MEDICINE

## 2017-01-24 PROCEDURE — 97535 SELF CARE MNGMENT TRAINING: CPT | Mod: GO | Performed by: OCCUPATIONAL THERAPIST

## 2017-01-24 PROCEDURE — 25000125 ZZHC RX 250: Performed by: INTERNAL MEDICINE

## 2017-01-24 PROCEDURE — 25900017 H RX MED GY IP 259 OP 259 PS 637: Performed by: INTERNAL MEDICINE

## 2017-01-24 PROCEDURE — 25000128 H RX IP 250 OP 636: Performed by: STUDENT IN AN ORGANIZED HEALTH CARE EDUCATION/TRAINING PROGRAM

## 2017-01-24 PROCEDURE — 86706 HEP B SURFACE ANTIBODY: CPT | Performed by: INTERNAL MEDICINE

## 2017-01-24 PROCEDURE — 86803 HEPATITIS C AB TEST: CPT | Performed by: INTERNAL MEDICINE

## 2017-01-24 PROCEDURE — 83735 ASSAY OF MAGNESIUM: CPT | Performed by: INTERNAL MEDICINE

## 2017-01-24 PROCEDURE — 80076 HEPATIC FUNCTION PANEL: CPT | Performed by: INTERNAL MEDICINE

## 2017-01-24 PROCEDURE — 36415 COLL VENOUS BLD VENIPUNCTURE: CPT | Performed by: INTERNAL MEDICINE

## 2017-01-24 PROCEDURE — 21400006 ZZH R&B CCU INTERMEDIATE UMMC

## 2017-01-24 PROCEDURE — 84134 ASSAY OF PREALBUMIN: CPT | Performed by: INTERNAL MEDICINE

## 2017-01-24 PROCEDURE — 86704 HEP B CORE ANTIBODY TOTAL: CPT | Performed by: INTERNAL MEDICINE

## 2017-01-24 RX ORDER — AMOXICILLIN 500 MG/1
2000 CAPSULE ORAL ONCE
Status: COMPLETED | OUTPATIENT
Start: 2017-01-25 | End: 2017-01-25

## 2017-01-24 RX ORDER — ACETAMINOPHEN 325 MG/1
650 TABLET ORAL 3 TIMES DAILY
Status: DISCONTINUED | OUTPATIENT
Start: 2017-01-24 | End: 2017-02-01

## 2017-01-24 RX ORDER — OXYCODONE HYDROCHLORIDE 5 MG/1
5-10 TABLET ORAL EVERY 8 HOURS PRN
Status: DISCONTINUED | OUTPATIENT
Start: 2017-01-24 | End: 2017-01-25

## 2017-01-24 RX ORDER — ONDANSETRON 4 MG/1
4 TABLET, FILM COATED ORAL EVERY 6 HOURS PRN
Status: DISCONTINUED | OUTPATIENT
Start: 2017-01-24 | End: 2017-02-12 | Stop reason: HOSPADM

## 2017-01-24 RX ADMIN — Medication: at 09:04

## 2017-01-24 RX ADMIN — ACETAMINOPHEN 650 MG: 325 TABLET, FILM COATED ORAL at 14:03

## 2017-01-24 RX ADMIN — ACETAMINOPHEN 650 MG: 325 TABLET, FILM COATED ORAL at 21:12

## 2017-01-24 RX ADMIN — SODIUM CHLORIDE 250 ML: 9 INJECTION, SOLUTION INTRAVENOUS at 12:45

## 2017-01-24 RX ADMIN — DOCUSATE SODIUM 100 MG: 100 CAPSULE, LIQUID FILLED ORAL at 21:12

## 2017-01-24 RX ADMIN — ALUMINUM HYDROXIDE, MAGNESIUM HYDROXIDE, AND DIMETHICONE 30 ML: 400; 400; 40 SUSPENSION ORAL at 13:20

## 2017-01-24 RX ADMIN — OXYCODONE HYDROCHLORIDE 10 MG: 5 TABLET ORAL at 16:18

## 2017-01-24 RX ADMIN — OXYCODONE HYDROCHLORIDE 10 MG: 5 TABLET ORAL at 07:41

## 2017-01-24 RX ADMIN — METHOCARBAMOL 750 MG: 750 TABLET ORAL at 21:12

## 2017-01-24 RX ADMIN — Medication: at 21:13

## 2017-01-24 RX ADMIN — ACETAMINOPHEN 1000 MG: 500 TABLET, FILM COATED ORAL at 07:51

## 2017-01-24 RX ADMIN — Medication 0.25 MG: at 00:49

## 2017-01-24 RX ADMIN — METHOCARBAMOL 750 MG: 750 TABLET ORAL at 07:51

## 2017-01-24 RX ADMIN — MULTIPLE VITAMINS W/ MINERALS TAB 1 TABLET: TAB at 07:51

## 2017-01-24 RX ADMIN — DOCUSATE SODIUM 100 MG: 100 CAPSULE, LIQUID FILLED ORAL at 07:51

## 2017-01-24 RX ADMIN — LIDOCAINE 3 PATCH: 50 PATCH CUTANEOUS at 21:13

## 2017-01-24 RX ADMIN — OMEPRAZOLE 20 MG: 20 CAPSULE, DELAYED RELEASE ORAL at 07:52

## 2017-01-24 RX ADMIN — ALUMINUM HYDROXIDE, MAGNESIUM HYDROXIDE, AND DIMETHICONE 30 ML: 400; 400; 40 SUSPENSION ORAL at 14:05

## 2017-01-24 RX ADMIN — LEVOTHYROXINE SODIUM 224 MCG: 112 TABLET ORAL at 07:50

## 2017-01-24 RX ADMIN — OXYCODONE HYDROCHLORIDE 10 MG: 5 TABLET ORAL at 00:49

## 2017-01-24 RX ADMIN — Medication 2 G: at 09:17

## 2017-01-24 ASSESSMENT — PAIN DESCRIPTION - DESCRIPTORS
DESCRIPTORS: ACHING
DESCRIPTORS: HEADACHE

## 2017-01-24 NOTE — PROGRESS NOTES
Social Work Services Progress Note    Hospital Day: 13  Date of Initial Social Work Evaluation:  NA  Collaborated with:  [place] Medicaid Team    Data:  Pt is a 46 year old  male being assessed for TCU placement.    Intervention:  SHAQUILLE called [place] Medicaid and left a message with the claims department asking for a call or fax of in network TCUs in [place] or [place].  SW will wait to hear back from Medicaid for options.      Assessment:  See previous note from yesterday, no changes.    Plan:    Anticipated Disposition:  Facility:  TCU    Barriers to d/c plan:  Insurance    Follow Up:  SW will continue to follow up for placement options.    REJI Hernandez, APSW  6C Unit   Pager: [phone]  Phone: [phone]

## 2017-01-24 NOTE — PLAN OF CARE
Problem: Goal Outcome Summary  Goal: Goal Outcome Summary  OT 6C: Recommend pt discharge to TCU to increase ADL Ind. Pt limited by pain in RUE, brace on RLE, decreased endurance and activity tolerance. Pt was max Ax2 for sit<>stand and completed seated ADLs with set-up. Pt MAP at 56-57 while supine in bed upon arrival and increased to 68 following activity.

## 2017-01-24 NOTE — PROGRESS NOTES
D: Rheumatic heart disease, [facility] MVR 1980, again in 1992, CHF, Broken bone right leg w/ brace.    I: Monitored vitals and assessed pt status.   Changed:-  Running:-  PRN:- Oxycodone, 250 mL fluid bolus, mag    A: A0x4. VSS. Afebrile. Up with assist 2 and walker. Up with therapy. Generalized, RLE/LLE, scrotal edema, increased edema on right forearm. Adequate urine output.  Headache throughout day. Pressures low. Md aware, waiting to see if pressures rebound with PO intake. Hepatology consult. Liver biopsy when possible.     P: Continue to monitor Pt status and report changes to treatment team.

## 2017-01-24 NOTE — PLAN OF CARE
Problem: Goal Outcome Summary  Goal: Goal Outcome Summary  PT - per plan established by the Physical Therapist, according to functional mobility the  discharge recommendation is TCU for cont skilled Pt to progress functional mobilty. Pt is limited by lack of strength/ endurance and increased pain in R shoulder and R leg. Pt has HKB on RLE when up. Pt up in chair for start of PT session. Pt unable to tolerate sit to stand at time of PT session due to pain and HA. Pt demo supine there x for DARIO LE. Pt declined ROM to RLE due to pain today.

## 2017-01-24 NOTE — PROGRESS NOTES
Brief Social Work Note    SW received a call from [place] Medicaid worker.  She will email the nursing department for authorizations to obtain a list of TCUs in network in the state [facility].  SW will either get a faxed list or a call back from Medicaid.    REJI Hernandez, APSW  6C Unit   Pager: [phone]  Phone: [phone]

## 2017-01-24 NOTE — CONSULTS
GASTROENTEROLOGY CONSULTATION      Date of Admission:  1/12/2017          ASSESSMENT AND RECOMMENDATIONS:   Assessment:  46 year old male with a history of rheumatic heart disease s/p mechanical MVR x2 on warfarin, biventricular heart failure s/p ICD, chronic afib on amiodarone and digoxin (stoppped 1/16 due to toxicity, WPW ablation and CKD who was transferred from [facility] for further evaluation of CHF and possible hemolysis. GI was asked to opine regarding abnormal LFT's and concern for cirrhosis as this may weigh into the decision for offering surgical treatment of mod-severe aortic insufficiency with LVAD backup.     #Abnormal LFT's: Mild, mixed hepatocellular/cholestatic picture which is of unclear chronicity or etiology. He does not have typical risk factors for HBV/HCV except numerous blood transfusions. Could have some congestive hepatopathy given significant cardiac comorbidity or may be related to long-term amiodarone use. It also remains unclear if he has underlying fibrosis/cirrhosis. He has had a normal RUQ US (normal liver and no splenomegaly). However, CT showed some mild nodularity of the liver but again no splenomegaly. He has had thrombocytopenia since arrival, again, unclear chronicity and while this could be related to his liver, it could also be a consumptive process with his documented hematomas. Hematology did not feel it was c/w DIC or AIT. There are some non-invasive metrics used to assess for fibrosis/cirrhosis such as the APRI score and FIB4, both of which would suggest fibrosis/cirrhosis in his case. However, these were not validated in a patient with his significant comorbidity. At this time, we would ideally pursue non-invasive means of assess liver parenchyma.  However, due to AICD, cannot undergo MRI or fibroscan.      Recommendations:  *Will need IR transjugular liver bx with hepatic wedge pressures. Would defer bridging of anticoagulation to cardiology  "service.   *HBV/HCV panel in process    Gastroenterology follow up recommendations: Pending clinical course    Thank you for involving us in this patient's care. Please do not hesitate to contact the GI service with any questions or concerns.     Pt care plan discussed with Dr. Gay, GI staff physician.    Josef Franklin MD  GI Fellow  -------------------------------------------------------------------------------------------------------------------          Chief Complaint:   We were asked by Dr. Tovar of cardiology to evaluate this patient with abnormal LFT's    History is obtained from the patient and the medical record.          History of Present Illness:   Samir Rodriguez is a 46 year old male with a history of rheumatic heart disease s/p mechanical MVR x2 on warfarin, biventricular heart failure s/p ICD, chronic afib on amiodarone and digoxin (stoppped 1/16 due to toxicity, WPW ablation and CKD who was transferred from [facility] for further evaluation of CHF and possible hemolysis. GI was asked to opine regarding abnormal LFT's and concern for cirrhosis.     Patient reports he has never been told he had liver disease. He remotely had a fracture of his tibia and had a very large ecchymosis. He states that he thinks he was a \"little jaundiced\" for a couple days after that. He has had fluctuations in edema and abd distention in the past which he attributes to his heart. He reports no history of IVDU or alcohol abuse. Has no tattoos. He reports multiple blood transfusions throughout his life.             Past Medical History:   Reviewed and edited as appropriate  Past Medical History   Diagnosis Date     Rheumatic heart disease             Past Surgical History:   Reviewed and edited as appropriate   Past Surgical History   Procedure Laterality Date     Cholecystectomy       Hernia repair       Appendectomy       Mitral valve replacement       Mechanical ([place] Tilting Disc); " 1992     Fusion cervical anterior three+ levels              Previous Endoscopy:   No results found for this or any previous visit.         Social History:   Reviewed and edited as appropriate  Social History     Social History     Marital Status: Single     Spouse Name: N/A     Number of Children: N/A     Years of Education: N/A     Occupational History     Not on file.     Social History Main Topics     Smoking status: Never Smoker      Smokeless tobacco: Not on file     Alcohol Use: No     Drug Use: No     Sexual Activity: Not on file     Other Topics Concern     Not on file     Social History Narrative            Family History:   Reviewed and edited as appropriate  Family History   Problem Relation Age of Onset     DIABETES Mother      Hypertension Brother            Allergies:   Reviewed and edited as appropriate     Allergies   Allergen Reactions     Asa [Aspirin] Other (See Comments) and Diarrhea     goosebumps  nausea     Gabapentin Nausea     Nabumetone Nausea     Sulfamethoxazole Unknown     Trimethoprim Unknown     Allopurinol Rash     Clindamycin Rash            Medications:     No prescriptions prior to admission             Review of Systems:   A complete review of systems was performed and is negative except as noted in the HPI           Physical Exam:   BP 80/40 mmHg  Temp(Src) 98.1  F (36.7  C) (Oral)  Resp 16  Ht 1.829 m (6')  Wt 93.214 kg (205 lb 8 oz)  BMI 27.86 kg/m2  SpO2 95%  Wt:   Wt Readings from Last 2 Encounters:   01/24/17 93.214 kg (205 lb 8 oz)      Constitutional: Chronically ill appearing but in no acute distress  Eyes: Sclera anicteric/injected  Ears/nose/mouth/throat: Normal oropharynx without ulcers or exudate, mucus membranes moist, hearing intact  Neck: supple, thyroid normal size  CV: Syst murmur. Elevated JVP. 1+ edema  Respiratory: Unlabored breathing  Lymph: No submandibular, supraclavicular lymphadenopathy  Abd:  Nondistended, no hepatosplenomegaly, nontender, no  peritoneal signs  Skin: warm, perfused, no jaundice  Neuro: AAO x 3,   Psych: Normal affect  MSK: Normal tone         Data:   Labs and imaging below were independently reviewed and interpreted    BMP  Recent Labs  Lab 01/24/17  0726 01/23/17  0715 01/22/17  0806 01/21/17  0730   * 126* 127* 127*   POTASSIUM 4.5 4.0 3.8 3.6   CHLORIDE 93* 90* 91* 91*   DELFINO 7.6* 7.2* 7.4* 7.4*   CO2 28 30 30 30   BUN 16 16 16 19   CR 1.43* 1.50* 1.48* 1.59*   GLC 73 88 76 88     CBC  Recent Labs  Lab 01/24/17  0726 01/23/17  0715 01/22/17  0806 01/21/17  0730   WBC 3.8* 4.5 5.4 5.6   RBC 2.75* 2.63* 2.83* 2.82*   HGB 8.7* 8.4* 8.9* 8.9*   HCT 26.7* 25.3* 27.1* 26.9*   MCV 97 96 96 95   MCH 31.6 31.9 31.4 31.6   MCHC 32.6 33.2 32.8 33.1   RDW 20.3* 19.9* 19.9* 19.7*   PLT 73* 72* 82* 87*     INR  Recent Labs  Lab 01/24/17  0726 01/23/17  0715 01/22/17  0806 01/21/17  0730   INR 3.50* 3.47* 2.73* 2.25*     LFTs  Recent Labs  Lab 01/24/17  0726 01/18/17  0707   ALKPHOS 121 128   AST 60* 60*   ALT 58 102*   BILITOTAL 1.9* 3.8*   PROTTOTAL 5.1* 5.1*   ALBUMIN 1.8* 2.1*        Imaging: Personally reviewed and pertinent findings noted above.   Attestation:  This patient has been seen and evaluated by me, Aung Gay.  Discussed with the house staff team or resident(s) and agree with the findings and plan in this note.

## 2017-01-24 NOTE — PLAN OF CARE
Problem: Goal Outcome Summary  Goal: Goal Outcome Summary  Outcome: No Change  Pt A/Ox4, VSS. Pt up with assist 2 with brace on right extremity. Pt has pain in right knee, right shoulder, oxy given for pain. Lidocaine patches on right shoulder, off at 0800. Pt repositioned frequently overnight. Pt has been NPO since midnight for EDEL today. Continue to monitor and notify MD of questions or concerns.

## 2017-01-25 ENCOUNTER — APPOINTMENT (OUTPATIENT)
Dept: CARDIOLOGY | Facility: CLINIC | Age: 47
DRG: 286 | End: 2017-01-25
Attending: INTERNAL MEDICINE
Payer: MEDICAID

## 2017-01-25 ENCOUNTER — APPOINTMENT (OUTPATIENT)
Dept: OCCUPATIONAL THERAPY | Facility: CLINIC | Age: 47
DRG: 286 | End: 2017-01-25
Attending: INTERNAL MEDICINE
Payer: MEDICAID

## 2017-01-25 ENCOUNTER — APPOINTMENT (OUTPATIENT)
Dept: PHYSICAL THERAPY | Facility: CLINIC | Age: 47
DRG: 286 | End: 2017-01-25
Attending: INTERNAL MEDICINE
Payer: MEDICAID

## 2017-01-25 ENCOUNTER — CARE COORDINATION (OUTPATIENT)
Dept: CARDIOLOGY | Facility: CLINIC | Age: 47
End: 2017-01-25

## 2017-01-25 LAB
ANION GAP SERPL CALCULATED.3IONS-SCNC: 7 MMOL/L (ref 3–14)
BUN SERPL-MCNC: 16 MG/DL (ref 7–30)
CALCIUM SERPL-MCNC: 7.6 MG/DL (ref 8.5–10.1)
CHLORIDE SERPL-SCNC: 94 MMOL/L (ref 94–109)
CO2 SERPL-SCNC: 28 MMOL/L (ref 20–32)
CREAT SERPL-MCNC: 1.31 MG/DL (ref 0.66–1.25)
ERYTHROCYTE [DISTWIDTH] IN BLOOD BY AUTOMATED COUNT: 20.2 % (ref 10–15)
GFR SERPL CREATININE-BSD FRML MDRD: 58 ML/MIN/1.7M2
GLUCOSE BLDC GLUCOMTR-MCNC: 90 MG/DL (ref 70–99)
GLUCOSE SERPL-MCNC: 76 MG/DL (ref 70–99)
HCT VFR BLD AUTO: 26.3 % (ref 40–53)
HGB BLD-MCNC: 8.9 G/DL (ref 13.3–17.7)
INR PPP: 2.87 (ref 0.86–1.14)
MAGNESIUM SERPL-MCNC: 2.2 MG/DL (ref 1.6–2.3)
MCH RBC QN AUTO: 32.4 PG (ref 26.5–33)
MCHC RBC AUTO-ENTMCNC: 33.8 G/DL (ref 31.5–36.5)
MCV RBC AUTO: 96 FL (ref 78–100)
PLATELET # BLD AUTO: 72 10E9/L (ref 150–450)
POTASSIUM SERPL-SCNC: 4.2 MMOL/L (ref 3.4–5.3)
PREALB SERPL IA-MCNC: 14 MG/DL (ref 15–45)
RBC # BLD AUTO: 2.75 10E12/L (ref 4.4–5.9)
SODIUM SERPL-SCNC: 130 MMOL/L (ref 133–144)
WBC # BLD AUTO: 4 10E9/L (ref 4–11)

## 2017-01-25 PROCEDURE — 80048 BASIC METABOLIC PNL TOTAL CA: CPT | Performed by: INTERNAL MEDICINE

## 2017-01-25 PROCEDURE — 85027 COMPLETE CBC AUTOMATED: CPT | Performed by: INTERNAL MEDICINE

## 2017-01-25 PROCEDURE — 97110 THERAPEUTIC EXERCISES: CPT | Mod: GP

## 2017-01-25 PROCEDURE — 93312 ECHO TRANSESOPHAGEAL: CPT | Mod: 26 | Performed by: INTERNAL MEDICINE

## 2017-01-25 PROCEDURE — 40000133 ZZH STATISTIC OT WARD VISIT: Performed by: OCCUPATIONAL THERAPIST

## 2017-01-25 PROCEDURE — 97535 SELF CARE MNGMENT TRAINING: CPT | Mod: GO | Performed by: OCCUPATIONAL THERAPIST

## 2017-01-25 PROCEDURE — 99233 SBSQ HOSP IP/OBS HIGH 50: CPT | Mod: 25 | Performed by: INTERNAL MEDICINE

## 2017-01-25 PROCEDURE — 25000132 ZZH RX MED GY IP 250 OP 250 PS 637: Performed by: INTERNAL MEDICINE

## 2017-01-25 PROCEDURE — 83735 ASSAY OF MAGNESIUM: CPT | Performed by: INTERNAL MEDICINE

## 2017-01-25 PROCEDURE — 40000193 ZZH STATISTIC PT WARD VISIT

## 2017-01-25 PROCEDURE — 97530 THERAPEUTIC ACTIVITIES: CPT | Mod: GP

## 2017-01-25 PROCEDURE — 93320 DOPPLER ECHO COMPLETE: CPT | Mod: 26 | Performed by: INTERNAL MEDICINE

## 2017-01-25 PROCEDURE — 36415 COLL VENOUS BLD VENIPUNCTURE: CPT | Performed by: INTERNAL MEDICINE

## 2017-01-25 PROCEDURE — 99152 MOD SED SAME PHYS/QHP 5/>YRS: CPT

## 2017-01-25 PROCEDURE — 93320 DOPPLER ECHO COMPLETE: CPT

## 2017-01-25 PROCEDURE — 21400006 ZZH R&B CCU INTERMEDIATE UMMC

## 2017-01-25 PROCEDURE — 25000125 ZZHC RX 250: Performed by: INTERNAL MEDICINE

## 2017-01-25 PROCEDURE — 93325 DOPPLER ECHO COLOR FLOW MAPG: CPT | Mod: 26 | Performed by: INTERNAL MEDICINE

## 2017-01-25 PROCEDURE — 25000128 H RX IP 250 OP 636: Performed by: INTERNAL MEDICINE

## 2017-01-25 PROCEDURE — 85610 PROTHROMBIN TIME: CPT | Performed by: INTERNAL MEDICINE

## 2017-01-25 PROCEDURE — 99153 MOD SED SAME PHYS/QHP EA: CPT

## 2017-01-25 PROCEDURE — 00000146 ZZHCL STATISTIC GLUCOSE BY METER IP

## 2017-01-25 RX ORDER — NALOXONE HYDROCHLORIDE 0.4 MG/ML
.1-.4 INJECTION, SOLUTION INTRAMUSCULAR; INTRAVENOUS; SUBCUTANEOUS
Status: DISCONTINUED | OUTPATIENT
Start: 2017-01-25 | End: 2017-01-25 | Stop reason: HOSPADM

## 2017-01-25 RX ORDER — FLUMAZENIL 0.1 MG/ML
0.2 INJECTION, SOLUTION INTRAVENOUS
Status: DISCONTINUED | OUTPATIENT
Start: 2017-01-25 | End: 2017-01-25 | Stop reason: HOSPADM

## 2017-01-25 RX ORDER — SODIUM CHLORIDE 9 MG/ML
INJECTION, SOLUTION INTRAVENOUS CONTINUOUS PRN
Status: DISCONTINUED | OUTPATIENT
Start: 2017-01-25 | End: 2017-01-25 | Stop reason: HOSPADM

## 2017-01-25 RX ORDER — OXYCODONE HYDROCHLORIDE 5 MG/1
5 TABLET ORAL ONCE
Status: COMPLETED | OUTPATIENT
Start: 2017-01-25 | End: 2017-01-25

## 2017-01-25 RX ORDER — OXYCODONE HYDROCHLORIDE 5 MG/1
5 TABLET ORAL EVERY 8 HOURS PRN
Status: DISCONTINUED | OUTPATIENT
Start: 2017-01-25 | End: 2017-01-26

## 2017-01-25 RX ORDER — FENTANYL CITRATE 50 UG/ML
50-100 INJECTION, SOLUTION INTRAMUSCULAR; INTRAVENOUS
Status: DISCONTINUED | OUTPATIENT
Start: 2017-01-25 | End: 2017-01-25 | Stop reason: HOSPADM

## 2017-01-25 RX ORDER — FENTANYL CITRATE 50 UG/ML
25-50 INJECTION, SOLUTION INTRAMUSCULAR; INTRAVENOUS
Status: DISCONTINUED | OUTPATIENT
Start: 2017-01-25 | End: 2017-01-25 | Stop reason: HOSPADM

## 2017-01-25 RX ADMIN — OXYCODONE HYDROCHLORIDE 10 MG: 5 TABLET ORAL at 09:33

## 2017-01-25 RX ADMIN — FENTANYL CITRATE 75 MCG: 50 INJECTION, SOLUTION INTRAMUSCULAR; INTRAVENOUS at 12:34

## 2017-01-25 RX ADMIN — ONDANSETRON HYDROCHLORIDE 4 MG: 4 TABLET, FILM COATED ORAL at 19:26

## 2017-01-25 RX ADMIN — MENTHOL 1 PATCH: 205.5 PATCH TOPICAL at 17:30

## 2017-01-25 RX ADMIN — MULTIPLE VITAMINS W/ MINERALS TAB 1 TABLET: TAB at 08:11

## 2017-01-25 RX ADMIN — OMEPRAZOLE 20 MG: 20 CAPSULE, DELAYED RELEASE ORAL at 08:12

## 2017-01-25 RX ADMIN — METHOCARBAMOL 750 MG: 750 TABLET ORAL at 19:25

## 2017-01-25 RX ADMIN — SODIUM CHLORIDE 150 ML: 9 INJECTION, SOLUTION INTRAVENOUS at 13:15

## 2017-01-25 RX ADMIN — DOCUSATE SODIUM 100 MG: 100 CAPSULE, LIQUID FILLED ORAL at 08:12

## 2017-01-25 RX ADMIN — Medication: at 19:26

## 2017-01-25 RX ADMIN — LEVOTHYROXINE SODIUM 224 MCG: 112 TABLET ORAL at 08:10

## 2017-01-25 RX ADMIN — TOPICAL ANESTHETIC 1 SPRAY: 200 SPRAY DENTAL; PERIODONTAL at 12:01

## 2017-01-25 RX ADMIN — LIDOCAINE HYDROCHLORIDE 30 ML: 20 SOLUTION ORAL; TOPICAL at 12:01

## 2017-01-25 RX ADMIN — ACETAMINOPHEN 650 MG: 325 TABLET, FILM COATED ORAL at 19:25

## 2017-01-25 RX ADMIN — ACETAMINOPHEN 650 MG: 325 TABLET, FILM COATED ORAL at 08:11

## 2017-01-25 RX ADMIN — LIDOCAINE 3 PATCH: 50 PATCH CUTANEOUS at 19:25

## 2017-01-25 RX ADMIN — ACETAMINOPHEN 650 MG: 325 TABLET, FILM COATED ORAL at 14:33

## 2017-01-25 RX ADMIN — Medication: at 08:15

## 2017-01-25 RX ADMIN — OXYCODONE HYDROCHLORIDE 10 MG: 5 TABLET ORAL at 00:30

## 2017-01-25 RX ADMIN — OXYCODONE HYDROCHLORIDE 5 MG: 5 TABLET ORAL at 19:26

## 2017-01-25 RX ADMIN — OXYCODONE HYDROCHLORIDE 5 MG: 5 TABLET ORAL at 17:30

## 2017-01-25 RX ADMIN — METHOCARBAMOL 750 MG: 750 TABLET ORAL at 08:11

## 2017-01-25 RX ADMIN — AMOXICILLIN 2000 MG: 500 CAPSULE ORAL at 08:20

## 2017-01-25 RX ADMIN — MIDAZOLAM 1.5 MG: 1 INJECTION INTRAMUSCULAR; INTRAVENOUS at 12:34

## 2017-01-25 RX ADMIN — DOCUSATE SODIUM 100 MG: 100 CAPSULE, LIQUID FILLED ORAL at 19:26

## 2017-01-25 NOTE — PROGRESS NOTES
Pt arrived in ECHO department for scheduled EDEL.   Procedure explained, questions answered and consent signed. Discharge instructions discussed with patient.  Pt's throat sprayed at 12:00, therefore pt will not be able to eat or drink until 2 hours after at 14:00. Informed pt of this time and encouraged to start with warm fluids and soft foods.    Pt tolerated procedure well, and was given a total of 75 mcg IV fentanyl and 1.5 mg IV versed for conscious sedation.   EDEL probe #3 used for procedure.  Pt denied C.P or throat pain after procedure and was monitored until awake. BP's 90's/50's upon arrival to echo department and remained 90's/50's throughout procedure and post. 97% on room air. Paced 80. Escorted back to  via stretcher and hospital transport.   Report given to ADRI Chand.

## 2017-01-25 NOTE — PROGRESS NOTES
CLINICAL NUTRITION SERVICES - REASSESSMENT NOTE     Nutrition Prescription    RECOMMENDATIONS FOR MDs/PROVIDERS TO ORDER:  1. If kcal counts demonstrate pt is consuming less than 1550 kcals and 81 g protein per day on average, rec place feeding tube and initiate TFs. If pt is a candidate for TFs, would rec place feeding tube and initiate TFs, TwoCal HN (concentrated TFs).  Initiate TFs at 10 mL/hr, advancing rate by 10 mL Q 8 hrs (or per MD discretion) to goal rate of 50 mL/hr. This provides 1200 mL TF, 2400 kcals, 101 g PRO, 840 ml free H2O, 263 g CHO and 6 g Fiber daily. Also, order 1 pkt Pro-stat BID. One packet of Prostat provides 15 g protein, 100 kcals, 10 g CHO, and 30 mL fluid.       If begin tube feeds:    - Flush feeding tube (FT) with 30 mL water Q 4 hrs for patency. Rec provider adjust free water flushes as needed, pending fluid status.   - Ensure K+/Mg++/Phos labs are ordered daily until TFs advance to goal infusion to evaluate for sx of refeeding with nutrition received.  If lytes trend low, aggressively replace lytes.       - If not already ordered, order a multivitamin/mineral (certavite 15 mL/day via FT) to help ensure micronutrient needs being met with suspected hypermetabolic demands and potential interruptions to TF infusions.              - Monitor TF and possible need to adjust nutrition support regimen if necessary, pending medical course and nutrition status.     2. Consider ordering thiamine, 100 mg/day, due to cardiac status.  3. If wounds do not improve, reinstate wound protocol for another three days to complete the recommended wound protocol supplementation: Order vitamin A (20,000 International Units per day), vitamin C (500 mg/day), and zinc (220 mg/day) - all for another three days as this was discontinued after seven days.     Malnutrition Status:    Non-severe malnutrition in the context of chronic illness    Recommendations already ordered by Registered Dietitian (RD):  Kcal  counts  Scheduled oral supplements    Future/Additional Recommendations:  1. Once able to advance diet, rec order a 2 g vs 3 g sodium diet. Encourage small, frequent meals. Also, encourage oral supplements and adequate protein intake.  2. Continue fluid restriction as ordered by team.  3. Continue multivitamin with minerals as ordered.   4. Consider scheduling antiemetics/antinauseants ~20 minutes prior to meals to help optimize nausea control if symptomatic.        Received nutrition consult for malnutrition    EVALUATION OF THE PROGRESS TOWARD GOALS   Diet: NPO currently since 1/24. On a 1.5 L fluid restriction. Has a prn supplement order in place.   Intake: Per nursing flowsheets, good diet tolerance. Flowsheets indicate pt consuming 75% of meals with a fair appetite 1/19, 75% of meals with a good appetite 1/20, % of meals with a fair to good appetite 1/21, and 50% of meals with a fair appetite 1/24. Attempts x 2 to see pt. Pt not in room during attempts to see. During this admission, pt having N/V at times, early satiety (has ascites), and a generalized decrease in appetite. NPO at times on 1/20, 1/23, and 1/24 and missing meals as a result of tests and procedures.  Oftentimes he skips lunch and eats breakfast and supper.     NEW FINDINGS   - In regards to wounds, WOC nurse is following. Pt was ordered vitamin A, vitamin C, and zinc x 10 days. Team discontinued wound protocol after seven days. Remains on a multivitamin with minerals.  - Albumin and prealbumin are poor indicator of nutritional status, especially with his current fluid status and hepatic status potentially contributing.     MALNUTRITION  % Intake: < 75% for > 7 days (non-severe)  % Weight Loss: Difficult to assess with fluid status changes.  Subcutaneous Fat Loss: Facial region:  Mild, although difficult to assess with fluid status  Muscle Loss: Temporal:  Mild. Generalized, although difficult to assess with fluid status  Fluid  Accumulation/Edema: Moderate (note, moderate anasarca and small ascites)  Malnutrition Diagnosis: Non-severe malnutrition in the context of chronic illness    Previous Goals   Patient to consume % of nutritionally adequate meal trays TID, or the equivalent with supplements/snacks.  Evaluation: Not met.    Previous Nutrition Diagnosis  Inadequate oral intake related to N/V at times and early satiety as evidenced by pt report of eating 50% of his usual oral intake on average.  Evaluation: Unresolved    CURRENT NUTRITION DIAGNOSIS  Inadequate oral intake related to decreased appetite, early satiety, NPO for tests/procedures, and N/V earlier this admission as evidenced by pt consuming 50% of meals at times.      INTERVENTIONS:  Implementation:  1. Medical food supplement therapy, Nutrition Education:  Scheduled oral supplements to start once his diet is advanced. Ordered orange Magic Cup at 14:00 and butter pecan Ensure Plus at HS. Left another oral supplement menu in his room. Wrote suggestions for high protein choices to choose at the top of the handout.   2. Collaboration with other providers: Discussed pt with team.   3. Ordered kcal counts 1/25-1/28.     Monitoring/Evaluation  Progress toward goals will be monitored and evaluated per protocol.     Will continue to follow pt.    Giana Damon, MS, RD, LD, [facility]   6C Pgr:  [phone]

## 2017-01-25 NOTE — PROGRESS NOTES
Social Work Services Progress Note    Hospital Day: 14  Date of Initial Social Work Evaluation:  CRISTINA  Collaborated with:  [place] Medicaid Team - Anu [phone]    Data:  Pt is a 46 year old  male being assessed for TCU placement.    Intervention:  SW called and discussed possible referral with the [facility] (PH: [phone], F: [phone]) and [facility] in [place] [phone]; PH: [phone], F: [phone]).  SW will make referrals and will attempt to find acute rehab placement for pt.  PMR placed as facilities will need to know if pt can tolerate 3 hours of therapy per day and if he meets acute rehab admission criteria.    Assessment:  See previous notes, no changes.    Plan:    Anticipated Disposition:  Facility, Inpatient Acute Rehab    Barriers to d/c plan:  Insurance, no transfer agreement in place to assist with discharge to [facility], will need to be assessed for care here.    Follow Up:  SW will continue to follow for placement.    REJI Hernandez, APSW  6C Unit   Pager: [phone]  Phone: [phone]      ___________________________________________________________________________________________________________________________________________________    Referrals in process:   [facility]   PH: [phone]  F: [phone]     2) [facility]  PH - Cell: [phone]  PH - Main: [phone]  F: [phone]    Referrals Discontinued:  NA    Community Case Management/Community Services in place:   Pt has a Medicaid  [place]   PH: [phone] x [phone]   -  on Call for [place]: [phone]    Pt may have Title 19 services for transportation, will need to assess this at time of dc if he qualifies for ARU.

## 2017-01-25 NOTE — PLAN OF CARE
Problem: Goal Outcome Summary  Goal: Goal Outcome Summary  OT 6C: Recommend discharge to TCU to increase ADL I, activity tolerance, and functional mobility. Pt limited by pain, fatigue, dizziness, RLE brace, and weakness. Pt c/o increased pain in RUE. Pt required max A to manage RLE and for positioning while transferring from supine to sit. Pt required max Ax2 for sit to stand transfer and additional assistance on right side while using FWW to ambulate to chair 2/2 pain in RUE and RLE.

## 2017-01-25 NOTE — PROGRESS NOTES
SPIRITUAL HEALTH SERVICES  SPIRITUAL ASSESSMENT Progress Note  [facility] ([place] 6C     REFERRAL SOURCE: visited both Monday and Tuesday of this week.     Pt talked about:     his struggle with headaches and other pains     frustration at what he perceived as some mixed messages from teams regarding plan of care, but said he feels good now about what he hears from attending cardiologist and surgery     history of his community at [place]    Pt requested prayer at end of each visit, as he always does.    PLAN: Continue to follow, along with Chaplain Resident Lisandra Amezquita. Will visit again later this week.    Gian Luciano) Lizbeth Mathias M.Div., [facility]  Staff   Pager [phone]

## 2017-01-25 NOTE — PLAN OF CARE
Problem: Cardiac: Heart Failure (Adult)  Goal: Signs and Symptoms of Listed Potential Problems Will be Absent or Manageable (Cardiac: Heart Failure)  Signs and symptoms of listed potential problems will be absent or manageable by discharge/transition of care (reference Cardiac: Heart Failure (Adult) CPG).   Outcome: No Change  D/His pain med was changed from q 6 to q 8 hrs today.   I/given some early in shift with relief of headache, right shoulder, and RLeg ache.  D/He is frustrated that he has been NPO for last 2 days for EDEL and it has been cancelled due to am low BP.   P/NPO again for EDEL tomorrow. He also needs consult for new glasses, and teeth extraction   D/He is eating a late breakfast this afternoon. He is up in the recliner as usual with his RT LE brace on  D/He has a relative who is checking on his son and family to see why they have not called him back.  P/EDEL should provide additional info to team about the plan of care that is recommended

## 2017-01-25 NOTE — PROGRESS NOTES
Radiology called - this inpt had an incidental finding on a CTA, pulmonary nodule that Dr. Tovar ordered.  Edyta his nurse aware of this, & states it was addressed as an inpt.

## 2017-01-25 NOTE — PLAN OF CARE
Problem: Cardiac: Heart Failure (Adult)  Goal: Signs and Symptoms of Listed Potential Problems Will be Absent or Manageable (Cardiac: Heart Failure)  Signs and symptoms of listed potential problems will be absent or manageable by discharge/transition of care (reference Cardiac: Heart Failure (Adult) CPG).   Outcome: No Change  D/He is frustrated because he has headache, right shoulder pain and right knee pain and the frequency has been decreased for the past three days, and now the dose is cut in half. He did finally get his EDEL today. Tooth extraction is rescheduled for Friday. His brother is trying to find his son to remind him to call the patient. This is frustrating to him. He continues to say the Lido patches to right shoulder overnight do help, and today I applied an icy hot patch to his right knee. I just gave him oxycodone because the new 8 hour time limit has just elapsed so he could have the med. He likes the stirfry but it is hard to eat as he says he is in pain  A/He is up in the recliner with his leg brace one. He wanted an apple juice to replace the spilled juice-done.  I/saw Dr Luna and gave him a brief update on him  P/monitor for changes  D/MD called and told me to give him a Zofran right away and an extra oxycodone as the patient is nauseated and is nauseated each day he sees him  I/told MD that this is the 4th evening I have him and he has NEVER complained of nausea. (He stops eating when he is waiting to get pain med and is in pain). I gave him these meds, the patient told me he was NOT nauseated, just in pain after the earlier oxycodone. He told me 3 times that the MD told him that the zofran will help his headache. He told me he never knew that a nausea med got rid of headaches.  A/some miscommunication is occurring   I/Lido patches on right shoulder at 8 pm per order  P/monitor for changes

## 2017-01-25 NOTE — PLAN OF CARE
Problem: Goal Outcome Summary  Goal: Goal Outcome Summary  PT / 6C: Pt completes bed mobility Spring. Pt performed sit <> stand from raised bed with FWW modA x 2, cues provided for sequencing and form. In standing position, pt with difficulty bringing R LE within ALBINA, needing Spring for repositioning. Pt performed stand pivot transfer from EOB > recliner with FWW CGA-Spring. Pt needing assist for balance and navigation of FWW and cues for LE sequencing. Pt also engaged in B LE therex. See Vitals Flowsheet for specific readings. Pt limited by R shoulder and R LE pain this date.  Recommend DC to TCU for continued therapy once medically appropriate.

## 2017-01-25 NOTE — PROGRESS NOTES
Cardiology 1 Progress Note    Samir Rodriguez MRN# [id number]   Age: 46 year old YOB: 1970   Date of Admission: 1/12/2017           Assessment and Plan:   Samir Rodriguez is a 46 year old  male with past medical history of Rheumatic Heart Disease s/p mechanical MVR x 2 (1980s and 1992) on warfarin (INR goal 2.5-3.5), biventricular CHF (EF 25-30%) s/p dual chamber ICD 2008, chronic afib on amiodarone and previously on digoxine (stopped 1/16/17 due to concern for toxicity), WPW ablation at age 11, CKD III, hypothyroid,  who was transferred from [facility] on 01/12 for further eval of CHF and concern of hemolysis in setting of mechanical valve.    Today:  - EDEL today  - dental visit rescheduled for Friday  - also scheduled eye exam for Friday at 9am  - GI consult for cirrhosis--unable to obtain fibroscan so recommending liver biopsy    # Non-ischemic dilated cardiomyopathy 2/2 valvular heart disease  # Acute on chronic systolic heart failure, EF 37% (12/2016) s/p dual chamber ICD 2008  NYHA class III  - holding diuretic, metoprolol, lisinopril due to hypotension  - will restart neurohormonal blockers when BP can tolerate  - Daily weights; Strict I/O    # Mechanical mitral valve (MV diastolic gradient 6.5)  # Aortic Insufficiency (mod - severe)  # TR (moderate)  History of BiV failure attributed to valvular disease. Echocardiogram on admission demonstrated moderate-severe aortic insufficiency which is his most acute cardiac pathology. His tricuspid regurgitation is likely due to his signficantly fluid overloaded state. Mechanical valve maintained on warfarin w/ INR 2.5-3.5 prior to this admission. Angiogram performed on 01/20 revealed nonobstructive CAD.  - EDEL today  - Dental advising mandibular teeth extraction before valve surgery  -> procedure planning appointment scheduled for 01/27 @ 1100; needs INR and Amoxicillin 2g 1 hr before procedure  -  Cardiovascular surgery consulted; appreciate recommendations  - heart failure service following; receiving workup for possible VAD as backup for valve surgery    # Anemia and thrombocytopenia  # Right Arm Hematoma   Blood smear: blood smear- RBCs show some target cells, hypochromia, increased polychromasia, no schistocytes or spherocytes. Low haptoglobin, high LDH, and plasma free hemoglobin concerning for intra-vascular hemolysis possibly due to valvular disease. No evidence of overt infection causing DIC. EPO inappropriately low w/ recent ARF. Etiology of hematoma likely due to supratherapeutic INR- 4.6 on arrival  - warfarin w/ goal INR 2.5-3.0    #. Cirrhosis and liver nodularity  Demonstrated on CT, although u/s was normal. CT read is concerning for amiodarone toxicity  - GI consulted, appreciate recommendations  - decreased scheduled tylenol dose  - may need liver biopsy    # Paroxsymal afib:  digoxin level 0.7 on 1/12 and 1/17. 1/16/17 device interrogation: atrial tachycardia to 150s with AV block. Lower rate setting changed to 80bpm from 60bmp and device changed from DDDR to VVIR on 01/16; atrial tachycardia and AV block and V pacing. The patient has been in an atrial flutter that is undersensed, hence the device was switched to VVI mode. There is no point continuing amiodarone at this stage especially with concern for toxicity  - stopped amiodarone.    # Hyperthyroid: Will need repeat TSH/T4 1-2 months post DC. Continue levothyroxine 224mcg   # Anxiety and insomnia: Increased clonazepam to bid    # Right tibia fx and R knee trauma:  Fall in November prompted decompensation. Xray tibia and knee no fx this admission.  - Ortho evaluated; planning for outpatient follow up with orthopedic surgery in 4-6 weeks  - PT/OT    # Headaches  May be due to near-sightedness vs rebound headache from opioid use vs reduced cardiac output  - pain consult with pain for opioid titration  - eye exam scheduled at [facility] at 9am on  1/27  - using lidocaine patches  - PRN zofran    # Pulmonary nodules: incidentally found on CT  - repeat CT chest in 6 months    FEN: cardiac diet   PPX: on warfarin  Dispo: Difficult placement due to out of state Medicaid.    Code Status: Full CODE      Patient discussed with staff attending, Dr. Tovar.     Gary Silva MD  Cardiovascular Medicine Fellow, PGY-5  [phone]       Attending: Patient seen and examined with Dr. Valerio Hudson and Cards I team. The H&P are accurate as recorded and any additional findings of my own have been incorporated into the body of the note. All labs and imaging studies reviewed personally. The assessment and recommendations outlined reflect our joint plan that was arrived at after careful review and discussion.     Koffi Tovar MD             Interval History/Subjective   No acute events overnight. Headaches still present; blood pressures back at baseline 90s/50s; no CP, SOB, lightheadedness         Objective   Temp:  [98.1  F (36.7  C)-98.7  F (37.1  C)] 98.7  F (37.1  C)  Pulse:  [80] 80  Heart Rate:  [79-80] 80  Resp:  [16-20] 16  BP: ()/(44-61) 89/54 mmHg  SpO2:  [95 %-100 %] 95 %    Physical exam:  Gen: AA&Ox3, no acute distress  HEENT: NACT, poor dentition   PULM: Clear lungs  CV: RRR; mechanical s2 w/ 2+ systolic murmur at LUSB and LISB: JVP of 6cm  ABD:  soft, nontender, nondistended.    EXT: trace sonam edema to knees. Right arm ecchymosis significantly improved  SKIN: Right arm resolving ecchymosis outlined w/ marker   NEURO: alert and oriented; speech intact         Data:   CBC    Recent Labs  Lab 01/25/17  0802 01/24/17  0726 01/23/17  0715 01/22/17  0806   WBC 4.0 3.8* 4.5 5.4   RBC 2.75* 2.75* 2.63* 2.83*   HGB 8.9* 8.7* 8.4* 8.9*   HCT 26.3* 26.7* 25.3* 27.1*   MCV 96 97 96 96   MCH 32.4 31.6 31.9 31.4   MCHC 33.8 32.6 33.2 32.8   RDW 20.2* 20.3* 19.9* 19.9*   PLT 72* 73* 72* 82*     CMP    Recent Labs  Lab 01/25/17  0802 01/24/17  0726 01/23/17  0715  01/22/17  0806 01/21/17  0730   * 128* 126* 127* 127*   POTASSIUM 4.2 4.5 4.0 3.8 3.6   CHLORIDE 94 93* 90* 91* 91*   CO2 28 28 30 30 30   ANIONGAP 7 7 6 6 7   GLC 76 73 88 76 88   BUN 16 16 16 16 19   CR 1.31* 1.43* 1.50* 1.48* 1.59*   GFRESTIMATED 58* 53* 50* 51* 47*   GFRESTBLACK 71 64 61 61 57*   DELFINO 7.6* 7.6* 7.2* 7.4* 7.4*   MAG 2.2 2.0 2.1 1.9 2.1   PHOS  --   --   --  2.4* 2.2*   PROTTOTAL  --  5.1*  --   --   --    ALBUMIN  --  1.8*  --   --   --    BILITOTAL  --  1.9*  --   --   --    ALKPHOS  --  121  --   --   --    AST  --  60*  --   --   --    ALT  --  58  --   --   --      INR    Recent Labs  Lab 01/25/17  0802 01/24/17  0726 01/23/17  0715 01/22/17  0806   INR 2.87* 3.50* 3.47* 2.73*             Medications:     Current Facility-Administered Medications   Medication     oxyCODONE (ROXICODONE) IR tablet 5 mg     warfarin-No DOSE today     acetaminophen (TYLENOL) tablet 650 mg     ondansetron (ZOFRAN) tablet 4 mg     lidocaine (LIDODERM) 5 % Patch 1-3 patch    And     lidocaine (LIDODERM) patch REMOVAL    And     lidocaine (LIDODERM) Patch in Place     Warfarin Therapy Reminder (Check START DATE - warfarin may be starting in the FUTURE)     lidocaine 1 % 1 mL     lidocaine (LMX4) kit     sodium chloride (PF) 0.9% PF flush 3 mL     sodium chloride (PF) 0.9% PF flush 3 mL     clonazePAM (klonoPIN) half-tab 0.25 mg     docusate sodium (COLACE) capsule 100 mg     potassium chloride SA (K-DUR/KLOR-CON M) CR tablet 20-40 mEq     potassium chloride (KLOR-CON) Packet 20-40 mEq     potassium chloride 10 mEq in 100 mL intermittent infusion     potassium chloride 10 mEq in 100 mL intermittent infusion with 10 mg lidocaine     potassium chloride 20 mEq in 50 mL intermittent infusion     magnesium sulfate 2 g in NS intermittent infusion (PharMEDium or FV Cmpd)     magnesium sulfate 4 g in 100 mL sterile water (premade)     potassium phosphate 15 mmol in D5W 250 mL intermittent infusion     potassium phosphate  20 mmol in D5W 500 mL intermittent infusion     potassium phosphate 20 mmol in D5W 250 mL intermittent infusion     potassium phosphate 25 mmol in D5W 500 mL intermittent infusion     multivitamin, therapeutic with minerals (THERA-VIT-M) tablet 1 tablet     melatonin tablet 3 mg     miconazole (MICATIN; MICRO GUARD) 2 % powder     lidocaine 1 % 1 mL     lidocaine (LMX4) kit     sodium chloride (PF) 0.9% PF flush 3 mL     sodium chloride (PF) 0.9% PF flush 3 mL     medication instruction     nitroglycerin (NITROSTAT) sublingual tablet 0.4 mg     alum & mag hydroxide-simethicone (MYLANTA ES/MAALOX  ES) suspension 15-30 mL     polyethylene glycol (MIRALAX/GLYCOLAX) Packet 17 g     albuterol (PROAIR HFA/PROVENTIL HFA/VENTOLIN HFA) Inhaler 2 puff     sennosides (SENOKOT) tablet 8.6 mg     naloxone (NARCAN) injection 0.1-0.4 mg     levothyroxine (SYNTHROID/LEVOTHROID) tablet 224 mcg     omeprazole (priLOSEC) CR capsule 20 mg     methocarbamol (ROBAXIN) tablet 750 mg

## 2017-01-25 NOTE — PHARMACY-ANTICOAGULATION SERVICE
Warfarin Therapy Hold Note  This patient is currently receiving warfarin for Mechanical heart valve.    Goal INR:  2.5-3.        Anticoagulation Dose History     Recent Dosing and Labs Latest Ref Rng 1/20/2017 1/20/2017 1/21/2017 1/22/2017 1/23/2017 1/24/2017 1/25/2017    Warfarin 4 mg - - 4 mg - - - - -    Warfarin 5 mg - - - 5 mg 5 mg - - -    INR 0.86 - 1.14 2.16(H) - 2.25(H) 2.73(H) 3.47(H) 3.50(H) 2.87(H)            Bleeding Signs/Symptoms:  None    Assessment:  Current INR is therapeutic.  GI has plan to perform liver biopsy    Plan:  1) Spoke with Dr. Lenny Hudson -- plan to HOLD today s warfarin dose.   An order has been placed in EPIC for  Warfarin- No Dose Today    2) No reversal at this time per Dr. Lenny Hudson.  3) Recheck next INR tomorrow with AM labs    Gordon Price, PharmD -- pager [phone]

## 2017-01-25 NOTE — PLAN OF CARE
Problem: Goal Outcome Summary  Goal: Goal Outcome Summary  Vital signs stable. Patient NPO since midnight for EDEL, patient aware. Rhythm is paced. Glucose 90 at 0200. Patient repositioned with pillows in bed, to wear right leg brace when out of bed. Foam dressing at coccyx. Patient got oxycodone at midnight for right knee pain. Continue to monitor labs, wound cares, vitals and comfort, plan EDEL today.

## 2017-01-25 NOTE — PROGRESS NOTES
"Social Work Services Progress Note    Hospital Day: 14  Date of Initial Social Work Evaluation:  NA  Collaborated with:  [place] Medicaid Team - Anu [phone]    Data:  Pt is a 46 year old  male being assessed for TCU placement.    Intervention:  SW received several calls from [place] Medicaid Nurse  Anu.  Per Anu, she has collaborated with her team at Medicaid and has not been able to find an in network TCU or ARU facility in [place] for rehab.  Anu states no provider at Medicaid has a \"list\" of in network providers.   SW will not be able to find an in network facility in [place] unless SW had each facility's NPI number and SW created an auth for each facility.  Anu recommends ARU team start working toward being a contracted provider for TCU with Medicaid [place] as there are \"no facilities\" she is aware of in [place] that are in network.    Other option is to have the pt discharge to [place] for cares.  Per nurse, there are three ARUs in [place] that are covered and they do not have a \"list\" of contracted [place] TCUs for rehab.  SW will call the [facility] to speak with a SW to ask where patients discharge with Medicaid insurance.    If pt cannot go to ARU or TCU, pt's last option is to discharge to home in [place] with homecare services.  RNCC is aware SW will attempting to find placement.    Update - pt did not get to his 11:00 am dental appointment, he is rescheduled for the 26th.  Pt will remain in hospital for extraction.    Assessment:  See previous notes, no changes.    Plan:    Anticipated Disposition:  Facility:  TCU    Barriers to d/c plan:  Insurance, no transfer agreement in place to assist with placement in rehab facilities at home.    Follow Up:  SW will continue to follow for placement.    REJI Hernandez, APSW  6C Unit   Pager: [phone]  Phone: [phone]          "

## 2017-01-26 ENCOUNTER — APPOINTMENT (OUTPATIENT)
Dept: OCCUPATIONAL THERAPY | Facility: CLINIC | Age: 47
DRG: 286 | End: 2017-01-26
Attending: INTERNAL MEDICINE
Payer: MEDICAID

## 2017-01-26 LAB
ABO + RH BLD: ABNORMAL
ABO + RH BLD: ABNORMAL
ANION GAP SERPL CALCULATED.3IONS-SCNC: 8 MMOL/L (ref 3–14)
BLD GP AB INVEST PLASRBC-IMP: ABNORMAL
BLD GP AB SCN SERPL QL: ABNORMAL
BLD PROD TYP BPU: ABNORMAL
BLOOD BANK CMNT PATIENT-IMP: ABNORMAL
BUN SERPL-MCNC: 14 MG/DL (ref 7–30)
CALCIUM SERPL-MCNC: 7.7 MG/DL (ref 8.5–10.1)
CHLORIDE SERPL-SCNC: 94 MMOL/L (ref 94–109)
CO2 SERPL-SCNC: 26 MMOL/L (ref 20–32)
CREAT SERPL-MCNC: 1.26 MG/DL (ref 0.66–1.25)
ERYTHROCYTE [DISTWIDTH] IN BLOOD BY AUTOMATED COUNT: 20.1 % (ref 10–15)
GFR SERPL CREATININE-BSD FRML MDRD: 61 ML/MIN/1.7M2
GLUCOSE SERPL-MCNC: 76 MG/DL (ref 70–99)
HCT VFR BLD AUTO: 28.4 % (ref 40–53)
HGB BLD-MCNC: 9.4 G/DL (ref 13.3–17.7)
INR PPP: 2.32 (ref 0.86–1.14)
LACTATE BLD-SCNC: 1.3 MMOL/L (ref 0.7–2.1)
LMWH PPP CHRO-ACNC: NORMAL IU/ML
MAGNESIUM SERPL-MCNC: 2.1 MG/DL (ref 1.6–2.3)
MCH RBC QN AUTO: 31.8 PG (ref 26.5–33)
MCHC RBC AUTO-ENTMCNC: 33.1 G/DL (ref 31.5–36.5)
MCV RBC AUTO: 96 FL (ref 78–100)
NUM BPU REQUESTED: 2
PLATELET # BLD AUTO: 77 10E9/L (ref 150–450)
POTASSIUM SERPL-SCNC: 3.8 MMOL/L (ref 3.4–5.3)
RBC # BLD AUTO: 2.96 10E12/L (ref 4.4–5.9)
SODIUM SERPL-SCNC: 129 MMOL/L (ref 133–144)
SPECIMEN EXP DATE BLD: ABNORMAL
WBC # BLD AUTO: 3.5 10E9/L (ref 4–11)

## 2017-01-26 PROCEDURE — 25000132 ZZH RX MED GY IP 250 OP 250 PS 637: Performed by: INTERNAL MEDICINE

## 2017-01-26 PROCEDURE — 36415 COLL VENOUS BLD VENIPUNCTURE: CPT | Performed by: INTERNAL MEDICINE

## 2017-01-26 PROCEDURE — 85610 PROTHROMBIN TIME: CPT | Performed by: INTERNAL MEDICINE

## 2017-01-26 PROCEDURE — 99212 OFFICE O/P EST SF 10 MIN: CPT

## 2017-01-26 PROCEDURE — 21400003 ZZH R&B CCU CRITICAL UMMC

## 2017-01-26 PROCEDURE — 80048 BASIC METABOLIC PNL TOTAL CA: CPT | Performed by: INTERNAL MEDICINE

## 2017-01-26 PROCEDURE — 85520 HEPARIN ASSAY: CPT | Performed by: INTERNAL MEDICINE

## 2017-01-26 PROCEDURE — 83735 ASSAY OF MAGNESIUM: CPT | Performed by: INTERNAL MEDICINE

## 2017-01-26 PROCEDURE — 83605 ASSAY OF LACTIC ACID: CPT | Performed by: INTERNAL MEDICINE

## 2017-01-26 PROCEDURE — 40000133 ZZH STATISTIC OT WARD VISIT: Performed by: OCCUPATIONAL THERAPIST

## 2017-01-26 PROCEDURE — 99233 SBSQ HOSP IP/OBS HIGH 50: CPT | Mod: GC | Performed by: INTERNAL MEDICINE

## 2017-01-26 PROCEDURE — 85027 COMPLETE CBC AUTOMATED: CPT | Performed by: INTERNAL MEDICINE

## 2017-01-26 PROCEDURE — 97535 SELF CARE MNGMENT TRAINING: CPT | Mod: GO | Performed by: OCCUPATIONAL THERAPIST

## 2017-01-26 PROCEDURE — 25000125 ZZHC RX 250: Performed by: INTERNAL MEDICINE

## 2017-01-26 RX ORDER — LISINOPRIL 2.5 MG/1
2.5 TABLET ORAL DAILY
Status: DISCONTINUED | OUTPATIENT
Start: 2017-01-26 | End: 2017-01-28

## 2017-01-26 RX ORDER — AMOXICILLIN 500 MG/1
2000 CAPSULE ORAL ONCE
Status: COMPLETED | OUTPATIENT
Start: 2017-01-27 | End: 2017-01-27

## 2017-01-26 RX ORDER — OXYCODONE HYDROCHLORIDE 5 MG/1
5 TABLET ORAL 2 TIMES DAILY PRN
Status: DISCONTINUED | OUTPATIENT
Start: 2017-01-26 | End: 2017-01-27

## 2017-01-26 RX ORDER — HEPARIN SODIUM 10000 [USP'U]/100ML
0-3500 INJECTION, SOLUTION INTRAVENOUS CONTINUOUS
Status: DISCONTINUED | OUTPATIENT
Start: 2017-01-26 | End: 2017-02-02

## 2017-01-26 RX ADMIN — ACETAMINOPHEN 650 MG: 325 TABLET, FILM COATED ORAL at 08:04

## 2017-01-26 RX ADMIN — OXYCODONE HYDROCHLORIDE 5 MG: 5 TABLET ORAL at 17:40

## 2017-01-26 RX ADMIN — POTASSIUM CHLORIDE 20 MEQ: 750 TABLET, EXTENDED RELEASE ORAL at 15:01

## 2017-01-26 RX ADMIN — HEPARIN SODIUM 1100 UNITS/HR: 10000 INJECTION, SOLUTION INTRAVENOUS at 11:20

## 2017-01-26 RX ADMIN — MULTIPLE VITAMINS W/ MINERALS TAB 1 TABLET: TAB at 08:06

## 2017-01-26 RX ADMIN — DOCUSATE SODIUM 100 MG: 100 CAPSULE, LIQUID FILLED ORAL at 20:43

## 2017-01-26 RX ADMIN — METHOCARBAMOL 750 MG: 750 TABLET ORAL at 08:05

## 2017-01-26 RX ADMIN — OMEPRAZOLE 20 MG: 20 CAPSULE, DELAYED RELEASE ORAL at 08:06

## 2017-01-26 RX ADMIN — Medication: at 08:17

## 2017-01-26 RX ADMIN — ACETAMINOPHEN 650 MG: 325 TABLET, FILM COATED ORAL at 20:43

## 2017-01-26 RX ADMIN — LISINOPRIL 2.5 MG: 2.5 TABLET ORAL at 11:16

## 2017-01-26 RX ADMIN — METHOCARBAMOL 750 MG: 750 TABLET ORAL at 20:42

## 2017-01-26 RX ADMIN — Medication 0.25 MG: at 09:44

## 2017-01-26 RX ADMIN — ACETAMINOPHEN 650 MG: 325 TABLET, FILM COATED ORAL at 15:01

## 2017-01-26 RX ADMIN — POLYETHYLENE GLYCOL 3350 17 G: 17 POWDER, FOR SOLUTION ORAL at 20:51

## 2017-01-26 RX ADMIN — Medication 0.25 MG: at 01:44

## 2017-01-26 RX ADMIN — LIDOCAINE 3 PATCH: 50 PATCH CUTANEOUS at 21:01

## 2017-01-26 RX ADMIN — LEVOTHYROXINE SODIUM 224 MCG: 112 TABLET ORAL at 08:06

## 2017-01-26 RX ADMIN — OXYCODONE HYDROCHLORIDE 5 MG: 5 TABLET ORAL at 09:43

## 2017-01-26 RX ADMIN — OXYCODONE HYDROCHLORIDE 5 MG: 5 TABLET ORAL at 01:44

## 2017-01-26 RX ADMIN — DOCUSATE SODIUM 100 MG: 100 CAPSULE, LIQUID FILLED ORAL at 08:05

## 2017-01-26 RX ADMIN — Medication: at 20:57

## 2017-01-26 ASSESSMENT — PAIN DESCRIPTION - DESCRIPTORS
DESCRIPTORS: ACHING;CONSTANT;DISCOMFORT
DESCRIPTORS: ACHING;PRESSURE

## 2017-01-26 NOTE — PROGRESS NOTES
"Social Work Services Progress Note    Hospital Day: 15  Date of Initial Social Work Evaluation:  NA  Collaborated with:  SNF admissions at [facility]    Data:  Pt is a 46 year old  male being assessed for TCU placement.    Intervention:  SW received a call that the pt has been declined to [facility] as they \"do not have any Medicaid beds available, we are at capacity\".  Admissions has passed on pt's referral to Elham Santos who is a liaison for the Specialty care programs connected to the hospital.  SW will wait to hear from Elham for options.  Message did not contain a call back number.    SW also received a call from the [facility].  [facility] admissions are declining pt due to concerns about \"transporting\" the pt back to the [facility] if he is eligible for the surgery.  Facility staff also feel pt \"cannot tolerate 3 hours of rehab\" per their review of the documentation.  SW will pend this referral as the facility stated they would reconsider after pt's surgery.    Update - SW started calls to TCUs throughout [place].  See below for options that may take Medicaid insurance.  SW met with pt to update him on progress with referrals.    Assessment:  See previous notes, no changes.    Plan:    Anticipated Disposition:  Facility, Inpatient Acute Rehab    Barriers to d/c plan:  Insurance, no transfer agreement in place to assist with discharge to [facility].    Follow Up:  SW will continue to follow for placement.    REJI Hernandez, APSW  6C Unit   Pager: [phone]  Phone: [phone]      ___________________________________________________________________________________________________________________________________________________    Referrals in process:     TCUs with phone calls out:     [facility]: - [facility] - no beds for a few weeks, do accept Medicaid and have onsite outpatient rehab options, referral sent.            PH: " [phone]            F: [phone]          - [facility] - Message for admissions             PH: [phone]    [place]:           - [facility] - message for admissiosn            PH: [phone]    [place]:          - [facility] - message for admissions            PH: [phone]          - [facility] - No answer when attempted to call             PH: [phone]    Referrals Discontinued:    Swing Bed Programs:   - [facility] ARU - declined due to transportation concerns, concerns pt cannot tolerate therapies.  Admissions states even if can tolerate therapies, pt would be declined due to pt needing transport to the [facility].  SW explained pt has Title 19 to cover transport and pt would be responsible.  Admissions states this still does not satisfy their concerns.  PH: [phone]  F: [phone]      - [facility] ARU - declined due to no Medicaid beds available at this time.  PH - Cell: [phone]  PH - Main: [phone]  F: [phone]    TCU Units:    - [facility] - do not accept Medicaid Ins.  Do not accept IHS insurance.  PH: [phone]   - [facility] - do not accept Medicaid Ins.  Do not accept IHS insurance.  PH: [phone]   - [facility] - only a SNF/LTC and do not accept Medicaid only for rehab.  Have an over two year wait for a SNF Medicaid bed.  PH: [phone]    Community Case Management/Community Services in place:   Pt has a Medicaid  [place]   PH: [phone]   -  on Call for [place]: [phone]    [employer]  [place]  PH: [phone]    Pt may have Title 19 services for transportation, will need to assess this at time of dc if he qualifies for ARU.

## 2017-01-26 NOTE — CONSULTS
Physical Medicine and Rehabilitation Consultation note    Patient Name: Samir Rodriguez   YOB: 1970  MRN: [id number]     Age / Sex: 46 year old male    Reason for Consult: eval for rehab admission  Consult placed by:     ASSESSMENT/PLAN:   47 yo M L hand dominant male with significant cardiac history including heart failure and moderate to severe aortic regurgitation, atrial fibrillation, amidorone toxicity with cirrhosis, who presents with debility, impaired ADLs, mobility, gait, and functional endurance. He is limited by R shoulder pain due to rotator cuff strain, R tibia fracture, and dizziness. Samir is far from functional baseline. He continues to require significant assistance. He is from [place] and has good family support. However, he has significant PT and OT needs and in unable to tolerate intensive therapy. Recommend Transitional Care Unit (TCU) upon discharge.     Thank you for the consult. Please feel free to call for any questions/concerns.    Case discussed with , staff PM&R physician      HISTORY OF PRESENTING ILLNESS:  This is a 46 year old L hand dominant male with a hx of RHD s/p  MVR x 2, on coumadin, biventricular CHF (EF 25-30%) s/p dual chamber ICD 2009, chronic afib on amiodarone, ablation, CKD III, liver cirrhosis, hypothyroidism who was transferred from [facility] on 01/12/16 for evaluation of CHF and concern for hemolysis. He was found to be significantly fluid overloaded with moderate to severe aortic regurgitation. Additionally found to be supratherapeutic on coumadin on arrival, with anemia and thrombocytopenia. Also found to have a right arm hematoma due to supratherapeutic INR. CT was of concern for amiodarone toxicity, and amidorone was stopped. Pt also had hx of right tibia fracture for which he is had a R HKB. Plan is for cardiac surgery for valve replacement (which patient is not an optimal candidate for) vs  "workup for possible LVAD as backup.     On encounter, states he is limited by R shoulder pain and R leg weakness. He notes dizziness with activity and therapies. He is short of breath with activity.     Prior Level of Function:   Independent with ADLs and IADLs. Using a cane for ambulation due to neuropathy.     Current Function:  Patient was evaluated by therapy teams during his acute hospitalization, and function was noted:   PT: \"bed mobility Spring. Pt performed sit <> stand from raised bed with FWW modA x 2, cues provided for sequencing and form. In standing position, pt with difficulty bringing R LE within ALBINA, needing Spring for repositioning. Pt performed stand pivot transfer from EOB > recliner with FWW CGA-Spring. Pt needing assist for balance and navigation of FWW and cues for LE sequencing.\"  OT: \"Max A for donning R LE brace and min A for supine to sit. Max A for washing L UE and back.  Max A for donning shower cap for modified hair washing activity, combing and braiding hair.  Pt performs sit to stand from EOB with mod AX2 and performs stand pivot transfer from bed to chair with CGAX2 and FWW. \"    REVIEW OF SYSTEMS:  Gen: feeling fatigued, no fevers, no chills  HEENT: no congestion, no visual loss, no hearing loss  Resp: +shortness of breath, no cough  CVS: no chest pains, no palpitations  GI: no abd pain, no n/v, no diarrhea, no constipation  : no dysuria, voiding normally  Cognition: no memory difficulties, no problem solving difficulties, no speech or language difficulties, no sensory abnormalities  Mobility: + focal weakness, abnormal balance  Pain: reports R shoulder pain    ALLERGIES:  Allergies   Allergen Reactions     Asa [Aspirin] Other (See Comments) and Diarrhea     goosebumps  nausea     Gabapentin Nausea     Nabumetone Nausea     Sulfamethoxazole Unknown     Trimethoprim Unknown     Allopurinol Rash     Clindamycin Rash       PAST MEDICAL HISTORY:  Past Medical History   Diagnosis Date     " Rheumatic heart disease        FAMILY HISTORY:  Family History   Problem Relation Age of Onset     DIABETES Mother      Hypertension Brother        SOCIAL HISTORY:  Occupation: previous  for [employer]  Hobbies:  tutoring    Substances:   Denies smoking, alcohol use, or drug use    Home: Lives in [place] in one level home. Pt reports 4 steps to enter home. Living is on main level with bathroom/bedroom.    Assistance available: Son and niece, additional family       PHYSICAL EXAM:  Vitals: B/P: 92/51, T: 98, P: 80, R: 17  Gen: fatigued, no acute distress  HEENT: PERRLA, EOMI, normal conjunctivae, moist mucosae, normal speech  Resp: non-labored breathing on room air  CVS: irregular rate and rhythm  GI: S NT ND  MSK: R shoulder: tenderness to palpation at coracoid process, biceps insertion. Pain with active and passive ROM. Unable to forward flex or abduct past 90 degrees. Pain with external rotation, abduction ,and flexion. + Neers. Unable to perform other tests due to pain.  Neuro: AOx3. CN intact. Neg clonus bilaterally. Strength testing reveals proximal > distal weakness.  Grasp 4/5 bilaterally. Testing limited on RUE due to pain and RLE due to knee brace. EF 4-/5 on RUE. SA 4-/5. LLE: HF 4-/5, KE 4-/5, DF/PF 5/5.   Ext: + R UE hematoma. Wearing R knee hinged brace.         Ida Mcrae MD  PGY-2 Resident  Physical Medicine & Rehabilitation    Patient seen by me and discussed with Dr Mcrae. I agree with her note, exam and plan for this patient.    Total time spent >50 min with this consult    Yury Cuellar MD    [phone]  Pager [phone]

## 2017-01-26 NOTE — PROGRESS NOTES
Brief GI Note:   Chart reviewed.   Starting bridge for liver bx.   IR guided transjugular liver bx with hepatic wedge pressures anticipated.   Hepatology will continue to follow.     Josef Franklin MD  GI Fellow

## 2017-01-26 NOTE — CONSULTS
"[facility], Orthopaedic Surgery Consultation    Samir Rodriguez MRN# [id number]   Age: 46 year old YOB: 1970     Date of Admission:  1/12/2017    Reason for consult: Patient wanting proper footwear       Requesting physician: Gary Smart MD         Assessment and Plan:   Assessment:  - Onychomycosis  - Tylomas  - Overgrown toenails  - Hammertoes  - Pes planus    Plan:  - Patient seen and evaluated. Treatment plan discussed.  - Nails 1-5 bilaterally debrided or trimmed upon consent.   - Calluses were debrided with tissue cutter x 4 upon consent.   - Patient would greatly benefit from custom insoles and/or bracing, however this must be done on an outpatient basis.   - To improve stability of patient's gait and left LE I will order a tall CAM boot to be worn on left foot while ambulating as he thinks this helps. Hopefully trimming the calluses there will help with the discomfort while walking.  - Advised patient to lotion feet daily. Nursing to do this when available. If this does not resolve dry skin, it is likely a tinea infection and an antifungal cream would be appropriate.  - Patient to follow up with a podiatrist after discharge for assessment and fitting of orthotics.           History of Present Illness:   Patient was seen and examined by me. History, PMH, Meds, SH, complete ROS (10 organ systems) and PE reviewed with patient and prior medical records.      Samir is a 46 year old male who was admitted to [facility] for acute decompensated heart failure. No medical history of diabetes mellitus. He says within the last year he had a surgery in his Left foot where \"they took something out and now his muscles are atrophied.\" After surgery he wore a tall CAM walker which made walking a lot more comfortable. He feels like he is walking on something strange at the ball of his foot under his big toe. Denies pain, just an uncomfortable sensation. The history is not very clear and it is " hard to discern what the procedure was for. I do not see any record of this in EPIC. He would like his nails and calluses trimmed today as well. He said at one point his doctor got him diabetic shoes and inserts (even though he is not diabetic) and these seemed to help a lot with the discomfort. Has tried Dr. Valentine's inserts which helped some, but not completely. Never has had custom insoles made. Denies numbness, tingling, burning, foot pain.          Past Medical History:     Past Medical History   Diagnosis Date     Rheumatic heart disease              Past Surgical History:     Past Surgical History   Procedure Laterality Date     Cholecystectomy       Hernia repair       Appendectomy       Mitral valve replacement       Mechanical ([employer] [place] Tilting Disc); 1992     Fusion cervical anterior three+ levels               Social History:     Social History     Social History     Marital Status: Single     Spouse Name: N/A     Number of Children: N/A     Years of Education: N/A     Social History Main Topics     Smoking status: Never Smoker      Smokeless tobacco: Not on file     Alcohol Use: No     Drug Use: No     Sexual Activity: Not on file     Other Topics Concern     Not on file     Social History Narrative             Family History:     Family History   Problem Relation Age of Onset     DIABETES Mother      Hypertension Brother               Medications:     Current Facility-Administered Medications   Medication     oxyCODONE (ROXICODONE) IR tablet 5 mg     [START ON 1/27/2017] amoxicillin (AMOXIL) capsule 2,000 mg     heparin  drip 25,000 units in 0.45% NaCl 250 mL (see additional administration details for dose)     heparin bolus from infusion pump     lisinopril (PRINIVIL/Zestril) tablet 2.5 mg     menthol (ICY HOT) 5 % patch 1 patch    And     menthol (ICY HOT) Patch in Place    And     menthol (ICY HOT) patch REMOVAL     acetaminophen (TYLENOL) tablet 650 mg     ondansetron (ZOFRAN) tablet 4 mg      lidocaine (LIDODERM) 5 % Patch 1-3 patch    And     lidocaine (LIDODERM) patch REMOVAL    And     lidocaine (LIDODERM) Patch in Place     lidocaine 1 % 1 mL     lidocaine (LMX4) kit     sodium chloride (PF) 0.9% PF flush 3 mL     sodium chloride (PF) 0.9% PF flush 3 mL     clonazePAM (klonoPIN) half-tab 0.25 mg     docusate sodium (COLACE) capsule 100 mg     potassium chloride SA (K-DUR/KLOR-CON M) CR tablet 20-40 mEq     potassium chloride (KLOR-CON) Packet 20-40 mEq     potassium chloride 10 mEq in 100 mL intermittent infusion     potassium chloride 10 mEq in 100 mL intermittent infusion with 10 mg lidocaine     potassium chloride 20 mEq in 50 mL intermittent infusion     magnesium sulfate 2 g in NS intermittent infusion (PharMEDium or FV Cmpd)     magnesium sulfate 4 g in 100 mL sterile water (premade)     potassium phosphate 15 mmol in D5W 250 mL intermittent infusion     potassium phosphate 20 mmol in D5W 500 mL intermittent infusion     potassium phosphate 20 mmol in D5W 250 mL intermittent infusion     potassium phosphate 25 mmol in D5W 500 mL intermittent infusion     multivitamin, therapeutic with minerals (THERA-VIT-M) tablet 1 tablet     melatonin tablet 3 mg     miconazole (MICATIN; MICRO GUARD) 2 % powder     lidocaine 1 % 1 mL     lidocaine (LMX4) kit     sodium chloride (PF) 0.9% PF flush 3 mL     sodium chloride (PF) 0.9% PF flush 3 mL     medication instruction     nitroglycerin (NITROSTAT) sublingual tablet 0.4 mg     alum & mag hydroxide-simethicone (MYLANTA ES/MAALOX  ES) suspension 15-30 mL     polyethylene glycol (MIRALAX/GLYCOLAX) Packet 17 g     albuterol (PROAIR HFA/PROVENTIL HFA/VENTOLIN HFA) Inhaler 2 puff     sennosides (SENOKOT) tablet 8.6 mg     naloxone (NARCAN) injection 0.1-0.4 mg     levothyroxine (SYNTHROID/LEVOTHROID) tablet 224 mcg     omeprazole (priLOSEC) CR capsule 20 mg     methocarbamol (ROBAXIN) tablet 750 mg             Allergies:      Allergies   Allergen Reactions      Blood Transfusion Related (Informational Only) Other (See Comments)     Patient has a history of a clinically significant antibody against RBC antigens.  A delay in compatible RBCs may occur.     Asa [Aspirin] Other (See Comments) and Diarrhea     goosebumps  nausea     Gabapentin Nausea     Nabumetone Nausea     Sulfamethoxazole Unknown     Trimethoprim Unknown     Allopurinol Rash     Clindamycin Rash            Review of Systems:   A comprehensive 10 point review of systems (constitutional, ENT, cardiac, peripheral vascular, respiratory, GI, , Musculoskeletal, skin, Neurological) was performed and found to be negative except as described in this note.           Physical Exam:   COMPLETE EXAMINATION:   VITAL SIGNS: BP 83/45 mmHg  Pulse 80  Temp(Src) 98.4  F (36.9  C) (Oral)  Resp 16  Ht 1.829 m (6')  Wt 93.396 kg (205 lb 14.4 oz)  BMI 27.92 kg/m2  SpO2 98%  NAD  DP and PT pulses 1/4 left, 2/4 right. Capillary refill <3seconds. Absent pedal hair.   Gross sensation intact bilaterally.  Equinus present bilaterally. No pain with passive or active ROM in foot or ankle joints. Dorsally contracted digits 2-5 bilaterally. Flat foot type bilaterally.  Skin is dry and flaking throughout. Nails are overgrown, incurvated and thickened to varying degrees. Hyperkeratotic tissue build up at the plantar surfaces of first and 3rd/4th metatarsal heads bilaterally. Ecchymosis within hyperkeratotic tissue at right first metatarsal location.          Data:   All pertinent laboratory data reviewed  All imaging studies reviewed by me.    Recent Labs   Lab Test  01/26/17   0827  01/25/17   0802  01/24/17   0726   01/13/17   0147   HGB  9.4*  8.9*  8.7*   < >   --    SED   --    --    --    --   17*   CRP   --    --    --    --   6.2   WBC  3.5*  4.0  3.8*   < >   --     < > = values in this interval not displayed.     No results for input(s): FTYP, FNEU, FOTH, FCOL, FAPR, FWBC in the last 32119 hours.

## 2017-01-26 NOTE — PROGRESS NOTES
Cardiology 1 Progress Note    Samir Rodriguez MRN# [id number]   Age: 46 year old YOB: 1970   Date of Admission: 1/12/2017           Assessment and Plan:   Samir Rodriguez is a 46 year old  male with past medical history of Rheumatic Heart Disease s/p mechanical MVR x 2 (1980s and 1992) on warfarin (INR goal 2.5-3.5), biventricular CHF (EF 25-30%) s/p dual chamber ICD 2008, chronic afib on amiodarone and previously on digoxine (stopped 1/16/17 due to concern for toxicity), WPW ablation at age 11, CKD III, hypothyroid,  who was transferred from [facility] on 01/12 for further eval of CHF and concern of hemolysis in setting of mechanical valve.    Today:  - no tests today  - start lisinopril 2.5mg daily  - dental visit rescheduled for Friday  - also scheduled eye exam for Friday at 9am  - GI consult for cirrhosis--unable to obtain fibroscan so recommending liver biopsy  - will start heparin gtt today (with INR of 2.3) in anticipation of liver biopsy in future    # Non-ischemic dilated cardiomyopathy 2/2 valvular heart disease  # Acute on chronic systolic heart failure, EF 37% (12/2016) s/p dual chamber ICD 2008  NYHA class III  - holding diuretic, metoprolol due to hypotension  - cautiously start low dose ACEI  - will restart neurohormonal blockers when BP can tolerate  - Daily weights; Strict I/O    # Mechanical mitral valve (MV diastolic gradient 6.5)  # Aortic Insufficiency (mod - severe)  # TR (moderate)  History of BiV failure attributed to valvular disease. Echocardiogram on admission demonstrated moderate-severe aortic insufficiency which is his most acute cardiac pathology. His tricuspid regurgitation is likely due to his signficantly fluid overloaded state. Mechanical valve maintained on warfarin w/ INR 2.5-3.5 prior to this admission. Angiogram performed on 01/20 revealed nonobstructive CAD.  - EDEL shows possible restrictions of mitral valve but  fluoroscopy on admission showed normal valves  - Dental advising mandibular teeth extraction before valve surgery  -> procedure planning appointment scheduled for 01/27 @ 1100; needs INR and Amoxicillin 2g 1 hr before procedure  - Cardiovascular surgery consulted; appreciate recommendations  - heart failure service following; receiving workup for possible VAD as backup for valve surgery    # Anemia and thrombocytopenia  # Right Arm Hematoma   Blood smear: blood smear- RBCs show some target cells, hypochromia, increased polychromasia, no schistocytes or spherocytes. Low haptoglobin, high LDH, and plasma free hemoglobin concerning for intra-vascular hemolysis possibly due to valvular disease. No evidence of overt infection causing DIC. EPO inappropriately low w/ recent ARF. Etiology of hematoma likely due to supratherapeutic INR- 4.6 on arrival  - warfarin w/ goal INR 2.5-3.0  - start heparin with INR below goal    #. Cirrhosis and liver nodularity  Demonstrated on CT, although u/s was normal. CT read is concerning for amiodarone toxicity  - GI consulted, appreciate recommendations  - decreased scheduled tylenol dose  - may need liver biopsy    # Paroxsymal afib:  digoxin level 0.7 on 1/12 and 1/17. 1/16/17 device interrogation: atrial tachycardia to 150s with AV block. Lower rate setting changed to 80bpm from 60bmp and device changed from DDDR to VVIR on 01/16; atrial tachycardia and AV block and V pacing. The patient has been in an atrial flutter that is undersensed, hence the device was switched to VVI mode. There is no point continuing amiodarone at this stage especially with concern for toxicity  - stopped amiodarone.    # Hyperthyroid: Will need repeat TSH/T4 1-2 months post DC. Continue levothyroxine 224mcg   # Anxiety and insomnia: Increased clonazepam to bid    # Right tibia fx and R knee trauma:  Fall in November prompted decompensation. Xray tibia and knee no fx this admission.  - Ortho evaluated; planning  for outpatient follow up with orthopedic surgery in 4-6 weeks  - PT/OT    # Headaches  May be due to near-sightedness vs rebound headache from opioid use vs reduced cardiac output  - pain consult with pain for opioid titration  - eye exam scheduled at [facility] at 9am on 1/27  - using lidocaine patches  - PRN zofran    # Pulmonary nodules: incidentally found on CT  - repeat CT chest in 6 months    FEN: cardiac diet   PPX: on warfarin  Dispo: Difficult placement due to out of state Medicaid.    Code Status: Full CODE      Patient discussed with staff attending, Dr. Tovar.     Gary Silva MD  Cardiovascular Medicine Fellow, PGY-5  [phone]     Attending: Patient seen and examined with Dr. Valerio Hudson and Cards I team. The H&P is accurate as recorded. Any additional findings, if any, have been incorporated into the body of the note. All labs and imaging studies reviewed personally. The assessment and recommendations outlined reflect our joint plan that was reached after careful review and discussion.    Koffi Tovar MD         Interval History/Subjective   No acute events overnight. Headaches still present; blood pressures back at baseline 90s/50s; no CP, SOB, lightheadedness         Objective   Temp:  [98  F (36.7  C)-98.7  F (37.1  C)] 98  F (36.7  C)  Pulse:  [80] 80  Heart Rate:  [80] 80  Resp:  [16-18] 17  BP: ()/(47-64) 92/51 mmHg  SpO2:  [95 %-100 %] 97 %    Physical exam:  Gen: AA&Ox3, no acute distress  HEENT: NACT, poor dentition   PULM: Clear lungs  CV: RRR; mechanical s2 w/ 2+ systolic murmur at LUSB and LISB: JVP of 6-8cm  ABD:  soft, nontender, nondistended.    EXT: trace sonam edema to knees. Right arm ecchymosis stable   SKIN: Right arm resolving ecchymosis outlined w/ marker   NEURO: alert and oriented; speech intact         Data:   CBC    Recent Labs  Lab 01/26/17  0827 01/25/17  0802 01/24/17  0726 01/23/17  0715   WBC 3.5* 4.0 3.8* 4.5   RBC 2.96* 2.75* 2.75* 2.63*   HGB 9.4* 8.9* 8.7*  8.4*   HCT 28.4* 26.3* 26.7* 25.3*   MCV 96 96 97 96   MCH 31.8 32.4 31.6 31.9   MCHC 33.1 33.8 32.6 33.2   RDW 20.1* 20.2* 20.3* 19.9*   PLT 77* 72* 73* 72*     CMP    Recent Labs  Lab 01/26/17  0827 01/25/17  0802 01/24/17  0726 01/23/17  0715 01/22/17  0806 01/21/17  0730   * 130* 128* 126* 127* 127*   POTASSIUM 3.8 4.2 4.5 4.0 3.8 3.6   CHLORIDE 94 94 93* 90* 91* 91*   CO2 26 28 28 30 30 30   ANIONGAP 8 7 7 6 6 7   GLC 76 76 73 88 76 88   BUN 14 16 16 16 16 19   CR 1.26* 1.31* 1.43* 1.50* 1.48* 1.59*   GFRESTIMATED 61 58* 53* 50* 51* 47*   GFRESTBLACK 74 71 64 61 61 57*   DELFINO 7.7* 7.6* 7.6* 7.2* 7.4* 7.4*   MAG 2.1 2.2 2.0 2.1 1.9 2.1   PHOS  --   --   --   --  2.4* 2.2*   PROTTOTAL  --   --  5.1*  --   --   --    ALBUMIN  --   --  1.8*  --   --   --    BILITOTAL  --   --  1.9*  --   --   --    ALKPHOS  --   --  121  --   --   --    AST  --   --  60*  --   --   --    ALT  --   --  58  --   --   --      INR    Recent Labs  Lab 01/26/17  0827 01/25/17  0802 01/24/17  0726 01/23/17  0715   INR 2.32* 2.87* 3.50* 3.47*             Medications:     Current Facility-Administered Medications   Medication     oxyCODONE (ROXICODONE) IR tablet 5 mg     [START ON 1/27/2017] amoxicillin (AMOXIL) capsule 2,000 mg     heparin  drip 25,000 units in 0.45% NaCl 250 mL (see additional administration details for dose)     heparin bolus from infusion pump     lisinopril (PRINIVIL/Zestril) tablet 2.5 mg     warfarin-No DOSE today     menthol (ICY HOT) 5 % patch 1 patch    And     menthol (ICY HOT) Patch in Place    And     menthol (ICY HOT) patch REMOVAL     acetaminophen (TYLENOL) tablet 650 mg     ondansetron (ZOFRAN) tablet 4 mg     lidocaine (LIDODERM) 5 % Patch 1-3 patch    And     lidocaine (LIDODERM) patch REMOVAL    And     lidocaine (LIDODERM) Patch in Place     lidocaine 1 % 1 mL     lidocaine (LMX4) kit     sodium chloride (PF) 0.9% PF flush 3 mL     sodium chloride (PF) 0.9% PF flush 3 mL     clonazePAM (klonoPIN)  half-tab 0.25 mg     docusate sodium (COLACE) capsule 100 mg     potassium chloride SA (K-DUR/KLOR-CON M) CR tablet 20-40 mEq     potassium chloride (KLOR-CON) Packet 20-40 mEq     potassium chloride 10 mEq in 100 mL intermittent infusion     potassium chloride 10 mEq in 100 mL intermittent infusion with 10 mg lidocaine     potassium chloride 20 mEq in 50 mL intermittent infusion     magnesium sulfate 2 g in NS intermittent infusion (PharMEDium or FV Cmpd)     magnesium sulfate 4 g in 100 mL sterile water (premade)     potassium phosphate 15 mmol in D5W 250 mL intermittent infusion     potassium phosphate 20 mmol in D5W 500 mL intermittent infusion     potassium phosphate 20 mmol in D5W 250 mL intermittent infusion     potassium phosphate 25 mmol in D5W 500 mL intermittent infusion     multivitamin, therapeutic with minerals (THERA-VIT-M) tablet 1 tablet     melatonin tablet 3 mg     miconazole (MICATIN; MICRO GUARD) 2 % powder     lidocaine 1 % 1 mL     lidocaine (LMX4) kit     sodium chloride (PF) 0.9% PF flush 3 mL     sodium chloride (PF) 0.9% PF flush 3 mL     medication instruction     nitroglycerin (NITROSTAT) sublingual tablet 0.4 mg     alum & mag hydroxide-simethicone (MYLANTA ES/MAALOX  ES) suspension 15-30 mL     polyethylene glycol (MIRALAX/GLYCOLAX) Packet 17 g     albuterol (PROAIR HFA/PROVENTIL HFA/VENTOLIN HFA) Inhaler 2 puff     sennosides (SENOKOT) tablet 8.6 mg     naloxone (NARCAN) injection 0.1-0.4 mg     levothyroxine (SYNTHROID/LEVOTHROID) tablet 224 mcg     omeprazole (priLOSEC) CR capsule 20 mg     methocarbamol (ROBAXIN) tablet 750 mg

## 2017-01-26 NOTE — PLAN OF CARE
Problem: Goal Outcome Summary  Goal: Goal Outcome Summary  Outcome: No Change  Pt admitted 1/12 from [facility] for evaluation of CHF for hemolysis of mechanical valve s/p RLE and fractured.  Needs TVR and VAD evaluation.  Pace, VS'S on RA with RLE pain and medicated with prn Roxicodone (switch from q8h to BID).  Prn Klonopin given, afraid of death.  Lymph wraps on b/l LE.  RUE blotchy/ecchymotic, b/l groin wounds covered, b/l heel ulcers covered, and ulcer on sacral area and covered.  Pt on a speciality mattress with q2h turns.  Medication patches on right extremities.  Andrew counts continues and adhering to 1.5 L FR.  Needs opthalmology consult, teeth extraction, and liver biopsy.  Otherwise, pt fell asleep around 230.  Continue to monitor and with POC.

## 2017-01-26 NOTE — PROGRESS NOTES
[facility]  WO Nurse Inpatient Adult Pressure INJURY (PI) Wound Assessment     Follow up assessment of PU(s) on pt's:   Bilateral heels and Right Buttock/ sacrum     Data:   Patient History:      per MD note(s):  Samir Rodriguez is a 46 year old male  male with past medical history of Rheumatic Heart Disease s/p mechanical MVR x 2 (1980s and 1992) on warfarin (INR goal 2.5-3.5), biventricular CHF (EF 25-30%) s/p dual chamber ICD 2008, WPW ablation at age 11, CKD III, hypothyroid,  who is transferred from [facility]      Current mattress:  AtmosAir, overlay ordered  Current pressure relieving devices:  Heel lift boots, chair cushion and Pillows    Moisture Management:  Incontinence Protocol    Catheter secured? Not applicable    Current Diet / Nutrition:       Active Diet Order  Combination Diet 2 gm NA Diet  Maximilian Score  Avg: 15.4  Min: 12  Max: 17     Recent Labs   Lab Test  01/26/17   0827   01/24/17   0726   01/13/17   0147   ALBUMIN   --    --   1.8*   < >   --    HGB  9.4*   < >  8.7*   < >   --    INR  2.32*   < >  3.50*   < >   --    WBC  3.5*   < >  3.8*   < >   --    CRP   --    --    --    --   6.2    < > = values in this interval not displayed.                                                                                                                      Pressure Injury Assessment : Bilateral heels   1/12/17 Left heel   1/12/17 right heel    Wound History:   Present on admit  Left posterior heel: 1 x 1 x 0.1 cm  red moist dermis, erosion of epidermis.  Draining small amount of seroussangounous fluid.    Right posterior heel: 0.5 x 0.3 x 0 cm soft, no surrounding erythema, draining blister.  Periwound skin inact, macerated.  Draining scant amount serous fluid    Temperature  cool      Odor: none    Pain:  absent     Has right leg brace  Pressure Injury Assessment : Right Buttock/sacrum  Wound History:  Present on admit, edema to pelvic region  remains.      Wound Base: mixed yellow red dermis with hair follicles    Specific Dimensions (length x width x depth, in cm) :  4.5 x 4 x 0.1 cm irregular shape on right buttocks                                                                                           0.4 cm round epidermal erosion at lower sacrum-midline    Palpation of the wound bed:  normal    Slough appearance:  none    Eschar appearance:  none    Periwound Skin:erosion of epidermis      Color: normal and consistent with surrounding tissue    Temperature  normal     Drainage:  Small amount serosanguinous       Odor: none    Pain:  minimal , aching           Intervention:     Patient's chart evaluated.      Maximilian Interventions:  Current Maximilian Interventions and Care Plan reviewed and updated, appropriate at this time.    Wound was assessed.    Wound Care: was done: Removal of existing dressing    Visual inspection    Cleansing with Microklenz    Application of clean dressing,    Orders  reviewed    Supplies  Reviewed    Discussed plan of care with Patient and Nurse    Instructed patient and nurse to transfer patient to bed from chair intermittently versus sitting up in chair for entire day           Assessment:     Pressure Injury (PI) located on Sacrum: stage 2, Left heel: Stage 2, Right heel: Stage 2    Status: wound  No change, Erosion at right buttock and small opening at lower sacrum    Wounds present on admit,           Plan:     Nursing to notify the Provider(s) and re-consult the [facility] Nurse if wound(s) deteriorate(s).    Plan of care for wound located on Buttock/Sacrum and bilateral heels: cleanse with microklenz and pat dry, apply no sting barrier around wound, cover with mepilex.  Change every other day.    Heel lift boots on at all times while in bed.    [facility] Nurse will return: weekly  Face to face time: 20 minutes.

## 2017-01-26 NOTE — PLAN OF CARE
Problem: Goal Outcome Summary  Goal: Goal Outcome Summary  OT 6C: Recommend discharge to TCU to increase Ind with ADLs, functional mobility, endurance, and activity tolerance. Pt limited by pain in RUE, RLE brace, strength, endurance, and activity tolerance. Pt max Ax2 to tx from supine<>seated at EOB, sit<>stand. Pt required setup for grooming and hygiene routine and completed tasks using BUE but R shoulder ROM limited by pain.

## 2017-01-26 NOTE — PROGRESS NOTES
Calorie Counts    Intake recorded for: 1/25 Kcals: 0 Protein: 0g    # Meals recorded: 1 meal ordered from kitchen, no intake recorded.     # Supplements recorded: no intake recorded.

## 2017-01-26 NOTE — PROGRESS NOTES
D: Rheumatic heart disease, [facility] MVR 1980, again in 1992, CHF, Broken bone right leg w/ brace.    I: Monitored vitals and assessed pt status.   Changed: Sacral and heels-Mepilex  Running:-  PRN:- Oxycodone, Clonazepam, K+    A: A0x4. VSS. Afebrile. Up with assist 2 and walker. Up with therapy. Generalized, RLE/LLE, scrotal edema. Adequate urine output.  Headache throughout day. EDEL done yesterday. Liver biopsy when possible.     P: Continue to monitor Pt status and report changes to treatment team. Optometry and Dental tomorrow

## 2017-01-27 ENCOUNTER — DOCUMENTATION ONLY (OUTPATIENT)
Dept: DENTISTRY | Facility: CLINIC | Age: 47
End: 2017-01-27

## 2017-01-27 ENCOUNTER — APPOINTMENT (OUTPATIENT)
Dept: OCCUPATIONAL THERAPY | Facility: CLINIC | Age: 47
DRG: 286 | End: 2017-01-27
Attending: INTERNAL MEDICINE
Payer: MEDICAID

## 2017-01-27 ENCOUNTER — OFFICE VISIT (OUTPATIENT)
Dept: OPHTHALMOLOGY | Facility: CLINIC | Age: 47
DRG: 286 | End: 2017-01-27
Attending: OPHTHALMOLOGY
Payer: MEDICAID

## 2017-01-27 DIAGNOSIS — H25.13 NUCLEAR SCLEROTIC CATARACT, BILATERAL: Primary | ICD-10-CM

## 2017-01-27 DIAGNOSIS — H52.4 MYOPIA WITH ASTIGMATISM AND PRESBYOPIA, BILATERAL: ICD-10-CM

## 2017-01-27 DIAGNOSIS — H35.412 LATTICE DEGENERATION OF PERIPHERAL RETINA, LEFT: ICD-10-CM

## 2017-01-27 DIAGNOSIS — H52.203 MYOPIA WITH ASTIGMATISM AND PRESBYOPIA, BILATERAL: ICD-10-CM

## 2017-01-27 DIAGNOSIS — H52.13 MYOPIA WITH ASTIGMATISM AND PRESBYOPIA, BILATERAL: ICD-10-CM

## 2017-01-27 LAB
ANION GAP SERPL CALCULATED.3IONS-SCNC: 10 MMOL/L (ref 3–14)
BUN SERPL-MCNC: 14 MG/DL (ref 7–30)
CALCIUM SERPL-MCNC: 7.1 MG/DL (ref 8.5–10.1)
CHLORIDE SERPL-SCNC: 95 MMOL/L (ref 94–109)
CO2 SERPL-SCNC: 25 MMOL/L (ref 20–32)
CREAT SERPL-MCNC: 1.27 MG/DL (ref 0.66–1.25)
ERYTHROCYTE [DISTWIDTH] IN BLOOD BY AUTOMATED COUNT: 19.9 % (ref 10–15)
GFR SERPL CREATININE-BSD FRML MDRD: 61 ML/MIN/1.7M2
GLUCOSE SERPL-MCNC: 82 MG/DL (ref 70–99)
HCT VFR BLD AUTO: 26.3 % (ref 40–53)
HGB BLD-MCNC: 8.7 G/DL (ref 13.3–17.7)
INR PPP: 2.38 (ref 0.86–1.14)
LACTATE BLD-SCNC: 2.2 MMOL/L (ref 0.7–2.1)
LMWH PPP CHRO-ACNC: 0.42 IU/ML
LMWH PPP CHRO-ACNC: 0.55 IU/ML
MAGNESIUM SERPL-MCNC: 1.9 MG/DL (ref 1.6–2.3)
MCH RBC QN AUTO: 31.8 PG (ref 26.5–33)
MCHC RBC AUTO-ENTMCNC: 33.1 G/DL (ref 31.5–36.5)
MCV RBC AUTO: 96 FL (ref 78–100)
PLATELET # BLD AUTO: 91 10E9/L (ref 150–450)
POTASSIUM SERPL-SCNC: 3.9 MMOL/L (ref 3.4–5.3)
RBC # BLD AUTO: 2.74 10E12/L (ref 4.4–5.9)
SODIUM SERPL-SCNC: 129 MMOL/L (ref 133–144)
WBC # BLD AUTO: 3.4 10E9/L (ref 4–11)

## 2017-01-27 PROCEDURE — 83605 ASSAY OF LACTIC ACID: CPT | Performed by: INTERNAL MEDICINE

## 2017-01-27 PROCEDURE — 99207 ZZC CDG-CORRECTLY CODED, REVIEWED AND AGREE: CPT | Performed by: NURSE PRACTITIONER

## 2017-01-27 PROCEDURE — 40000115 ZZH STATISTIC NURSE TLC VISIT: Performed by: NURSE PRACTITIONER

## 2017-01-27 PROCEDURE — 85027 COMPLETE CBC AUTOMATED: CPT | Performed by: INTERNAL MEDICINE

## 2017-01-27 PROCEDURE — 97535 SELF CARE MNGMENT TRAINING: CPT | Mod: GO | Performed by: OCCUPATIONAL THERAPIST

## 2017-01-27 PROCEDURE — 21400003 ZZH R&B CCU CRITICAL UMMC

## 2017-01-27 PROCEDURE — 25000132 ZZH RX MED GY IP 250 OP 250 PS 637: Performed by: INTERNAL MEDICINE

## 2017-01-27 PROCEDURE — 40000133 ZZH STATISTIC OT WARD VISIT: Performed by: OCCUPATIONAL THERAPIST

## 2017-01-27 PROCEDURE — 85520 HEPARIN ASSAY: CPT | Performed by: INTERNAL MEDICINE

## 2017-01-27 PROCEDURE — 36415 COLL VENOUS BLD VENIPUNCTURE: CPT | Performed by: INTERNAL MEDICINE

## 2017-01-27 PROCEDURE — 25000125 ZZHC RX 250: Performed by: INTERNAL MEDICINE

## 2017-01-27 PROCEDURE — 85610 PROTHROMBIN TIME: CPT | Performed by: INTERNAL MEDICINE

## 2017-01-27 PROCEDURE — 99233 SBSQ HOSP IP/OBS HIGH 50: CPT | Mod: GC | Performed by: INTERNAL MEDICINE

## 2017-01-27 PROCEDURE — S0171 BUMETANIDE 0.5 MG: HCPCS | Performed by: INTERNAL MEDICINE

## 2017-01-27 PROCEDURE — 25000132 ZZH RX MED GY IP 250 OP 250 PS 637: Performed by: NURSE PRACTITIONER

## 2017-01-27 PROCEDURE — 99233 SBSQ HOSP IP/OBS HIGH 50: CPT | Performed by: NURSE PRACTITIONER

## 2017-01-27 PROCEDURE — 80048 BASIC METABOLIC PNL TOTAL CA: CPT | Performed by: INTERNAL MEDICINE

## 2017-01-27 PROCEDURE — 83735 ASSAY OF MAGNESIUM: CPT | Performed by: INTERNAL MEDICINE

## 2017-01-27 RX ORDER — OXYCODONE HYDROCHLORIDE 5 MG/1
5 TABLET ORAL EVERY 6 HOURS PRN
Status: DISCONTINUED | OUTPATIENT
Start: 2017-01-27 | End: 2017-02-03

## 2017-01-27 RX ORDER — BUMETANIDE 0.25 MG/ML
1 INJECTION INTRAMUSCULAR; INTRAVENOUS
Status: DISCONTINUED | OUTPATIENT
Start: 2017-01-27 | End: 2017-01-29

## 2017-01-27 RX ADMIN — OXYCODONE HYDROCHLORIDE 5 MG: 5 TABLET ORAL at 19:46

## 2017-01-27 RX ADMIN — METHOCARBAMOL 750 MG: 750 TABLET ORAL at 07:59

## 2017-01-27 RX ADMIN — LISINOPRIL 2.5 MG: 2.5 TABLET ORAL at 07:59

## 2017-01-27 RX ADMIN — Medication 2 G: at 15:59

## 2017-01-27 RX ADMIN — POTASSIUM CHLORIDE 20 MEQ: 750 TABLET, EXTENDED RELEASE ORAL at 15:59

## 2017-01-27 RX ADMIN — Medication 0.25 MG: at 12:10

## 2017-01-27 RX ADMIN — HEPARIN SODIUM 1050 UNITS/HR: 10000 INJECTION, SOLUTION INTRAVENOUS at 18:02

## 2017-01-27 RX ADMIN — OXYCODONE HYDROCHLORIDE 5 MG: 5 TABLET ORAL at 12:10

## 2017-01-27 RX ADMIN — BUMETANIDE 1 MG: 0.25 INJECTION, SOLUTION INTRAMUSCULAR; INTRAVENOUS at 15:59

## 2017-01-27 RX ADMIN — MELATONIN TAB 3 MG 3 MG: 3 TAB at 05:17

## 2017-01-27 RX ADMIN — AMOXICILLIN 2000 MG: 500 CAPSULE ORAL at 12:10

## 2017-01-27 RX ADMIN — DOCUSATE SODIUM 100 MG: 100 CAPSULE, LIQUID FILLED ORAL at 07:59

## 2017-01-27 RX ADMIN — LEVOTHYROXINE SODIUM 224 MCG: 112 TABLET ORAL at 07:59

## 2017-01-27 RX ADMIN — Medication 0.25 MG: at 19:46

## 2017-01-27 RX ADMIN — Medication 0.25 MG: at 00:24

## 2017-01-27 RX ADMIN — ONDANSETRON HYDROCHLORIDE 4 MG: 4 TABLET, FILM COATED ORAL at 12:10

## 2017-01-27 RX ADMIN — METHOCARBAMOL 750 MG: 750 TABLET ORAL at 19:46

## 2017-01-27 RX ADMIN — ACETAMINOPHEN 650 MG: 325 TABLET, FILM COATED ORAL at 15:59

## 2017-01-27 RX ADMIN — LIDOCAINE 3 PATCH: 50 PATCH CUTANEOUS at 19:45

## 2017-01-27 RX ADMIN — OMEPRAZOLE 20 MG: 20 CAPSULE, DELAYED RELEASE ORAL at 07:59

## 2017-01-27 RX ADMIN — HEPARIN SODIUM 1400 UNITS/HR: 10000 INJECTION, SOLUTION INTRAVENOUS at 02:52

## 2017-01-27 RX ADMIN — ONDANSETRON HYDROCHLORIDE 4 MG: 4 TABLET, FILM COATED ORAL at 06:33

## 2017-01-27 RX ADMIN — Medication: at 19:47

## 2017-01-27 RX ADMIN — DOCUSATE SODIUM 100 MG: 100 CAPSULE, LIQUID FILLED ORAL at 19:46

## 2017-01-27 RX ADMIN — MULTIPLE VITAMINS W/ MINERALS TAB 1 TABLET: TAB at 07:59

## 2017-01-27 RX ADMIN — Medication: at 08:01

## 2017-01-27 RX ADMIN — ACETAMINOPHEN 650 MG: 325 TABLET, FILM COATED ORAL at 07:59

## 2017-01-27 ASSESSMENT — REFRACTION_WEARINGRX
OS_AXIS: 101
OD_AXIS: 095
OD_SPHERE: -5.75
OD_CYLINDER: +2.75
OS_ADD: +1.50
OS_CYLINDER: +2.75
OD_ADD: +1.50
OS_SPHERE: -9.25
SPECS_TYPE: PAL

## 2017-01-27 ASSESSMENT — REFRACTION_MANIFEST
OD_SPHERE: -5.50
OS_ADD: +2.25
OS_AXIS: 100
OS_SPHERE: -8.75
OD_CYLINDER: +3.00
OD_AXIS: 100
OD_ADD: +2.25
OS_CYLINDER: +3.00

## 2017-01-27 ASSESSMENT — VISUAL ACUITY
CORRECTION_TYPE: GLASSES
OD_CC+: -1
METHOD: SNELLEN - LINEAR
OS_CC: 20/40
OD_CC: 20/20

## 2017-01-27 ASSESSMENT — TONOMETRY
IOP_METHOD: TONOPEN
OS_IOP_MMHG: 11
OD_IOP_MMHG: 12

## 2017-01-27 ASSESSMENT — CUP TO DISC RATIO
OD_RATIO: 0.2
OS_RATIO: 0.3

## 2017-01-27 ASSESSMENT — CONF VISUAL FIELD
METHOD: COUNTING FINGERS
OS_NORMAL: 1
OD_NORMAL: 1

## 2017-01-27 ASSESSMENT — SLIT LAMP EXAM - LIDS
COMMENTS: NORMAL
COMMENTS: NORMAL

## 2017-01-27 NOTE — PLAN OF CARE
Problem: Goal Outcome Summary  Goal: Goal Outcome Summary  Outcome: No Change    Pt alert and oriented.  Paced rhythm.   Continues to have soft BP, team aware.  Pt continues to be on speciality mattress with every 2 hr turning.  Pt was up in chair, transferred to bed.  700 mL UOP.  PRN miralax per pt request.  Medium BM this evening.  Continues to be on calorie counts until 1/28 PM.  Eye appt at 0900 tomorrow, as well as dental appt at 1300 tomorrow.  Heparin gtt increased with bolus, see MAR, currently at 1400 units/hr.  10a check at 0200.  Continue with plan of care and notify team of changes/concerns.

## 2017-01-27 NOTE — CONSULTS
"[facility]  Palliative Care Consultation         Samir Rodriguez MRN# [id number]   Age: 46 year old YOB: 1970   Date of Admission: 1/12/2017    Reason for consult: Symptom management  Goals of care  Decisional support       Requesting physician/service: [facility]       Recommendations        Pt was seen for brief interval between Opthalmology appt and Dental appt. He isnt sure what the overall plans are in the context of this hospitalization- believes he is being considered for LVAD and or replacement of 1 or 2 heart valves.   He is aware of planned procedures for dental extractions and liver biopsy following eye appt today.     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 N/A  Are you Zoroastrianism? Spiritual? Not so much? N/A   Are you at peace? N/A  What are your concerns, medical and non-medical, that are not being addressed? Chronic pain concerns- Headache and Knee pain  Who are your \"go-to\" people when you need support? Son and [place] community    Plan for psychosocial/spiritual support/follow-up from palliative team: will review with team     Decision-Making & Goals of Care     Discussion/counseling today about prognosis/goals of care/decisions: will continue to follow as needed    Patient has a completed health care directive available in the chart (N)  Physician orders for life-sustaining treatment (POLST) form is not " completed  Code Status: Full code   Patient has decision-making capacity (Y)    Coordination of Care     Findings & plan of care discussed with: Primary team  Follow-up plan from palliative team: Will be determined  Thank you for involving us in the patient's care.     Ezio ESPARZA NP  Nurse Practitioner- Lead Advanced Practice Provider  [facility] Palliative Medicine Consult Service   [phone]          Chief Complaint     Headache and leg pain- unsure what the next steps are about his heart valve disease.          History of Present Illness     History obtained from: Pt and EPIC chart  46 year old  male with past medical history of Rheumatic Heart Disease s/p mechanical MVR x 2 (1980s and 1992) on warfarin (INR goal 2.5-3.5), biventricular CHF (EF 25-30%) s/p dual chamber ICD 2008, chronic afib on amiodarone and digoxin, WPW ablation at age 11, CKD III, hypothyroid,  who was transferred from [facility] for further eval of CHF and concern of hemolysis in setting of mechanical valve. Palliative care consult for goals of care and support in setting of complex chronic illness with possible poor access to advanced therapies due to comorbid problems.                Past Medical History:   Past Medical History   Diagnosis Date     Rheumatic heart disease               Past Surgical History:   Past Surgical History   Procedure Laterality Date     Cholecystectomy       Hernia repair       Appendectomy       Mitral valve replacement       Mechanical ([place] Tilting Disc); 1992     Fusion cervical anterior three+ levels       Eye surgery Left      Pt notes eye surgery to LE secondary to Barbed wire injury as a child.               Social/Spiritual History:     Living situation: Lives with son in [facility] [facility]  Family system: Supportive per his report  Functional status (needs help with ADLs or IADLs): was IADL's   Employment/education:   Use of community resources:  [place] and family support  Activities/interests:   History of substance use/abuse: None by his report.             Family History:   Family History   Problem Relation Age of Onset     DIABETES Mother      Hypertension Brother      Macular Degeneration No family hx of      Glaucoma Father      Family history in EPIC           Allergies:   Allergies   Allergen Reactions     Blood Transfusion Related (Informational Only) Other (See Comments)     Patient has a history of a clinically significant antibody against RBC antigens.  A delay in compatible RBCs may occur.     Asa [Aspirin] Other (See Comments) and Diarrhea     goosebumps  nausea     Gabapentin Nausea     Nabumetone Nausea     Sulfamethoxazole Unknown     Trimethoprim Unknown     Allopurinol Rash     Clindamycin Rash              Medications:   I have reviewed this patient's medication profile and medications during this hospitalization             Review of Systems:   The comprehensive review of systems is negative other than noted here and in the HPI.    Palliative Symptom Review      Physical exam: Vitals: BP 92/52 mmHg  Pulse 80  Temp(Src) 99  F (37.2  C) (Oral)  Resp 14  Ht 1.829 m (6')  Wt 93.396 kg (205 lb 14.4 oz)  BMI 27.92 kg/m2  SpO2 99%  BMI= Body mass index is 27.92 kg/(m^2).  Gen: AA&Ox3, no acute distress. Lying in bed  HEENT: NACT, poor dentition    PULM: Clear lungs anterolaterally  CV: RRR; mechanical s2 w/ 2+ systolic murmur at LUSB.  ABD:  soft, nontender, nondistended.BM this am  EXT: trace sonam edema to knees. Right arm ecchymosis stable    SKIN: Right arm resolving ecchymosis   NEURO: alert and oriented; speech intact            Data Reviewed:     ROUTINE LABS (Last four results)  BMP  Recent Labs  Lab 01/27/17  0713 01/26/17  0827 01/25/17  0802 01/24/17  0726   * 129* 130* 128*   POTASSIUM 3.9 3.8 4.2 4.5   CHLORIDE 95 94 94 93*   DELFINO 7.1* 7.7* 7.6* 7.6*   CO2 25 26 28 28   BUN 14 14 16 16   CR 1.27* 1.26* 1.31* 1.43*   GLC  82 76 76 73     CBC  Recent Labs  Lab 01/27/17 0713 01/26/17 0827 01/25/17  0802 01/24/17 0726   WBC 3.4* 3.5* 4.0 3.8*   RBC 2.74* 2.96* 2.75* 2.75*   HGB 8.7* 9.4* 8.9* 8.7*   HCT 26.3* 28.4* 26.3* 26.7*   MCV 96 96 96 97   MCH 31.8 31.8 32.4 31.6   MCHC 33.1 33.1 33.8 32.6   RDW 19.9* 20.1* 20.2* 20.3*   PLT 91* 77* 72* 73*     INR  Recent Labs  Lab 01/27/17 0713 01/26/17 0827 01/25/17  0802 01/24/17 0726   INR 2.38* 2.32* 2.87* 3.50*

## 2017-01-27 NOTE — PROGRESS NOTES
Cardiology 1 Progress Note    Samir Rodriguez MRN# [id number]   Age: 46 year old YOB: 1970   Date of Admission: 1/12/2017           Assessment and Plan:   Samir Rodriguez is a 46 year old  male with past medical history of Rheumatic Heart Disease s/p mechanical MVR x 2 (1980s and 1992) on warfarin (INR goal 2.5-3.5), biventricular CHF (EF 25-30%) s/p dual chamber ICD 2008, chronic afib on amiodarone and previously on digoxine (stopped 1/16/17 due to concern for toxicity), WPW ablation at age 11, CKD III, hypothyroid,  who was transferred from [facility] on 01/12 for further eval of CHF and concern of hemolysis in setting of mechanical valve.    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 Echocardiogram on admission demonstrated moderate-severe aortic insufficiency which is his most acute cardiac pathology. His tricuspid regurgitation is likely due to his signficantly fluid overloaded state. Mechanical valve maintained on warfarin w/ INR 2.5-3.5 prior to this admission. Angiogram performed on 01/20 revealed nonobstructive CAD.  - EDEL shows probable restriction of one of the mitral valve leafets; if he is a candidate for valvular surgery, mitral valve replacemtn will have to be considered as well  - Dental advising  mandibular teeth extraction before valve surgery  -> procedure planning appointment scheduled for 01/27 @ 100; needs INR and Amoxicillin 2g 1 hr before procedure  - Cardiovascular surgery consulted; appreciate recommendations  - heart failure service following; receiving workup for possible VAD as backup for valve surgery  - will need liver biopsy as part of VAD workup    # Anemia and thrombocytopenia  # Right Arm Hematoma   Blood smear: blood smear- RBCs show some target cells, hypochromia, increased polychromasia, no schistocytes or spherocytes. Low haptoglobin, high LDH, and plasma free hemoglobin concerning for intra-vascular hemolysis possibly due to valvular disease. No evidence of overt infection causing DIC. EPO inappropriately low w/ recent ARF. Etiology of hematoma likely due to supratherapeutic INR- 4.6 on arrival  - warfarin w/ goal INR 2.5-3.0  - start heparin gtt with planned liver biopsy next week    #. Cirrhosis and liver nodularity  Demonstrated on CT, although u/s was normal. CT read is concerning for amiodarone toxicity  - GI consulted, appreciate recommendations  - decreased scheduled tylenol dose  - liver biopsy next week     # Paroxsymal afib:  digoxin level 0.7 on 1/12 and 1/17. 1/16/17 device interrogation: atrial tachycardia to 150s with AV block. Lower rate setting changed to 80bpm from 60bmp and device changed from DDDR to VVIR on 01/16; atrial tachycardia and AV block and V pacing. The patient has been in an atrial flutter that is undersensed, hence the device was switched to VVI mode. There is no point continuing amiodarone at this stage especially with concern for toxicity  - stopped amiodarone.    # Hyperthyroid: Will need repeat TSH/T4 1-2 months post DC. Continue levothyroxine 224mcg   # Anxiety and insomnia: Increased clonazepam to bid    # Right tibia fx and R knee trauma:  Fall in November prompted decompensation. Xray tibia and knee no fx this admission.  - Ortho evaluated;  planning for outpatient follow up with orthopedic surgery in 4-6 weeks  - PT/OT    # Headaches  May be due to near-sightedness vs rebound headache from opioid use vs reduced cardiac output  - pain consult with pain for opioid titration  - eye exam scheduled at [facility] at 9am on 1/27  - using lidocaine patches  - PRN zofran    # Pulmonary nodules: incidentally found on CT  - repeat CT chest in 6 months    FEN: cardiac diet   PPX: on warfarin  Dispo: Difficult placement due to out of state Medicaid.    Code Status: Full CODE      Patient discussed with staff attending, Dr. Tovar.     Gary Silva MD  Cardiovascular Medicine Fellow, PGY-5  [phone]     Attending: Patient seen and examined with Dr. Valerio Hudson and Cards I team. The H&P is as recorded. Any additional findings, if any, have been incorporated into the body of the note. All labs and imaging studies reviewed personally. The assessment and plans outlined above reflect our joint plan, reached after careful review and discussion.    Koffi Tovar MD             Interval History/Subjective   No acute events overnight. Headaches still present; blood pressures back at baseline 90s/50s; no CP, SOB, lightheadedness         Objective   Temp:  [98.1  F (36.7  C)-99  F (37.2  C)] 99  F (37.2  C)  Heart Rate:  [80] 80  Resp:  [14-18] 14  BP: ()/(45-52) 92/52 mmHg  SpO2:  [98 %-99 %] 99 %    Physical exam:  Gen: AA&Ox3, no acute distress  HEENT: NACT, poor dentition   PULM: Clear lungs  CV: RRR; mechanical s2 w/ 2+ systolic murmur at LUSB and LISB: JVP of 6-8cm  ABD:  soft, nontender, nondistended.    EXT: trace sonam edema to knees. Right arm ecchymosis stable   SKIN: Right arm resolving ecchymosis outlined w/ marker   NEURO: alert and oriented; speech intact         Data:   CBC    Recent Labs  Lab 01/27/17  0713 01/26/17  0827 01/25/17  0802 01/24/17  0726   WBC 3.4* 3.5* 4.0 3.8*   RBC 2.74* 2.96* 2.75* 2.75*   HGB 8.7* 9.4* 8.9* 8.7*   HCT 26.3* 28.4*  26.3* 26.7*   MCV 96 96 96 97   MCH 31.8 31.8 32.4 31.6   MCHC 33.1 33.1 33.8 32.6   RDW 19.9* 20.1* 20.2* 20.3*   PLT 91* 77* 72* 73*     CMP    Recent Labs  Lab 01/27/17 0713 01/26/17 0827 01/25/17 0802 01/24/17 0726 01/22/17  0806 01/21/17  0730   * 129* 130* 128*  < > 127* 127*   POTASSIUM 3.9 3.8 4.2 4.5  < > 3.8 3.6   CHLORIDE 95 94 94 93*  < > 91* 91*   CO2 25 26 28 28  < > 30 30   ANIONGAP 10 8 7 7  < > 6 7   GLC 82 76 76 73  < > 76 88   BUN 14 14 16 16  < > 16 19   CR 1.27* 1.26* 1.31* 1.43*  < > 1.48* 1.59*   GFRESTIMATED 61 61 58* 53*  < > 51* 47*   GFRESTBLACK 73 74 71 64  < > 61 57*   DELFINO 7.1* 7.7* 7.6* 7.6*  < > 7.4* 7.4*   MAG 1.9 2.1 2.2 2.0  < > 1.9 2.1   PHOS  --   --   --   --   --  2.4* 2.2*   PROTTOTAL  --   --   --  5.1*  --   --   --    ALBUMIN  --   --   --  1.8*  --   --   --    BILITOTAL  --   --   --  1.9*  --   --   --    ALKPHOS  --   --   --  121  --   --   --    AST  --   --   --  60*  --   --   --    ALT  --   --   --  58  --   --   --    < > = values in this interval not displayed.  INR    Recent Labs  Lab 01/27/17 0713 01/26/17 0827 01/25/17 0802 01/24/17  0726   INR 2.38* 2.32* 2.87* 3.50*             Medications:     Current Facility-Administered Medications   Medication     oxyCODONE (ROXICODONE) IR tablet 5 mg     heparin  drip 25,000 units in 0.45% NaCl 250 mL (see additional administration details for dose)     heparin bolus from infusion pump     lisinopril (PRINIVIL/Zestril) tablet 2.5 mg     menthol (ICY HOT) 5 % patch 1 patch    And     menthol (ICY HOT) Patch in Place    And     menthol (ICY HOT) patch REMOVAL     acetaminophen (TYLENOL) tablet 650 mg     ondansetron (ZOFRAN) tablet 4 mg     lidocaine (LIDODERM) 5 % Patch 1-3 patch    And     lidocaine (LIDODERM) patch REMOVAL    And     lidocaine (LIDODERM) Patch in Place     lidocaine 1 % 1 mL     lidocaine (LMX4) kit     sodium chloride (PF) 0.9% PF flush 3 mL     clonazePAM (klonoPIN) half-tab 0.25 mg      docusate sodium (COLACE) capsule 100 mg     potassium chloride SA (K-DUR/KLOR-CON M) CR tablet 20-40 mEq     potassium chloride (KLOR-CON) Packet 20-40 mEq     potassium chloride 10 mEq in 100 mL intermittent infusion     potassium chloride 10 mEq in 100 mL intermittent infusion with 10 mg lidocaine     potassium chloride 20 mEq in 50 mL intermittent infusion     magnesium sulfate 2 g in NS intermittent infusion (PharMEDium or FV Cmpd)     magnesium sulfate 4 g in 100 mL sterile water (premade)     potassium phosphate 15 mmol in D5W 250 mL intermittent infusion     potassium phosphate 20 mmol in D5W 500 mL intermittent infusion     potassium phosphate 20 mmol in D5W 250 mL intermittent infusion     potassium phosphate 25 mmol in D5W 500 mL intermittent infusion     multivitamin, therapeutic with minerals (THERA-VIT-M) tablet 1 tablet     melatonin tablet 3 mg     miconazole (MICATIN; MICRO GUARD) 2 % powder     lidocaine 1 % 1 mL     lidocaine (LMX4) kit     sodium chloride (PF) 0.9% PF flush 3 mL     sodium chloride (PF) 0.9% PF flush 3 mL     medication instruction     nitroglycerin (NITROSTAT) sublingual tablet 0.4 mg     alum & mag hydroxide-simethicone (MYLANTA ES/MAALOX  ES) suspension 15-30 mL     polyethylene glycol (MIRALAX/GLYCOLAX) Packet 17 g     albuterol (PROAIR HFA/PROVENTIL HFA/VENTOLIN HFA) Inhaler 2 puff     sennosides (SENOKOT) tablet 8.6 mg     naloxone (NARCAN) injection 0.1-0.4 mg     levothyroxine (SYNTHROID/LEVOTHROID) tablet 224 mcg     omeprazole (priLOSEC) CR capsule 20 mg     methocarbamol (ROBAXIN) tablet 750 mg

## 2017-01-27 NOTE — PLAN OF CARE
Problem: Goal Outcome Summary  Goal: Goal Outcome Summary  OT 6C: Recommend pt discharge to TCU to increase Ind in ADLs, functional transfers, and activity tolerance. Pt limited by weakness, fatigue, pain in RUE and brace on RLE, and decreased endurance. Pt max A for LBD and max Ax2 for bed to WC tx.

## 2017-01-27 NOTE — NURSING NOTE
Chief Complaints and History of Present Illnesses   Patient presents with     New Patient     migraines     HPI    Affected eye(s):  Both   Symptoms:     No floaters   No redness   No Dryness         Do you have eye pain now?:  No      Comments:  Pt states that he fell and broke his glasses about 3 months ago. Pt got new glasses at that time, but has been experiencing more headaches. Pt notes flashing lights peripherally in both eyes when he stands up.    Edwin WHITE January 27, 2017 9:50 AM

## 2017-01-27 NOTE — PROGRESS NOTES
D: Rheumatic heart disease, [facility] MVR 1980, again in 1992, CHF, Broken bone right leg w/ brace.    I: Monitored vitals and assessed pt status.   Changed: Interdry on groin  Running:-  PRN:-    A: A0x4. VSS. Slightly Febrile. Up with assist 2 and walker. Slid down in bed overnight and slept with affected right knee slightly bent d/t foot pushing up against bed rail. Plan to monitor positioning more frequently.     Up with therapy. Generalized, RLE/LLE, scrotal edema. Adequate urine output. Liver biopsy when possible. Went to [place] for dental exam. Eye exam in the AM.     P: Continue to monitor Pt status and report changes to treatment team.

## 2017-01-27 NOTE — PLAN OF CARE
Problem: Goal Outcome Summary  Goal: Goal Outcome Summary  6C / PT: Cancel. Attempted this AM, however pt away at procedure. Will reschedule per POC.

## 2017-01-27 NOTE — PROGRESS NOTES
"Dental Service Progress Note        Samir Rodriguez MRN# [id number]   YOB: 1970 Age: 46 year old              Chief Complaint:   \"I was told that I need all of my teeth out.\"         History of Present Illness:   This patient is a 46 year old male who presents with past medical history of Rheumatic Heart Disease s/p mechanical MVR x 2 (1980s and 1992) on warfarin (INR goal 2.5-3.5), biventricular CHF (EF 25-30%) s/p dual chamber ICD 2008, chronic afib on amiodarone and previously on digoxine (stopped 1/16/17 due to concern for toxicity), CKD III, hypothyroid. He is a possible candidate for valvular surgery, and/or mitral valve replacement.  Pt presents to [facility] for bilateral dental examination and radiographs, for medical clearance from dental perspective.     Pt reports being exhausted because he could not sleep the previous night.  Pt was sensitive to light, due to pupils being dilated for eye examination earlier in the day.  Pt was wheelchair bound, due to severe pain of the right leg.            Past Medical History:     Past Medical History   Diagnosis Date     Rheumatic heart disease              Past Surgical History:     Past Surgical History   Procedure Laterality Date     Cholecystectomy       Hernia repair       Appendectomy       Mitral valve replacement       Mechanical ([place] Tilting Disc); 1992     Fusion cervical anterior three+ levels       Eye surgery Left      Pt notes eye surgery to LE secondary to Barbed wire injury as a child.               Social History:     Social History   Substance Use Topics     Smoking status: Never Smoker      Smokeless tobacco: Not on file     Alcohol Use: No             Family History:     Family History   Problem Relation Age of Onset     DIABETES Mother      Hypertension Brother      Macular Degeneration No family hx of      Glaucoma Father              Immunizations:     There is no immunization history on file for this " patient.          Allergies:     Allergies   Allergen Reactions     Blood Transfusion Related (Informational Only) Other (See Comments)     Patient has a history of a clinically significant antibody against RBC antigens.  A delay in compatible RBCs may occur.     Asa [Aspirin] Other (See Comments) and Diarrhea     goosebumps  nausea     Gabapentin Nausea     Nabumetone Nausea     Sulfamethoxazole Unknown     Trimethoprim Unknown     Allopurinol Rash     Clindamycin Rash             Medications:     Current Facility-Administered Medications Ordered in Epic   Medication Dose Route Frequency Last Rate Last Dose     bumetanide (BUMEX) injection 1 mg  1 mg Intravenous BID   1 mg at 01/27/17 1559     oxyCODONE (ROXICODONE) IR tablet 5 mg  5 mg Oral Q6H PRN         heparin  drip 25,000 units in 0.45% NaCl 250 mL (see additional administration details for dose)  0-3,500 Units/hr Intravenous Continuous   Stopped at 01/27/17 1100     heparin bolus from infusion pump   Intravenous Q6H PRN   4,650 Units at 01/26/17 1946     lisinopril (PRINIVIL/Zestril) tablet 2.5 mg  2.5 mg Oral Daily   2.5 mg at 01/27/17 0759     menthol (ICY HOT) 5 % patch 1 patch  1 patch Topical Q8H PRN   1 patch at 01/25/17 1730    And     menthol (ICY HOT) Patch in Place   Transdermal Q8H        And     menthol (ICY HOT) patch REMOVAL   Transdermal Q8H PRN         acetaminophen (TYLENOL) tablet 650 mg  650 mg Oral TID   650 mg at 01/27/17 1559     ondansetron (ZOFRAN) tablet 4 mg  4 mg Oral Q6H PRN   4 mg at 01/27/17 1210     lidocaine (LIDODERM) 5 % Patch 1-3 patch  1-3 patch Transdermal Q24h   3 patch at 01/26/17 2101    And     lidocaine (LIDODERM) patch REMOVAL   Transdermal Q24H        And     lidocaine (LIDODERM) Patch in Place   Transdermal Q8H         lidocaine 1 % 1 mL  1 mL Other Q1H PRN         lidocaine (LMX4) kit   Topical Q1H PRN         sodium chloride (PF) 0.9% PF flush 3 mL  3 mL Intracatheter Q8H   3 mL at 01/26/17 0809     clonazePAM  (klonoPIN) half-tab 0.25 mg  0.25 mg Oral BID PRN   0.25 mg at 01/27/17 1210     docusate sodium (COLACE) capsule 100 mg  100 mg Oral BID   100 mg at 01/27/17 0759     potassium chloride SA (K-DUR/KLOR-CON M) CR tablet 20-40 mEq  20-40 mEq Oral Q2H PRN   20 mEq at 01/27/17 1559     potassium chloride (KLOR-CON) Packet 20-40 mEq  20-40 mEq Oral or Feeding Tube Q2H PRN   20 mEq at 01/20/17 1808     potassium chloride 10 mEq in 100 mL intermittent infusion  10 mEq Intravenous Q1H PRN         potassium chloride 10 mEq in 100 mL intermittent infusion with 10 mg lidocaine  10 mEq Intravenous Q1H PRN         potassium chloride 20 mEq in 50 mL intermittent infusion  20 mEq Intravenous Q1H PRN         magnesium sulfate 2 g in NS intermittent infusion (PharMEDium or FV Cmpd)  2 g Intravenous Daily PRN 50 mL/hr at 01/22/17 0952 2 g at 01/27/17 1559     magnesium sulfate 4 g in 100 mL sterile water (premade)  4 g Intravenous Q4H PRN         potassium phosphate 15 mmol in D5W 250 mL intermittent infusion  15 mmol Intravenous Daily PRN 63 mL/hr at 01/22/17 1510 15 mmol at 01/22/17 1510     potassium phosphate 20 mmol in D5W 500 mL intermittent infusion  20 mmol Intravenous Q6H PRN         potassium phosphate 20 mmol in D5W 250 mL intermittent infusion  20 mmol Intravenous Q6H PRN         potassium phosphate 25 mmol in D5W 500 mL intermittent infusion  25 mmol Intravenous Q8H PRN         multivitamin, therapeutic with minerals (THERA-VIT-M) tablet 1 tablet  1 tablet Oral Daily   1 tablet at 01/27/17 0759     melatonin tablet 3 mg  3 mg Oral At Bedtime PRN   3 mg at 01/27/17 0517     miconazole (MICATIN; MICRO GUARD) 2 % powder   Topical BID         lidocaine 1 % 1 mL  1 mL Other Q1H PRN         lidocaine (LMX4) kit   Topical Q1H PRN         sodium chloride (PF) 0.9% PF flush 3 mL  3 mL Intracatheter Q1H PRN   3 mL at 01/20/17 0826     sodium chloride (PF) 0.9% PF flush 3 mL  3 mL Intracatheter Q8H   3 mL at 01/25/17 2277      medication instruction   Does not apply Continuous PRN         nitroglycerin (NITROSTAT) sublingual tablet 0.4 mg  0.4 mg Sublingual Q5 Min PRN         alum & mag hydroxide-simethicone (MYLANTA ES/MAALOX  ES) suspension 15-30 mL  15-30 mL Oral Q4H PRN   30 mL at 01/24/17 1405     polyethylene glycol (MIRALAX/GLYCOLAX) Packet 17 g  17 g Oral Daily PRN   17 g at 01/26/17 2051     albuterol (PROAIR HFA/PROVENTIL HFA/VENTOLIN HFA) Inhaler 2 puff  2 puff Inhalation 4x Daily PRN         sennosides (SENOKOT) tablet 8.6 mg  8.6 mg Oral BID PRN   8.6 mg at 01/16/17 0606     naloxone (NARCAN) injection 0.1-0.4 mg  0.1-0.4 mg Intravenous Q2 Min PRN         levothyroxine (SYNTHROID/LEVOTHROID) tablet 224 mcg  224 mcg Oral QAM AC   224 mcg at 01/27/17 0759     omeprazole (priLOSEC) CR capsule 20 mg  20 mg Oral QAM AC   20 mg at 01/27/17 0759     methocarbamol (ROBAXIN) tablet 750 mg  750 mg Oral BID   750 mg at 01/27/17 0759     No current [facility]-ordered outpatient prescriptions on file.             Physical Exam:   Head and neck exam: Face symmetrical, no swelling, no lesions.    Soft Tissue exam: No swelling. Generalized bleeding around exposed root-tips of posterior mandibular teeth.     Hard Tissue exam: Complete edentulism of maxillary arch. Mandibular arch: multiple decayed, missing, filled teeth.  Posterior teeth are severely decayed to gumline. Lower central incisors severely decayed.   Dx: non-restorable mandibular teeth, due to extensive decay.         Data:   Radiographic interpretation: Mandibular arch: multiple decayed, missing, filled teeth.  retained root-tips in posterior mandibular teeth.   Osseous pathology: none  Pulpal pathology: necrotic pulp on multiple teeth  Periodontal Pathology: generalized chronic periodontitis  Caries: all existing teeth  Odontogenic pathology: none         Assessment and Plan:   Assessment:  Maxillary arch: complete edentulism   Mandibular arch: non-restorable teeth, due to extensive  decay.    Plan:  See patient in [place] operating room for extractions of all mandibular teeth, possibly on Thursday (2/2/2017).  Please reduce INR to around 3.0, if drug holiday for heparin is not possible.  If dosage of heparin can be reduce, INR can be between closer to 3.5.  Please contact the dental care coordinator ([phone]) to arrange this.         The patient was seen by and discussed with: Dr. Danielle Washburn DDS, and Dr. Marissa Tam DDS.      Next Appointment: extractions of all mandibular teeth        Sommer Marshall DDS   PGY 1  Pager: [phone]

## 2017-01-27 NOTE — PROGRESS NOTES
Calorie Counts    Intake recorded for: 1/26 Kcals: 533 Protein: 26g    # Meals recorded: 100% fruit ice, chocolate chip cookie, 75% hot turkey sandwich with gravy, mashed potatoes with gravy, crystal light, 50% corn     # Supplements recorded: 0

## 2017-01-27 NOTE — PROGRESS NOTES
Care from 3P-7P:   Monitor shows 100% V paced 80. Heparin drip through PIV at 1100 units/hour per protocol.  Awaiting results from 1800 10a level. Up in recliner for this time period. Declined to stand but did agree to reposition with assist. C/O pain in right shoulder; received 5 mg oxycodone with some relief. Continues with lower body edema, +3. Continues on negrita counts. Ate most of supper. Compliant with fluid intake. See flowsheets for assessments and additional data.  A: Stable HF.   P: Continue heparin per protocol. Plan for eye and dental appointments tomorrow. Liver biopsy is planned; no date determined. Continue current cares and notify providers with issues and concerns.

## 2017-01-27 NOTE — PLAN OF CARE
Problem: Goal Outcome Summary  Goal: Goal Outcome Summary  OT 6C: Recommend discharge to TCU to increase endurance and ADL Ind. Pt max A for bed mobility and to don RLE brace. Pt max Ax2 with FWW for tx from bed to WC to leave for procedure.

## 2017-01-27 NOTE — PROGRESS NOTES
CC:  Patient presents with:  New Patient: migraines      HPI:   Samir Rodriguez is a 46 year old year-old patient who presents for eye exam. He broke his most recent glasses and has been using an older pair. Has been having headaches with old glasses and describes HA as involving the whole head with associated nausea, light and sound sensitivity. Has been having constant daily headaches for about 1 week. Broke glasses in November.     POH:   Refractive error   Remote history of trauma with barbed wire to one eye, patient unsure which     Current Ocular Meds:  None    ASSESSMENT & PLAN:    Samir Rodriguez is a 46 year old male with the following diagnoses:     1. Cataracts OU   Mild, not visually significant    Observe    2. Lattice degeneration OS   RD precautions were discussed. Patient instructed to contact clinic immediately if he/she experiences flashes, floaters, shadows/curtains in their peripheral vision, or a sudden change in vision.      3. Myopia with high astigmatism and anisometropia    Could explain some headache type symptoms, but timeline of when patient began having headaches more recent than glasses change   New glasses rx given   Further management of headaches per primary inpatient team and PCP    Recommend annual eye exams, can be done here for locally     Americo Youssef MD MPH   Ophthalmology Resident PGY-3      ~~~~~~~~~~~~~~~~~~~~~~~~~~~~~~~~~~~~~~~~~~~~~~~~~~~~~~~~~~~~~~~~    I have personally examined the patient and agree with the assessment and plan as delineated by the resident / fellow.  I have confirmed the contents of the chief complaint, history of present illness, review of systems, and medical / surgical history sections and edited the note as needed.    Ezio Hou MD, MA  Director, Cornea & Anterior Segment  [facility] Department of Ophthalmology & Visual Neuroscience

## 2017-01-27 NOTE — PROGRESS NOTES
"SPIRITUAL HEALTH SERVICES  SPIRITUAL ASSESSMENT Progress Note  [facility] ([place]) 6C     Visited with pt Samir, who said \"I have good news from yesterday. They don't have to repair three of my valves, just one.\"  Pt shared other updates regarding long-term plan of care - \"the main thing is for me to get stronger for my surgery.\"  I shared an online daily reflection from Bishop Dwight Hadley, a  retired [employer], and shared a prayer with Samir at his request.    PLAN: continue to follow, visiting pt at least 2x weekly while he is on unit.    Gian Mathias M.Div (Bill)., [facility]  Staff   Pager [phone]      "

## 2017-01-27 NOTE — PLAN OF CARE
Problem: Goal Outcome Summary  Goal: Goal Outcome Summary  Outcome: No Change  D: Congestive heart failure, rheumatic heart disease as a child, MVR.  I/A: Heparin drip continues--Heparin 10A result from 0200 was 0.55, infusion held for 30 min and reduced to 1200u/hr per protocol. Pt denies pain. Clonazepam given for anxiety. Pt states he is worried about the dental appointment and his pending heart procedure. VSS. Vpaced. Pt ate 2 slices toast with butter and jelly and also corn flakes for a snack.  P: Continue to monitor. Eye appointment at 0900. Dental appointment at 1300.

## 2017-01-28 LAB
ANION GAP SERPL CALCULATED.3IONS-SCNC: 11 MMOL/L (ref 3–14)
BUN SERPL-MCNC: 14 MG/DL (ref 7–30)
CALCIUM SERPL-MCNC: 7.3 MG/DL (ref 8.5–10.1)
CHLORIDE SERPL-SCNC: 96 MMOL/L (ref 94–109)
CO2 SERPL-SCNC: 24 MMOL/L (ref 20–32)
CREAT SERPL-MCNC: 1.24 MG/DL (ref 0.66–1.25)
ERYTHROCYTE [DISTWIDTH] IN BLOOD BY AUTOMATED COUNT: 19.7 % (ref 10–15)
GFR SERPL CREATININE-BSD FRML MDRD: 62 ML/MIN/1.7M2
GLUCOSE SERPL-MCNC: 89 MG/DL (ref 70–99)
HCT VFR BLD AUTO: 28.5 % (ref 40–53)
HGB BLD-MCNC: 9.5 G/DL (ref 13.3–17.7)
INR PPP: 1.81 (ref 0.86–1.14)
LACTATE BLD-SCNC: 1.5 MMOL/L (ref 0.7–2.1)
LMWH PPP CHRO-ACNC: 0.15 IU/ML
LMWH PPP CHRO-ACNC: 0.23 IU/ML
MAGNESIUM SERPL-MCNC: 2.1 MG/DL (ref 1.6–2.3)
MCH RBC QN AUTO: 31.9 PG (ref 26.5–33)
MCHC RBC AUTO-ENTMCNC: 33.3 G/DL (ref 31.5–36.5)
MCV RBC AUTO: 96 FL (ref 78–100)
PLATELET # BLD AUTO: 112 10E9/L (ref 150–450)
POTASSIUM SERPL-SCNC: 4 MMOL/L (ref 3.4–5.3)
RBC # BLD AUTO: 2.98 10E12/L (ref 4.4–5.9)
SODIUM SERPL-SCNC: 131 MMOL/L (ref 133–144)
WBC # BLD AUTO: 4.5 10E9/L (ref 4–11)

## 2017-01-28 PROCEDURE — 21400003 ZZH R&B CCU CRITICAL UMMC

## 2017-01-28 PROCEDURE — 85027 COMPLETE CBC AUTOMATED: CPT | Performed by: INTERNAL MEDICINE

## 2017-01-28 PROCEDURE — S0171 BUMETANIDE 0.5 MG: HCPCS | Performed by: INTERNAL MEDICINE

## 2017-01-28 PROCEDURE — 25000125 ZZHC RX 250: Performed by: INTERNAL MEDICINE

## 2017-01-28 PROCEDURE — 25000132 ZZH RX MED GY IP 250 OP 250 PS 637: Performed by: INTERNAL MEDICINE

## 2017-01-28 PROCEDURE — 25000132 ZZH RX MED GY IP 250 OP 250 PS 637: Performed by: STUDENT IN AN ORGANIZED HEALTH CARE EDUCATION/TRAINING PROGRAM

## 2017-01-28 PROCEDURE — 85610 PROTHROMBIN TIME: CPT | Performed by: INTERNAL MEDICINE

## 2017-01-28 PROCEDURE — 80048 BASIC METABOLIC PNL TOTAL CA: CPT | Performed by: INTERNAL MEDICINE

## 2017-01-28 PROCEDURE — 25000132 ZZH RX MED GY IP 250 OP 250 PS 637: Performed by: NURSE PRACTITIONER

## 2017-01-28 PROCEDURE — 85520 HEPARIN ASSAY: CPT | Performed by: INTERNAL MEDICINE

## 2017-01-28 PROCEDURE — 99232 SBSQ HOSP IP/OBS MODERATE 35: CPT | Mod: GC | Performed by: INTERNAL MEDICINE

## 2017-01-28 PROCEDURE — 83735 ASSAY OF MAGNESIUM: CPT | Performed by: INTERNAL MEDICINE

## 2017-01-28 PROCEDURE — 36415 COLL VENOUS BLD VENIPUNCTURE: CPT | Performed by: INTERNAL MEDICINE

## 2017-01-28 PROCEDURE — 83605 ASSAY OF LACTIC ACID: CPT | Performed by: STUDENT IN AN ORGANIZED HEALTH CARE EDUCATION/TRAINING PROGRAM

## 2017-01-28 RX ORDER — CLONAZEPAM 0.5 MG/1
0.25 TABLET ORAL 3 TIMES DAILY PRN
Status: DISCONTINUED | OUTPATIENT
Start: 2017-01-28 | End: 2017-02-12 | Stop reason: HOSPADM

## 2017-01-28 RX ADMIN — OMEPRAZOLE 20 MG: 20 CAPSULE, DELAYED RELEASE ORAL at 08:26

## 2017-01-28 RX ADMIN — OXYCODONE HYDROCHLORIDE 5 MG: 5 TABLET ORAL at 02:07

## 2017-01-28 RX ADMIN — HEPARIN SODIUM 1050 UNITS/HR: 10000 INJECTION, SOLUTION INTRAVENOUS at 16:38

## 2017-01-28 RX ADMIN — OXYCODONE HYDROCHLORIDE 5 MG: 5 TABLET ORAL at 21:29

## 2017-01-28 RX ADMIN — OXYCODONE HYDROCHLORIDE 5 MG: 5 TABLET ORAL at 14:59

## 2017-01-28 RX ADMIN — MULTIPLE VITAMINS W/ MINERALS TAB 1 TABLET: TAB at 08:26

## 2017-01-28 RX ADMIN — ACETAMINOPHEN 650 MG: 325 TABLET, FILM COATED ORAL at 01:18

## 2017-01-28 RX ADMIN — ACETAMINOPHEN 650 MG: 325 TABLET, FILM COATED ORAL at 08:25

## 2017-01-28 RX ADMIN — ACETAMINOPHEN 650 MG: 325 TABLET, FILM COATED ORAL at 14:57

## 2017-01-28 RX ADMIN — Medication: at 10:10

## 2017-01-28 RX ADMIN — POTASSIUM CHLORIDE 20 MEQ: 750 TABLET, EXTENDED RELEASE ORAL at 10:30

## 2017-01-28 RX ADMIN — MENTHOL 1 PATCH: 205.5 PATCH TOPICAL at 08:36

## 2017-01-28 RX ADMIN — LEVOTHYROXINE SODIUM 224 MCG: 112 TABLET ORAL at 08:25

## 2017-01-28 RX ADMIN — Medication 0.25 MG: at 20:11

## 2017-01-28 RX ADMIN — Medication: at 20:11

## 2017-01-28 RX ADMIN — LIDOCAINE 3 PATCH: 50 PATCH CUTANEOUS at 21:28

## 2017-01-28 RX ADMIN — DOCUSATE SODIUM 100 MG: 100 CAPSULE, LIQUID FILLED ORAL at 08:25

## 2017-01-28 RX ADMIN — METHOCARBAMOL 750 MG: 750 TABLET ORAL at 20:11

## 2017-01-28 RX ADMIN — BUMETANIDE 1 MG: 0.25 INJECTION, SOLUTION INTRAMUSCULAR; INTRAVENOUS at 16:34

## 2017-01-28 RX ADMIN — BUMETANIDE 1 MG: 0.25 INJECTION, SOLUTION INTRAMUSCULAR; INTRAVENOUS at 08:26

## 2017-01-28 RX ADMIN — Medication 0.25 MG: at 08:39

## 2017-01-28 RX ADMIN — METHOCARBAMOL 750 MG: 750 TABLET ORAL at 08:26

## 2017-01-28 RX ADMIN — ACETAMINOPHEN 650 MG: 325 TABLET, FILM COATED ORAL at 20:11

## 2017-01-28 RX ADMIN — OXYCODONE HYDROCHLORIDE 5 MG: 5 TABLET ORAL at 08:27

## 2017-01-28 RX ADMIN — DOCUSATE SODIUM 100 MG: 100 CAPSULE, LIQUID FILLED ORAL at 20:11

## 2017-01-28 ASSESSMENT — PAIN DESCRIPTION - DESCRIPTORS
DESCRIPTORS: ACHING

## 2017-01-28 NOTE — PLAN OF CARE
"Problem: Goal Outcome Summary  Goal: Goal Outcome Summary  Outcome: Improving  D/A: Pt up in chair at MN. Very talkative, alert and oriented, aware of condition and procedures, but also stated that during dinner he heard someone knocking on his window. V Paced on monitor at 80. BP continues to run low  82/52, MAP 61 tonight. Pt states he feels \"pretty good tonight\". Ate oatmeal and toast, sodium crackers and popsicle tonight Heparin gtt continues at 1050 u/h. Hep Xa at MN was 0.15 - no change required in rate. 2 to assist back to bed with walker and belt. Pain in right leg ( old fx) and R shoulder (torn rotator cuff). Mepilex drsgs on coccyx and both heels. Rt upper thigh is weeping serous liquid. Generalized edema present with sever edema of thighs and moderate edema of legs and scrotum/penis. Wt down 3 kg from 1/25.   I: Ht rate and rhythm monitored. Cares as needed. Food provided. Pain meds per prn protocol for R leg and R shoulder pain. Questions answered.  R: Pain decreased with pain meds. Pt in good spirits tonight.   PLAN: Cont to monitor or changes in status. Maintain heparin and follow Hep Xa levels.         "

## 2017-01-28 NOTE — PROGRESS NOTES
Patient A&O x 4, VSS, V paced HR 80. Dizziness when shifting positions, resolves after a couple seconds. Slightly hypotensive this afternoon, encouraging fluids. Heparin gtt continues at 1050 U/hr, 10a therapeutic, recheck in AM. Headache, right shoulder pain, right knee pain managed with scheduled tylenol and PRN oxycodone. Wounds cleaned with microklenz, dressings changed. Keep of coccyx while in bed. Continues to have 3+ edema in right hip that is draining serous fluid. Had a good appetite for breakfast. Bumex given in AM, good UOP. Potassium replaced per protocol. Will continue to monitor and notify team of any questions or concerns.

## 2017-01-28 NOTE — PROGRESS NOTES
Calorie Counts    Intake recorded for: 1/27 Kcal: 626 Protein: 27g    # meals recorded: 100% chocolate chip cookie, fresh fruit cup, 75% sweet and sour stir sharma w/veggies, brown rice, and grilled chicken, green peppers, onions, and mushrooms on the side    # supplements recorded: 0

## 2017-01-28 NOTE — PLAN OF CARE
D:pt had 1200 cc and a large bowel movement this shift  A:pt currently in chair, pressures lower pt asymptomatic  I:recheck BP at Midnight  P:labs at midnight

## 2017-01-29 ENCOUNTER — APPOINTMENT (OUTPATIENT)
Dept: PHYSICAL THERAPY | Facility: CLINIC | Age: 47
DRG: 286 | End: 2017-01-29
Attending: INTERNAL MEDICINE
Payer: MEDICAID

## 2017-01-29 LAB
ANION GAP SERPL CALCULATED.3IONS-SCNC: 6 MMOL/L (ref 3–14)
BUN SERPL-MCNC: 13 MG/DL (ref 7–30)
CALCIUM SERPL-MCNC: 7.2 MG/DL (ref 8.5–10.1)
CHLORIDE SERPL-SCNC: 98 MMOL/L (ref 94–109)
CO2 SERPL-SCNC: 27 MMOL/L (ref 20–32)
CREAT SERPL-MCNC: 1.29 MG/DL (ref 0.66–1.25)
ERYTHROCYTE [DISTWIDTH] IN BLOOD BY AUTOMATED COUNT: 19.8 % (ref 10–15)
GFR SERPL CREATININE-BSD FRML MDRD: 60 ML/MIN/1.7M2
GLUCOSE SERPL-MCNC: 78 MG/DL (ref 70–99)
HCT VFR BLD AUTO: 25.1 % (ref 40–53)
HGB BLD-MCNC: 8.4 G/DL (ref 13.3–17.7)
INR PPP: 1.94 (ref 0.86–1.14)
LACTATE BLD-SCNC: 1.7 MMOL/L (ref 0.7–2.1)
LMWH PPP CHRO-ACNC: 0.25 IU/ML
MAGNESIUM SERPL-MCNC: 1.9 MG/DL (ref 1.6–2.3)
MCH RBC QN AUTO: 32.2 PG (ref 26.5–33)
MCHC RBC AUTO-ENTMCNC: 33.5 G/DL (ref 31.5–36.5)
MCV RBC AUTO: 96 FL (ref 78–100)
PLATELET # BLD AUTO: 106 10E9/L (ref 150–450)
POTASSIUM SERPL-SCNC: 4 MMOL/L (ref 3.4–5.3)
RBC # BLD AUTO: 2.61 10E12/L (ref 4.4–5.9)
SODIUM SERPL-SCNC: 130 MMOL/L (ref 133–144)
WBC # BLD AUTO: 3.2 10E9/L (ref 4–11)

## 2017-01-29 PROCEDURE — 85520 HEPARIN ASSAY: CPT | Performed by: INTERNAL MEDICINE

## 2017-01-29 PROCEDURE — 85027 COMPLETE CBC AUTOMATED: CPT | Performed by: INTERNAL MEDICINE

## 2017-01-29 PROCEDURE — 25000132 ZZH RX MED GY IP 250 OP 250 PS 637: Performed by: INTERNAL MEDICINE

## 2017-01-29 PROCEDURE — 25000125 ZZHC RX 250: Performed by: INTERNAL MEDICINE

## 2017-01-29 PROCEDURE — 80048 BASIC METABOLIC PNL TOTAL CA: CPT | Performed by: INTERNAL MEDICINE

## 2017-01-29 PROCEDURE — 83735 ASSAY OF MAGNESIUM: CPT | Performed by: INTERNAL MEDICINE

## 2017-01-29 PROCEDURE — 99232 SBSQ HOSP IP/OBS MODERATE 35: CPT | Mod: GC | Performed by: INTERNAL MEDICINE

## 2017-01-29 PROCEDURE — 25000132 ZZH RX MED GY IP 250 OP 250 PS 637: Performed by: NURSE PRACTITIONER

## 2017-01-29 PROCEDURE — 83605 ASSAY OF LACTIC ACID: CPT | Performed by: INTERNAL MEDICINE

## 2017-01-29 PROCEDURE — 25000132 ZZH RX MED GY IP 250 OP 250 PS 637: Performed by: STUDENT IN AN ORGANIZED HEALTH CARE EDUCATION/TRAINING PROGRAM

## 2017-01-29 PROCEDURE — 85610 PROTHROMBIN TIME: CPT | Performed by: INTERNAL MEDICINE

## 2017-01-29 PROCEDURE — 36415 COLL VENOUS BLD VENIPUNCTURE: CPT | Performed by: INTERNAL MEDICINE

## 2017-01-29 PROCEDURE — 21400003 ZZH R&B CCU CRITICAL UMMC

## 2017-01-29 PROCEDURE — 97530 THERAPEUTIC ACTIVITIES: CPT | Mod: GP

## 2017-01-29 PROCEDURE — 97110 THERAPEUTIC EXERCISES: CPT | Mod: GP

## 2017-01-29 PROCEDURE — 40000193 ZZH STATISTIC PT WARD VISIT

## 2017-01-29 RX ADMIN — ACETAMINOPHEN 650 MG: 325 TABLET, FILM COATED ORAL at 09:30

## 2017-01-29 RX ADMIN — ACETAMINOPHEN 650 MG: 325 TABLET, FILM COATED ORAL at 14:35

## 2017-01-29 RX ADMIN — MULTIPLE VITAMINS W/ MINERALS TAB 1 TABLET: TAB at 09:30

## 2017-01-29 RX ADMIN — OXYCODONE HYDROCHLORIDE 5 MG: 5 TABLET ORAL at 09:38

## 2017-01-29 RX ADMIN — OXYCODONE HYDROCHLORIDE 5 MG: 5 TABLET ORAL at 23:01

## 2017-01-29 RX ADMIN — OMEPRAZOLE 20 MG: 20 CAPSULE, DELAYED RELEASE ORAL at 09:30

## 2017-01-29 RX ADMIN — Medication: at 20:23

## 2017-01-29 RX ADMIN — Medication 0.25 MG: at 18:02

## 2017-01-29 RX ADMIN — DOCUSATE SODIUM 100 MG: 100 CAPSULE, LIQUID FILLED ORAL at 20:20

## 2017-01-29 RX ADMIN — HEPARIN SODIUM 1050 UNITS/HR: 10000 INJECTION, SOLUTION INTRAVENOUS at 16:45

## 2017-01-29 RX ADMIN — SENNOSIDES 8.6 MG: 8.6 TABLET, FILM COATED ORAL at 23:20

## 2017-01-29 RX ADMIN — Medication 0.25 MG: at 11:05

## 2017-01-29 RX ADMIN — ACETAMINOPHEN 650 MG: 325 TABLET, FILM COATED ORAL at 20:20

## 2017-01-29 RX ADMIN — LIDOCAINE 3 PATCH: 50 PATCH CUTANEOUS at 20:23

## 2017-01-29 RX ADMIN — Medication: at 09:31

## 2017-01-29 RX ADMIN — Medication 2 G: at 10:58

## 2017-01-29 RX ADMIN — OXYCODONE HYDROCHLORIDE 5 MG: 5 TABLET ORAL at 16:45

## 2017-01-29 RX ADMIN — MENTHOL 1 PATCH: 205.5 PATCH TOPICAL at 09:38

## 2017-01-29 RX ADMIN — ONDANSETRON HYDROCHLORIDE 4 MG: 4 TABLET, FILM COATED ORAL at 10:58

## 2017-01-29 RX ADMIN — LEVOTHYROXINE SODIUM 224 MCG: 112 TABLET ORAL at 09:30

## 2017-01-29 RX ADMIN — METHOCARBAMOL 750 MG: 750 TABLET ORAL at 09:30

## 2017-01-29 RX ADMIN — OXYCODONE HYDROCHLORIDE 5 MG: 5 TABLET ORAL at 03:33

## 2017-01-29 RX ADMIN — DOCUSATE SODIUM 100 MG: 100 CAPSULE, LIQUID FILLED ORAL at 09:30

## 2017-01-29 RX ADMIN — METHOCARBAMOL 750 MG: 750 TABLET ORAL at 20:20

## 2017-01-29 ASSESSMENT — PAIN DESCRIPTION - DESCRIPTORS
DESCRIPTORS: ACHING
DESCRIPTORS: HEADACHE

## 2017-01-29 NOTE — PLAN OF CARE
Problem: Goal Outcome Summary  Goal: Goal Outcome Summary  PT / 6C: Pt completes bed mobility CGA. Pt performed sit <> stand from raised bed with FWW modA x 2, cues provided for sequencing and form. In standing position, pt with improved ability to bring R LE within ALBINA this date. However continues to c/o R shoulder pain and demo limited weight acceptance on R LE despite verbal cues and M-L weight shifting exercise. Pt performs x5 sit <> stand transfers for improved sequencing and LE strength. Pt states dizziness improves with continued activity. Pt ambulates ~6ft with FWW Spring x 2. Pt reluctant to put full weight through RLE and demo antalgic gait pattern with decreased step length/height/joy. Pt also engaged in B LE therex.  Recommend DC to TCU for continued therapy once medically appropriate.

## 2017-01-29 NOTE — PROGRESS NOTES
Calorie Counts    Intake recorded for: 1/27 Kcals: 1820 Protein: 60g    # meals recorded: Three meals (first--100% 8 oz skim milk, 4 oz orange juice, boiled egg, hash browns w/ketchup packet, 75% oatmeal)     (second--100% oatmeal w/4 oz 1% milk, one slice toast w/two butter packets and jelly, three packets crackers, one popsicle)     (third--100% orange fruit ice, chocolate chip cookie, 75% stir sharma w/veggies, brown rice, and grilled chicken)    # supplements recorded: 0

## 2017-01-29 NOTE — PLAN OF CARE
Problem: Goal Outcome Summary  Goal: Goal Outcome Summary    Hx of Rheumatic heart disease and mechanical MVRx2 transferred from [place] for further HF management. BP continue to be soft, MD aware, All other VSS, paced rhythm. Pain in right knee and coccyx well controlled with PRN oxycodone, lidocaine patches and repositioning. Heparin gtt continues at 1050 units/hr, 10A therapeutic. Right hip continues to weep serous fluid. Up with heavy assist of two and walker (with leg brace on).  Plan is for liver biopsy and teeth extractions this week while continuing to optimize fluid status with end goal to complete an MVR, TVR, and AVR in near future. Continue to monitor and notify MD with pertinent changes.

## 2017-01-29 NOTE — PROGRESS NOTES
Cardiology 1 Progress Note    Samir Rodriguez MRN# [id number]   Age: 46 year old YOB: 1970   Date of Admission: 1/12/2017           Assessment and Plan:   Samir Rodriguez is a 46 year old  male with past medical history of Rheumatic Heart Disease s/p mechanical MVR x 2 (1980s and 1992) on warfarin (INR goal 2.5-3.5), biventricular CHF (EF 25-30%) s/p dual chamber ICD 2008, chronic afib on amiodarone and previously on digoxine (stopped 1/16/17 due to concern for toxicity), WPW ablation at age 11, CKD III, hypothyroid,  who was transferred from [facility] on 01/12 for further eval of CHF and concern of hemolysis in setting of mechanical valve.    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 Echocardiogram on admission demonstrated moderate-severe aortic insufficiency which is his most acute cardiac pathology. His tricuspid regurgitation is likely due to his signficantly fluid overloaded state. Mechanical valve maintained on warfarin w/ INR 2.5-3.5 prior to this admission. Angiogram performed on 01/20 revealed nonobstructive CAD.  - EDEL shows probable restriction of one of the mitral valve leafets; if he is a candidate for valvular surgery, mitral valve replacemnt will have to be considered as well  - Dental advising mandibular teeth extraction before valve surgery- had  pre-extraction evaluation at dental clinic  -> on Monday contact dental care coordinator to arrange extractions for Thursday 2/2/17  - Cardiovascular surgery consulted; appreciate recommendations  - heart failure service following; receiving workup for possible VAD as backup for valve surgery  - will need liver biopsy as part of VAD workup    # Anemia and thrombocytopenia  # Right Arm Hematoma   Blood smear: blood smear- RBCs show some target cells, hypochromia, increased polychromasia, no schistocytes or spherocytes. Low haptoglobin, high LDH, and plasma free hemoglobin concerning for intra-vascular hemolysis possibly due to valvular disease. No evidence of overt infection causing DIC. EPO inappropriately low w/ recent ARF. Etiology of hematoma likely due to supratherapeutic INR- 4.6 on arrival  - warfarin w/ goal INR 2.5-3.0   - on heparin gtt with planned liver biopsy this week   - holding warfarin    #. Cirrhosis and liver nodularity  Demonstrated on CT, although u/s was normal. CT read is concerning for amiodarone toxicity  - GI consulted, appreciate recommendations  - decreased scheduled tylenol dose  - liver biopsy next week     # Paroxsymal afib:  digoxin level 0.7 on 1/12 and 1/17. 1/16/17 device interrogation: atrial tachycardia to 150s with AV block. Lower rate setting changed to 80bpm from 60bmp and device changed from DDDR to VVIR on 01/16; atrial tachycardia and AV block and V pacing. The patient has been in an atrial flutter that is undersensed, hence the device was switched to VVI mode. There is no point continuing amiodarone at this stage especially with concern for toxicity  - stopped amiodarone.    # Hyperthyroid: Will need repeat TSH/T4 1-2 months post DC. Continue levothyroxine 224mcg   # Anxiety and insomnia: Increased clonazepam to bid    # Right tibia fx and R knee trauma:  Fall in November prompted decompensation. Xray tibia and knee no fx this admission.  - Ortho evaluated; planning for  outpatient follow up with orthopedic surgery in 4-6 weeks  - PT/OT    # Headaches  May be due to near-sightedness vs rebound headache from opioid use vs reduced cardiac output  - pain consult with pain for opioid titration  - eye exam 1/27- received new Rx  - using lidocaine patches  - PRN zofran    # Pulmonary nodules: incidentally found on CT  - repeat CT chest in 6 months    FEN: cardiac diet   PPX: on heparin gtt  Dispo: Difficult placement due to out of state Medicaid.    Code Status: Full CODE      Patient discussed with staff attending, Dr. Tovar.     Joan Burks MD   PGY3  [phone]    Attending: Patient seen and examined with Dr. Burks. The H&P is accurate as recorded. Any additional findings have been incorporated into the body of the note.  All labs and imaging studies reviewed personally. The assessment and plans outlined reflect our joint plan, arrived at after careful review and consideration of the case.    Koffi Tovar MD           Interval History/Subjective   No acute events overnight. Headaches still present; blood pressures lower again 80's/40's. Asymptomatic. no CP, SOB, lightheadedness.         Objective   Temp:  [98.2  F (36.8  C)-99.5  F (37.5  C)] 99.5  F (37.5  C)  Heart Rate:  [79-80] 79  Resp:  [16-18] 18  BP: (84-89)/(45-55) 89/55 mmHg  SpO2:  [96 %-99 %] 96 %    Physical exam:  Gen: AA&Ox3, no acute distress  HEENT: NACT, poor dentition   PULM: Clear lungs  CV: RRR; mechanical s2 w/ 2+ systolic murmur at LUSB and LISB: JVP of 6-8cm  ABD:  soft, nontender, nondistended.    EXT: trace sonam edema to knees. Right arm ecchymosis stable   SKIN: Right arm resolving ecchymosis outlined w/ marker   NEURO: alert and oriented; speech intact         Data:   CBC    Recent Labs  Lab 01/29/17  0720 01/28/17  0749 01/27/17  0713 01/26/17  0827   WBC 3.2* 4.5 3.4* 3.5*   RBC 2.61* 2.98* 2.74* 2.96*   HGB 8.4* 9.5* 8.7* 9.4*   HCT 25.1* 28.5* 26.3* 28.4*   MCV 96 96 96 96   MCH 32.2 31.9  31.8 31.8   MCHC 33.5 33.3 33.1 33.1   RDW 19.8* 19.7* 19.9* 20.1*   * 112* 91* 77*     CMP    Recent Labs  Lab 01/29/17  0720 01/28/17  0749 01/27/17  0713 01/26/17  0827  01/24/17  0726   * 131* 129* 129*  < > 128*   POTASSIUM 4.0 4.0 3.9 3.8  < > 4.5   CHLORIDE 98 96 95 94  < > 93*   CO2 27 24 25 26  < > 28   ANIONGAP 6 11 10 8  < > 7   GLC 78 89 82 76  < > 73   BUN 13 14 14 14  < > 16   CR 1.29* 1.24 1.27* 1.26*  < > 1.43*   GFRESTIMATED 60* 62 61 61  < > 53*   GFRESTBLACK 72 75 73 74  < > 64   DELFINO 7.2* 7.3* 7.1* 7.7*  < > 7.6*   MAG 1.9 2.1 1.9 2.1  < > 2.0   PROTTOTAL  --   --   --   --   --  5.1*   ALBUMIN  --   --   --   --   --  1.8*   BILITOTAL  --   --   --   --   --  1.9*   ALKPHOS  --   --   --   --   --  121   AST  --   --   --   --   --  60*   ALT  --   --   --   --   --  58   < > = values in this interval not displayed.  INR    Recent Labs  Lab 01/29/17  0720 01/28/17  0749 01/27/17  0713 01/26/17  0827   INR 1.94* 1.81* 2.38* 2.32*             Medications:     Current Facility-Administered Medications   Medication     clonazePAM (klonoPIN) half-tab 0.25 mg     bumetanide (BUMEX) injection 1 mg     oxyCODONE (ROXICODONE) IR tablet 5 mg     heparin  drip 25,000 units in 0.45% NaCl 250 mL (see additional administration details for dose)     heparin bolus from infusion pump     menthol (ICY HOT) 5 % patch 1 patch    And     menthol (ICY HOT) Patch in Place    And     menthol (ICY HOT) patch REMOVAL     acetaminophen (TYLENOL) tablet 650 mg     ondansetron (ZOFRAN) tablet 4 mg     lidocaine (LIDODERM) 5 % Patch 1-3 patch    And     lidocaine (LIDODERM) patch REMOVAL    And     lidocaine (LIDODERM) Patch in Place     lidocaine 1 % 1 mL     lidocaine (LMX4) kit     sodium chloride (PF) 0.9% PF flush 3 mL     docusate sodium (COLACE) capsule 100 mg     potassium chloride SA (K-DUR/KLOR-CON M) CR tablet 20-40 mEq     potassium chloride (KLOR-CON) Packet 20-40 mEq     potassium chloride 10 mEq in  100 mL intermittent infusion     potassium chloride 10 mEq in 100 mL intermittent infusion with 10 mg lidocaine     potassium chloride 20 mEq in 50 mL intermittent infusion     magnesium sulfate 2 g in NS intermittent infusion (PharMEDium or FV Cmpd)     magnesium sulfate 4 g in 100 mL sterile water (premade)     potassium phosphate 15 mmol in D5W 250 mL intermittent infusion     potassium phosphate 20 mmol in D5W 500 mL intermittent infusion     potassium phosphate 20 mmol in D5W 250 mL intermittent infusion     potassium phosphate 25 mmol in D5W 500 mL intermittent infusion     multivitamin, therapeutic with minerals (THERA-VIT-M) tablet 1 tablet     melatonin tablet 3 mg     miconazole (MICATIN; MICRO GUARD) 2 % powder     lidocaine 1 % 1 mL     lidocaine (LMX4) kit     sodium chloride (PF) 0.9% PF flush 3 mL     sodium chloride (PF) 0.9% PF flush 3 mL     medication instruction     nitroglycerin (NITROSTAT) sublingual tablet 0.4 mg     alum & mag hydroxide-simethicone (MYLANTA ES/MAALOX  ES) suspension 15-30 mL     polyethylene glycol (MIRALAX/GLYCOLAX) Packet 17 g     albuterol (PROAIR HFA/PROVENTIL HFA/VENTOLIN HFA) Inhaler 2 puff     sennosides (SENOKOT) tablet 8.6 mg     naloxone (NARCAN) injection 0.1-0.4 mg     levothyroxine (SYNTHROID/LEVOTHROID) tablet 224 mcg     omeprazole (priLOSEC) CR capsule 20 mg     methocarbamol (ROBAXIN) tablet 750 mg

## 2017-01-29 NOTE — PLAN OF CARE
"D:pt some what fearful of upcoming surgery,\"I think I might die on the table\"  I:listened and validated pt's fears, providing support  P:pt needs further conditioning before option of surgery, continue with Primary orders  "

## 2017-01-30 ENCOUNTER — APPOINTMENT (OUTPATIENT)
Dept: INTERVENTIONAL RADIOLOGY/VASCULAR | Facility: CLINIC | Age: 47
DRG: 286 | End: 2017-01-30
Attending: RADIOLOGY PRACTITIONER ASSISTANT
Payer: MEDICAID

## 2017-01-30 ENCOUNTER — APPOINTMENT (OUTPATIENT)
Dept: OCCUPATIONAL THERAPY | Facility: CLINIC | Age: 47
DRG: 286 | End: 2017-01-30
Attending: INTERNAL MEDICINE
Payer: MEDICAID

## 2017-01-30 LAB
ANION GAP SERPL CALCULATED.3IONS-SCNC: 9 MMOL/L (ref 3–14)
BUN SERPL-MCNC: 14 MG/DL (ref 7–30)
CALCIUM SERPL-MCNC: 7.7 MG/DL (ref 8.5–10.1)
CHLORIDE SERPL-SCNC: 97 MMOL/L (ref 94–109)
CO2 SERPL-SCNC: 25 MMOL/L (ref 20–32)
CREAT SERPL-MCNC: 1.25 MG/DL (ref 0.66–1.25)
ERYTHROCYTE [DISTWIDTH] IN BLOOD BY AUTOMATED COUNT: 19.7 % (ref 10–15)
GFR SERPL CREATININE-BSD FRML MDRD: 62 ML/MIN/1.7M2
GLUCOSE SERPL-MCNC: 76 MG/DL (ref 70–99)
HCT VFR BLD AUTO: 29.2 % (ref 40–53)
HGB BLD-MCNC: 9.5 G/DL (ref 13.3–17.7)
INR PPP: 1.57 (ref 0.86–1.14)
LMWH PPP CHRO-ACNC: 0.22 IU/ML
LMWH PPP CHRO-ACNC: NORMAL IU/ML
MAGNESIUM SERPL-MCNC: 2.1 MG/DL (ref 1.6–2.3)
MCH RBC QN AUTO: 31.7 PG (ref 26.5–33)
MCHC RBC AUTO-ENTMCNC: 32.5 G/DL (ref 31.5–36.5)
MCV RBC AUTO: 97 FL (ref 78–100)
PLATELET # BLD AUTO: 140 10E9/L (ref 150–450)
POTASSIUM SERPL-SCNC: 4.1 MMOL/L (ref 3.4–5.3)
RBC # BLD AUTO: 3 10E12/L (ref 4.4–5.9)
SODIUM SERPL-SCNC: 131 MMOL/L (ref 133–144)
WBC # BLD AUTO: 4 10E9/L (ref 4–11)

## 2017-01-30 PROCEDURE — 25000132 ZZH RX MED GY IP 250 OP 250 PS 637: Performed by: INTERNAL MEDICINE

## 2017-01-30 PROCEDURE — 36415 COLL VENOUS BLD VENIPUNCTURE: CPT | Performed by: INTERNAL MEDICINE

## 2017-01-30 PROCEDURE — 99153 MOD SED SAME PHYS/QHP EA: CPT

## 2017-01-30 PROCEDURE — 25500064 ZZH RX 255 OP 636: Performed by: RADIOLOGY

## 2017-01-30 PROCEDURE — 99152 MOD SED SAME PHYS/QHP 5/>YRS: CPT

## 2017-01-30 PROCEDURE — 0FB13ZX EXCISION OF RIGHT LOBE LIVER, PERCUTANEOUS APPROACH, DIAGNOSTIC: ICD-10-PCS | Performed by: RADIOLOGY

## 2017-01-30 PROCEDURE — 93010 ELECTROCARDIOGRAM REPORT: CPT | Performed by: INTERNAL MEDICINE

## 2017-01-30 PROCEDURE — 37200 TRANSCATHETER BIOPSY: CPT

## 2017-01-30 PROCEDURE — 85610 PROTHROMBIN TIME: CPT | Performed by: INTERNAL MEDICINE

## 2017-01-30 PROCEDURE — 99232 SBSQ HOSP IP/OBS MODERATE 35: CPT | Mod: GC | Performed by: INTERNAL MEDICINE

## 2017-01-30 PROCEDURE — 27210805 ZZH SHEATH CR4

## 2017-01-30 PROCEDURE — 88307 TISSUE EXAM BY PATHOLOGIST: CPT | Performed by: STUDENT IN AN ORGANIZED HEALTH CARE EDUCATION/TRAINING PROGRAM

## 2017-01-30 PROCEDURE — 25000132 ZZH RX MED GY IP 250 OP 250 PS 637: Performed by: STUDENT IN AN ORGANIZED HEALTH CARE EDUCATION/TRAINING PROGRAM

## 2017-01-30 PROCEDURE — 85027 COMPLETE CBC AUTOMATED: CPT | Performed by: INTERNAL MEDICINE

## 2017-01-30 PROCEDURE — 25000132 ZZH RX MED GY IP 250 OP 250 PS 637: Performed by: NURSE PRACTITIONER

## 2017-01-30 PROCEDURE — 75970 VASCULAR BIOPSY: CPT

## 2017-01-30 PROCEDURE — 85520 HEPARIN ASSAY: CPT | Performed by: INTERNAL MEDICINE

## 2017-01-30 PROCEDURE — 27210808 ZZH SHEATH CR7

## 2017-01-30 PROCEDURE — S0171 BUMETANIDE 0.5 MG: HCPCS | Performed by: STUDENT IN AN ORGANIZED HEALTH CARE EDUCATION/TRAINING PROGRAM

## 2017-01-30 PROCEDURE — 99207 ZZC CDG-CORRECTLY CODED, REVIEWED AND AGREE: CPT | Performed by: NURSE PRACTITIONER

## 2017-01-30 PROCEDURE — 36011 PLACE CATHETER IN VEIN: CPT

## 2017-01-30 PROCEDURE — 40000133 ZZH STATISTIC OT WARD VISIT: Performed by: OCCUPATIONAL THERAPIST

## 2017-01-30 PROCEDURE — C1769 GUIDE WIRE: HCPCS

## 2017-01-30 PROCEDURE — 27210914 ZZH SHEATH CR8

## 2017-01-30 PROCEDURE — 75885 VEIN X-RAY LIVER W/HEMODYNAM: CPT | Mod: XU

## 2017-01-30 PROCEDURE — 97535 SELF CARE MNGMENT TRAINING: CPT | Mod: GO | Performed by: OCCUPATIONAL THERAPIST

## 2017-01-30 PROCEDURE — 27210732 ZZH ACCESSORY CR1

## 2017-01-30 PROCEDURE — 99232 SBSQ HOSP IP/OBS MODERATE 35: CPT | Performed by: NURSE PRACTITIONER

## 2017-01-30 PROCEDURE — 83735 ASSAY OF MAGNESIUM: CPT | Performed by: INTERNAL MEDICINE

## 2017-01-30 PROCEDURE — 27210905 ZZH KIT CR7

## 2017-01-30 PROCEDURE — 88313 SPECIAL STAINS GROUP 2: CPT | Performed by: STUDENT IN AN ORGANIZED HEALTH CARE EDUCATION/TRAINING PROGRAM

## 2017-01-30 PROCEDURE — 25000125 ZZHC RX 250: Performed by: RADIOLOGY

## 2017-01-30 PROCEDURE — 21400003 ZZH R&B CCU CRITICAL UMMC

## 2017-01-30 PROCEDURE — 25000125 ZZHC RX 250: Performed by: STUDENT IN AN ORGANIZED HEALTH CARE EDUCATION/TRAINING PROGRAM

## 2017-01-30 PROCEDURE — 80048 BASIC METABOLIC PNL TOTAL CA: CPT | Performed by: INTERNAL MEDICINE

## 2017-01-30 PROCEDURE — 93005 ELECTROCARDIOGRAM TRACING: CPT

## 2017-01-30 RX ORDER — FENTANYL CITRATE 50 UG/ML
25-50 INJECTION, SOLUTION INTRAMUSCULAR; INTRAVENOUS EVERY 5 MIN PRN
Status: DISCONTINUED | OUTPATIENT
Start: 2017-01-30 | End: 2017-01-30 | Stop reason: HOSPADM

## 2017-01-30 RX ORDER — FLUMAZENIL 0.1 MG/ML
0.2 INJECTION, SOLUTION INTRAVENOUS
Status: DISCONTINUED | OUTPATIENT
Start: 2017-01-30 | End: 2017-01-30 | Stop reason: HOSPADM

## 2017-01-30 RX ORDER — WARFARIN SODIUM 5 MG/1
5 TABLET ORAL
Status: COMPLETED | OUTPATIENT
Start: 2017-01-30 | End: 2017-01-30

## 2017-01-30 RX ORDER — NALOXONE HYDROCHLORIDE 0.4 MG/ML
.1-.4 INJECTION, SOLUTION INTRAMUSCULAR; INTRAVENOUS; SUBCUTANEOUS
Status: DISCONTINUED | OUTPATIENT
Start: 2017-01-30 | End: 2017-01-30 | Stop reason: HOSPADM

## 2017-01-30 RX ORDER — IODIXANOL 320 MG/ML
100 INJECTION, SOLUTION INTRAVASCULAR ONCE
Status: COMPLETED | OUTPATIENT
Start: 2017-01-30 | End: 2017-01-30

## 2017-01-30 RX ORDER — BUMETANIDE 0.25 MG/ML
1 INJECTION INTRAMUSCULAR; INTRAVENOUS ONCE
Status: COMPLETED | OUTPATIENT
Start: 2017-01-30 | End: 2017-01-30

## 2017-01-30 RX ADMIN — METHOCARBAMOL 750 MG: 750 TABLET ORAL at 19:58

## 2017-01-30 RX ADMIN — Medication: at 19:58

## 2017-01-30 RX ADMIN — DOCUSATE SODIUM 100 MG: 100 CAPSULE, LIQUID FILLED ORAL at 19:58

## 2017-01-30 RX ADMIN — FENTANYL CITRATE 50 MCG: 50 INJECTION, SOLUTION INTRAMUSCULAR; INTRAVENOUS at 15:20

## 2017-01-30 RX ADMIN — MIDAZOLAM 1 MG: 1 INJECTION INTRAMUSCULAR; INTRAVENOUS at 15:16

## 2017-01-30 RX ADMIN — FENTANYL CITRATE 50 MCG: 50 INJECTION, SOLUTION INTRAMUSCULAR; INTRAVENOUS at 15:10

## 2017-01-30 RX ADMIN — OXYCODONE HYDROCHLORIDE 5 MG: 5 TABLET ORAL at 12:33

## 2017-01-30 RX ADMIN — ACETAMINOPHEN 650 MG: 325 TABLET, FILM COATED ORAL at 13:55

## 2017-01-30 RX ADMIN — LEVOTHYROXINE SODIUM 175 MCG: 25 TABLET ORAL at 09:27

## 2017-01-30 RX ADMIN — MULTIPLE VITAMINS W/ MINERALS TAB 1 TABLET: TAB at 09:21

## 2017-01-30 RX ADMIN — MIDAZOLAM 1 MG: 1 INJECTION INTRAMUSCULAR; INTRAVENOUS at 15:20

## 2017-01-30 RX ADMIN — FENTANYL CITRATE 50 MCG: 50 INJECTION, SOLUTION INTRAMUSCULAR; INTRAVENOUS at 15:06

## 2017-01-30 RX ADMIN — OXYCODONE HYDROCHLORIDE 5 MG: 5 TABLET ORAL at 05:48

## 2017-01-30 RX ADMIN — FENTANYL CITRATE 50 MCG: 50 INJECTION, SOLUTION INTRAMUSCULAR; INTRAVENOUS at 15:50

## 2017-01-30 RX ADMIN — MIDAZOLAM 1 MG: 1 INJECTION INTRAMUSCULAR; INTRAVENOUS at 15:26

## 2017-01-30 RX ADMIN — FENTANYL CITRATE 50 MCG: 50 INJECTION, SOLUTION INTRAMUSCULAR; INTRAVENOUS at 15:56

## 2017-01-30 RX ADMIN — DOCUSATE SODIUM 100 MG: 100 CAPSULE, LIQUID FILLED ORAL at 09:21

## 2017-01-30 RX ADMIN — ACETAMINOPHEN 650 MG: 325 TABLET, FILM COATED ORAL at 19:58

## 2017-01-30 RX ADMIN — FENTANYL CITRATE 50 MCG: 50 INJECTION, SOLUTION INTRAMUSCULAR; INTRAVENOUS at 15:26

## 2017-01-30 RX ADMIN — ALUMINUM HYDROXIDE, MAGNESIUM HYDROXIDE, AND DIMETHICONE 30 ML: 400; 400; 40 SUSPENSION ORAL at 05:48

## 2017-01-30 RX ADMIN — LIDOCAINE 3 PATCH: 50 PATCH CUTANEOUS at 19:58

## 2017-01-30 RX ADMIN — MIDAZOLAM 1 MG: 1 INJECTION INTRAMUSCULAR; INTRAVENOUS at 15:10

## 2017-01-30 RX ADMIN — MIDAZOLAM 1 MG: 1 INJECTION INTRAMUSCULAR; INTRAVENOUS at 15:56

## 2017-01-30 RX ADMIN — Medication 5000 UNITS: at 15:17

## 2017-01-30 RX ADMIN — METHOCARBAMOL 750 MG: 750 TABLET ORAL at 09:20

## 2017-01-30 RX ADMIN — Medication: at 10:00

## 2017-01-30 RX ADMIN — OMEPRAZOLE 20 MG: 20 CAPSULE, DELAYED RELEASE ORAL at 09:20

## 2017-01-30 RX ADMIN — WARFARIN SODIUM 5 MG: 5 TABLET ORAL at 18:24

## 2017-01-30 RX ADMIN — ACETAMINOPHEN 650 MG: 325 TABLET, FILM COATED ORAL at 09:20

## 2017-01-30 RX ADMIN — MIDAZOLAM 1 MG: 1 INJECTION INTRAMUSCULAR; INTRAVENOUS at 15:06

## 2017-01-30 RX ADMIN — BUMETANIDE 1 MG: 0.25 INJECTION, SOLUTION INTRAMUSCULAR; INTRAVENOUS at 09:28

## 2017-01-30 RX ADMIN — IODIXANOL 30 ML: 320 INJECTION, SOLUTION INTRAVASCULAR at 16:19

## 2017-01-30 RX ADMIN — MIDAZOLAM 1 MG: 1 INJECTION INTRAMUSCULAR; INTRAVENOUS at 15:31

## 2017-01-30 RX ADMIN — FENTANYL CITRATE 50 MCG: 50 INJECTION, SOLUTION INTRAMUSCULAR; INTRAVENOUS at 15:31

## 2017-01-30 RX ADMIN — FENTANYL CITRATE 50 MCG: 50 INJECTION, SOLUTION INTRAMUSCULAR; INTRAVENOUS at 15:16

## 2017-01-30 RX ADMIN — OXYCODONE HYDROCHLORIDE 5 MG: 5 TABLET ORAL at 19:58

## 2017-01-30 RX ADMIN — MIDAZOLAM 1 MG: 1 INJECTION INTRAMUSCULAR; INTRAVENOUS at 15:50

## 2017-01-30 ASSESSMENT — PAIN DESCRIPTION - DESCRIPTORS: DESCRIPTORS: ACHING

## 2017-01-30 NOTE — PROGRESS NOTES
Interventional Radiology Pre-Procedure Sedation Assessment   Time of Assessment: 2:55 PM    Expected Level: Moderate Sedation    Indication: Sedation is required for the following type of Procedure: Biopsy    Sedation and procedural consent: Risks, benefits and alternatives were discussed with Patient    PO Intake: Appropriately NPO for procedure    ASA Class: Class 3 - SEVERE SYSTEMIC DISEASE, DEFINITE FUNCTIONAL LIMITATIONS.    Mallampati: Grade 2:  Soft palate, base of uvula, tonsillar pillars, and portion of posterior pharyngeal wall visible    Lungs: Lungs Clear with good breath sounds bilaterally    Heart: Normal heart sounds and rate    History and physical reviewed and no updates needed. I have reviewed the lab findings, diagnostic data, medications, and the plan for sedation. I have determined this patient to be an appropriate candidate for the planned sedation and procedure and have reassessed the patient IMMEDIATELY PRIOR to sedation and procedure.    Alex P. Pallas, DO

## 2017-01-30 NOTE — PROGRESS NOTES
Interventional Radiology Brief Post Procedure Note    Procedure: @[email or web address]([id number])@    Proceduralist: Leticia Beckford MD    Assistant: Alex P. Pallas,     Time Out: Prior to the start of the procedure and with procedural staff participation, I verbally confirmed the patient s identity using two indicators, relevant allergies, that the procedure was appropriate and matched the consent or emergent situation, and that the correct equipment/implants were available. Immediately prior to starting the procedure I conducted the Time Out with the procedural staff and re-confirmed the patient s name, procedure, and site/side. (The Joint Commission universal protocol was followed.)  Yes    Sedation: IR Nurse Monitored Care   Post Procedure Summary:  Prior to the start of the procedure and with procedural staff participation, I verbally confirmed the patient s identity using two indicators, relevant allergies, that the procedure was appropriate and matched the consent or emergent situation, and that the correct equipment/implants were available. Immediately prior to starting the procedure I conducted the Time Out with the procedural staff and re-confirmed the patient s name, procedure, and site/side. (The Joint Commission universal protocol was followed.)  Yes       Sedatives: Fentanyl and Midazolam (Versed)    Vital signs, airway and pulse oximetry were monitored and remained stable throughout the procedure and sedation was maintained until the procedure was complete.  The patient was monitored by staff until sedation discharge criteria were met.    Patient tolerance: Patient tolerated the procedure well with no immediate complications.    Time of sedation in minutes: 60 Minutes minutes from beginning to end of physician one to one monitoring.    Findings: 3 core liber biopsy samples. No significant pressure gradient identified. (wedged and free hepatic veins).    Estimated Blood Loss: Minimal    Fluoroscopy Time: 16.2  minutes    SPECIMENS: Core needle biopsy specimens sent for pathological analysis    Complications: 1. None     Condition: Stable    Plan: See dictation.    Comments: See dictated procedure note for full details.    Alex P. Pallas, DO

## 2017-01-30 NOTE — PROGRESS NOTES
Interventional Radiology Intra-procedural Nursing Note    Patient Name: Samir Rodriguez  Medical Record Number: [id number]  Today's Date: January 30, 2017    Start Time: 1505  End of procedure time: 1605  Procedure: Transjugular liver biopsy with pressure measurements   Report given to: Christine RUSH       Rcd pt from transport. No s/s acute distress on RA. Consent obtained by Dr. Pallas. Pt transferred to procedure table in supine position using hovermat x 2 assist. Placed on continuous monitoring, prepped and draped per policy by SANTIAGO Péerz RTR. See VS flowsheet, MAR for further information. Wedge pressure #1 21 #2 22 Free Hepatic Vein #1 16 #2 19 .   Pt c/o cramping. MD aware, sedation medication given . SBAR report called to Christine RUSH and also given to Annetta Sheridan RN,BSN

## 2017-01-30 NOTE — PROGRESS NOTES
Cardiology 1 Progress Note    Samir Rodriguez MRN# [id number]   Age: 46 year old YOB: 1970   Date of Admission: 1/12/2017           Assessment and Plan:   Samir Rodriguez is a 46 year old  male with past medical history of Rheumatic Heart Disease s/p mechanical MVR x 2 (1980s and 1992) on warfarin (INR goal 2.5-3.5), biventricular CHF (EF 25-30%) s/p dual chamber ICD 2008, chronic afib on amiodarone and previously on digoxine (stopped 1/16/17 due to concern for toxicity), WPW ablation at age 11, CKD III, hypothyroid,  who was transferred from [facility] on 01/12 for further eval of CHF and concern of hemolysis in setting of mechanical valve.    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 Echocardiogram on admission demonstrated moderate-severe aortic insufficiency which is his most acute cardiac pathology. His tricuspid regurgitation is likely due to his signficantly fluid overloaded state. Mechanical valve maintained on warfarin w/ INR 2.5-3.5 prior to this admission. Angiogram performed on 01/20 revealed nonobstructive CAD.  - EDEL shows probable restriction of one of the mitral valve leafets; if he is a candidate for valvular surgery, mitral valve replacemnt will have to be considered as well  - Dental advising mandibular teeth extraction before valve surgery- had  pre-extraction evaluation at dental clinic  -> on Monday contact dental care coordinator to arrange extractions for Thursday 2/2/17  - Cardiovascular surgery consulted; appreciate recommendations  - heart failure service following; receiving workup for possible VAD as backup for valve surgery  - liver biopsy by IR as part of VAD workup today    # Anemia and thrombocytopenia  # Right Arm Hematoma   Blood smear: blood smear- RBCs show some target cells, hypochromia, increased polychromasia, no schistocytes or spherocytes. Low haptoglobin, high LDH, and plasma free hemoglobin concerning for intra-vascular hemolysis possibly due to valvular disease. No evidence of overt infection causing DIC. EPO inappropriately low w/ recent ARF. Etiology of hematoma likely due to supratherapeutic INR- 4.6 on arrival  - warfarin w/ goal INR 2.5-3.0   - on heparin gtt with planned liver biopsy today  - holding warfarin; can restart after liver biopsy    #. Cirrhosis and liver nodularity  Demonstrated on CT, although u/s was normal. CT read is concerning for amiodarone toxicity  - GI consulted, appreciate recommendations  - decreased scheduled tylenol dose  - liver biopsy next week     # Paroxsymal afib:  digoxin level 0.7 on 1/12 and 1/17. 1/16/17 device interrogation: atrial tachycardia to 150s with AV block. Lower rate setting changed to 80bpm from 60bmp and device changed from DDDR to VVIR on 01/16; atrial tachycardia and AV block and V pacing. The patient has been in an atrial flutter that is undersensed, hence the device was switched to VVI mode. There is no point continuing amiodarone at this stage especially with concern for toxicity  - stopped amiodarone.    # Hyperthyroid: Will need repeat TSH/T4 1-2 months post DC. Decreased levothyroxine from 224mcg to 175 mcg   # Anxiety and insomnia: Increased clonazepam to tid    # Right tibia fx and R knee trauma:  Fall in November prompted decompensation. Xray tibia and knee no fx this  "admission.  - Ortho evaluated; planning for outpatient follow up with orthopedic surgery in 4-6 weeks  - PT/OT    # Headaches  May be due to near-sightedness vs rebound headache from opioid use vs reduced cardiac output  - pain consult with pain for opioid titration  - eye exam 1/27- received new Rx  - using lidocaine patches  - PRN zofran    # Pulmonary nodules: incidentally found on CT  - repeat CT chest in 6 months    FEN: cardiac diet   PPX: on heparin gtt  Dispo: Difficult placement due to out of state Medicaid.    Code Status: Full CODE      Patient discussed with staff attending, Dr. Gan.     Joan Burks MD  IM PGY3  [phone]      CARDIOLOGY STAFF  Patient seen and examined by me.  History and physical examination discussed with Dr. Burks whose note reflects our joint assessment and recommendation/plans.  I am assuming the Cardiology 1 service today.  46 year old NA with rheumatic heart disease, s/p MVRx2.  He has biventricular failure and dual chamber ICD placed 2008.  He has possible cirrhosis noted on CT (but not on echo).  He is being treated for heart failure and has had symptomatic improvement.  Evaluation to date indicates a need for both AVR and re-do MVR.  He is felt to be a very high surgical risk and that he would need LVAD for circulatory support.  In this regard, he is to have liver biopsy to day for cirrhosis, which could be disqualifying.  He also needs multiple tooth extractions this week.  His warfarin is being held for both of these procedures.  Patient noted heart \"pounding\" this morning during rehab.  Review of the monitor online showed a transient increase in sinus rate with gradual onset and offset to about 116 bpm over about 10-15 minutes, about 30 minutes before remembers the symptoms occuring.    Ganesh Gan             Interval History/Subjective   No acute events overnight. Headaches still present; blood pressures lower again 80's/40's. Asymptomatic. no CP, SOB, " lightheadedness.         Objective   Temp:  [98  F (36.7  C)-98.6  F (37  C)] 98  F (36.7  C)  Heart Rate:  [79-80] 80  Resp:  [16-18] 16  BP: (84-93)/(47-56) 92/53 mmHg  SpO2:  [97 %-99 %] 98 %    Physical exam:  Gen: AA&Ox3, no acute distress  HEENT: NACT, poor dentition   PULM: Clear lungs  CV: RRR; mechanical s2 w/ 2+ systolic murmur at LUSB and LISB: JVP of 6-8cm  ABD:  soft, nontender, nondistended.    EXT: trace sonam edema to knees. Right arm ecchymosis stable   SKIN: Right arm resolving ecchymosis outlined w/ marker   NEURO: alert and oriented; speech intact         Data:   CBC    Recent Labs  Lab 01/30/17  0709 01/29/17  0720 01/28/17  0749 01/27/17  0713   WBC 4.0 3.2* 4.5 3.4*   RBC 3.00* 2.61* 2.98* 2.74*   HGB 9.5* 8.4* 9.5* 8.7*   HCT 29.2* 25.1* 28.5* 26.3*   MCV 97 96 96 96   MCH 31.7 32.2 31.9 31.8   MCHC 32.5 33.5 33.3 33.1   RDW 19.7* 19.8* 19.7* 19.9*   * 106* 112* 91*     CMP    Recent Labs  Lab 01/30/17  0709 01/29/17  0720 01/28/17  0749 01/27/17  0713  01/24/17  0726   * 130* 131* 129*  < > 128*   POTASSIUM 4.1 4.0 4.0 3.9  < > 4.5   CHLORIDE 97 98 96 95  < > 93*   CO2 25 27 24 25  < > 28   ANIONGAP 9 6 11 10  < > 7   GLC 76 78 89 82  < > 73   BUN 14 13 14 14  < > 16   CR 1.25 1.29* 1.24 1.27*  < > 1.43*   GFRESTIMATED 62 60* 62 61  < > 53*   GFRESTBLACK 75 72 75 73  < > 64   DELFINO 7.7* 7.2* 7.3* 7.1*  < > 7.6*   MAG 2.1 1.9 2.1 1.9  < > 2.0   PROTTOTAL  --   --   --   --   --  5.1*   ALBUMIN  --   --   --   --   --  1.8*   BILITOTAL  --   --   --   --   --  1.9*   ALKPHOS  --   --   --   --   --  121   AST  --   --   --   --   --  60*   ALT  --   --   --   --   --  58   < > = values in this interval not displayed.  INR    Recent Labs  Lab 01/30/17  0709 01/29/17  0720 01/28/17  0749 01/27/17  0713   INR 1.57* 1.94* 1.81* 2.38*             Medications:     Current Facility-Administered Medications   Medication     levothyroxine (SYNTHROID/LEVOTHROID) tablet 175 mcg     clonazePAM  (klonoPIN) half-tab 0.25 mg     oxyCODONE (ROXICODONE) IR tablet 5 mg     heparin  drip 25,000 units in 0.45% NaCl 250 mL (see additional administration details for dose)     heparin bolus from infusion pump     menthol (ICY HOT) 5 % patch 1 patch    And     menthol (ICY HOT) Patch in Place    And     menthol (ICY HOT) patch REMOVAL     acetaminophen (TYLENOL) tablet 650 mg     ondansetron (ZOFRAN) tablet 4 mg     lidocaine (LIDODERM) 5 % Patch 1-3 patch    And     lidocaine (LIDODERM) patch REMOVAL    And     lidocaine (LIDODERM) Patch in Place     docusate sodium (COLACE) capsule 100 mg     potassium chloride SA (K-DUR/KLOR-CON M) CR tablet 20-40 mEq     potassium chloride (KLOR-CON) Packet 20-40 mEq     potassium chloride 10 mEq in 100 mL intermittent infusion     potassium chloride 10 mEq in 100 mL intermittent infusion with 10 mg lidocaine     potassium chloride 20 mEq in 50 mL intermittent infusion     magnesium sulfate 2 g in NS intermittent infusion (PharMEDium or FV Cmpd)     magnesium sulfate 4 g in 100 mL sterile water (premade)     potassium phosphate 15 mmol in D5W 250 mL intermittent infusion     potassium phosphate 20 mmol in D5W 500 mL intermittent infusion     potassium phosphate 25 mmol in D5W 500 mL intermittent infusion     multivitamin, therapeutic with minerals (THERA-VIT-M) tablet 1 tablet     melatonin tablet 3 mg     miconazole (MICATIN; MICRO GUARD) 2 % powder     lidocaine 1 % 1 mL     lidocaine (LMX4) kit     sodium chloride (PF) 0.9% PF flush 3 mL     sodium chloride (PF) 0.9% PF flush 3 mL     medication instruction     nitroglycerin (NITROSTAT) sublingual tablet 0.4 mg     alum & mag hydroxide-simethicone (MYLANTA ES/MAALOX  ES) suspension 15-30 mL     polyethylene glycol (MIRALAX/GLYCOLAX) Packet 17 g     albuterol (PROAIR HFA/PROVENTIL HFA/VENTOLIN HFA) Inhaler 2 puff     sennosides (SENOKOT) tablet 8.6 mg     naloxone (NARCAN) injection 0.1-0.4 mg     omeprazole (priLOSEC) CR  capsule 20 mg     methocarbamol (ROBAXIN) tablet 750 mg

## 2017-01-30 NOTE — PROGRESS NOTES
CLINICAL NUTRITION SERVICES     Nutrition Prescription    RECOMMENDATIONS FOR MDs/PROVIDERS TO ORDER:  For all recommendations, see prior nutrition notes.     Kcal counts:  1/25    No intake recorded - NPO for a portion of this day. He did order lunch.  1/26     533 kcals and 26 g protein (small meal/s and no supplement/s recorded) - Ordered breakfast and supper.  1/27     626 kcals and 27 g protein (small meal/s and no supplement/s recorded) - NPO for a large portion of this day. Ordered supper only.  1/28   1820 kcals and 60 g protein (three meal/s and no supplement/s recorded) - Ordered breakfast and supper.  *  Pt consumed a three-day average of 993 kcals and 38 g protein daily. Not meeting estimated needs of 7044-0226+ kcals/day (25 - 30+ kcals/kg) and 121-140 grams protein/day (1.3 - 1.5 grams of pro/kg).    INTERVENTIONS:  Implementation:  None additionally at this time.    Follow up/Monitoring:  Will continue to follow pt.    Giana Damon, MS, RD, LD, [facility]   6C Pgr:  [phone]

## 2017-01-30 NOTE — CONSULTS
Patient is on IR schedule 1/30/17  for a US guidedTransjugular liver biopsy with portosystemic pressures . W/U for  left ventricular assisstance device    Labs WNL for procedure.  Orders for NPO, scrubs have been entered.   Consent will be done prior to procedure.   Please contact the IR charge RN at [phone] for estimated time of procedure.     Alejandra Martinez IR RPA  [phone] Call pager  [phone] pager

## 2017-01-30 NOTE — PLAN OF CARE
Problem: Goal Outcome Summary  Goal: Goal Outcome Summary  PT 6C: Cancel.  Pt and RN state, pt scheduled for liver biopsy soon.  RN is prepping pt currently.  Will reschedule for tomorrow.

## 2017-01-30 NOTE — PROGRESS NOTES
"Social Work Services Progress Note    Hospital Day: 19  Date of Initial Social Work Evaluation:  CRISTINA  Collaborated with:  CRISTINA, updated via voicemails    Data:  Pt is a 46 year old  male being assessed for TCU placement.    Intervention:  SW received a voicemail from [facility] that they do not have openings \"for a long time\" for a Medicaid bed.  Admissions further noted on the voicemail, \"we don't know when the next one will be\".  No referral sent as admissions did not leave a fax number as requested for a referral to be sent.  Will follow up if the pt cannot get into [facility].    Update - SW received a message that [facility] does not have any bed openings this week for a Medicaid patient this week.  SW asked that admissions place pt on a waitlist for TCU.   Admissions will complete this today.  Will need to give facility updated notes when liver biopsy is completed.    Assessment:  Pt has a son and sister who will be coming to visit today at 10 pm.  Family is working with [facility] to get accomodations.  SW and accomodations rep still have not heard from [facility] what services will be covered for pt and family.  Will continue to try to call [facility] insurance for service supports.    Plan:    Anticipated Disposition:  Facility, TCU pending.    Barriers to d/c plan:  Insurance, no transfer agreement in place to assist with discharge to [facility].  Is on waitlist for Medicaid bed at [facility]    Follow Up:  SW will continue to follow for placement.    REJI Hernandez, APSW  6C Unit   Pager: [phone]  Phone: [phone]      ___________________________________________________________________________________________________________________________________________________    Referrals in process:     TCUs with phone calls/referrals out:     [place] [facility]: - [facility] - no beds this week, pt is on waitlist for next Medicaid bed.            " PH: [phone]            F: [phone]          - [place] - Message for admissions             PH: [phone]      [place] [facility]:          - [facility] - message for admissions            PH: [phone]           Referrals Discontinued:    Swing Bed Programs:   - [facility] ARU - declined due to transportation concerns, concerns pt cannot tolerate therapies.  Admissions states even if can tolerate therapies, pt would be declined due to pt needing transport to the [facility].  SW explained pt has Title 19 to cover transport and pt would be responsible.  Admissions states this still does not satisfy their concerns.  PH: [phone]  F: [phone]      - [facility] - declined due to no Medicaid beds available at this time.  PH - Cell: [phone]  PH - Main: [phone]  F: [phone]    TCU Units:    - [facility] - do not accept Medicaid Ins.  Do not accept I[employer] insurance.  PH: [phone]   - [facility] - do not accept Medicaid Ins.  Do not accept IHS insurance.  PH: [phone]   - [facility] - only a SNF/LTC and do not accept Medicaid only for rehab.  Have an over two year wait for a SNF Medicaid bed.  PH: [phone]   - [facility] - Accepts Medicaid, however has extensive wait for a bed.  PH: [phone]  - [facility] - No answer when attempted to call  PH: [phone]    Community Case Management/Community Services in place:   Pt has a Medicaid  [place]   PH: [phone]   -  on Call for [place]: [phone]    [employer]  [place]  PH: [phone]    Pt may have Title 19 services for transportation, will need to assess this at time of dc if he qualifies for ARU.

## 2017-01-30 NOTE — PROGRESS NOTES
Patient A&O X 4 . VSS, V paced HR 80, on RA. BP maintained 90/50s throughout most of the day. Septic protocol triggered, lactic acid 1.7. Headache and right knee pain managed with PRN oxycodone. Right shoulder pain managed with icy hot patch. Wounds cleaned with microklenz and new dressing placed. Heparin gtt continues at 1050U/hr, next 10a due with AM labs. Anxiety managed with PRN klonopin. Heavy assist of 2 with right leg brace on for ambulation. Bumex canceled this AM, still good UOP. Mg replaced per protocol. Will continue to monitor and notify team of any questions or concerns.

## 2017-01-30 NOTE — PLAN OF CARE
Problem: Goal Outcome Summary  Goal: Goal Outcome Summary  Outcome: No Change    D: Pt with hx rheumatic heart disease, CHF, CKD, chronic AF, PPM/ICD admitted for valve replacement (workup).  I/A: Oxycodone PRN, susana tylenol, lido patches for R shoulder, R knee pain and headache. Up in recliner before HS- assisted back to bed with A of 2, walker, gait belt. Susana colace and PRN senna given per pt request for feeling of fullness after eating. R knee in brace. Mepilex in place over coccyx and both heels. Extremities elevated, heels suspended, specialty pressure reduction air mattress, assisted with turning. Continues with edema up through legs- especially in hips, scrotum, penis. Voids infrequently in large amounts. Sleeping overnight between cares.  P: Plan for tooth extraction, liver bx, fluid volume optimization, and strength/reconditioning before valve surgery. Monitor and assess pt condition and contact treatment team with questions or concerns.

## 2017-01-30 NOTE — PROGRESS NOTES
[facility], [place]   Palliative Care Daily Progress Note          Recommendations, Patient/Family Counseling & Coordination     Was seen and examined. Plan is liver bx this afternoon, results to directly influence decisions about further cardiac interventions (Valvular surgery and or LVAD placement)   Further plan for dental extractions later this week.   RECC: Continue present pain meds in setting of sedating procedures anticipated later this week. If anything would offer a single prn added rescue dose of additional 5mg once daily for added pain flare but otherwise continue as present.   - many decisions seem to be riding on liver bx today- will continue to follow for support    POLST: not completed as of yet      Thank you for the opportunity to continue to participate in the care of this patient and family.  Please feel free to contact on-call palliative provider with any emergent needs.  We can be reached via team pager [phone] (answered 8-4:30 Monday-Friday); after-hours answering service ([phone]); Main Palliative Clinic - [facility] 1C: [phone].       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 Now rescheduled for procedure 2/2/17 and liver biopsy today as above. Son (Sid and other family) due to arrive 2200 today and will be staying for support  and decision assistance           Review of Systems:   Palliative Symptom Review (0=no symptom/no concern, 1=mild, 2=moderate, 3=severe):      Pain: 2 (chronic knee and headache pain)       Fatigue: 2      Nausea: 0-1      Constipation: 0      Diarrhea: 0      Depressive Symptoms: 0-1      Anxiety: 1      Drowsiness: 0-1      Poor Appetite: 0      Shortness of Breath: 0-1               Medications:   I have reviewed this patient's medication profile and medications given in past 24 hours.        {   Physical exam: Vitals: BP 93/57 mmHg  Pulse 80  Temp(Src) 97.9  F (36.6  C) (Oral)  Resp 16  Ht 1.829 m (6')  Wt 92.761 kg (204 lb 8 oz)  BMI 27.73 kg/m2  SpO2 100%  BMI= Body mass index is 27.73 kg/(m^2).   Gen: AA&Ox3, no acute distress. Lying in bed  HEENT: NACT, poor dentition    PULM: Clear lungs anterolaterally  CV: RRR; mechanical s2 w/ 2+ systolic murmur at LUSB.  ABD:  soft, nontender, nondistended.BM this am  EXT: trace sonam edema to knees. Right arm ecchymosis stable    SKIN: Right arm resolving ecchymosis   NEURO: alert and oriented; speech intact           Data Reviewed:   ROUTINE LABS (Last four results)  BMP  Recent Labs  Lab 01/30/17  0709 01/29/17  0720 01/28/17  0749 01/27/17  0713   * 130* 131* 129*   POTASSIUM 4.1 4.0 4.0 3.9   CHLORIDE 97 98 96 95   DELFINO 7.7* 7.2* 7.3* 7.1*   CO2 25 27 24 25   BUN 14 13 14 14   CR 1.25 1.29* 1.24 1.27*   GLC 76 78 89 82     CBC  Recent Labs  Lab 01/30/17  0709 01/29/17  0720 01/28/17  0749 01/27/17  0713   WBC 4.0 3.2* 4.5 3.4*   RBC 3.00* 2.61* 2.98* 2.74*   HGB 9.5* 8.4* 9.5* 8.7*   HCT 29.2* 25.1* 28.5* 26.3*   MCV 97 96 96 96   MCH 31.7 32.2 31.9 31.8   MCHC 32.5 33.5 33.3 33.1   RDW 19.7* 19.8* 19.7* 19.9*   * 106* 112* 91*     INR  Recent Labs  Lab 01/30/17  0709 01/29/17  0720 01/28/17  0749 01/27/17  0713   INR 1.57* 1.94* 1.81* 2.38*

## 2017-01-30 NOTE — PLAN OF CARE
Problem: Goal Outcome Summary  Goal: Goal Outcome Summary  OT/6C: Pt completed seated ADLs with mod A. Pt limited by RUE shoulder pain. HR from 80s to 120s with activity, pt reporting dizziness with activity, BP stable     REC: TCU due to decreased ADL I

## 2017-01-31 ENCOUNTER — APPOINTMENT (OUTPATIENT)
Dept: OCCUPATIONAL THERAPY | Facility: CLINIC | Age: 47
DRG: 286 | End: 2017-01-31
Attending: INTERNAL MEDICINE
Payer: MEDICAID

## 2017-01-31 ENCOUNTER — APPOINTMENT (OUTPATIENT)
Dept: PHYSICAL THERAPY | Facility: CLINIC | Age: 47
DRG: 286 | End: 2017-01-31
Attending: INTERNAL MEDICINE
Payer: MEDICAID

## 2017-01-31 LAB
ANION GAP SERPL CALCULATED.3IONS-SCNC: 9 MMOL/L (ref 3–14)
BUN SERPL-MCNC: 14 MG/DL (ref 7–30)
CALCIUM SERPL-MCNC: 7.5 MG/DL (ref 8.5–10.1)
CHLORIDE SERPL-SCNC: 98 MMOL/L (ref 94–109)
CO2 SERPL-SCNC: 24 MMOL/L (ref 20–32)
CREAT SERPL-MCNC: 1.25 MG/DL (ref 0.66–1.25)
ERYTHROCYTE [DISTWIDTH] IN BLOOD BY AUTOMATED COUNT: 19.8 % (ref 10–15)
GFR SERPL CREATININE-BSD FRML MDRD: 62 ML/MIN/1.7M2
GLUCOSE SERPL-MCNC: 74 MG/DL (ref 70–99)
HCT VFR BLD AUTO: 25.9 % (ref 40–53)
HGB BLD-MCNC: 8.6 G/DL (ref 13.3–17.7)
INR PPP: 1.71 (ref 0.86–1.14)
INTERPRETATION ECG - MUSE: NORMAL
LACTATE BLD-SCNC: 1.6 MMOL/L (ref 0.7–2.1)
LMWH PPP CHRO-ACNC: 0.25 IU/ML
LMWH PPP CHRO-ACNC: 0.36 IU/ML
LMWH PPP CHRO-ACNC: 0.37 IU/ML
MAGNESIUM SERPL-MCNC: 2 MG/DL (ref 1.6–2.3)
MCH RBC QN AUTO: 32 PG (ref 26.5–33)
MCHC RBC AUTO-ENTMCNC: 33.2 G/DL (ref 31.5–36.5)
MCV RBC AUTO: 96 FL (ref 78–100)
PLATELET # BLD AUTO: 139 10E9/L (ref 150–450)
POTASSIUM SERPL-SCNC: 4.4 MMOL/L (ref 3.4–5.3)
RBC # BLD AUTO: 2.69 10E12/L (ref 4.4–5.9)
SODIUM SERPL-SCNC: 130 MMOL/L (ref 133–144)
WBC # BLD AUTO: 3.9 10E9/L (ref 4–11)

## 2017-01-31 PROCEDURE — 83735 ASSAY OF MAGNESIUM: CPT | Performed by: INTERNAL MEDICINE

## 2017-01-31 PROCEDURE — 40000133 ZZH STATISTIC OT WARD VISIT

## 2017-01-31 PROCEDURE — 25000132 ZZH RX MED GY IP 250 OP 250 PS 637: Performed by: STUDENT IN AN ORGANIZED HEALTH CARE EDUCATION/TRAINING PROGRAM

## 2017-01-31 PROCEDURE — 25000132 ZZH RX MED GY IP 250 OP 250 PS 637: Performed by: INTERNAL MEDICINE

## 2017-01-31 PROCEDURE — 97535 SELF CARE MNGMENT TRAINING: CPT | Mod: GO | Performed by: OCCUPATIONAL THERAPIST

## 2017-01-31 PROCEDURE — 40000193 ZZH STATISTIC PT WARD VISIT

## 2017-01-31 PROCEDURE — 25000125 ZZHC RX 250: Performed by: INTERNAL MEDICINE

## 2017-01-31 PROCEDURE — 25000128 H RX IP 250 OP 636: Performed by: INTERNAL MEDICINE

## 2017-01-31 PROCEDURE — 25000128 H RX IP 250 OP 636: Performed by: STUDENT IN AN ORGANIZED HEALTH CARE EDUCATION/TRAINING PROGRAM

## 2017-01-31 PROCEDURE — 83605 ASSAY OF LACTIC ACID: CPT | Performed by: INTERNAL MEDICINE

## 2017-01-31 PROCEDURE — 21400003 ZZH R&B CCU CRITICAL UMMC

## 2017-01-31 PROCEDURE — 36415 COLL VENOUS BLD VENIPUNCTURE: CPT | Performed by: NURSE PRACTITIONER

## 2017-01-31 PROCEDURE — 97140 MANUAL THERAPY 1/> REGIONS: CPT | Mod: GO

## 2017-01-31 PROCEDURE — S0171 BUMETANIDE 0.5 MG: HCPCS | Performed by: STUDENT IN AN ORGANIZED HEALTH CARE EDUCATION/TRAINING PROGRAM

## 2017-01-31 PROCEDURE — 85027 COMPLETE CBC AUTOMATED: CPT | Performed by: INTERNAL MEDICINE

## 2017-01-31 PROCEDURE — 99232 SBSQ HOSP IP/OBS MODERATE 35: CPT | Mod: GC | Performed by: INTERNAL MEDICINE

## 2017-01-31 PROCEDURE — 85610 PROTHROMBIN TIME: CPT | Performed by: INTERNAL MEDICINE

## 2017-01-31 PROCEDURE — 97110 THERAPEUTIC EXERCISES: CPT | Mod: GP

## 2017-01-31 PROCEDURE — 25000132 ZZH RX MED GY IP 250 OP 250 PS 637: Performed by: NURSE PRACTITIONER

## 2017-01-31 PROCEDURE — 36415 COLL VENOUS BLD VENIPUNCTURE: CPT | Performed by: INTERNAL MEDICINE

## 2017-01-31 PROCEDURE — 85520 HEPARIN ASSAY: CPT | Performed by: INTERNAL MEDICINE

## 2017-01-31 PROCEDURE — 97530 THERAPEUTIC ACTIVITIES: CPT | Mod: GP

## 2017-01-31 PROCEDURE — 25000125 ZZHC RX 250: Performed by: STUDENT IN AN ORGANIZED HEALTH CARE EDUCATION/TRAINING PROGRAM

## 2017-01-31 PROCEDURE — 85520 HEPARIN ASSAY: CPT | Performed by: NURSE PRACTITIONER

## 2017-01-31 PROCEDURE — 80048 BASIC METABOLIC PNL TOTAL CA: CPT | Performed by: INTERNAL MEDICINE

## 2017-01-31 PROCEDURE — 97116 GAIT TRAINING THERAPY: CPT | Mod: GP

## 2017-01-31 PROCEDURE — 40000133 ZZH STATISTIC OT WARD VISIT: Performed by: OCCUPATIONAL THERAPIST

## 2017-01-31 RX ORDER — BUMETANIDE 0.25 MG/ML
1 INJECTION INTRAMUSCULAR; INTRAVENOUS
Status: DISCONTINUED | OUTPATIENT
Start: 2017-01-31 | End: 2017-02-01

## 2017-01-31 RX ORDER — WARFARIN SODIUM 5 MG/1
5 TABLET ORAL
Status: COMPLETED | OUTPATIENT
Start: 2017-01-31 | End: 2017-01-31

## 2017-01-31 RX ORDER — CEFAZOLIN SODIUM 2 G/100ML
2 INJECTION, SOLUTION INTRAVENOUS
Status: CANCELLED | OUTPATIENT
Start: 2017-01-31

## 2017-01-31 RX ORDER — CEFAZOLIN SODIUM 1 G/3ML
1 INJECTION, POWDER, FOR SOLUTION INTRAMUSCULAR; INTRAVENOUS SEE ADMIN INSTRUCTIONS
Status: CANCELLED | OUTPATIENT
Start: 2017-01-31

## 2017-01-31 RX ADMIN — Medication: at 08:40

## 2017-01-31 RX ADMIN — LIDOCAINE 3 PATCH: 50 PATCH CUTANEOUS at 20:41

## 2017-01-31 RX ADMIN — MULTIPLE VITAMINS W/ MINERALS TAB 1 TABLET: TAB at 08:41

## 2017-01-31 RX ADMIN — ACETAMINOPHEN 650 MG: 325 TABLET, FILM COATED ORAL at 13:11

## 2017-01-31 RX ADMIN — OXYCODONE HYDROCHLORIDE 5 MG: 5 TABLET ORAL at 08:41

## 2017-01-31 RX ADMIN — PROCHLORPERAZINE EDISYLATE 10 MG: 5 INJECTION INTRAMUSCULAR; INTRAVENOUS at 13:11

## 2017-01-31 RX ADMIN — HEPARIN SODIUM 1050 UNITS/HR: 10000 INJECTION, SOLUTION INTRAVENOUS at 20:03

## 2017-01-31 RX ADMIN — WARFARIN SODIUM 5 MG: 5 TABLET ORAL at 17:28

## 2017-01-31 RX ADMIN — BUMETANIDE 1 MG: 0.25 INJECTION, SOLUTION INTRAMUSCULAR; INTRAVENOUS at 15:31

## 2017-01-31 RX ADMIN — DOCUSATE SODIUM 100 MG: 100 CAPSULE, LIQUID FILLED ORAL at 08:41

## 2017-01-31 RX ADMIN — DOCUSATE SODIUM 100 MG: 100 CAPSULE, LIQUID FILLED ORAL at 20:40

## 2017-01-31 RX ADMIN — ONDANSETRON HYDROCHLORIDE 4 MG: 4 TABLET, FILM COATED ORAL at 08:41

## 2017-01-31 RX ADMIN — Medication: at 20:42

## 2017-01-31 RX ADMIN — ONDANSETRON HYDROCHLORIDE 4 MG: 4 TABLET, FILM COATED ORAL at 15:31

## 2017-01-31 RX ADMIN — ACETAMINOPHEN 650 MG: 325 TABLET, FILM COATED ORAL at 08:40

## 2017-01-31 RX ADMIN — ACETAMINOPHEN 650 MG: 325 TABLET, FILM COATED ORAL at 20:41

## 2017-01-31 RX ADMIN — BUMETANIDE 1 MG: 0.25 INJECTION, SOLUTION INTRAMUSCULAR; INTRAVENOUS at 08:41

## 2017-01-31 RX ADMIN — OMEPRAZOLE 20 MG: 20 CAPSULE, DELAYED RELEASE ORAL at 08:41

## 2017-01-31 RX ADMIN — HEPARIN SODIUM 1350 UNITS/HR: 10000 INJECTION, SOLUTION INTRAVENOUS at 00:18

## 2017-01-31 RX ADMIN — OXYCODONE HYDROCHLORIDE 5 MG: 5 TABLET ORAL at 15:31

## 2017-01-31 RX ADMIN — Medication 0.25 MG: at 19:01

## 2017-01-31 RX ADMIN — Medication 0.25 MG: at 08:39

## 2017-01-31 RX ADMIN — Medication 2 G: at 13:11

## 2017-01-31 RX ADMIN — OXYCODONE HYDROCHLORIDE 5 MG: 5 TABLET ORAL at 02:06

## 2017-01-31 RX ADMIN — METHOCARBAMOL 750 MG: 750 TABLET ORAL at 08:40

## 2017-01-31 RX ADMIN — OXYCODONE HYDROCHLORIDE 5 MG: 5 TABLET ORAL at 21:05

## 2017-01-31 RX ADMIN — LEVOTHYROXINE SODIUM 175 MCG: 25 TABLET ORAL at 08:40

## 2017-01-31 RX ADMIN — METHOCARBAMOL 750 MG: 750 TABLET ORAL at 20:41

## 2017-01-31 ASSESSMENT — PAIN DESCRIPTION - DESCRIPTORS
DESCRIPTORS: ACHING;DISCOMFORT;SORE
DESCRIPTORS: HEADACHE
DESCRIPTORS: DISCOMFORT
DESCRIPTORS: ACHING;SORE

## 2017-01-31 NOTE — PROGRESS NOTES
Cardiology 1 Progress Note    Samir Rodriguez MRN# [id number]   Age: 46 year old YOB: 1970   Date of Admission: 1/12/2017           Assessment and Plan:   Samir Rodriguez is a 46 year old  male with past medical history of Rheumatic Heart Disease s/p mechanical MVR x 2 (1980s and 1992) on warfarin (INR goal 2.5-3.5), biventricular CHF (EF 25-30%) s/p dual chamber ICD 2008, chronic afib on amiodarone and previously on digoxine (stopped 1/16/17 due to concern for toxicity), WPW ablation at age 11, CKD III, hypothyroid,  who was transferred from [facility] on 01/12 for further eval of CHF and concern of hemolysis in setting of mechanical valve.    Today:  - cont heparin gtt for bridging, continue warfarin (restarted last night)  - Bumex 1 mg IV BID  - follow up liver biopsy path results  - plan for teeth extraction Thursday Feb 2 at 7:30 am on [place] by Dr. 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 Echocardiogram on admission demonstrated moderate-severe aortic insufficiency which is his most acute cardiac pathology. His tricuspid regurgitation is likely due to his signficantly fluid overloaded state. Mechanical valve maintained on warfarin w/ INR 2.5-3.5 prior to this admission. Angiogram performed on 01/20 revealed nonobstructive CAD.  - EDEL shows probable restriction of one of the mitral valve leafets; if he is a candidate for valvular surgery, mitral valve replacemnt will have to be considered as well  - Dental advising  mandibular teeth extraction before valve surgery- had pre-extraction evaluation at dental clinic  -> on Monday contact dental care coordinator to arrange extractions for Thursday 2/2/17  - Cardiovascular surgery consulted; appreciate recommendations  - heart failure service following; receiving workup for possible VAD as backup for valve surgery  - liver biopsy by IR as part of VAD workup 1/30; will follow up pathology results    # Anemia and thrombocytopenia  # Right Arm Hematoma   Blood smear: blood smear- RBCs show some target cells, hypochromia, increased polychromasia, no schistocytes or spherocytes. Low haptoglobin, high LDH, and plasma free hemoglobin concerning for intra-vascular hemolysis possibly due to valvular disease. No evidence of overt infection causing DIC. EPO inappropriately low w/ recent ARF. Etiology of hematoma likely due to supratherapeutic INR- 4.6 on arrival  - warfarin w/ goal INR 2.5-3.0   - on heparin gtt for bridging  - restarted warfarin 1/30    #. Cirrhosis and liver nodularity  Demonstrated on CT, although u/s was normal. CT read is concerning for amiodarone toxicity  - GI consulted, appreciate recommendations  - decreased scheduled tylenol dose  - liver biopsy results pending    # Paroxsymal afib:  digoxin level 0.7 on 1/12 and 1/17. 1/16/17 device interrogation: atrial tachycardia to 150s with AV block. Lower rate setting changed to 80bpm from 60bmp and device changed from DDDR to VVIR on 01/16; atrial tachycardia and AV block and V pacing. The patient has been in an atrial flutter that is undersensed, hence the device was switched to VVI mode. There is no point continuing amiodarone at this stage especially with concern for toxicity  - discontinued pta amiodarone.    # Hyperthyroid: Will need repeat TSH/T4 1-2 months post DC. Decreased levothyroxine from 224mcg to 175 mcg   # Anxiety and insomnia: Increased clonazepam to tid    # Right tibia fx and R knee trauma:  Fall in November  prompted decompensation. Xray tibia and knee no fx this admission.  - Ortho evaluated; planning for outpatient follow up with orthopedic surgery in 4-6 weeks  - PT/OT    # Headaches  May be due to near-sightedness vs rebound headache from opioid use vs reduced cardiac output  - pain consult with pain for opioid titration  - eye exam 1/27- received new Rx  - using lidocaine patches  - PRN zofran    # Pulmonary nodules: incidentally found on CT  - repeat CT chest in 6 months    FEN: cardiac diet   PPX: on heparin gtt  Dispo: Difficult placement due to out of state Medicaid.    Code Status: Full CODE      Patient discussed with staff attending, Dr. Gan.     Joan Burks MD  IM PGY3  [phone]      CARDIOLOGY STAFF  Patient seen and examined by me.  History and physical examination discussed with Dr. Burks whose note reflects our joint assessment and recommendation/plans.  Liver biopsy results are still pending. He is back on IV heparin and warfarin.  Teeth extractions are now schedule to 7:30 a.m on Friday.  We have increased his diuretics to a twice daily schedule.  Ganesh Gan             Interval History/Subjective   No acute events overnight. Headaches still present; Asymptomatic. no CP, SOB, lightheadedness.         Objective   Temp:  [97.4  F (36.3  C)-99.5  F (37.5  C)] 99.5  F (37.5  C)  Heart Rate:  [80] 80  Resp:  [16-17] 16  BP: (83-97)/(53-61) 83/57 mmHg  SpO2:  [96 %-100 %] 98 %    Physical exam:  Gen: AA&Ox3, no acute distress  HEENT: NACT, poor dentition   PULM: Clear lungs  CV: RRR; mechanical s2 w/ 2+ systolic murmur at LUSB and LISB: JVP of 6-8cm  ABD:  soft, nontender, nondistended.    EXT: trace sonam edema to knees. Right arm ecchymosis stable   SKIN: Right arm resolving ecchymosis outlined w/ marker   NEURO: alert and oriented; speech intact         Data:   CBC    Recent Labs  Lab 01/31/17  0833 01/30/17  0709 01/29/17  0720 01/28/17  0749   WBC 3.9* 4.0 3.2* 4.5   RBC 2.69* 3.00*  2.61* 2.98*   HGB 8.6* 9.5* 8.4* 9.5*   HCT 25.9* 29.2* 25.1* 28.5*   MCV 96 97 96 96   MCH 32.0 31.7 32.2 31.9   MCHC 33.2 32.5 33.5 33.3   RDW 19.8* 19.7* 19.8* 19.7*   * 140* 106* 112*     CMP    Recent Labs  Lab 01/31/17  0833 01/30/17  0709 01/29/17  0720 01/28/17  0749   * 131* 130* 131*   POTASSIUM 4.4 4.1 4.0 4.0   CHLORIDE 98 97 98 96   CO2 24 25 27 24   ANIONGAP 9 9 6 11   GLC 74 76 78 89   BUN 14 14 13 14   CR 1.25 1.25 1.29* 1.24   GFRESTIMATED 62 62 60* 62   GFRESTBLACK 75 75 72 75   DELFINO 7.5* 7.7* 7.2* 7.3*   MAG 2.0 2.1 1.9 2.1     INR    Recent Labs  Lab 01/31/17  0833 01/30/17  0709 01/29/17  0720 01/28/17  0749   INR 1.71* 1.57* 1.94* 1.81*             Medications:     Current Facility-Administered Medications   Medication     bumetanide (BUMEX) injection 1 mg     levothyroxine (SYNTHROID/LEVOTHROID) tablet 175 mcg     Warfarin Therapy Reminder (Check START DATE - warfarin may be starting in the FUTURE)     clonazePAM (klonoPIN) half-tab 0.25 mg     oxyCODONE (ROXICODONE) IR tablet 5 mg     heparin  drip 25,000 units in 0.45% NaCl 250 mL (see additional administration details for dose)     heparin bolus from infusion pump     menthol (ICY HOT) 5 % patch 1 patch    And     menthol (ICY HOT) Patch in Place    And     menthol (ICY HOT) patch REMOVAL     acetaminophen (TYLENOL) tablet 650 mg     ondansetron (ZOFRAN) tablet 4 mg     lidocaine (LIDODERM) 5 % Patch 1-3 patch    And     lidocaine (LIDODERM) patch REMOVAL    And     lidocaine (LIDODERM) Patch in Place     docusate sodium (COLACE) capsule 100 mg     potassium chloride SA (K-DUR/KLOR-CON M) CR tablet 20-40 mEq     potassium chloride (KLOR-CON) Packet 20-40 mEq     potassium chloride 10 mEq in 100 mL intermittent infusion     potassium chloride 10 mEq in 100 mL intermittent infusion with 10 mg lidocaine     potassium chloride 20 mEq in 50 mL intermittent infusion     magnesium sulfate 2 g in NS intermittent infusion (PharMEDium or  FV Cmpd)     magnesium sulfate 4 g in 100 mL sterile water (premade)     potassium phosphate 15 mmol in D5W 250 mL intermittent infusion     potassium phosphate 20 mmol in D5W 500 mL intermittent infusion     potassium phosphate 25 mmol in D5W 500 mL intermittent infusion     multivitamin, therapeutic with minerals (THERA-VIT-M) tablet 1 tablet     melatonin tablet 3 mg     miconazole (MICATIN; MICRO GUARD) 2 % powder     lidocaine 1 % 1 mL     lidocaine (LMX4) kit     sodium chloride (PF) 0.9% PF flush 3 mL     sodium chloride (PF) 0.9% PF flush 3 mL     medication instruction     nitroglycerin (NITROSTAT) sublingual tablet 0.4 mg     alum & mag hydroxide-simethicone (MYLANTA ES/MAALOX  ES) suspension 15-30 mL     polyethylene glycol (MIRALAX/GLYCOLAX) Packet 17 g     albuterol (PROAIR HFA/PROVENTIL HFA/VENTOLIN HFA) Inhaler 2 puff     sennosides (SENOKOT) tablet 8.6 mg     naloxone (NARCAN) injection 0.1-0.4 mg     omeprazole (priLOSEC) CR capsule 20 mg     methocarbamol (ROBAXIN) tablet 750 mg

## 2017-01-31 NOTE — PLAN OF CARE
Problem: Goal Outcome Summary  Goal: Goal Outcome Summary  Edema 6C: Pt has not worn GCB since 1/23 with increases in all B LE measuerments since last seen. Pt with significant amount of blisters forming on B LEs with deep 2+-3+ pitting. GCB reapplied, to be worn x 48 hours. OK for pt to wear compression bandages until therapy returns. However, please remove any compression if it causes numbness, tingling, pain, becomes soiled, or if pt becomes unable to verbalize pain. Will follow up with patient.

## 2017-01-31 NOTE — PLAN OF CARE
Problem: Goal Outcome Summary  Goal: Goal Outcome Summary  Outcome: No Change  D/I/A: Patient vital signs stable. Alert, oriented x4. Patient taken for Liver Biopsy with samples taken, through right IJ, no hematoma. Heparin infusion stopped at 1039, per IR instructions of 2 hours prior to Biopsy. Heparin 10A level rechecked at 8pm (<0.10), and restarted per MD instruction and Heparin protocol. Next Hep 10a scheduled for 0300 tomorrow. Rhythm: V Paced 80. Lymph specialist consulted for 1/31/16 due to swelling. Coccyx dressing and L heel dressing changed, R heel dressing C/D/I. Access: LPIV. Last BM: 1/30/17. Voiding good amounts, standing to urinate. Oxycodone 5mg given for shoulder pain, patches in place.   P: Patient to be seen for tooth extraction (unknown date), fluid volume optimization, and continue strengthening with cardiac rehab. Update MD if questions/concerns.

## 2017-01-31 NOTE — PLAN OF CARE
Problem: Goal Outcome Summary  Goal: Goal Outcome Summary  PT 6C: Ambulates 12ft x2 with FWW and CGAx2.  STS with mod Ax2 with FWW and vc to lean forward.  STS primarily limited by pain in R knee and inability to lean forward.  Primarily limitation is pain and nausea.  PT recommends TCU at d/c when medically stable.

## 2017-01-31 NOTE — PLAN OF CARE
Problem: Goal Outcome Summary  Goal: Goal Outcome Summary  OT 6C: Recommend discharge to TCU to increase ADL I and activity tolerance. Pt limited by pain, weakness, fatigue, and activity tolerance. Pt required max Ax2 for supine <-> sit transfer. Pt required max Ax2 for sit <-> stand transfer, ambulated ~4 steps with CGA using FWW to sit in chair. Pt completed grooming/hygiene while seated with SBA for set-up. Pt tolerated activity well, VSS.

## 2017-01-31 NOTE — PLAN OF CARE
Problem: Goal Outcome Summary  Goal: Goal Outcome Summary  Vital signs WNL. Rhythm paced. Patient has lidoderm patches, also got oxycodone 5 mg at 0200 for pain at right shoulder and right knee. Patient turned and positioned with pillows, right leg brace off while in bed. Leg edema improved over last week. Patient was reminded that he is on a 1500 ml/ day fluid restriction. Heparin Xa level 0.25, so no change in rate, heparin drip at 1350 units/ hr. Continue to monitor fluid status, comfort, continue skin cares. Plan tooth extraction around Feb. 3 and subsequent heart valve surgery.

## 2017-01-31 NOTE — PROGRESS NOTES
Social Work Services Progress Note    Hospital Day: 20  Date of Initial Social Work Evaluation:  NA  [facility] with:  [facility] pt's [facility]    Data:  Pt is a 46 year old  male being assessed for TCU placement.  PT has asked SW since admission to assist with contacting the [facility] for assistance for his family.  SW has left several messages with the [facility] office since admission and has not heard back from the [facility].  SW was able to speak with a [facility] person directly this morning.    Intervention:  SHAQUILLE talked directly with Virginia Cat at PH: [phone].  Virginia states that last week Friday, the pt's son was given accommodations for the evening to visit with the pt.  Virginia asked if the son ever came to visit.  SW stated they were unsure if the son was onsite.  Virginia then asked for a medical verification letter and resources for hotels in the area that have a contract with the hospital for a medical rate.  SHAQUILLE will fax the list and a medical letter verifying pt's medical stay and possible continued course.  The fax can be send to [facility] at F: [phone].  SHAQUILLE will also work with  Accommodations office to assist with directly working with the [facility].    Of note, the S phone number for [facility], is a wrong number.  SHAQUILLE was able to reach a live person who stated they were not [facility].  SHAQUILLE will ask for family to find a correct number for S.  SHAQUILLE will also call [facility] pt's  if they have an updated number so that the pt can call insurance for his TCU stay.    Assessment:  Pt has a son and sister who are to be visiting today per pt.  SHAQUILLE will work with  accomodations and the [facility] to get support for the family to visit.    Plan:    Anticipated Disposition:  Facility, TCU pending.    Barriers to d/c plan:  Insurance, no transfer agreement in place to assist with discharge to [facility].  Is on waitlist for Medicaid bed at [facility]     Follow Up:  SW will continue to follow for placement.    REJI Hernandez, APSW  6C Unit   Pager: [phone]  Phone: [phone]      ___________________________________________________________________________________________________________________________________________________    Referrals in process:     TCUs with phone calls/referrals out:     [place] [facility]: - [facility] - no beds this week, pt is on waitlist for next Medicaid bed.            PH: [phone]            F: [phone]    Referrals Discontinued:    Swing Bed Programs:   - [facility] - declined due to transportation concerns, concerns pt cannot tolerate therapies.  Admissions states even if can tolerate therapies, pt would be declined due to pt needing transport to the [facility].  SW explained pt has Title 19 to cover transport and pt would be responsible.  Admissions states this still does not satisfy their concerns.  PH: [phone]  F: [phone]      - [facility] - declined due to no Medicaid beds available at this time.  PH - Cell: [phone]  PH - Main: [phone]  F: [phone]    TCU Units:    [facility] - do not accept Medicaid Ins.  Do not accept IHS insurance.  PH: [phone]   [facility] - do not accept [facility] Ins.  Do not accept IHS insurance.  PH: [phone]   - [facility] - only a SNF/LTC and do not accept Medicaid only for rehab.  Have an over two year wait for a SNF Medicaid bed.  PH: [phone]   - [facility] - Accepts Medicaid, however has extensive wait for a bed.  PH: [phone]  - [facility] - No answer when attempted to call  PH: [phone]  - [facility] - no answer from admissions re if Medicaid is covered  PH: [phone]  - [place] - No answer from admissions re if Medicaid is covered  PH: [phone]    Community Case Management/Community  Services in place:   Pt has a Medicaid  [facility]   PH: [phone]   -  on Call for [facility]: [phone]    [facility] Office: Main line: [phone]  Virginia Stephania - call for services to start  PH: [phone]    [facility] - medical administration, fax all documentation here  F: [phone]    Pt may have Title 19 services for transportation, will need to assess this at time of dc if he qualifies for TCU.      Update 1/31/17 - this is a wrong number.  Please do not call.

## 2017-02-01 ENCOUNTER — HOSPITAL ENCOUNTER (INPATIENT)
Dept: VASCULAR ULTRASOUND | Facility: CLINIC | Age: 47
DRG: 286 | End: 2017-02-01
Attending: STUDENT IN AN ORGANIZED HEALTH CARE EDUCATION/TRAINING PROGRAM | Admitting: INTERNAL MEDICINE
Payer: MEDICAID

## 2017-02-01 ENCOUNTER — APPOINTMENT (OUTPATIENT)
Dept: CT IMAGING | Facility: CLINIC | Age: 47
DRG: 286 | End: 2017-02-01
Attending: STUDENT IN AN ORGANIZED HEALTH CARE EDUCATION/TRAINING PROGRAM
Payer: MEDICAID

## 2017-02-01 ENCOUNTER — APPOINTMENT (OUTPATIENT)
Dept: CARDIOLOGY | Facility: CLINIC | Age: 47
DRG: 286 | End: 2017-02-01
Attending: STUDENT IN AN ORGANIZED HEALTH CARE EDUCATION/TRAINING PROGRAM
Payer: MEDICAID

## 2017-02-01 ENCOUNTER — APPOINTMENT (OUTPATIENT)
Dept: OCCUPATIONAL THERAPY | Facility: CLINIC | Age: 47
DRG: 286 | End: 2017-02-01
Attending: INTERNAL MEDICINE
Payer: MEDICAID

## 2017-02-01 ENCOUNTER — APPOINTMENT (OUTPATIENT)
Dept: ULTRASOUND IMAGING | Facility: CLINIC | Age: 47
DRG: 286 | End: 2017-02-01
Attending: INTERNAL MEDICINE
Payer: MEDICAID

## 2017-02-01 LAB
ALBUMIN SERPL-MCNC: 2.1 G/DL (ref 3.4–5)
ALBUMIN SERPL-MCNC: 2.5 G/DL (ref 3.4–5)
ALP SERPL-CCNC: 277 U/L (ref 40–150)
ALP SERPL-CCNC: 302 U/L (ref 40–150)
ALT SERPL W P-5'-P-CCNC: 84 U/L (ref 0–70)
ALT SERPL W P-5'-P-CCNC: 90 U/L (ref 0–70)
ANION GAP SERPL CALCULATED.3IONS-SCNC: 10 MMOL/L (ref 3–14)
AST SERPL W P-5'-P-CCNC: 139 U/L (ref 0–45)
AST SERPL W P-5'-P-CCNC: 150 U/L (ref 0–45)
BASE DEFICIT BLDV-SCNC: 0.8 MMOL/L
BILIRUB DIRECT SERPL-MCNC: 4.8 MG/DL (ref 0–0.2)
BILIRUB DIRECT SERPL-MCNC: 4.9 MG/DL (ref 0–0.2)
BILIRUB SERPL-MCNC: 6.2 MG/DL (ref 0.2–1.3)
BILIRUB SERPL-MCNC: 6.5 MG/DL (ref 0.2–1.3)
BUN SERPL-MCNC: 19 MG/DL (ref 7–30)
CALCIUM SERPL-MCNC: 7.7 MG/DL (ref 8.5–10.1)
CHLORIDE SERPL-SCNC: 97 MMOL/L (ref 94–109)
CO2 SERPL-SCNC: 24 MMOL/L (ref 20–32)
COPATH REPORT: NORMAL
CREAT SERPL-MCNC: 1.42 MG/DL (ref 0.66–1.25)
ERYTHROCYTE [DISTWIDTH] IN BLOOD BY AUTOMATED COUNT: 19.7 % (ref 10–15)
ERYTHROCYTE [DISTWIDTH] IN BLOOD BY AUTOMATED COUNT: 19.9 % (ref 10–15)
ERYTHROCYTE [DISTWIDTH] IN BLOOD BY AUTOMATED COUNT: 20 % (ref 10–15)
GFR SERPL CREATININE-BSD FRML MDRD: 53 ML/MIN/1.7M2
GLUCOSE SERPL-MCNC: 90 MG/DL (ref 70–99)
HCO3 BLDV-SCNC: 24 MMOL/L (ref 21–28)
HCT VFR BLD AUTO: 25.4 % (ref 40–53)
HCT VFR BLD AUTO: 26.5 % (ref 40–53)
HCT VFR BLD AUTO: 26.5 % (ref 40–53)
HGB BLD-MCNC: 8.5 G/DL (ref 13.3–17.7)
HGB BLD-MCNC: 8.9 G/DL (ref 13.3–17.7)
HGB BLD-MCNC: 8.9 G/DL (ref 13.3–17.7)
INR PPP: 1.92 (ref 0.86–1.14)
LACTATE BLD-SCNC: 3.2 MMOL/L (ref 0.7–2.1)
LACTATE BLD-SCNC: 4.3 MMOL/L (ref 0.7–2.1)
LACTATE BLD-SCNC: 4.4 MMOL/L (ref 0.7–2.1)
LACTATE BLD-SCNC: 5.3 MMOL/L (ref 0.7–2.1)
LIPASE SERPL-CCNC: 655 U/L (ref 73–393)
LMWH PPP CHRO-ACNC: 0.19 IU/ML
LMWH PPP CHRO-ACNC: 0.23 IU/ML
LMWH PPP CHRO-ACNC: 0.3 IU/ML
MAGNESIUM SERPL-MCNC: 2.1 MG/DL (ref 1.6–2.3)
MCH RBC QN AUTO: 31.8 PG (ref 26.5–33)
MCH RBC QN AUTO: 32.2 PG (ref 26.5–33)
MCH RBC QN AUTO: 32.2 PG (ref 26.5–33)
MCHC RBC AUTO-ENTMCNC: 33.5 G/DL (ref 31.5–36.5)
MCHC RBC AUTO-ENTMCNC: 33.6 G/DL (ref 31.5–36.5)
MCHC RBC AUTO-ENTMCNC: 33.6 G/DL (ref 31.5–36.5)
MCV RBC AUTO: 95 FL (ref 78–100)
MCV RBC AUTO: 96 FL (ref 78–100)
MCV RBC AUTO: 96 FL (ref 78–100)
O2/TOTAL GAS SETTING VFR VENT: 21 %
OXYHGB MFR BLDV: 66 %
PCO2 BLDV: 38 MM HG (ref 40–50)
PH BLDV: 7.41 PH (ref 7.32–7.43)
PLATELET # BLD AUTO: 139 10E9/L (ref 150–450)
PLATELET # BLD AUTO: 141 10E9/L (ref 150–450)
PLATELET # BLD AUTO: 171 10E9/L (ref 150–450)
PO2 BLDV: 37 MM HG (ref 25–47)
POTASSIUM SERPL-SCNC: 4.6 MMOL/L (ref 3.4–5.3)
PROT SERPL-MCNC: 5.7 G/DL (ref 6.8–8.8)
PROT SERPL-MCNC: 6.4 G/DL (ref 6.8–8.8)
RADIOLOGIST FLAGS: ABNORMAL
RBC # BLD AUTO: 2.64 10E12/L (ref 4.4–5.9)
RBC # BLD AUTO: 2.76 10E12/L (ref 4.4–5.9)
RBC # BLD AUTO: 2.8 10E12/L (ref 4.4–5.9)
SODIUM SERPL-SCNC: 131 MMOL/L (ref 133–144)
WBC # BLD AUTO: 4.7 10E9/L (ref 4–11)
WBC # BLD AUTO: 5.7 10E9/L (ref 4–11)
WBC # BLD AUTO: 6.1 10E9/L (ref 4–11)

## 2017-02-01 PROCEDURE — 85027 COMPLETE CBC AUTOMATED: CPT | Performed by: INTERNAL MEDICINE

## 2017-02-01 PROCEDURE — 99232 SBSQ HOSP IP/OBS MODERATE 35: CPT | Mod: 25 | Performed by: INTERNAL MEDICINE

## 2017-02-01 PROCEDURE — 25000128 H RX IP 250 OP 636: Performed by: INTERNAL MEDICINE

## 2017-02-01 PROCEDURE — 25000132 ZZH RX MED GY IP 250 OP 250 PS 637: Performed by: INTERNAL MEDICINE

## 2017-02-01 PROCEDURE — 27210195 ZZH KIT POWER PICC DOUBLE LUMEN

## 2017-02-01 PROCEDURE — 40000133 ZZH STATISTIC OT WARD VISIT: Performed by: OCCUPATIONAL THERAPIST

## 2017-02-01 PROCEDURE — 83605 ASSAY OF LACTIC ACID: CPT | Performed by: INTERNAL MEDICINE

## 2017-02-01 PROCEDURE — 25000125 ZZHC RX 250: Performed by: INTERNAL MEDICINE

## 2017-02-01 PROCEDURE — 97535 SELF CARE MNGMENT TRAINING: CPT | Mod: GO | Performed by: OCCUPATIONAL THERAPIST

## 2017-02-01 PROCEDURE — 36415 COLL VENOUS BLD VENIPUNCTURE: CPT | Performed by: INTERNAL MEDICINE

## 2017-02-01 PROCEDURE — 80076 HEPATIC FUNCTION PANEL: CPT | Performed by: INTERNAL MEDICINE

## 2017-02-01 PROCEDURE — 85610 PROTHROMBIN TIME: CPT | Performed by: INTERNAL MEDICINE

## 2017-02-01 PROCEDURE — 25000132 ZZH RX MED GY IP 250 OP 250 PS 637: Performed by: NURSE PRACTITIONER

## 2017-02-01 PROCEDURE — 20000004 ZZH R&B ICU UMMC

## 2017-02-01 PROCEDURE — 82805 BLOOD GASES W/O2 SATURATION: CPT | Performed by: INTERNAL MEDICINE

## 2017-02-01 PROCEDURE — 83735 ASSAY OF MAGNESIUM: CPT | Performed by: INTERNAL MEDICINE

## 2017-02-01 PROCEDURE — S0171 BUMETANIDE 0.5 MG: HCPCS | Performed by: STUDENT IN AN ORGANIZED HEALTH CARE EDUCATION/TRAINING PROGRAM

## 2017-02-01 PROCEDURE — 85520 HEPARIN ASSAY: CPT | Performed by: INTERNAL MEDICINE

## 2017-02-01 PROCEDURE — 74176 CT ABD & PELVIS W/O CONTRAST: CPT

## 2017-02-01 PROCEDURE — 25500064 ZZH RX 255 OP 636: Performed by: INTERNAL MEDICINE

## 2017-02-01 PROCEDURE — 87040 BLOOD CULTURE FOR BACTERIA: CPT | Performed by: INTERNAL MEDICINE

## 2017-02-01 PROCEDURE — 25000132 ZZH RX MED GY IP 250 OP 250 PS 637: Performed by: STUDENT IN AN ORGANIZED HEALTH CARE EDUCATION/TRAINING PROGRAM

## 2017-02-01 PROCEDURE — 93306 TTE W/DOPPLER COMPLETE: CPT | Mod: 26 | Performed by: INTERNAL MEDICINE

## 2017-02-01 PROCEDURE — 97110 THERAPEUTIC EXERCISES: CPT | Mod: GO | Performed by: OCCUPATIONAL THERAPIST

## 2017-02-01 PROCEDURE — 93975 VASCULAR STUDY: CPT | Mod: TC

## 2017-02-01 PROCEDURE — 83605 ASSAY OF LACTIC ACID: CPT | Performed by: STUDENT IN AN ORGANIZED HEALTH CARE EDUCATION/TRAINING PROGRAM

## 2017-02-01 PROCEDURE — 83690 ASSAY OF LIPASE: CPT | Performed by: INTERNAL MEDICINE

## 2017-02-01 PROCEDURE — 36569 INSJ PICC 5 YR+ W/O IMAGING: CPT

## 2017-02-01 PROCEDURE — 25000128 H RX IP 250 OP 636: Performed by: STUDENT IN AN ORGANIZED HEALTH CARE EDUCATION/TRAINING PROGRAM

## 2017-02-01 PROCEDURE — 25000125 ZZHC RX 250: Performed by: STUDENT IN AN ORGANIZED HEALTH CARE EDUCATION/TRAINING PROGRAM

## 2017-02-01 PROCEDURE — 36415 COLL VENOUS BLD VENIPUNCTURE: CPT | Performed by: STUDENT IN AN ORGANIZED HEALTH CARE EDUCATION/TRAINING PROGRAM

## 2017-02-01 PROCEDURE — 36592 COLLECT BLOOD FROM PICC: CPT | Performed by: INTERNAL MEDICINE

## 2017-02-01 PROCEDURE — 40000264 ECHO COMPLETE WITH OPTISON

## 2017-02-01 PROCEDURE — 80048 BASIC METABOLIC PNL TOTAL CA: CPT | Performed by: INTERNAL MEDICINE

## 2017-02-01 RX ORDER — LORAZEPAM 2 MG/ML
0.5 INJECTION INTRAMUSCULAR
Status: COMPLETED | OUTPATIENT
Start: 2017-02-01 | End: 2017-02-01

## 2017-02-01 RX ORDER — ACETAMINOPHEN 10 MG/ML
1000 INJECTION, SOLUTION INTRAVENOUS ONCE
Status: COMPLETED | OUTPATIENT
Start: 2017-02-01 | End: 2017-02-01

## 2017-02-01 RX ORDER — WARFARIN SODIUM 5 MG/1
5 TABLET ORAL
Status: DISCONTINUED | OUTPATIENT
Start: 2017-02-01 | End: 2017-02-01

## 2017-02-01 RX ORDER — HYDROMORPHONE HYDROCHLORIDE 1 MG/ML
.3-.5 INJECTION, SOLUTION INTRAMUSCULAR; INTRAVENOUS; SUBCUTANEOUS ONCE
Status: COMPLETED | OUTPATIENT
Start: 2017-02-01 | End: 2017-02-01

## 2017-02-01 RX ORDER — SODIUM CHLORIDE 9 MG/ML
INJECTION, SOLUTION INTRAVENOUS
Status: DISCONTINUED
Start: 2017-02-01 | End: 2017-02-03 | Stop reason: HOSPADM

## 2017-02-01 RX ORDER — LIDOCAINE 40 MG/G
CREAM TOPICAL
Status: DISCONTINUED | OUTPATIENT
Start: 2017-02-01 | End: 2017-02-01

## 2017-02-01 RX ORDER — LIDOCAINE 40 MG/G
CREAM TOPICAL
Status: DISCONTINUED | OUTPATIENT
Start: 2017-02-01 | End: 2017-02-12 | Stop reason: HOSPADM

## 2017-02-01 RX ORDER — HEPARIN SODIUM,PORCINE 10 UNIT/ML
2-5 VIAL (ML) INTRAVENOUS
Status: DISCONTINUED | OUTPATIENT
Start: 2017-02-01 | End: 2017-02-12 | Stop reason: HOSPADM

## 2017-02-01 RX ORDER — LIDOCAINE HYDROCHLORIDE 10 MG/ML
.5-1 INJECTION, SOLUTION INFILTRATION; PERINEURAL
Status: DISCONTINUED | OUTPATIENT
Start: 2017-02-01 | End: 2017-02-12 | Stop reason: HOSPADM

## 2017-02-01 RX ORDER — ACETAMINOPHEN 500 MG
1000 TABLET ORAL ONCE
Status: DISCONTINUED | OUTPATIENT
Start: 2017-02-01 | End: 2017-02-01

## 2017-02-01 RX ORDER — HYDROMORPHONE HYDROCHLORIDE 1 MG/ML
.3-.5 INJECTION, SOLUTION INTRAMUSCULAR; INTRAVENOUS; SUBCUTANEOUS EVERY 4 HOURS PRN
Status: DISCONTINUED | OUTPATIENT
Start: 2017-02-01 | End: 2017-02-01

## 2017-02-01 RX ORDER — HYDROMORPHONE HYDROCHLORIDE 1 MG/ML
0.5 INJECTION, SOLUTION INTRAMUSCULAR; INTRAVENOUS; SUBCUTANEOUS ONCE
Status: COMPLETED | OUTPATIENT
Start: 2017-02-01 | End: 2017-02-01

## 2017-02-01 RX ORDER — ONDANSETRON 2 MG/ML
4 INJECTION INTRAMUSCULAR; INTRAVENOUS ONCE
Status: COMPLETED | OUTPATIENT
Start: 2017-02-01 | End: 2017-02-01

## 2017-02-01 RX ORDER — HYDROMORPHONE HYDROCHLORIDE 1 MG/ML
.3-.5 INJECTION, SOLUTION INTRAMUSCULAR; INTRAVENOUS; SUBCUTANEOUS
Status: DISCONTINUED | OUTPATIENT
Start: 2017-02-01 | End: 2017-02-04

## 2017-02-01 RX ORDER — CHLORPROMAZINE HYDROCHLORIDE 25 MG/ML
25 INJECTION INTRAMUSCULAR ONCE
Status: DISCONTINUED | OUTPATIENT
Start: 2017-02-01 | End: 2017-02-01

## 2017-02-01 RX ORDER — PIPERACILLIN SODIUM, TAZOBACTAM SODIUM 3; .375 G/15ML; G/15ML
3.38 INJECTION, POWDER, LYOPHILIZED, FOR SOLUTION INTRAVENOUS EVERY 6 HOURS
Status: DISCONTINUED | OUTPATIENT
Start: 2017-02-01 | End: 2017-02-09

## 2017-02-01 RX ORDER — BUMETANIDE 0.25 MG/ML
1 INJECTION INTRAMUSCULAR; INTRAVENOUS DAILY
Status: DISCONTINUED | OUTPATIENT
Start: 2017-02-02 | End: 2017-02-01

## 2017-02-01 RX ADMIN — LORAZEPAM 0.5 MG: 2 INJECTION INTRAMUSCULAR; INTRAVENOUS at 14:42

## 2017-02-01 RX ADMIN — HUMAN ALBUMIN MICROSPHERES AND PERFLUTREN 6 ML: 10; .22 INJECTION, SOLUTION INTRAVENOUS at 14:45

## 2017-02-01 RX ADMIN — LEVOTHYROXINE SODIUM 175 MCG: 25 TABLET ORAL at 09:28

## 2017-02-01 RX ADMIN — PIPERACILLIN AND TAZOBACTAM 3.38 G: 3; .375 INJECTION, POWDER, FOR SOLUTION INTRAVENOUS at 18:46

## 2017-02-01 RX ADMIN — Medication 0.25 MG: at 02:58

## 2017-02-01 RX ADMIN — HYDROMORPHONE HYDROCHLORIDE 0.5 MG: 10 INJECTION, SOLUTION INTRAMUSCULAR; INTRAVENOUS; SUBCUTANEOUS at 16:05

## 2017-02-01 RX ADMIN — Medication: at 09:30

## 2017-02-01 RX ADMIN — ALUMINUM HYDROXIDE, MAGNESIUM HYDROXIDE, AND DIMETHICONE 30 ML: 400; 400; 40 SUSPENSION ORAL at 10:29

## 2017-02-01 RX ADMIN — POLYETHYLENE GLYCOL 3350 17 G: 17 POWDER, FOR SOLUTION ORAL at 11:47

## 2017-02-01 RX ADMIN — LIDOCAINE 1 PATCH: 50 PATCH CUTANEOUS at 19:49

## 2017-02-01 RX ADMIN — ONDANSETRON HYDROCHLORIDE 4 MG: 4 TABLET, FILM COATED ORAL at 08:28

## 2017-02-01 RX ADMIN — VANCOMYCIN HYDROCHLORIDE 1750 MG: 10 INJECTION, POWDER, LYOPHILIZED, FOR SOLUTION INTRAVENOUS at 20:35

## 2017-02-01 RX ADMIN — OXYCODONE HYDROCHLORIDE 5 MG: 5 TABLET ORAL at 08:28

## 2017-02-01 RX ADMIN — BUMETANIDE 1 MG: 0.25 INJECTION, SOLUTION INTRAMUSCULAR; INTRAVENOUS at 09:28

## 2017-02-01 RX ADMIN — Medication 0.25 MG: at 13:11

## 2017-02-01 RX ADMIN — ACETAMINOPHEN 650 MG: 325 TABLET, FILM COATED ORAL at 09:28

## 2017-02-01 RX ADMIN — ACETAMINOPHEN 1000 MG: 10 INJECTION, SOLUTION INTRAVENOUS at 21:12

## 2017-02-01 RX ADMIN — HYDROMORPHONE HYDROCHLORIDE 0.3 MG: 10 INJECTION, SOLUTION INTRAMUSCULAR; INTRAVENOUS; SUBCUTANEOUS at 13:50

## 2017-02-01 RX ADMIN — SODIUM CHLORIDE 500 ML: 9 INJECTION, SOLUTION INTRAVENOUS at 17:28

## 2017-02-01 RX ADMIN — LIDOCAINE HYDROCHLORIDE 3.5 ML: 10 INJECTION, SOLUTION INFILTRATION; PERINEURAL at 15:41

## 2017-02-01 RX ADMIN — PANTOPRAZOLE SODIUM 40 MG: 40 INJECTION, POWDER, FOR SOLUTION INTRAVENOUS at 20:02

## 2017-02-01 RX ADMIN — Medication 0.25 MG: at 19:48

## 2017-02-01 RX ADMIN — HYDROMORPHONE HYDROCHLORIDE 0.5 MG: 10 INJECTION, SOLUTION INTRAMUSCULAR; INTRAVENOUS; SUBCUTANEOUS at 18:31

## 2017-02-01 RX ADMIN — DOCUSATE SODIUM 100 MG: 100 CAPSULE, LIQUID FILLED ORAL at 09:28

## 2017-02-01 RX ADMIN — MULTIPLE VITAMINS W/ MINERALS TAB 1 TABLET: TAB at 09:29

## 2017-02-01 RX ADMIN — Medication 0.25 MG: at 08:27

## 2017-02-01 RX ADMIN — ONDANSETRON 4 MG: 2 INJECTION INTRAMUSCULAR; INTRAVENOUS at 17:20

## 2017-02-01 RX ADMIN — METHOCARBAMOL 750 MG: 750 TABLET ORAL at 09:28

## 2017-02-01 RX ADMIN — PROCHLORPERAZINE EDISYLATE 5 MG: 5 INJECTION INTRAMUSCULAR; INTRAVENOUS at 14:00

## 2017-02-01 RX ADMIN — MENTHOL 1 PATCH: 205.5 PATCH TOPICAL at 09:29

## 2017-02-01 RX ADMIN — HYDROMORPHONE HYDROCHLORIDE 0.3 MG: 10 INJECTION, SOLUTION INTRAMUSCULAR; INTRAVENOUS; SUBCUTANEOUS at 22:50

## 2017-02-01 RX ADMIN — OMEPRAZOLE 20 MG: 20 CAPSULE, DELAYED RELEASE ORAL at 09:29

## 2017-02-01 RX ADMIN — DOCUSATE SODIUM 100 MG: 100 CAPSULE, LIQUID FILLED ORAL at 19:48

## 2017-02-01 RX ADMIN — PIPERACILLIN AND TAZOBACTAM 3.38 G: 3; .375 INJECTION, POWDER, FOR SOLUTION INTRAVENOUS at 23:21

## 2017-02-01 RX ADMIN — OXYCODONE HYDROCHLORIDE 5 MG: 5 TABLET ORAL at 02:58

## 2017-02-01 ASSESSMENT — PAIN DESCRIPTION - DESCRIPTORS
DESCRIPTORS: HEADACHE
DESCRIPTORS: ACHING
DESCRIPTORS: DISCOMFORT
DESCRIPTORS: ACHING
DESCRIPTORS: ACHING
DESCRIPTORS: ACHING;SORE

## 2017-02-01 NOTE — PLAN OF CARE
Writer contacted CARDS 1 team regarding patient discomfort and nausea not resolved with scheduled medications. Lactic acid drawn: 3.2, MD aware. Patient given IV Dilaudid and IV Compazine. Echo, Abdominal CT ordered. PICC line ordered to be placed.

## 2017-02-01 NOTE — PLAN OF CARE
Problem: Goal Outcome Summary  Goal: Goal Outcome Summary  OT 6C: Pt limited by RUE pain, RLE brace, decreased strength and endurance, and dizziness/fatigue with activity. Pt mod A to complete seated ADL routine and max Ax2 to stand x2 with FWW for ~3 min total with seated rest break between. Pts BP increased from 87/51 to 98/55 with activity.    Recommend discharge to TCU to increase ADL Ind and endurance.

## 2017-02-01 NOTE — PHARMACY-VANCOMYCIN DOSING SERVICE
Pharmacy Vancomycin Initial Note  Date of Service 2017  Patient's  1970  46 year old, male    Indication: Intra-abdominal infection    Current estimated CrCl = Estimated Creatinine Clearance: 76.4 mL/min (based on Cr of 1.42).    Creatinine for last 3 days  2017:  7:09 AM Creatinine 1.25 mg/dL  2017:  8:33 AM Creatinine 1.25 mg/dL  2017:  8:49 AM Creatinine 1.42 mg/dL*    Recent Vancomycin Level(s) for last 3 days  No results found for requested labs within last 3 days.      Vancomycin IV Administrations (past 72 hours)      No vancomycin orders with administrations in past 72 hours.                Nephrotoxins and other renal medications (Future)    Start     Dose/Rate Route Frequency Ordered Stop    17 1730  vancomycin (VANCOCIN) 1,750 mg in NaCl 0.9 % 250 mL intermittent infusion      1,750 mg (central catheter) Intravenous EVERY 24 HOURS 17 1720      17 1715  piperacillin-tazobactam (ZOSYN) 3.375 g vial to attach to  mL bag      3.375 g  over 1 Hours Intravenous EVERY 6 HOURS 17 1709            Contrast Orders - past 72 hours (72h ago through future)    Start     Dose/Rate Route Frequency Ordered Stop    17 1445  perflutren diluted 1mL to 2mL with saline (OPTISON) diluted injection 6 mL      6 mL Intravenous ONCE 17 1437 17 1445    17 1430  iodixanol (VISIPAQUE 320) injection 100 mL      100 mL Intravenous ONCE 17 1429 17 1619                Plan:  1.  Start vancomycin  1750 mg IV q24h (18.7mg/kg using ABW = 93.7kg). Frequency of q24h chosen given CARSON; patient will need to be closely monitored.  2.  Goal Trough Level: 15-20 mg/L   3.  Pharmacy will check trough levels as appropriate in 1-3 Days.    4. Serum creatinine levels will be ordered daily for the first week of therapy and at least twice weekly for subsequent weeks.    5. [place] method utilized to dose vancomycin therapy: Method 2    Vancomycin is a  restricted antibiotic and requires ID/Antimicrobial Management Team (AMT) approval for empiric use beyond 48 hours (2/3).    Yumiko Darnell, PharmD

## 2017-02-01 NOTE — PLAN OF CARE
Problem: Goal Outcome Summary  Goal: Goal Outcome Summary  Outcome: No Change  D: Admitted 1/12 from OSH for further evaluation of CHF  I/A: A&Ox4. VSS on RA, except soft BPs at 85s-90s/45s-50s. V-paced 80s. R leg pain and R shoulder pain partially relieved by PRN oxycodone, scheduled tylenol and lidoderm patches. Bridging to warfarin since 1/30, heparin gtt continues at 1050 units/hr. PRN klonopin given x1 overnight. Denied nausea this shift. Lymph wraps in place (to be changed 2/1). R leg brace removed while patient in bed overnight. +3 R hip edema continues to weep. Assisted to reposition in bed. Mepilex on buttocks in place and CDI. Scrotal and penile edema, antifungal powder and interdry applied to groin folds. Adequate uop. Appeared to sleep well between cares.     P: Continues to diurese. Possible upcoming valve(s) repair/replacement. LVAD w/u as backup plan. Possible tooth extraction prior to heart surgery. Continue to monitor and notify Cards I with questions/concerns.

## 2017-02-01 NOTE — PROGRESS NOTES
"SPIRITUAL HEALTH SERVICES  SPIRITUAL ASSESSMENT Progress Note  [facility] 6C     Brief unit  visit with pt, who said he was \"feeling a bit nauseous, but they're working on it.\" Pt asked for prayer, which we did. I let pt know I would be gone Wednesday, back Thursday.    PLAN: will visit again this week.    Gian Luciano) Lizbeth Mathias M.Div., [facility]  Staff   Pager [phone]      "

## 2017-02-01 NOTE — PLAN OF CARE
Problem: Goal Outcome Summary  Goal: Goal Outcome Summary  PT 6C: cancel. Per nursing, patient not appropriate for therapy this afternoon.

## 2017-02-01 NOTE — PROGRESS NOTES
Cardiology 1 Progress Note    Samir Rodriguez MRN# [id number]   Age: 46 year old YOB: 1970   Date of Admission: 1/12/2017           Assessment and Plan:   Samir Rodriguez is a 46 year old  male with past medical history of Rheumatic Heart Disease s/p mechanical MVR x 2 (1980s and 1992) on warfarin (INR goal 2.5-3.5), biventricular CHF (EF 25-30%) s/p dual chamber ICD 2008, chronic afib on amiodarone and previously on digoxine (stopped 1/16/17 due to concern for toxicity), WPW ablation at age 11, CKD III, hypothyroid,  who was transferred from [facility] on 01/12 for further eval of CHF and concern of hemolysis in setting of mechanical valve.    Today:  - cont heparin gtt for bridging, continue warfarin (restarted 1/30/17)  - Bumex 1 mg qday  - follow up liver biopsy path results  - plan for teeth extraction Friday at 7:30 am on [place] by Dr. 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 Echocardiogram on admission demonstrated moderate-severe aortic insufficiency which is his most acute cardiac pathology. His tricuspid regurgitation is likely due to his signficantly fluid overloaded state. Mechanical valve maintained on warfarin w/ INR 2.5-3.5 prior to this admission. Angiogram performed on 01/20 revealed nonobstructive CAD.  - EDEL shows probable restriction of one of the mitral valve leafets; if he is a candidate for valvular surgery, mitral valve replacemnt will have to be considered as well   - Dental advising mandibular  teeth extraction before valve surgery- had pre-extraction evaluation at dental clinic  -> teeth extractions Friday 2/3/17  - Hold heparin Friday2/2 at 1am for extractions  - Cardiovascular surgery consulted; appreciate recommendations  - heart failure service following; receiving workup for possible VAD as backup for valve surgery  - liver biopsy by IR as part of VAD workup 1/30; will follow up pathology results    # Anemia and thrombocytopenia  # Right Arm Hematoma   Blood smear: blood smear- RBCs show some target cells, hypochromia, increased polychromasia, no schistocytes or spherocytes. Low haptoglobin, high LDH, and plasma free hemoglobin concerning for intra-vascular hemolysis possibly due to valvular disease. No evidence of overt infection causing DIC. EPO inappropriately low w/ recent ARF. Etiology of hematoma likely due to supratherapeutic INR- 4.6 on arrival  - warfarin w/ goal INR 2.5-3.0   - on heparin gtt for bridging  - restarted warfarin 1/30    #. Cirrhosis and liver nodularity  Demonstrated on CT, although u/s was normal. CT read is concerning for amiodarone toxicity  - GI consulted, appreciate recommendations  - decreased scheduled tylenol dose  - liver biopsy results pending    # Paroxsymal afib:  digoxin level 0.7 on 1/12 and 1/17. 1/16/17 device interrogation: atrial tachycardia to 150s with AV block. Lower rate setting changed to 80bpm from 60bmp and device changed from DDDR to VVIR on 01/16; atrial tachycardia and AV block and V pacing. The patient has been in an atrial flutter that is undersensed, hence the device was switched to VVI mode. There is no point continuing amiodarone at this stage especially with concern for toxicity  - discontinued pta amiodarone.    # Hyperthyroid: Will need repeat TSH/T4 1-2 months post DC. Decreased levothyroxine from 224mcg to 175 mcg   # Anxiety and insomnia: Increased clonazepam to tid    # Right tibia fx and R knee trauma:  Fall in November prompted  decompensation. Xray tibia and knee no fx this admission.  - Ortho evaluated; planning for outpatient follow up with orthopedic surgery in 4-6 weeks  - PT/OT    # Headaches  May be due to near-sightedness vs rebound headache from opioid use vs reduced cardiac output  - pain consult with pain for opioid titration  - eye exam 1/27- received new Rx  - using lidocaine patches  - PRN zofran    # Pulmonary nodules: incidentally found on CT  - repeat CT chest in 6 months    # CARSON- likely pre-renal with increased diuresis. Will hold second dose of bumex today.  -Recheck BMP in am    FEN: cardiac diet   PPX: on heparin gtt  Dispo: Difficult placement due to out of state Medicaid.    Code Status: Full CODE      Patient discussed with staff attending, Dr. Gan.     Joan Burks MD  IM PGY3  [phone]      CARDIOLOGY STAFF  Patient seen and examined by me.  History and physical examination discussed with Dr. Burks whose note reflects our joint assessment and recommendation/plans.  We are still awaiting the results of liver biopsy.  We are also awaiting the arrival of his family who, per patient, were expected to have arrived two nights ago.  We are holding diuretic at this time due to increased creatinine.  He will have teeth extractions of Friday on warfarin with INR 3.0 or less.  D/C heparin in advance.  Ganesh Gan               Interval History/Subjective   No acute events overnight. no CP, SOB, lightheadedness. No n/v today.         Objective   Temp:  [98.2  F (36.8  C)-99.5  F (37.5  C)] 98.4  F (36.9  C)  Heart Rate:  [80] 80  Resp:  [15-18] 18  BP: ()/(48-58) 94/54 mmHg  SpO2:  [96 %-100 %] 96 %    Physical exam:  Gen: AA&Ox3, no acute distress  HEENT: NACT, poor dentition   PULM: Clear lungs  CV: RRR; mechanical s2 w/ 2+ systolic murmur at LUSB and LISB: JVP of 6-8cm  ABD:  soft, nontender, nondistended.    EXT: trace sonam edema to knees. Right arm ecchymosis stable   SKIN: Right arm resolving  ecchymosis outlined w/ marker   NEURO: alert and oriented; speech intact         Data:   CBC    Recent Labs  Lab 01/31/17  0833 01/30/17  0709 01/29/17  0720 01/28/17  0749   WBC 3.9* 4.0 3.2* 4.5   RBC 2.69* 3.00* 2.61* 2.98*   HGB 8.6* 9.5* 8.4* 9.5*   HCT 25.9* 29.2* 25.1* 28.5*   MCV 96 97 96 96   MCH 32.0 31.7 32.2 31.9   MCHC 33.2 32.5 33.5 33.3   RDW 19.8* 19.7* 19.8* 19.7*   * 140* 106* 112*     CMP    Recent Labs  Lab 01/31/17  0833 01/30/17  0709 01/29/17  0720 01/28/17  0749   * 131* 130* 131*   POTASSIUM 4.4 4.1 4.0 4.0   CHLORIDE 98 97 98 96   CO2 24 25 27 24   ANIONGAP 9 9 6 11   GLC 74 76 78 89   BUN 14 14 13 14   CR 1.25 1.25 1.29* 1.24   GFRESTIMATED 62 62 60* 62   GFRESTBLACK 75 75 72 75   DELFINO 7.5* 7.7* 7.2* 7.3*   MAG 2.0 2.1 1.9 2.1     INR    Recent Labs  Lab 01/31/17  0833 01/30/17  0709 01/29/17  0720 01/28/17  0749   INR 1.71* 1.57* 1.94* 1.81*             Medications:     Current Facility-Administered Medications   Medication     bumetanide (BUMEX) injection 1 mg     levothyroxine (SYNTHROID/LEVOTHROID) tablet 175 mcg     Warfarin Therapy Reminder (Check START DATE - warfarin may be starting in the FUTURE)     clonazePAM (klonoPIN) half-tab 0.25 mg     oxyCODONE (ROXICODONE) IR tablet 5 mg     heparin  drip 25,000 units in 0.45% NaCl 250 mL (see additional administration details for dose)     heparin bolus from infusion pump     menthol (ICY HOT) 5 % patch 1 patch    And     menthol (ICY HOT) Patch in Place    And     menthol (ICY HOT) patch REMOVAL     acetaminophen (TYLENOL) tablet 650 mg     ondansetron (ZOFRAN) tablet 4 mg     lidocaine (LIDODERM) 5 % Patch 1-3 patch    And     lidocaine (LIDODERM) patch REMOVAL    And     lidocaine (LIDODERM) Patch in Place     docusate sodium (COLACE) capsule 100 mg     potassium chloride SA (K-DUR/KLOR-CON M) CR tablet 20-40 mEq     potassium chloride (KLOR-CON) Packet 20-40 mEq     potassium chloride 10 mEq in 100 mL intermittent  infusion     potassium chloride 10 mEq in 100 mL intermittent infusion with 10 mg lidocaine     potassium chloride 20 mEq in 50 mL intermittent infusion     magnesium sulfate 2 g in NS intermittent infusion (PharMEDium or FV Cmpd)     magnesium sulfate 4 g in 100 mL sterile water (premade)     potassium phosphate 15 mmol in D5W 250 mL intermittent infusion     potassium phosphate 20 mmol in D5W 500 mL intermittent infusion     potassium phosphate 25 mmol in D5W 500 mL intermittent infusion     multivitamin, therapeutic with minerals (THERA-VIT-M) tablet 1 tablet     melatonin tablet 3 mg     miconazole (MICATIN; MICRO GUARD) 2 % powder     lidocaine 1 % 1 mL     lidocaine (LMX4) kit     sodium chloride (PF) 0.9% PF flush 3 mL     sodium chloride (PF) 0.9% PF flush 3 mL     medication instruction     nitroglycerin (NITROSTAT) sublingual tablet 0.4 mg     alum & mag hydroxide-simethicone (MYLANTA ES/MAALOX  ES) suspension 15-30 mL     polyethylene glycol (MIRALAX/GLYCOLAX) Packet 17 g     albuterol (PROAIR HFA/PROVENTIL HFA/VENTOLIN HFA) Inhaler 2 puff     sennosides (SENOKOT) tablet 8.6 mg     naloxone (NARCAN) injection 0.1-0.4 mg     omeprazole (priLOSEC) CR capsule 20 mg     methocarbamol (ROBAXIN) tablet 750 mg

## 2017-02-01 NOTE — PLAN OF CARE
Problem: Goal Outcome Summary  Goal: Goal Outcome Summary  Outcome: No Change  D/A/I:  Patient A&O x4, denied palpitations, difficulty breathing, SOB, and dizziness.  Reported nausea that was managed with antiemetics, see MAR.  Lung sounds diminished in bases, valve click auscultated.  Had +3 dependent edema, legs germain with +2 edema.  Lymphedema wraps placed by OT staff.  Was given scheduled bumetanide; patient had good urine output, see I&O flowsheet.  Changed dressings to heels and sacrum, see PCS for assessment.  In paced rhythm with HR 80s, SBP 80s-100s, SaO2 97-98% on room air.  Heparin gtt decreased to 1050 units/hr, 10a level due 2330.  Magnesium replaced per protocol.  Reported a headache and right chest pain that was managed with analgesics, patient able to sleep between cares.  Ambulated with therapy with walker and 2-person assist.      P:  Continue to monitor pain, nausea, VS, heart rhythm, wound integrity, fluid status, cardiac and respiratory status.  Notify care team of changes in patient condition or other concerns.  Reposition regularly to promote skin integrity and help heal wounds.  Expected to have tooth extraction 2/2 at 0730, will need heparin gtt stopped by 0130 that morning.

## 2017-02-01 NOTE — PROGRESS NOTES
CLINICAL NUTRITION SERVICES - REASSESSMENT NOTE     Nutrition Prescription    RECOMMENDATIONS FOR MDs/PROVIDERS TO ORDER:  1. Rec change diet to 2 g vs 3 g sodium diet to help encourage oral intake, especially for wound healing (no change in wounds per last WOC assessment) and potential upcoming surgery.  2. Consider ordering thiamine, 100 mg/day, due to cardiac status.  3. If wounds do not improve, reinstate wound protocol for another three days to complete the recommended wound protocol supplementation: Order vitamin A (20,000 International Units per day), vitamin C (500 mg/day), and zinc (220 mg/day) - all for another three days as this was discontinued after seven days.     Malnutrition Status:    Non-severe malnutrition in the context of chronic illness    Future/Additional Recommendations:  1. Encourage small, frequent meals. Also, encourage oral supplements and adequate protein intake. If teeth are extracted, rec soft diet after procedure.  2. Continue multivitamin with minerals as ordered to help meet micronutrient needs.   3. Continue fluid restriction as ordered by team.  4. Consider scheduling antiemetics/antinauseants ~20 minutes prior to meals to help optimize nausea control if symptomatic.     5. If pt should need TFs, rec place feeding tube and initiate TFs, TwoCal HN (concentrated TFs).  Initiate TFs at 10 mL/hr, advancing rate by 10 mL Q 8 hrs (or per MD discretion) to goal rate of 50 mL/hr. This provides 1200 mL TF, 2400 kcals, 101 g PRO, 840 ml free H2O, 263 g CHO and 6 g Fiber daily. Also, order 1 pkt Pro-stat BID. One packet of Prostat provides 15 g protein, 100 kcals, 10 g CHO, and 30 mL fluid.  If desire immunonutrition, would then rec Impact Peptide 1.5 with goal rate of 75 mL/hr, pending volume status. Do not order Pro-stat if Impact Peptide 1.5 TF formula is ordered.       If begin tube feeds:    - Flush feeding tube (FT) with 30 mL water Q 4 hrs for patency. Rec provider adjust free water  flushes as needed, pending fluid status.   - Ensure K+/Mg++/Phos labs are ordered daily until TFs advance to goal infusion to evaluate for sx of refeeding with nutrition received.  If lytes trend low, aggressively replace lytes.       - If not already ordered, order a multivitamin/mineral (certavite 15 mL/day via FT) to help ensure micronutrient needs being met with suspected hypermetabolic demands and potential interruptions to TF infusions.              - Monitor TF and possible need to adjust nutrition support regimen if necessary, pending medical course and nutrition status.        EVALUATION OF THE PROGRESS TOWARD GOALS   Diet: Cardiac diet order. On a 1.5 L fluid restriction. Has a prn supplement order as well as orange Magic Cup at 14:00 and butter pecan Ensure Plus at HS.   Intake: Per nursing flowsheets, good diet tolerance. Flowsheets indicate pt consuming 50% of meals with a fair appetite 1/24, 100% of meals with a good appetite 1/26, 100% of meals on 1/27-1/28, 50-75% of meals with a good appetite 1/29, 75% of meals with a good appetite 1/30, and 25-75% of meals with a fair to good appetite 1/31. Pt was busy during attempts x 2 to see this morning. Per RN note on 1/28, pt had a good appetite for breakfast. Per [employer], foods/beverages ordered 1/30 totaled 1989 kcals and 74 g protein and on 1/31, this totaled 1380 kcals and 46 g protein. Possible factors affecting nutrition intake include frequent NPO status for test and procedures, early satiety, and restrictive diet order.       Kcal counts:   1/25    No intake recorded - NPO for a portion of this day. He did order lunch.   1/26     533 kcals and 26 g protein (small meal/s and no supplement/s recorded) - Ordered breakfast and supper.   1/27     626 kcals and 27 g protein (small meal/s and no supplement/s recorded) - NPO for a large portion of this day. Ordered supper only.   1/28   1820 kcals and 60 g protein (three meal/s and no supplement/s  recorded) - Ordered breakfast and supper.   *  Pt consumed a three-day average of 993 kcals and 38 g protein daily. Not meeting estimated needs of 2509-4756+ kcals/day (25 - 30+ kcals/kg) and 121-140 grams protein/day (1.3 - 1.5 grams of pro/kg).    NEW FINDINGS   [facility] nurse note 1/26: Pressure Injury (PI) located on Sacrum: stage 2, Left heel: Stage 2, Right heel: Stage 2. Status: wound  No change, Erosion at right buttock and small opening at lower sacrum. Wounds present on admit. Remains on thera-vit M. Received wound protocol for seven days starting 1/16 before team discontinued.     ASSESSED NUTRITION NEEDS (updated):  Dosing wt: 91 kg (based on lowest wt this admission of 90.5 kg on 1/28/16)  Estimated Energy Needs: 2049-0044+ kcals/day (25 - 30+ kcals/kg)  Justification: Maintenance needs  Estimated Protein Needs: 118-137 grams protein/day (1.3 - 1.5 grams of pro/kg)  Justification: Increased needs with heart failure and wound healing, pending renal function with CKD hx  Estimated Fluid Needs: 1.5 L fluid restriction per provider  Justification: Per provider pending fluid status    MALNUTRITION  % Intake: < 75% for > 7 days (non-severe)  % Weight Loss: Difficult to assess with fluid status changes.  Subcutaneous Fat Loss: Facial region:  Mild, although difficult to assess with fluid status  Muscle Loss: Temporal:  Mild. Generalized, although difficult to assess with fluid status  Fluid Accumulation/Edema: Does not meet criteria  Malnutrition Diagnosis: Non-severe malnutrition in the context of chronic illness    Previous Goals   Patient to consume % of nutritionally adequate meal trays TID, or the equivalent with supplements/snacks. - As per note 1/13.  Evaluation: Not met.     Previous Nutrition Diagnosis  Inadequate oral intake related to decreased appetite, early satiety, NPO for tests/procedures, and N/V earlier this admission as evidenced by pt consuming 50% of meals at times.    Evaluation:  Unresolved. Updated below.     CURRENT NUTRITION DIAGNOSIS  Inadequate oral intake related to decreased appetite, early satiety, NPO for tests/procedures, and N/V earlier this admission as evidenced by pt consuming 25-50% of meals at times.      INTERVENTIONS  Implementation  None additionally at this time. Oral supplement menu left with pt during last visit. Wrote high protein options on handout.     Goals  Patient to consume % of nutritionally adequate meal trays TID, or the equivalent with supplements/snacks.    Monitoring/Evaluation  Progress toward goals will be monitored and evaluated per protocol.     Nutrition will continue to follow.    Giana Damon, MS, RD, LD, [facility]   6C Pgr:  [phone]

## 2017-02-01 NOTE — PROVIDER NOTIFICATION
02/01/17 1700   Call Information   Date of Call 02/01/17   Time of Call 1631   Name of person requesting the team Sameera   Title of person requesting team RN   RRT Arrival time 1632   Time RRT ended 1720   Reason for call   Type of RRT Adult   Primary reason for call Pain;Staff concerned;Sepsis suspected;Nurse is concerned/worried   Pain Recurring   Pain Location abdomen   Sepsis Suspected Elevated Lactate level   Was patient transferred from the ED, ICU, or PACU within last 24 hours prior to RRT call? No   SBAR   Situation elevated lactate 4.0, c/o abdominal pain, nausea, severe edema throughout.    Background Liver biopsy, increased creat, v-paced at 80   Notable History/Conditions Cardiac;End-Stage disease;Organ failure   Assessment pt crying out in pain, AVSS, increased lactic acid.    Interventions Labs;Meds   Patient Outcome   Patient Outcome Transferred to  (ICU)   RRT Team   Attending/Primary/Covering Physician Parminder   Date Attending Physician notified 02/01/17   Time Attending Physician notified 1631   Physician(s) Elvia Smith   Other staff Evie

## 2017-02-02 ENCOUNTER — APPOINTMENT (OUTPATIENT)
Dept: PHYSICAL THERAPY | Facility: CLINIC | Age: 47
DRG: 286 | End: 2017-02-02
Attending: INTERNAL MEDICINE
Payer: MEDICAID

## 2017-02-02 ENCOUNTER — APPOINTMENT (OUTPATIENT)
Dept: OCCUPATIONAL THERAPY | Facility: CLINIC | Age: 47
DRG: 286 | End: 2017-02-02
Attending: INTERNAL MEDICINE
Payer: MEDICAID

## 2017-02-02 ENCOUNTER — APPOINTMENT (OUTPATIENT)
Dept: CT IMAGING | Facility: CLINIC | Age: 47
DRG: 286 | End: 2017-02-02
Attending: STUDENT IN AN ORGANIZED HEALTH CARE EDUCATION/TRAINING PROGRAM
Payer: MEDICAID

## 2017-02-02 LAB
ABO + RH BLD: ABNORMAL
ABO + RH BLD: ABNORMAL
ALBUMIN SERPL-MCNC: 2 G/DL (ref 3.4–5)
ALP SERPL-CCNC: 295 U/L (ref 40–150)
ALT SERPL W P-5'-P-CCNC: 81 U/L (ref 0–70)
ANION GAP SERPL CALCULATED.3IONS-SCNC: 12 MMOL/L (ref 3–14)
AST SERPL W P-5'-P-CCNC: 127 U/L (ref 0–45)
BASE EXCESS BLDV CALC-SCNC: 0.6 MMOL/L
BILIRUB SERPL-MCNC: 6.5 MG/DL (ref 0.2–1.3)
BLD GP AB SCN SERPL QL: ABNORMAL
BLD PROD TYP BPU: ABNORMAL
BLD PROD TYP BPU: NORMAL
BLD UNIT ID BPU: 0
BLOOD BANK CMNT PATIENT-IMP: ABNORMAL
BLOOD BANK CMNT PATIENT-IMP: ABNORMAL
BLOOD PRODUCT CODE: NORMAL
BPU ID: NORMAL
BUN SERPL-MCNC: 23 MG/DL (ref 7–30)
CALCIUM SERPL-MCNC: 7.3 MG/DL (ref 8.5–10.1)
CHLORIDE SERPL-SCNC: 99 MMOL/L (ref 94–109)
CO2 SERPL-SCNC: 21 MMOL/L (ref 20–32)
CREAT SERPL-MCNC: 1.31 MG/DL (ref 0.66–1.25)
ERYTHROCYTE [DISTWIDTH] IN BLOOD BY AUTOMATED COUNT: 19.8 % (ref 10–15)
ERYTHROCYTE [DISTWIDTH] IN BLOOD BY AUTOMATED COUNT: 20.1 % (ref 10–15)
GFR SERPL CREATININE-BSD FRML MDRD: 58 ML/MIN/1.7M2
GLUCOSE SERPL-MCNC: 122 MG/DL (ref 70–99)
HCO3 BLDV-SCNC: 25 MMOL/L (ref 21–28)
HCT VFR BLD AUTO: 21.5 % (ref 40–53)
HCT VFR BLD AUTO: 22.4 % (ref 40–53)
HGB BLD-MCNC: 7.3 G/DL (ref 13.3–17.7)
HGB BLD-MCNC: 7.3 G/DL (ref 13.3–17.7)
HGB BLD-MCNC: 7.7 G/DL (ref 13.3–17.7)
HGB BLD-MCNC: 7.7 G/DL (ref 13.3–17.7)
HGB BLD-MCNC: 7.8 G/DL (ref 13.3–17.7)
INR PPP: 2.41 (ref 0.86–1.14)
LACTATE BLD-SCNC: 1.1 MMOL/L (ref 0.7–2.1)
LACTATE BLD-SCNC: 1.9 MMOL/L (ref 0.7–2.1)
LACTATE BLD-SCNC: 2.4 MMOL/L (ref 0.7–2.1)
LACTATE BLD-SCNC: 3.3 MMOL/L (ref 0.7–2.1)
LACTATE BLD-SCNC: 3.5 MMOL/L (ref 0.7–2.1)
LMWH PPP CHRO-ACNC: 0.22 IU/ML
LMWH PPP CHRO-ACNC: NORMAL IU/ML
MAGNESIUM SERPL-MCNC: 2.1 MG/DL (ref 1.6–2.3)
MCH RBC QN AUTO: 31.6 PG (ref 26.5–33)
MCH RBC QN AUTO: 32.5 PG (ref 26.5–33)
MCHC RBC AUTO-ENTMCNC: 34 G/DL (ref 31.5–36.5)
MCHC RBC AUTO-ENTMCNC: 34.4 G/DL (ref 31.5–36.5)
MCV RBC AUTO: 93 FL (ref 78–100)
MCV RBC AUTO: 95 FL (ref 78–100)
NUM BPU REQUESTED: 1
O2/TOTAL GAS SETTING VFR VENT: 21 %
OXYHGB MFR BLDV: 66 %
PCO2 BLDV: 38 MM HG (ref 40–50)
PH BLDV: 7.43 PH (ref 7.32–7.43)
PLATELET # BLD AUTO: 158 10E9/L (ref 150–450)
PLATELET # BLD AUTO: 199 10E9/L (ref 150–450)
PO2 BLDV: 36 MM HG (ref 25–47)
POTASSIUM SERPL-SCNC: 4.9 MMOL/L (ref 3.4–5.3)
PROT SERPL-MCNC: 5.5 G/DL (ref 6.8–8.8)
RBC # BLD AUTO: 2.31 10E12/L (ref 4.4–5.9)
RBC # BLD AUTO: 2.37 10E12/L (ref 4.4–5.9)
SODIUM SERPL-SCNC: 132 MMOL/L (ref 133–144)
SPECIMEN EXP DATE BLD: ABNORMAL
TRANSFUSION STATUS PATIENT QL: NORMAL
TRANSFUSION STATUS PATIENT QL: NORMAL
WBC # BLD AUTO: 6.3 10E9/L (ref 4–11)
WBC # BLD AUTO: 6.9 10E9/L (ref 4–11)

## 2017-02-02 PROCEDURE — 85610 PROTHROMBIN TIME: CPT | Performed by: INTERNAL MEDICINE

## 2017-02-02 PROCEDURE — 25000128 H RX IP 250 OP 636: Performed by: INTERNAL MEDICINE

## 2017-02-02 PROCEDURE — 85520 HEPARIN ASSAY: CPT | Performed by: STUDENT IN AN ORGANIZED HEALTH CARE EDUCATION/TRAINING PROGRAM

## 2017-02-02 PROCEDURE — 86901 BLOOD TYPING SEROLOGIC RH(D): CPT | Performed by: INTERNAL MEDICINE

## 2017-02-02 PROCEDURE — 74174 CTA ABD&PLVS W/CONTRAST: CPT

## 2017-02-02 PROCEDURE — 97530 THERAPEUTIC ACTIVITIES: CPT | Mod: GP

## 2017-02-02 PROCEDURE — 82805 BLOOD GASES W/O2 SATURATION: CPT | Performed by: STUDENT IN AN ORGANIZED HEALTH CARE EDUCATION/TRAINING PROGRAM

## 2017-02-02 PROCEDURE — 25000132 ZZH RX MED GY IP 250 OP 250 PS 637: Performed by: INTERNAL MEDICINE

## 2017-02-02 PROCEDURE — P9016 RBC LEUKOCYTES REDUCED: HCPCS | Performed by: INTERNAL MEDICINE

## 2017-02-02 PROCEDURE — 40000193 ZZH STATISTIC PT WARD VISIT

## 2017-02-02 PROCEDURE — 25500064 ZZH RX 255 OP 636: Performed by: RADIOLOGY

## 2017-02-02 PROCEDURE — S0171 BUMETANIDE 0.5 MG: HCPCS | Performed by: STUDENT IN AN ORGANIZED HEALTH CARE EDUCATION/TRAINING PROGRAM

## 2017-02-02 PROCEDURE — 83735 ASSAY OF MAGNESIUM: CPT | Performed by: INTERNAL MEDICINE

## 2017-02-02 PROCEDURE — 25000125 ZZHC RX 250: Performed by: INTERNAL MEDICINE

## 2017-02-02 PROCEDURE — 83605 ASSAY OF LACTIC ACID: CPT | Performed by: INTERNAL MEDICINE

## 2017-02-02 PROCEDURE — 85027 COMPLETE CBC AUTOMATED: CPT | Performed by: STUDENT IN AN ORGANIZED HEALTH CARE EDUCATION/TRAINING PROGRAM

## 2017-02-02 PROCEDURE — 80053 COMPREHEN METABOLIC PANEL: CPT | Performed by: INTERNAL MEDICINE

## 2017-02-02 PROCEDURE — 25000128 H RX IP 250 OP 636: Performed by: STUDENT IN AN ORGANIZED HEALTH CARE EDUCATION/TRAINING PROGRAM

## 2017-02-02 PROCEDURE — 97140 MANUAL THERAPY 1/> REGIONS: CPT | Mod: GO

## 2017-02-02 PROCEDURE — 25000132 ZZH RX MED GY IP 250 OP 250 PS 637: Performed by: STUDENT IN AN ORGANIZED HEALTH CARE EDUCATION/TRAINING PROGRAM

## 2017-02-02 PROCEDURE — 99232 SBSQ HOSP IP/OBS MODERATE 35: CPT | Mod: 25 | Performed by: INTERNAL MEDICINE

## 2017-02-02 PROCEDURE — 25000125 ZZHC RX 250: Performed by: STUDENT IN AN ORGANIZED HEALTH CARE EDUCATION/TRAINING PROGRAM

## 2017-02-02 PROCEDURE — 86850 RBC ANTIBODY SCREEN: CPT | Performed by: INTERNAL MEDICINE

## 2017-02-02 PROCEDURE — 20000004 ZZH R&B ICU UMMC

## 2017-02-02 PROCEDURE — 86902 BLOOD TYPE ANTIGEN DONOR EA: CPT | Performed by: INTERNAL MEDICINE

## 2017-02-02 PROCEDURE — 85018 HEMOGLOBIN: CPT | Performed by: INTERNAL MEDICINE

## 2017-02-02 PROCEDURE — 86900 BLOOD TYPING SEROLOGIC ABO: CPT | Performed by: INTERNAL MEDICINE

## 2017-02-02 PROCEDURE — 85520 HEPARIN ASSAY: CPT | Performed by: INTERNAL MEDICINE

## 2017-02-02 PROCEDURE — 83605 ASSAY OF LACTIC ACID: CPT | Performed by: STUDENT IN AN ORGANIZED HEALTH CARE EDUCATION/TRAINING PROGRAM

## 2017-02-02 PROCEDURE — 40000133 ZZH STATISTIC OT WARD VISIT

## 2017-02-02 PROCEDURE — 86922 COMPATIBILITY TEST ANTIGLOB: CPT | Performed by: INTERNAL MEDICINE

## 2017-02-02 PROCEDURE — 85018 HEMOGLOBIN: CPT | Performed by: STUDENT IN AN ORGANIZED HEALTH CARE EDUCATION/TRAINING PROGRAM

## 2017-02-02 PROCEDURE — 85027 COMPLETE CBC AUTOMATED: CPT | Performed by: INTERNAL MEDICINE

## 2017-02-02 PROCEDURE — 25000132 ZZH RX MED GY IP 250 OP 250 PS 637: Performed by: NURSE PRACTITIONER

## 2017-02-02 RX ORDER — LORAZEPAM 2 MG/ML
0.5 INJECTION INTRAMUSCULAR
Status: COMPLETED | OUTPATIENT
Start: 2017-02-02 | End: 2017-02-03

## 2017-02-02 RX ORDER — HEPARIN SODIUM 10000 [USP'U]/100ML
0-3500 INJECTION, SOLUTION INTRAVENOUS CONTINUOUS
Status: DISCONTINUED | OUTPATIENT
Start: 2017-02-02 | End: 2017-02-05

## 2017-02-02 RX ORDER — BUMETANIDE 0.25 MG/ML
1 INJECTION INTRAMUSCULAR; INTRAVENOUS ONCE
Status: COMPLETED | OUTPATIENT
Start: 2017-02-02 | End: 2017-02-02

## 2017-02-02 RX ORDER — FUROSEMIDE 10 MG/ML
20 INJECTION INTRAMUSCULAR; INTRAVENOUS ONCE
Status: DISCONTINUED | OUTPATIENT
Start: 2017-02-02 | End: 2017-02-02

## 2017-02-02 RX ORDER — IOPAMIDOL 755 MG/ML
100 INJECTION, SOLUTION INTRAVASCULAR ONCE
Status: COMPLETED | OUTPATIENT
Start: 2017-02-02 | End: 2017-02-02

## 2017-02-02 RX ADMIN — HYDROMORPHONE HYDROCHLORIDE 0.5 MG: 10 INJECTION, SOLUTION INTRAMUSCULAR; INTRAVENOUS; SUBCUTANEOUS at 10:15

## 2017-02-02 RX ADMIN — PANTOPRAZOLE SODIUM 80 MG: 40 INJECTION, POWDER, FOR SOLUTION INTRAVENOUS at 06:48

## 2017-02-02 RX ADMIN — IOPAMIDOL 100 ML: 755 INJECTION, SOLUTION INTRAVENOUS at 13:15

## 2017-02-02 RX ADMIN — HYDROMORPHONE HYDROCHLORIDE 0.3 MG: 10 INJECTION, SOLUTION INTRAMUSCULAR; INTRAVENOUS; SUBCUTANEOUS at 06:48

## 2017-02-02 RX ADMIN — OXYCODONE HYDROCHLORIDE 5 MG: 5 TABLET ORAL at 08:05

## 2017-02-02 RX ADMIN — Medication: at 20:00

## 2017-02-02 RX ADMIN — PIPERACILLIN AND TAZOBACTAM 3.38 G: 3; .375 INJECTION, POWDER, FOR SOLUTION INTRAVENOUS at 17:13

## 2017-02-02 RX ADMIN — OXYCODONE HYDROCHLORIDE 5 MG: 5 TABLET ORAL at 16:09

## 2017-02-02 RX ADMIN — PIPERACILLIN AND TAZOBACTAM 3.38 G: 3; .375 INJECTION, POWDER, FOR SOLUTION INTRAVENOUS at 05:12

## 2017-02-02 RX ADMIN — HYDROMORPHONE HYDROCHLORIDE 0.5 MG: 10 INJECTION, SOLUTION INTRAMUSCULAR; INTRAVENOUS; SUBCUTANEOUS at 01:32

## 2017-02-02 RX ADMIN — PANTOPRAZOLE SODIUM 40 MG: 40 INJECTION, POWDER, FOR SOLUTION INTRAVENOUS at 08:05

## 2017-02-02 RX ADMIN — PIPERACILLIN AND TAZOBACTAM 3.38 G: 3; .375 INJECTION, POWDER, FOR SOLUTION INTRAVENOUS at 11:31

## 2017-02-02 RX ADMIN — HYDROMORPHONE HYDROCHLORIDE 0.5 MG: 10 INJECTION, SOLUTION INTRAMUSCULAR; INTRAVENOUS; SUBCUTANEOUS at 20:11

## 2017-02-02 RX ADMIN — PANTOPRAZOLE SODIUM 40 MG: 40 INJECTION, POWDER, FOR SOLUTION INTRAVENOUS at 20:23

## 2017-02-02 RX ADMIN — LEVOTHYROXINE SODIUM 175 MCG: 25 TABLET ORAL at 08:05

## 2017-02-02 RX ADMIN — HYDROMORPHONE HYDROCHLORIDE 0.3 MG: 10 INJECTION, SOLUTION INTRAMUSCULAR; INTRAVENOUS; SUBCUTANEOUS at 03:19

## 2017-02-02 RX ADMIN — HEPARIN SODIUM 1100 UNITS/HR: 10000 INJECTION, SOLUTION INTRAVENOUS at 16:35

## 2017-02-02 RX ADMIN — DOCUSATE SODIUM 100 MG: 100 CAPSULE, LIQUID FILLED ORAL at 20:23

## 2017-02-02 RX ADMIN — LIDOCAINE 1 PATCH: 50 PATCH CUTANEOUS at 20:24

## 2017-02-02 RX ADMIN — PIPERACILLIN AND TAZOBACTAM 3.38 G: 3; .375 INJECTION, POWDER, FOR SOLUTION INTRAVENOUS at 23:12

## 2017-02-02 RX ADMIN — BUMETANIDE 1 MG: 0.25 INJECTION, SOLUTION INTRAMUSCULAR; INTRAVENOUS at 11:31

## 2017-02-02 RX ADMIN — MULTIPLE VITAMINS W/ MINERALS TAB 1 TABLET: TAB at 08:05

## 2017-02-02 RX ADMIN — SODIUM CHLORIDE 8 MG/HR: 9 INJECTION, SOLUTION INTRAVENOUS at 08:12

## 2017-02-02 RX ADMIN — OXYCODONE HYDROCHLORIDE 5 MG: 5 TABLET ORAL at 23:12

## 2017-02-02 RX ADMIN — Medication 0.25 MG: at 04:26

## 2017-02-02 ASSESSMENT — PAIN DESCRIPTION - DESCRIPTORS
DESCRIPTORS: ACHING
DESCRIPTORS: SHARP
DESCRIPTORS: ACHING
DESCRIPTORS: ACHING
DESCRIPTORS: SHARP
DESCRIPTORS: ACHING
DESCRIPTORS: ACHING;SHARP;SHOOTING

## 2017-02-02 NOTE — PROGRESS NOTES
GASTROENTEROLOGY PROGRESS NOTE  Date of Service: 2/2/2017    ASSESSMENT: 46 year old male with a history of rheumatic heart disease s/p mechanical MVR x2 on warfarin, biventricular heart failure s/p ICD, chronic afib on amiodarone and digoxin (stoppped 1/16 due to toxicity, WPW ablation and CKD who was transferred from [facility] for further evaluation of CHF and possible hemolysis. GI was asked to opine regarding abnormal LFT's and concern for cirrhosis as this may weigh into the decision for offering surgical treatment of mod-severe aortic insufficiency with LVAD backup.     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 His child-montero score is C but difficult to interpret given his complex picture and normal wedge. Suspect  he would likely be moderate-high surgical risk.   *Diuretics per primary  *Consider lactulose if evidence of HE  *Consider hepatology f/u and HCC/EV screening as outpatient pending overall goals of care    #Acute Cholestatic LFT's: Likely biliary obstruction 2/2 hemobilia. No signs of cholangitis and abd pain improved.   *Daily MELD labs  *Pancreaticobiliary following for possible ERCP    #GIB: Concern for hemobilia s/p liver bx. Not amenable to endoscopic therapy.   *CTA pending. Await results. If concern for ongoing bleed and further signs of hemobilia, would consult IR.     The patient was discussed and plan agreed upon with GI staff, Dr Oropeza.    Josef Franklin MD  Gastroenterology Fellow    _______________________________________________________________  S: Some melena overnight and worsening abd pain. CT done. 2g drop in hgb.       ROS:   10 pt ROS negative unless noted in subjective.     O:  Blood pressure 90/58, pulse 80, temperature 98.1  F (36.7  C), temperature source Oral, resp. rate 16, height 1.829 m (6'), weight 93.668 kg (206  lb 8 oz), SpO2 99 %.  Gen: Chronically ill appearing  HEENT: Subtle icertus  Resp: Clear bilaterally  CV: Regular  Abd: Soft, NT, ND. Not TTP. No HSM  Ext: Wraps in place  Skin: No jaundice  Neuro: No asterixis    LABS:  BMP  Recent Labs  Lab 02/02/17  0312 02/01/17  0849 01/31/17  0833 01/30/17  0709   * 131* 130* 131*   POTASSIUM 4.9 4.6 4.4 4.1   CHLORIDE 99 97 98 97   DELFINO 7.3* 7.7* 7.5* 7.7*   CO2 21 24 24 25   BUN 23 19 14 14   CR 1.31* 1.42* 1.25 1.25   * 90 74 76     CBC  Recent Labs  Lab 02/02/17  1339 02/02/17  0911 02/02/17 0312 02/01/17  2321 02/01/17  1614   WBC  --  6.9 6.3 5.7 6.1   RBC  --  2.31* 2.37* 2.80* 2.76*   HGB 7.7* 7.3* 7.7* 8.9* 8.9*   HCT  --  21.5* 22.4* 26.5* 26.5*   MCV  --  93 95 95 96   MCH  --  31.6 32.5 31.8 32.2   MCHC  --  34.0 34.4 33.6 33.6   RDW  --  20.1* 19.8* 19.7* 19.9*   PLT  --  199 158 141* 171     INR  Recent Labs  Lab 02/02/17  1339 02/01/17  0849 01/31/17  0833 01/30/17  0709   INR 2.41* 1.92* 1.71* 1.57*     LFTs  Recent Labs  Lab 02/02/17 0312 02/01/17  1829 02/01/17  1614   ALKPHOS 295* 277* 302*   * 139* 150*   ALT 81* 84* 90*   BILITOTAL 6.5* 6.2* 6.5*   PROTTOTAL 5.5* 5.7* 6.4*   ALBUMIN 2.0* 2.1* 2.5*      PANC  Recent Labs  Lab 02/01/17  1829   LIPASE 655*     MELD-Na score: 28 at 2/2/2017  1:39 PM  MELD score: 26 at 2/2/2017  1:39 PM  Calculated from:  Serum Creatinine: 1.3 at 2/2/2017  3:12 AM  Serum Sodium: 132 at 2/2/2017  3:12 AM  Total Bilirubin: 6.5 at 2/2/2017  3:12 AM  INR(ratio): 2.4 at 2/2/2017  1:39 PM  Age: 46 years    Imaging: Personally reviewed

## 2017-02-02 NOTE — PROGRESS NOTES
0530: Patient had large melena stool. Huge bright red to dark red blood clots. MD notified. Heparin gtt stopped.  0600: MD updated on patients low BP. Orders for blood transfusion. Dilaudid for pain. Patient appropriate.  Will continue to monitor and update MD as necessary.

## 2017-02-02 NOTE — PROGRESS NOTES
General Surgery Progress Note    Subjective  Patient with improving pain this AM. Large blooding stool overnight. Hgb decreased from 8.9 to 7.7    Objective  BP 96/50 mmHg  Pulse 80  Temp(Src) 98.1  F (36.7  C) (Oral)  Resp 16  Ht 1.829 m (6')  Wt 93.668 kg (206 lb 8 oz)  BMI 28.00 kg/m2  SpO2 98%    On Exam  NAD, Awake, oriented  NLB  Abdomen soft, tender in RUQ, no rebound or guarding    I/O last 3 completed shifts:  In: 1019.21 [P.O.:300; I.V.:719.21]  Out: 1775 [Urine:1125; Emesis/NG output:650]    WBC      6.9   2/2/2017  RBC     2.31   2/2/2017  HGB      7.7   2/2/2017  HCT     21.5   2/2/2017  No components found with this name: mct  MCV       93   2/2/2017  MCH     31.6   2/2/2017  MCHC     34.0   2/2/2017  RDW     20.1   2/2/2017  PLT      199   2/2/2017  basic metabolic panel    Assessment/Plan:  Samir Rodriguez is a 46 year old male with complex PMHx/PSHx including rhematic heart disease s/p MVR x2 on warfarin, CHF with EF of 25-30% s/p ICD, chronic afib, CKD III now presenting with RUQ pain after liver biopsy as part of VAD workup. On CT He has a small area of mesentery that is hypolucent that represents infarct (NOT bowel infarction). There is increased density in the biliary tree, suspicious for hemobilia     In the setting of improving lactic acid, and imaging that does not show bowel ischemia, patient does not have mesenteric ischemia and more likely hemobilia from his recent liver biopsy. As patient is hemodynamically stable we recommend endoscopy by GI for confirmation and IR consult for embolization.    Patient seen and discussed with chief resident and discussed with staff, Dr. Eric Cantu   Surgery PGY2  [phone]

## 2017-02-02 NOTE — PROGRESS NOTES
[facility], [place]    Sepsis Evaluation Progress Note    Date of Service: 02/01/2017    I was called to see Samir CARD Conradcale due to a change in status. He is not known to have an infection. He complained of severe abdominal pain, nausea, vomiting, and single episode of stool w/ catalina blood earlier in the day. His pain improved w/ hydromorphone IV but did not resolve.    Physical Exam    Vital Signs:  Temp: 97.6  F (36.4  C) Temp src: Oral BP: 94/55 mmHg   Heart Rate: 80 Resp: 16 SpO2: 99 % O2 Device: None (Room air)      Lab:  LACTIC ACID   Date Value Ref Range Status   02/01/2017 5.3* 0.7 - 2.1 mmol/L Final     Comment:     Critical Value called to and read back by  GENEVIEVE CARRANZA RN 4E AT 1842 ON 02/01/2017 BY MG       Lactic acid 02/01 @1600 4.3    CT C/A/P  IMPRESSION:    1. Increased density in the biliary tree. Differential includes  biliary secretion of contrast and hematobilia.    1a. (Concern for hematobilia given recent transjugular hepatic biopsy,  however the density in the biliary tree is predominantly in the left,  the biopsy site is in the right making this less likely but still a  consideration with increased density in multiple bowel loops without  history of oral contrast. No retroperitoneal or hepatic subcapsular  hematoma.    2. Findings of mesenteric infarct, new since prior exam.  3. Severe anasarca and fanny mesentery, most likely secondary to  severe volume overload and/or hypoproteinemia. Slightly increased  right greater than left pleural effusion and bibasilar atelectasis.  4. Nodular densities in the dependent left lower lobe, may represent  aspiration superimposed on atelectasis.  5. Diffuse hyperdense appearance of the liver parenchyma, differential  multiple transfusions versus hemachromatosis.    6. In view of rising lactate an new mesenteric infarct, consider  repeat CT with IV contrast which would better evaluate bowel.    The patient is at baseline  mental status.    The rest of their physical exam is significant for significant abdominal tenderness to palpation, tachycardia, and BP 80/50 while on 6C..    Assessment and Plan    Abdominal pain and elevated lactate are possibly explained by the small mesenteric infarction as seen on CT. General surgery was consulted who evaluated and advised q6h lactate and hgb with no immediate intervention and continuation of heparin. GI was consulted w/ findings concerning for hemobilia w/ recent IR-guided liver biopsy who will assess the patient in the morning. Mixed VBG w/ reassuring results regarding heart failure.  - Started vancomycin/Zosyn w/ some concern for acute cholangitis as complication of liver biopsy and unclear status  - Blood culture x 2 obtained  - Trending hgb/lactate  - LFTs elevated but comparable to prior; lipase elevated ~600  - If lactate continue to increase significantly or abdominal exam evolved, will obtain CT abd/pelvis w/ contrast  - Resuscitated w /1L IVF  - Echo obtained reveals no acute changes, but ongoing severe MS and aortic regurgitation  - Transferred to ICU w/ rising lactate, no urine output, severe pain, and soft BP on 6C (80s/40s)    Status changes discussed w/ Dr. Elvia Zurita MD  [phone]

## 2017-02-02 NOTE — PROGRESS NOTES
Patient seen and examined in follow up. Continues to complain of abdominal pain when awake, but sleeping comfortably upon my arrival to the room.    Abd soft, tender only in the RUQ, no peritoneal signs    Lactate down-trending throughout the night, hemoglobin and WBC count stable.    A/P: stable abdominal exam and improving lactic acidosis.    -continue current cares    Zhang Anderson MD   Surgery PGY-2  [phone]

## 2017-02-02 NOTE — CONSULTS
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 As part of the workup patient had a transjugular liver biopsy on Monday 1/30. His heparin was restarted that evening. The next morning he began to have severe RUQ pain. He reports associated nausea and vomiting. Lactic acid at that time was normal. Today he had worsening pain and rising lactate to 4.3. He denies melena or maroon colored stools though per nursing he had 1 episode of bright red stool though this was unclear how much of it was from a sacral wound. He also had a mild \"spit up\" of blood.     REVIEW OF SYSTEMS: As noted above. A 10 pt ROS was otherwise negative.    PAST MEDICAL HISTORY:   Past Medical History   Diagnosis Date     Rheumatic heart disease        SURGICAL HISTORY:   Past Surgical History   Procedure Laterality Date     Cholecystectomy       Hernia repair       Appendectomy       Mitral valve replacement       Mechanical ([employer] [place] Tilting Disc); 1992     Fusion cervical anterior three+ levels       Eye surgery Left      Pt notes eye surgery to [facility] secondary to Barbed wire injury as a child.     Multiple abdominal surgeries, 4+ hernia surgeries per patient, cholecystectomy, appendectomy  SOCIAL HISTORY:   Social History     Social History     Marital Status: Single     Spouse Name: N/A     Number of Children: N/A     Years of Education: N/A     Occupational History     Not on file.     Social History Main Topics     Smoking status: Never Smoker      Smokeless tobacco: Not on file     Alcohol Use: No     " Drug Use: No     Sexual Activity: Not on file     Other Topics Concern     Not on file     Social History Narrative         FAMILY HISTORY: No bleeding/clotting disorders nor problems with anesthesia.     ALLERGIES:      Allergies   Allergen Reactions     Blood Transfusion Related (Informational Only) Other (See Comments)     Patient has a history of a clinically significant antibody against RBC antigens.  A delay in compatible RBCs may occur.     Asa [Aspirin] Other (See Comments) and Diarrhea     goosebumps  nausea     Gabapentin Nausea     Nabumetone Nausea     Sulfamethoxazole Unknown     Trimethoprim Unknown     Allopurinol Rash     Clindamycin Rash       MEDICATIONS:    No current facility-administered medications on file prior to encounter.  No current outpatient prescriptions on file prior to encounter.    PHYSICAL EXAMINATION:  Temp:  [97.2  F (36.2  C)-98.4  F (36.9  C)] 97.7  F (36.5  C)  Heart Rate:  [80] 80  Resp:  [12-18] 12  BP: ()/(48-59) 102/59 mmHg  SpO2:  [96 %-100 %] 99 %  General: Alert, well-appearing in no acute distress.  HEENT: Normocephalic, atraumatic. Patent nares.  Neck: No cervical lymphadenopathy. No thyromegaly.  Chest wall: Symmetric thorax. No masses or tenderness to palpation.   Respiratory: Non-labored breathing. Lung sounds clear to auscultation bilaterally.   Cardiovascular: Regular rate and rhythm.   Gastrointestinal: Abdomen soft, tender diffusely worse in the RUQ, no rebound or guarding  Extremities: Moving all four extremities. No limb deformities. No pedal edema.   Skin: As noted above. No rashes or lesions appreciated.    LABS: Reviewed.   Arterial Blood Gases   No lab results found in last 7 days.  Complete Blood Count     Recent Labs  Lab 02/01/17  1614 02/01/17  0849 01/31/17  0833 01/30/17  0709   WBC 6.1 4.7 3.9* 4.0   HGB 8.9* 8.5* 8.6* 9.5*    139* 139* 140*     Basic Metabolic Panel    Recent Labs  Lab 02/01/17  0849 01/31/17  0833 01/30/17  0709  01/29/17  0720   * 130* 131* 130*   POTASSIUM 4.6 4.4 4.1 4.0   CHLORIDE 97 98 97 98   CO2 24 24 25 27   BUN 19 14 14 13   CR 1.42* 1.25 1.25 1.29*   GLC 90 74 76 78     Liver Function Tests    Recent Labs  Lab 02/01/17  0849 01/31/17  0833 01/30/17  0709 01/29/17  0720   INR 1.92* 1.71* 1.57* 1.94*     Pancreatic Enzymes  No lab results found in last 7 days.  Coagulation Profile    Recent Labs  Lab 02/01/17  0849 01/31/17  0833 01/30/17  0709 01/29/17  0720   INR 1.92* 1.71* 1.57* 1.94*         IMAGING:  Results for orders placed or performed during the hospital encounter of 01/12/17   US Abdomen Complete w Doppler Complete    Narrative    EXAMINATION: US ABDOMEN COMPLETE WITH DOPPLER, 1/13/2017 8:45 AM     COMPARISON: None.    HISTORY: New transaminitis, hyperbilirubin    TECHNIQUE: The abdomen was scanned in standard fashion with  specialized ultrasound transducer(s) using both gray-scale, color  Doppler, and spectral flow techniques.    Findings:    Liver: The liver demonstrates normal homogeneous echotexture. No  evidence of a focal hepatic mass.     Extrahepatic portal vein flow is antegrade, measuring 20 cm/sec.  Right portal vein flow is antegrade, measuring 25 cm/sec.  Left portal vein flow is antegrade, measuring 17 cm/sec.      Flow in the hepatic artery is towards the liver and:  76 cm/sec peak systolic  0.85 resistive index.     The splenic vein is patent and flow is towards the liver.  The left,  middle, and right hepatic veins are patent with flow towards the IVC.  The IVC is prominent and patent with flow towards the heart. The IVC  measures 2.8 cm.  The visualized aorta is not dilated.    Gallbladder: Surgically removed per patient report, and not  visualized.    Bile Ducts: The intrahepatic bile ducts are not dilated. The common  bile duct is mildly dilated and measures 11 mm in diameter.    Pancreas: Visualized portions of the head and body of the pancreas are  unremarkable.     Kidneys:  Both kidneys are of normal echotexture, without mass or  hydronephrosis. The left kidney is partially obscured due to overlying  ribs and bowel gas.  The craniocaudal dimensions are: right- 8.6 cm,  left- 12.9 cm.    Spleen: The spleen measures 12.9 cm in sagittal dimension. There few  hyperechoic foci within the spleen, likely granulomas.    Fluid: There is a small to moderate amount of ascites around the liver  and spleen. Bilateral pleural effusions.        Impression    Impression:   1.  Unremarkable appearance of the liver parenchyma with normal  antegrade flow of the portal venous system.  2.  There is a small to moderate amount of ascites around the liver  and spleen. Also, there are bilateral pleural effusions.  3.   Mild dilation of the common bile duct to 11 mm, this is at the  upper limit of what is expected for postcholecystectomy dilation of  the common bile duct. If there is continued concern for biliary  pathology, MRCP or ERCP should be considered for further assessment.    I have personally reviewed the examination and initial interpretation  and I agree with the findings.    COTY BARRIENTOS MD   XR Chest Port 1 View    Narrative    Exam: XR CHEST PORT 1 VW, 1/13/2017 6:02 PM    Indication: sob    Comparison: None    Findings:      Cardiac defibrillator/ pacemaker device with wires overlying the  cardiac silhouette. Sternotomy wires. Enlarged cardiac silhouette.  Left base opacity. No pneumothorax.       Impression    Impression:    1. Cardiomegaly;     2. Left base atelectasis/consolidation.    I have personally reviewed the examination and initial interpretation  and I agree with the findings.    NANCIE KIM MD   XR Knee Port Right 1/2 Views    Narrative    2 views right knee(s) radiographs    History: mechanical fall    Comparison: None available.    Findings:    AP and crosstable lateral views of the right  knee were obtained.  External device compromising assessment on the lateral  projection  particularly obscuring posterior aspect of the knee.    No acute osseous abnormality. Small joint effusion without evidence of  lipohemarthrosis.    No substantial degenerative change.    Diffuse subcutaneous soft tissue strandings.      Impression    Impression:  External device compromising assessment on the lateral  projection particularly obscuring posterior aspect of the knee.   Otherwise, no acute osseous abnormality.    MIRANDA MACIAS   XR Tibia & Fibula Port Right 2 Views    Narrative    2 views right tibia/fibula radiographs    History: recent mechnanical fall    Comparison: None.    Findings:    AP and lateral views of the right  tibia/fibula were obtained.     No acute osseous abnormality.  Questionable minimal periosteal  reaction at the medial aspect of the mid tibial shaft approximately 28  cm above the ankle joint.    Knee and ankle joints are incompletely assessed but grossly congruent.    Focal asymmetric sclerosis in the distal tibia, likely nonossifying  fibroma.  Patchy lucency of fibular shaft, nonspecific.    Diffuse subcutaneous soft tissue strandings.       Impression    Impression:  1. No acute osseous abnormality.  2. Questionable minimal periosteal reaction at the medial aspect of  the mid tibial shaft approximately 28 cm above the ankle joint, of  uncertain etiology but unlikely to be related to acute trauma.  Follow  up radiographs may be helpful to assess interval change.       MIRANDA MACIAS   XR Knee Port Right 1/2 Views    Narrative    Exam: Single lateral view of the right knee radiograph    HISTORY: Mechanical fall.    COMPARISON: January 14, 2017.    FINDINGS:    Interval removal of external hardware. No evidence of fracture  including the previously obscured area of posterior knee. Small knee  joint effusion.      Impression    IMPRESSION: No acute osseous abnormality.    MIRANDA MACIAS   US Upper Extremity Venous Duplex Right    Narrative    EXAMINATION:  DOPPLER VENOUS ULTRASOUND OF THE RIGHT UPPER EXTREMITY,  1/16/2017 2:44 PM     COMPARISON: No similar prior available.    HISTORY: Right-sided dialysis catheter, now with hemolytic anemia and  bruising.    TECHNIQUE:  Gray-scale evaluation with compression, spectral flow and  color Doppler assessment of the deep venous system of the right upper  extremity.    FINDINGS:  Right: The right internal jugular vein was not seen. Multiple  collaterals are seen in the low neck. Normal blood flow and waveforms  are demonstrated in the innominate, subclavian, and axillary veins.  There is normal compressibility of the brachial, basilic and cephalic  veins.    There is a heterogeneously echogenic collection without internal  vascularity in the right lateral upper to mid arm measuring 4.9 x 1.8  x 1.5 cm.      Impression    IMPRESSION:  1.  The right internal jugular vein was not seen and multiple  collaterals were visualized in the low neck, suggestive of chronic  thrombosis.  2.  Otherwise, no evidence of right upper extremity deep venous  thrombosis.  3.  Heterogeneous collection in the lateral right upper to mid arm,  measuring up to 4.9 cm, most compatible with a hematoma.    ALEXIA SPENCER MD   US Upper Ext Arterial Duplex Right Port    Narrative    Duplex Doppler ultrasound assessment of the right upper extremity  1/16/2017 2:44 PM    Clinical information: Assess for arterial occlusive disease    Ordering provider: Dr. Landers    Technique: B-mode (grayscale) and duplex Doppler ultrasound of the  upper extremity arteries. Velocity measurements obtained with angle  correction at or less than 60 degrees.    Findings:     Peak systolic velocities:    Right Upper Extremity Arteries:    Subclavian artery: 174 cm/s, triphasic  Axillary artery: 137 cm/s, triphasic    Brachial proximal: 110 cm/sec , triphasic  Brachial mid: 105 cm/sec, triphasic  Brachial antecubital: 85 cm/sec, triphasic    Radial artery antecubital fossa:  76 cm/sec, triphasic  Radial artery wrist: 60 cm/s, triphasic    Ulnar artery antecubital fossa: 87 cm/sec, triphasic  Ulnar artery wrist: 97 cm/s, triphasic       Impression    Impression: No arterial occlusive disease or evidence for  hemodynamically significant stenosis in the right upper extremity.    I have personally reviewed the examination and initial interpretation  and I agree with the findings.    SREEDHAR HAYDEN MD   XR Orthopantomogram Teeth Full Mouth    Narrative    Exam: XR ORTHOPANTOMOGRAM TEETH FULL MOUTH, 1/18/2017 1:51 PM    Indication: valve replacement surgery work-up; poor dentition    Comparison: None    Technique: Central blurring of the radiograph limits evaluation.    Findings:   Patient is partially edentulous. No bony fracture noted. No  significant periapical lucency.      Impression    Impression: No radiographic findings to suggest significant  periodontal disease. Limited exam.    I have personally reviewed the examination and initial interpretation  and I agree with the findings.    JUTTA ELLERMANN, MD   CT Head w/o Contrast    Narrative    CT of the Head without contrast    History: persistent headache, nausea, vomiting, thrombocytopenia .    Additional information from the EMR:    Comparison: No similar study to compare findings    Technique: Helical thin section image data were obtained from the  skull base to the vertex without intravenous contrast. Axial and  coronal images were reconstructed and reviewed in brain, bone and  subdural windows.    Findings:    No intracranial hemorrhage, mass effect, midline shift or abnormal  extra-axial fluid collection. Superior right cerebellar hemisphere  chronic lacunar infarct. No definite acute infarct. Mild generalized  cerebral and cerebellar parenchymal volume loss. Ventricles are not  enlarged out of portion to the cerebral sulci. Postsurgical changes of  left canal wall down mastoidectomy. No significant debris in the  left  mastoidectomy cavity. The right mastoid air cells and visualized  paranasal sinuses are clear.      Impression    Impression:  1. No acute intracranial pathology.  2. Chronic right cerebellar hemisphere lacunar infarct.  3. Mild generalized parenchymal volume loss.  4. Clear left mastoidectomy cavity.    I have personally reviewed the examination and initial interpretation  and I agree with the findings.    ALBERT APARICIO MD   CT Chest Abdomen Pelvis w/o Contrast     Value    Radiologist flags Pulmonary nodule.    Narrative    EXAMINATION: CT CHEST ABDOMEN PELVIS W/O CONTRAST, 1/23/2017 6:04 PM    TECHNIQUE:  Helical CT images from the thoracic inlet through the  symphysis pubis were obtained  without IV contrast.     COMPARISON: Abdominal ultrasound 1/13/2017.    HISTORY: thrombocytopenia, concern for cirrhosis; also pre-aortic  valve replacement planning    FINDINGS:    Chest:  Left ventricular dilatation. Left pectoral implantable cardiac  defibrillator, prosthetic mitral valve are in placed. No significant  pericardial effusion. No mediastinal, axillary or hilar  lymphadenopathy.    The central tracheobronchial tree is patent. The visualized thyroid  gland is unremarkable. Small pleural effusion, right greater than left  and associated atelectasis. Scattered centrilobular emphysema.  Scattered groundglass nodules. For instance, ill-defined 4 mm  groundglass opacity within the anterior left upper lobe (series 4,  image 82). 5 mm groundglass pulmonary nodule within the right upper  lobe (series 4, image 93).     Abdomen and pelvis:  Gallbladder is surgically absent. Shrunken hyperattenuated liver with  minimal hepatic contour nodularity, without focal hepatic mass. The  spleen is not enlarged. Lipomatous changes of the pancreas. Mesenteric  haziness predominantly within the mid abdomen below the pancreas.  Varicose changes of the mesenteric veins. Adrenal glands are  unremarkable. There is a subcentimeter  splenules at the splenic hilum.  No hydronephrosis. Nonobstructive punctate right lower pole calculus.  Urinary bladder is distended and unremarkable. Small free fluid within  the right paracolic gutter. No focal bowel wall thickening. No bowel  obstruction. Right inguinal hernia containing small ascites.    Bones and soft tissues: Moderate anasarca. Mediastinotomy wires  appears are intact and united. Old rib fractures. No suspicious or  acute osseous lesion.      Impression    Impression:  1. Hyperattenuated liver with minimal hepatic contour nodularity,  suspicious for amiodarone toxicity. The nodularity is compatible with  cirrhosis with varices, small ascites and moderate anasarca.  2. Multiple 5 mm groundglass pulmonary nodules. Recommend follow-up in  6 months.  3. Cardiomegaly.  4. Nonobstructing right lower pole renal stone.  5. Small pleural effusions and associated atelectasis.    [Consider Follow Up: Pulmonary nodule.]    This report will be copied to the [facility] to ensure a  provider acknowledges the finding.     I have personally reviewed the examination and initial interpretation  and I agree with the findings.    NANCIE KIM MD   IR Portogram w Hemodynamics    Narrative    PROCEDURES 1/30/2017:  1. Ultrasound guided right internal jugular venotomy.  2. Fluoroscopic guided transjugular hepatic biopsy.  3. Hepatic venous pressure gradient measurement.  4. Middle hepatic catheterization and venography.    Clinical indication: 46-year-old male with CHF. Request for  transjugular hepatic biopsy and pressures.    Comparison studies: CT dated 1/23/2017.    Interventional Radiologist: DANIELE Beckford MD    IR Fellow: A. Pallas, DO    Medications:  1. 8 mg Versed.   2. 400 mcg fentanyl    Monitoring: The patient was monitored by nursing staff. Patient  remained stable throughout the procedure.    Fluoroscopy time: 16.2 minutes    Complications: None.    Contrast: 10cc Visipaque  320.    Consent: verbal and written informed consent obtained prior to  procedure.    Procedure details: Patient placed in supine position. Right neck was  prepped and draped in standard sterile fashion.     Ultrasound guidance used to puncture the right internal jugular vein  with a micropuncture needle. Ultrasound image of the patent vein and  the needle entering the vein saved to confirm correct needle  placement. Next the needle removed over a 0.018 nitinol wire and a  micropuncture sheath placed over the 0.018 wire. The inner dilator and  0.018 nitinol wire then removed and a 0.035 Bentson wire was advanced  into the IVC. 10 Nigerian sheath placed over the wire.     Directional multipurpose catheter advanced over the wire into the  sheath and the middle hepatic vein selected. Contrast injected to the  through the catheter to confirm wedge position. Next, pressures  measured from two branched positions of the middle hepatic vein. The  pressure measurements were as follows:    Hepatic wedge pressure: 21 mmHg  Hepatic free pressure: 16 mmHg    Hepatic wedge pressure: 22 mmHg  Hepatic free pressure: 19 mmHg    Next a 0.035 inch Amplatz wire advanced through the catheter and the  10 Nigerian sheath. 7 Nigerian Argon liver access and biopsy Set was used  to obtain 3 core biopsies from the right hepatic lobe.     All wires, catheters, and sheath were removed and pressure was held on  the venotomy site until stasis.       Impression    Impression:  1. No significant hepatic venous pressure gradient identified (venous  pressure gradient of 3-5 mmHg).  2. Successful transjugular hepatic biopsy. Three core biopsies taken  and sent for pathologic analysis.    Plan:  Bedrest with head elevation for 1.5 hours.         CO-MORBIDITIES:   No diagnosis found.    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 Reviewed CT with radiology. He has a small area of mesentery that is hypolucent that represents infarct (NOT bowel infarction).There is no evidence of mesenteric ischemia at this time. There is a heterogeneity in the CBD that may represent hemobilia in the setting of recent liver biopsy. His pain is most likely related to recent biopsy with possible development of a subcapsular hematoma.    PLAN:    - Recommend trending Hgb and Lactic Acid Q6H  - Recommend resuscitation to a euvolemic state, If patient continues to have rising lactate despite this, please obtain CT Abd/Pelv with IV contrast.  - If patient has further convincing evidence of GI bleed recommend GI panc/bili consult for hemobilia  - General surgery to follow with serial exams    Patient findings and plan discussed with chief resident Dr. Carpenter and staff, Dr. Reyes.    Omi Cantu   Surgery PGY2  [phone]

## 2017-02-02 NOTE — PROGRESS NOTES
"[facility], [place]   Palliative Care Daily Progress Note          Recommendations, Patient/Family Counseling & Coordination     Was seen and examined. Events overnight reviewed- resultant transfer to ICU for increased lactic acid and concern for GI bleed. Plan today inculdes CTA abd pelvis to evaluate poss mesenteric involvement and EGD vs embolization of GIB source once identified Liver bx with findings of   \" Advanced chronic liver disease (stage 3-4)of uncertain etiology\"   Question how these results influence decisions about further cardiac interventions (Valvular surgery and or LVAD placement) ]  Further plan for dental extractions may be on hold [sched for 2/3/17]  RECC:   He complains that his pain is not well controlled and asks many times for help with this. Oxycodone dosing presently 5 mg q 6 hours prn. Will defer to ICU team given sedation needed for upcoming procedures. At first glance it may be reasonable to change frequency to q 4 hours prn.     POLST: not completed as of yet      Thank you for the opportunity to continue to participate in the care of this patient and family.  Please feel free to contact on-call palliative provider with any emergent needs.  We can be reached via team pager [phone] (answered 8-4:30 Monday-Friday); after-hours answering service ([phone]); Main Palliative Clinic - PWB 1C: [phone].     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  Acute on chronic systolic heart failure, EF 37% (12/2016) s/p dual chamber ICD 2008  Mechanical mitral valve   Aortic Insufficiency   Cirrhosis and liver nodularity  Right tibia fx and R knee trauma- now with chronic pain (doi 11/16)   Headaches- new rx for glasses received after opth appt- " chronic     2)  Psychosocial/Spiritual Needs:   Ongoing: Will continue to follow   New:No        Is new assessment/intervention required by palliative team?:  No         Interval History:   See above with ICU transfer overnight - Dental extractions were planned on 1/27/17 rescheduled for procedure 2/3/17. Results of liver bx as above. Son (Sid and other family) never arrived from due to arrive 2200 today and will be staying for support and decision assistance           Review of Systems:   Palliative Symptom Review (0=no symptom/no concern, 1=mild, 2=moderate, 3=severe):      Pain: 2 (abdominal pain with chronic knee/headache pain)       Fatigue: 2      Nausea: 0-1      Constipation: 0      Diarrhea: 0      Depressive Symptoms: 0-1      Anxiety: 1-2      Drowsiness: 0-1      Poor Appetite: 0-1      Shortness of Breath: 0-1               Medications:   I have reviewed this patient's medication profile and medications given in past 24 hours.        {   Physical exam: Vitals: BP 90/58 mmHg  Pulse 80  Temp(Src) 98.1  F (36.7  C) (Oral)  Resp 16  Ht 1.829 m (6')  Wt 93.668 kg (206 lb 8 oz)  BMI 28.00 kg/m2  SpO2 99%  BMI= Body mass index is 28 kg/(m^2).   Gen: A&Ox3, working with PT transferring to bedside chair. NAD. c/o bitterly of increased pain in abdomen and back.   HEENT: NC AT, poor dentition.   PULM: Clear lungs without wheeze. Good inspiratory effort.   CV: RRR; mildly tachycardic with s1 mechanical s2 w/ 2+ systolic murmur at LUSB.  ABD:  soft, nontender, nondistended.  EXT: trace sonam edema to knees. Right arm ecchymosis stable R knee brace in place  SKIN: Right arm resolving ecchymosis   NEURO: alert and oriented; speech intact           Data Reviewed:     ROUTINE LABS (Last four results)  BMP  Recent Labs  Lab 02/02/17  0312 02/01/17  0849 01/31/17  0833 01/30/17  0709   * 131* 130* 131*   POTASSIUM 4.9 4.6 4.4 4.1   CHLORIDE 99 97 98 97   DELFINO 7.3* 7.7* 7.5* 7.7*   CO2 21 24 24 25   BUN 23 19  14 14   CR 1.31* 1.42* 1.25 1.25   * 90 74 76     CBC  Recent Labs  Lab 02/02/17  1339 02/02/17  0911 02/02/17  0312 02/01/17  2321 02/01/17  1614   WBC  --  6.9 6.3 5.7 6.1   RBC  --  2.31* 2.37* 2.80* 2.76*   HGB 7.7* 7.3* 7.7* 8.9* 8.9*   HCT  --  21.5* 22.4* 26.5* 26.5*   MCV  --  93 95 95 96   MCH  --  31.6 32.5 31.8 32.2   MCHC  --  34.0 34.4 33.6 33.6   RDW  --  20.1* 19.8* 19.7* 19.9*   PLT  --  199 158 141* 171     INR  Recent Labs  Lab 02/02/17  1339 02/01/17  0849 01/31/17  0833 01/30/17  0709   INR 2.41* 1.92* 1.71* 1.57*

## 2017-02-02 NOTE — PLAN OF CARE
"Problem: Individualization  Goal: Patient Preferences  Outcome: Improving  D: Pt arrives to 4E via bed per 6C staff.  Pt is alert and orietned.  Pt is crying in pain.  Pt states he has pain in right abdomen and \"Wants Drugs\".  Pt in a paced rhythm at 80. Pt has no ectopy.  Pt BP stable 106/56 (76).  Pt sats are 100% on RA.  Pt c/o nausea but has some sputum that he spits in a cup no emesis noted.  Pt has a single lumen PICC in the right upper arm and heparin and sailine infusing on arrival.  A: Cards 1 team notified of arrival and questioned for interventions that are required for the ICU care of the patient.  No new orders given.  Pt given 0.5 of dilaudid and pt falls asleep and is tolerating pain well.  Pt remains VSS and on room air.  Pt given ordered antibiotics as well.  R: Pt tolerates treatments and VSS.  Pt appears comfortable and is sleeping.  P: Continue labs and trend Lactic acid.  COnsider possible transfer back to 6C or 6B if patient remains stable.         "

## 2017-02-02 NOTE — PROGRESS NOTES
Cardiology 1 Progress Note    Samir Rodriguez MRN# [id number]   Age: 46 year old YOB: 1970   Date of Admission: 1/12/2017           Assessment and Plan:   Samir Rodriguez is a 46 year old  male with past medical history of Rheumatic Heart Disease s/p mechanical MVR x 2 (1980s and 1992) on warfarin (INR goal 2.5-3.5), biventricular CHF (EF 25-30%) s/p dual chamber ICD 2008, chronic afib on amiodarone and previously on digoxine (stopped 1/16/17 due to concern for toxicity), WPW ablation at age 11, CKD III, hypothyroid,  who was transferred from [facility] on 01/12 for further eval of CHF and concern of hemolysis in setting of mechanical valve.    Today:  -CTA Abdomen/pelvis  -IR embolization vs EGD    # Lactic acidosis  # Possible hematobilia  # Mesenteric ischemia  # Hematochezia, ?GIB  # Acute on chronic anemia  Lactate trending down. Peaked at 5.3 and 3.3 this am. Abdominal pain and n/v resolved. Has back pain.  -IR aware  -GI panc/bili planning for ERCP on non-emergent basis or urgently if evidence of infection  -Recommended CTA Abdo/pelvis to identify source of bleeding. If none identified, GI planning for EGD today  -Surgery consulted- appreciate recs  -q6h hgb checks  -restart heparin gtt asap in context of mechanical mitral valve  -s/p 1u pRBC  -Attempted to reach family: son Sid without functioning phone and no answer from the mother of his children, Dinora Beckett.    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 Hypervolemic  - Bumex IV as BP tolerates; received bumex 1mg IV x1 with 1u pRBC  - hypervolemic with IVC dilated and no variation with respirations  - hold lisinopril 2.5 due to hypotension  - cont holding metoprolol and spironolactone for now    # Mechanical mitral valve (MV diastolic gradient 7)  # Aortic  Insufficiency (mod - severe)  # TR (moderate)  History of BiV failure attributed to valvular disease. Echocardiogram on admission demonstrated moderate-severe aortic insufficiency which is his most acute cardiac pathology. His tricuspid regurgitation is likely due to his signficantly fluid overloaded state. Mechanical valve maintained on warfarin w/ INR 2.5-3.5 prior to this admission. Angiogram performed on 01/20 revealed nonobstructive CAD.  - EDEL shows probable restriction of one of the mitral valve leafets; if he is a candidate for valvular surgery, mitral valve replacemnt will have to be considered as well   - Tooth extractions cancelled, as patient is no longer valve replacement candidate and currently with active bleeding  - Cardiovascular surgery consulted; appreciate recommendations  - heart failure service following; receiving workup for possible VAD as backup for valve surgery  - liver biopsy by IR as part of VAD workup 1/30; will follow up pathology results    # Anemia and thrombocytopenia  # Right Arm Hematoma   Blood smear: blood smear- RBCs show some target cells, hypochromia, increased polychromasia, no schistocytes or spherocytes. Low haptoglobin, high LDH, and plasma free hemoglobin concerning for intra-vascular hemolysis possibly due to valvular disease. No evidence of overt infection causing DIC. EPO inappropriately low w/ recent ARF. Etiology of hematoma likely due to supratherapeutic INR- 4.6 on arrival  - warfarin w/ goal INR 2.5-3.0   - on heparin gtt for bridging- but now holding due to active GI bleed. Will restart as soon as possible.  - restarted warfarin 1/30    #. Cirrhosis and liver nodularity  Demonstrated on CT, although u/s was normal. CT read is concerning for amiodarone toxicity. Liver biopsy 1/30 with evidence of advanced chronic liver disease (stage 3 or 4) of unclear etiology (amiodarone toxicity is possible). Consequently, does not appear to be candidate for LVAD or for valve  replacements. Cards 2 will discuss these results and their consequence with patient.   -No LVAD  -No CT surgery/ valve replacement    # Paroxsymal afib:  digoxin level 0.7 on 1/12 and 1/17. 1/16/17 device interrogation: atrial tachycardia to 150s with AV block. Lower rate setting changed to 80bpm from 60bmp and device changed from DDDR to VVIR on 01/16; atrial tachycardia and AV block and V pacing. The patient has been in an atrial flutter that is undersensed, hence the device was switched to VVI mode. There is no point continuing amiodarone at this stage especially with concern for toxicity  - discontinued pta amiodarone.    # Hyperthyroid: Will need repeat TSH/T4 1-2 months post DC. Decreased levothyroxine from 224mcg to 175 mcg   # Anxiety and insomnia: Increased clonazepam to tid    # Right tibia fx and R knee trauma:  Fall in November prompted decompensation. Xray tibia and knee no fx this admission.  - Ortho evaluated; planning for outpatient follow up with orthopedic surgery in 4-6 weeks  - PT/OT    # Headaches  May be due to near-sightedness vs rebound headache from opioid use vs reduced cardiac output  - pain consult with pain for opioid titration  - eye exam 1/27- received new Rx  - using lidocaine patches  - PRN zofran    # Pulmonary nodules: incidentally found on CT  - repeat CT chest in 6 months    # CARSON- likely cardiorenal with TTE with dilated IVC without respiratory variation (also received IVF overnight due to lactic acidosis)  -diuresis as tolerated in setting of active bleeding  -Recheck BMP in am    FEN: cardiac diet   PPX: on heparin gtt  Dispo: Difficult placement due to out of state Medicaid.    Code Status: Full CODE      Patient discussed with staff attending, Dr. Gan.     Joan Burks MD  IM PGY3  [phone]    CARDIOLOGY STAFF  Patient seen and examined by me.  History and physical examination discussed with Dr. Burks whose note reflects our joint assessment and  recommendation/plans.  Yesterday afternoon Mr. Rodriguez developed severe abdominal pain and lactic acidosis.  Mixed venous CO2 indicates adequate cardiac output.  CT suggested possible mesenteric ischemia.   He was transferred overnight to the ICU.  His lactate improved and we transferred patient out of the ICU at request of ICU due to more unstable patients.  He had hematochezia this morning.  He was transfused.  GI, surgery and IR have been involved.  At this point there is no immediate plan for intervention so we have resumed IV heparin in view of his mechanical mitral valve.  Mr. Rodriguez has been expecting his family since I first came on service (Jan 30), but they have not arrived.  We have been trying to contact them by phone to update them, but without success.  Apparently our original phone numbers are for phone that have been shut off.  He provided additional phone numbers this morning but we have not been able to reach anyone yet.  Ganesh Gan           Interval History/Subjective   Abdominal pain, nausea vomiting yesterday  CT CAP with evidence of mesenteric ischemia and possible hemobilia  Lactate peaked at 5.3 and now 3.3 this am  Hgb trended down to 7.3 from 8.6 yesterday  Also with large episode of hematochezia this morning  Transfusing 1u pRBC  q6h hgb checks  Holding heparin gtt due to bleeding and possibility of procedure today: Will assess bleeding source with CTA Abdomen and pelvis.  GI, surgery, IR all involved.  Attempted to reach family: son Sid without functioning phone and no answer from the mother of his children, Dinora Beckett.    Patient with decreased abdominal pain this morning but now with back pain. Denies chest pain, pressure, sob, n/v. 1 episode of hematochezia and no BM since.          Objective   Temp:  [97.6  F (36.4  C)-98.6  F (37  C)] 98.1  F (36.7  C)  Heart Rate:  [78-84] 80  Resp:  [12-16] 16  BP: ()/(43-67) 96/50 mmHg  SpO2:  [97 %-100 %] 98  %    Physical exam:  Gen: AA&Ox3, no acute distress  HEENT: NACT, poor dentition   PULM: Clear lungs  CV: RRR; mechanical s2 w/ 2+ systolic murmur at LUSB and LISB: JVD+  ABD:  soft, nontender, nondistended.  BS+  EXT: trace sonam edema to knees. Right arm ecchymosis stable   SKIN: Right arm resolving ecchymosis outlined w/ marker   NEURO: alert and oriented; speech intact         Data:   CBC    Recent Labs  Lab 02/02/17  0911 02/02/17  0312 02/01/17  2321 02/01/17  1614   WBC 6.9 6.3 5.7 6.1   RBC 2.31* 2.37* 2.80* 2.76*   HGB 7.3* 7.7* 8.9* 8.9*   HCT 21.5* 22.4* 26.5* 26.5*   MCV 93 95 95 96   MCH 31.6 32.5 31.8 32.2   MCHC 34.0 34.4 33.6 33.6   RDW 20.1* 19.8* 19.7* 19.9*    158 141* 171     CMP    Recent Labs  Lab 02/02/17  0312 02/01/17  1829 02/01/17  1614 02/01/17  0849 01/31/17  0833 01/30/17  0709 01/29/17  0720   *  --   --  131* 130* 131* 130*   POTASSIUM 4.9  --   --  4.6 4.4 4.1 4.0   CHLORIDE 99  --   --  97 98 97 98   CO2 21  --   --  24 24 25 27   ANIONGAP 12  --   --  10 9 9 6   *  --   --  90 74 76 78   BUN 23  --   --  19 14 14 13   CR 1.31*  --   --  1.42* 1.25 1.25 1.29*   GFRESTIMATED 58*  --   --  53* 62 62 60*   GFRESTBLACK 71  --   --  64 75 75 72   DELFINO 7.3*  --   --  7.7* 7.5* 7.7* 7.2*   MAG  --   --   --  2.1 2.0 2.1 1.9   PROTTOTAL 5.5* 5.7* 6.4*  --   --   --   --    ALBUMIN 2.0* 2.1* 2.5*  --   --   --   --    BILITOTAL 6.5* 6.2* 6.5*  --   --   --   --    ALKPHOS 295* 277* 302*  --   --   --   --    * 139* 150*  --   --   --   --    ALT 81* 84* 90*  --   --   --   --      INR    Recent Labs  Lab 02/01/17  0849 01/31/17  0833 01/30/17  0709 01/29/17  0720   INR 1.92* 1.71* 1.57* 1.94*        CT CAP 2/1/17    EXAMINATION: CT CHEST ABDOMEN PELVIS W/O CONTRAST, 2/1/2017 3:14 PM     TECHNIQUE:  Helical CT images from the thoracic inlet through the  symphysis pubis were obtained  without IV contrast.       COMPARISON: CT 1/23/2017     HISTORY: concern for bleed  after IR transjugular liver biopsy on 1/30     FINDINGS:[id number]     Chest: Cardiomegaly. Left pectoral implantable cardiac defibrillator  is again seen. No pericardial effusion. Increase in small right and  trace left pleural effusion. There are scattered 4 mm groundglass  attenuations in the lungs for example on the right lower lobe (series  2, image 85), not visualized on prior exam. The previously seen  groundglass attenuation in the right lower lobe is not conspicuous on  today's exam. Breathing motion artifact slightly limits evaluation.  Patchy nodular densities in the dependent aspect of the left lower  lobe which appear more prominent to be accounted for breathing  artifact, new since prior exam and may represent aspiration. Bibasilar  atelectasis. No pneumothorax.     Abdomen and pelvis: Gallbladder is surgically absent. Diffuse  hyperdense appearance of the liver. Some hyperdense material in the  common bile duct, secreted contrast versus hemobilia. Spleen is not  enlarged. There is a small splenule at the splenic hilum. Pancreas is  unremarkable in this noncontrast exam. Adrenals are unremarkable. No  hydronephrosis. No renal calculus. Bladder is partially distended and  appears unremarkable. Prostate is not enlarged. Moderate amount of  stool in the colon. Bowel loops are not distended. No significant  diverticula or evidence of diverticulitis. There are some  hyperdensities in the ascending colon, new since prior exam.     There is a new hypodensity in the mesentery (series 3, image 172),  suggestive of mesenteric infarct. There is significant mesenteric  infiltration, represent edematous mesentery. No retroperitoneal  hematoma or subcapsular hematoma of the liver. No pneumoperitoneum.     Bones and soft tissues: No suspicious osseous lesion. Multiple  bilateral old rib fractures. Unchanged severe anasarca.                                                                       IMPRESSION:    1. Increased  density in the biliary tree. Differential includes  biliary secretion of contrast and hematobilia.    1a. (Concern for hematobilia given recent transjugular hepatic biopsy,  however the density in the biliary tree is predominantly in the left,  the biopsy site is in the right making this less likely but still a  consideration with increased density in multiple bowel loops without  history of oral contrast. No retroperitoneal or hepatic subcapsular  hematoma.    2. Findings of mesenteric infarct, new since prior exam.  3. Severe anasarca and fanny mesentery, most likely secondary to  severe volume overload and/or hypoproteinemia. Slightly increased  right greater than left pleural effusion and bibasilar atelectasis.  4. Nodular densities in the dependent left lower lobe, may represent  aspiration superimposed on atelectasis.  5. Diffuse hyperdense appearance of the liver parenchyma, differential  multiple transfusions versus hemachromatosis.    6. In view of rising lactate an new mesenteric infarct, consider  repeat CT with IV contrast which would better evaluate bowel.     [Urgent Result: Possible hemobilia and possible mesenteric infarct]     Finding was identified on 2/1/2017 3:16 PM.           Medications:     Current Facility-Administered Medications   Medication     pantoprazole (PROTONIX) 80 mg in NaCl 0.9 % 100 mL infusion     LORazepam (ATIVAN) injection 0.5 mg     heparin  drip 25,000 units in 0.45% NaCl 250 mL (see additional administration details for dose)     heparin bolus from infusion pump     sodium chloride (PF) 0.9% PF flush 100 mL     iopamidol (ISOVUE-370) solution 100 mL     heparin lock flush 10 UNIT/ML injection 2-5 mL     lidocaine 1 % injection 0.5-1 mL     lidocaine (LMX4) kit     sodium chloride (PF) 0.9% PF flush 1-10 mL     sodium chloride (PF) 0.9% PF flush 5-10 mL     lidocaine 1 % 1 mL     sodium chloride (PF) 0.9% PF flush 10-20 mL     sodium chloride (PF) 0.9% PF flush 10 mL      piperacillin-tazobactam (ZOSYN) 3.375 g vial to attach to  mL bag     HYDROmorphone (PF) (DILAUDID) injection 0.3-0.5 mg     pantoprazole (PROTONIX) 40 mg IV push injection     levothyroxine (SYNTHROID/LEVOTHROID) tablet 175 mcg     Warfarin Therapy Reminder (Check START DATE - warfarin may be starting in the FUTURE)     clonazePAM (klonoPIN) half-tab 0.25 mg     oxyCODONE (ROXICODONE) IR tablet 5 mg     menthol (ICY HOT) 5 % patch 1 patch    And     menthol (ICY HOT) Patch in Place    And     menthol (ICY HOT) patch REMOVAL     ondansetron (ZOFRAN) tablet 4 mg     lidocaine (LIDODERM) 5 % Patch 1-3 patch    And     lidocaine (LIDODERM) patch REMOVAL    And     lidocaine (LIDODERM) Patch in Place     docusate sodium (COLACE) capsule 100 mg     potassium chloride SA (K-DUR/KLOR-CON M) CR tablet 20-40 mEq     potassium chloride (KLOR-CON) Packet 20-40 mEq     potassium chloride 10 mEq in 100 mL intermittent infusion     potassium chloride 10 mEq in 100 mL intermittent infusion with 10 mg lidocaine     potassium chloride 20 mEq in 50 mL intermittent infusion     magnesium sulfate 2 g in NS intermittent infusion (PharMEDium or FV Cmpd)     magnesium sulfate 4 g in 100 mL sterile water (premade)     potassium phosphate 15 mmol in D5W 250 mL intermittent infusion     potassium phosphate 20 mmol in D5W 500 mL intermittent infusion     potassium phosphate 25 mmol in D5W 500 mL intermittent infusion     multivitamin, therapeutic with minerals (THERA-VIT-M) tablet 1 tablet     melatonin tablet 3 mg     miconazole (MICATIN; MICRO GUARD) 2 % powder     sodium chloride (PF) 0.9% PF flush 3 mL     sodium chloride (PF) 0.9% PF flush 3 mL     medication instruction     nitroglycerin (NITROSTAT) sublingual tablet 0.4 mg     alum & mag hydroxide-simethicone (MYLANTA ES/MAALOX  ES) suspension 15-30 mL     polyethylene glycol (MIRALAX/GLYCOLAX) Packet 17 g     albuterol (PROAIR HFA/PROVENTIL HFA/VENTOLIN HFA) Inhaler 2 puff      sennosides (SENOKOT) tablet 8.6 mg     naloxone (NARCAN) injection 0.1-0.4 mg

## 2017-02-02 NOTE — PLAN OF CARE
Problem: Goal Outcome Summary  Goal: Goal Outcome Summary  Edema 4E: Pt with significant reductions in 11/12 B LE measurements with GCB use x 24 hours. Pt with mild scrotal/penile edema -- pt issued design gadiel scrotal support though declined to don at this time. Pt with mild 1+ pitting in distal B LEs and 2+ into thighs. Pt wrapped to groin on R LE and to knee on L LE for additional fluid mobilization and to promote greater IND/ease with mobility and transfers. OK for pt to wear compression bandages until therapy returns. However, please remove any compression if it causes numbness, tingling, pain, becomes soiled, or if pt becomes unable to verbalize pain. Will follow up with patient.

## 2017-02-02 NOTE — PROGRESS NOTES
Social Work Services Progress Note    Hospital Day: 22  Collaborated with:  6C SW, Nursing, chart review    Data:    Pt was transferred to the ICU from 6C d/t elevated lactic acid levels.  SW continues to follow for support to pt and family, resource referral, and d/c planning.  Intervention/Assessment:   Returned call to pt's cousin Kayleigh ([phone]).  She requests that a letter be sent to her [place], [place] ([phone]) confirming pt's admission so that she can obtain financial assistance to travel to [facility].  Faxed letter as requested.  Plan:    Anticipated Disposition:  Facility:  TCU, please review prior SW notes    Barriers to d/c plan:  Medical readiness    Follow Up:  SW will continue to follow to assist with d/c planning when appropriate.    RERE Hutchins, [facility]  Adult ICU Clinical   Pager [phone]

## 2017-02-02 NOTE — PLAN OF CARE
Problem: Goal Outcome Summary  Goal: Goal Outcome Summary  Outcome: Declining  Transfer  Transferred to:   Via: bed  Reason for transfer: Pt inappropriate for 6C- worsening patient condition  Family: Aware of transfer  Belongings: Sent with pt  Chart: Sent with pt  Medications: Meds from bin sent with pt  Report called to: GODFREY RN    Patient vital signs stable. Patient alert, oriented x4. Edema +3 in hips, scrotum, lower extremities. Lower extremities lymph wraps on. Patient voiding small amounts (275 cc urine at 1000, no urge to void following 1000). Patient given oral Zofran and Oxycodone x1, per request at 0900. Patient ate breakfast and vomited 200cc following eating approx 1100. Patient reported pain in abdomen; was given Maalox PRN and Miralax for possible constipation; MD agreed with plan of care. Abdominal pain, nausea/vomiting continued following IV Dilaudid and IV Compazine. CARDS 1 team aware and ordered Lactic Acid; 3.2. Abdominal CT scan, Echo, and PICC line placement ordered. Echo, CT scan completed. PICC line placed in R arm. Upon returning from PICC line placement, PICC line bleeding moderate amount; pressure dressing applied. Repeat Lactic acid 4.3; follow up labs (CBC, blood cultures) ordered and Rapid Response Team called at 1630. Patient assisted with positioning for comfort, but continued to report intense pain. Patient given IV Dilaudid and IV Zofran for pain/nausea.   Report was given to 4E RN approximately 1730 and patient transferred from  to  approximately 1750.

## 2017-02-02 NOTE — PROGRESS NOTES
Cross cover note    I saw him at 0000, abdomen soft, diffusely tender and BS +. Abdominal US showed normal hepatic arterial and portal/hepatic  venous flows. Lactate down-trended from 5.3 -> 4.4 -> 3.5. The pain was adequately controlled with dilaudid prn. He remained hemodynamically stable with MAPs >65mm, and abdominal exam unchanged. This morning, he had a big melanotic and maroon bowel movement. Hgb drop from 8.9 --> 7.7. The etiology of GI bleed is likely ischemic bowel. Heparin and warfarin stopped and IV PPI started. Updated surgery and consulted GI for questionable hemobilia on CT.    Fidel French MD  PGY3  [id number]

## 2017-02-02 NOTE — CONSULTS
GASTROENTEROLOGY CONSULTATION      Date of Admission:  1/12/2017  Consulting physician: Georgie Lopez MD          ASSESSMENT AND RECOMMENDATIONS:     Assessment:    46 year old male with a history of rheumatic heart disease s/p mechanical MVR x2 on warfarin, biventricular heart failure s/p ICD, chronic afib on amiodarone and digoxin (stoppped 1/16 due to toxicity, WPW ablation,  CKD, cholecystectomy who was transferred from [facility] for further evaluation of CHF and possible hemolysis and for LVAD evaluation. He underwent transjugular liver biopsy on 1/30. Patient started having abdominal pain and GI bleed. No HD instability. Hb dropped from 8.9 --> 7.7. He was also on IV heparin and warfarin for his mechanical mitral valve. Most likely source of bleeding is hemobilia based on clinical course and CT finding. Also has elevated LFTs with bilirubin elevation of 6.4 with direct 5.9, also underlying liver cirrhosis, most likely from congestive hepatopathy/amiodarone toxicity. No signs of cholangitis at this point. Also questionable mesenteric ischemia, based on CT scan.      Recommendations    Recommend getting CTA and consulting IR if positive for possible embolization.     If CTA negative and persistent bleeding with significant hb drop, will consider EGD. No need for ERCP at this point as it is not therapeutic in hemobilia and also blood clot mostly clear on its own, will consider if persistent LFT elevation with cholangitis picture.     Continue H&H monitoring, with goal Hb according to primary team.     Continue IV PPI BID.     F/u results of liver biopsy    Gastroenterology follow up recommendations: TBD      Thank you for involving us in this patient's care. Please do not hesitate to contact the GI service with any questions or concerns.     Pt care plan discussed with Dr. Randall, GI staff physician.    Tej Mejia  GI fellow         Chief Complaint:    hematochezia, questionable  melena.          HPI:     46 year old male with a history of rheumatic heart disease s/p mechanical MVR x2 on warfarin, biventricular heart failure s/p ICD, chronic afib on amiodarone and digoxin (stoppped 1/16 due to toxicity, WPW ablation and CKD who was transferred from [facility] for further evaluation of CHF and possible hemolysis and for LVAD evaluation. Hepatology was following this patient before for elevated LFTs and cirrhosis. Patient underwent liver biopsy on 1/30. He started having RUQ pain next day of the procedure and also had melena yesterday, also noted to have maroon stool this morning. CT abdomen w/o contrast was done and showed possible hemobilia and questiable mesenteric infarct.   Today he is still c/o intermittent RUQ pain which radiates to his back. He was having some chills but no documented fevers. He denied any chest pain, mild SOB on deep breaths. His heparin was stopped this morning around 5.30 am.          Past Medical History:     Reviewed and edited as appropriate  Past Medical History   Diagnosis Date     Rheumatic heart disease             Past Surgical History:   Reviewed and edited as appropriate   Past Surgical History   Procedure Laterality Date     Cholecystectomy       Hernia repair       Appendectomy       Mitral valve replacement       Mechanical ([place] Tilting Disc); 1992     Fusion cervical anterior three+ levels       Eye surgery Left      Pt notes eye surgery to LE secondary to Barbed wire injury as a child.            Previous Endoscopy:   No results found for this or any previous visit.         Social History:   Reviewed and edited as appropriate  Social History     Social History     Marital Status: Single     Spouse Name: N/A     Number of Children: N/A     Years of Education: N/A     Occupational History     Not on file.     Social History Main Topics     Smoking status: Never Smoker      Smokeless tobacco: Not on file     Alcohol Use: No      Drug Use: No     Sexual Activity: Not on file     Other Topics Concern     Not on file     Social History Narrative            Family History:   Reviewed and edited as appropriate  Family History   Problem Relation Age of Onset     DIABETES Mother      Hypertension Brother      Macular Degeneration No family hx of      Glaucoma Father            Allergies:   Reviewed and edited as appropriate     Allergies   Allergen Reactions     Blood Transfusion Related (Informational Only) Other (See Comments)     Patient has a history of a clinically significant antibody against RBC antigens.  A delay in compatible RBCs may occur.     Asa [Aspirin] Other (See Comments) and Diarrhea     goosebumps  nausea     Gabapentin Nausea     Nabumetone Nausea     Sulfamethoxazole Unknown     Trimethoprim Unknown     Allopurinol Rash     Clindamycin Rash            Medications:     Current Facility-Administered Medications   Medication     LORazepam (ATIVAN) injection 0.5 mg     heparin  drip 25,000 units in 0.45% NaCl 250 mL (see additional administration details for dose)     heparin bolus from infusion pump     heparin lock flush 10 UNIT/ML injection 2-5 mL     lidocaine 1 % injection 0.5-1 mL     lidocaine (LMX4) kit     sodium chloride (PF) 0.9% PF flush 1-10 mL     sodium chloride (PF) 0.9% PF flush 5-10 mL     lidocaine 1 % 1 mL     sodium chloride (PF) 0.9% PF flush 10-20 mL     sodium chloride (PF) 0.9% PF flush 10 mL     piperacillin-tazobactam (ZOSYN) 3.375 g vial to attach to  mL bag     HYDROmorphone (PF) (DILAUDID) injection 0.3-0.5 mg     pantoprazole (PROTONIX) 40 mg IV push injection     levothyroxine (SYNTHROID/LEVOTHROID) tablet 175 mcg     Warfarin Therapy Reminder (Check START DATE - warfarin may be starting in the FUTURE)     clonazePAM (klonoPIN) half-tab 0.25 mg     oxyCODONE (ROXICODONE) IR tablet 5 mg     menthol (ICY HOT) 5 % patch 1 patch    And     menthol (ICY HOT) Patch in Place    And     menthol (ICY  HOT) patch REMOVAL     ondansetron (ZOFRAN) tablet 4 mg     lidocaine (LIDODERM) 5 % Patch 1-3 patch    And     lidocaine (LIDODERM) patch REMOVAL    And     lidocaine (LIDODERM) Patch in Place     docusate sodium (COLACE) capsule 100 mg     potassium chloride SA (K-DUR/KLOR-CON M) CR tablet 20-40 mEq     potassium chloride (KLOR-CON) Packet 20-40 mEq     potassium chloride 10 mEq in 100 mL intermittent infusion     potassium chloride 10 mEq in 100 mL intermittent infusion with 10 mg lidocaine     potassium chloride 20 mEq in 50 mL intermittent infusion     magnesium sulfate 2 g in NS intermittent infusion (PharMEDium or FV Cmpd)     magnesium sulfate 4 g in 100 mL sterile water (premade)     potassium phosphate 15 mmol in D5W 250 mL intermittent infusion     potassium phosphate 20 mmol in D5W 500 mL intermittent infusion     potassium phosphate 25 mmol in D5W 500 mL intermittent infusion     multivitamin, therapeutic with minerals (THERA-VIT-M) tablet 1 tablet     melatonin tablet 3 mg     miconazole (MICATIN; MICRO GUARD) 2 % powder     sodium chloride (PF) 0.9% PF flush 3 mL     sodium chloride (PF) 0.9% PF flush 3 mL     medication instruction     nitroglycerin (NITROSTAT) sublingual tablet 0.4 mg     alum & mag hydroxide-simethicone (MYLANTA ES/MAALOX  ES) suspension 15-30 mL     polyethylene glycol (MIRALAX/GLYCOLAX) Packet 17 g     albuterol (PROAIR HFA/PROVENTIL HFA/VENTOLIN HFA) Inhaler 2 puff     sennosides (SENOKOT) tablet 8.6 mg     naloxone (NARCAN) injection 0.1-0.4 mg             Review of Systems:   A complete review of systems was performed and is negative except as noted in the HPI           Physical Exam:     BP 98/51 mmHg  Pulse 80  Temp(Src) 97.9  F (36.6  C) (Oral)  Resp 16  Ht 1.829 m (6')  Wt 93.668 kg (206 lb 8 oz)  BMI 28.00 kg/m2  SpO2 98%  Wt:   Wt Readings from Last 2 Encounters:   02/01/17 93.668 kg (206 lb 8 oz)        Constitutional: cooperative, not dyspneic/diaphoretic, no  acute distress  Eyes: Sclera anicteric/ no pallor  Ears/nose/mouth/throat: Normal oropharynx without ulcers or exudate, mucus membranes moist,  CV: Normal S1, S2, no murmurs.  Respiratory: clear to auscultation b/l, no murmur  Abd: Nondistended, +bs, tenderness in RUQ,  no peritoneal signs  Skin: warm, perfused, no jaundice  Neuro: AAO x 3, no focal motor sensory deficits  Extremities: R leg in cast, b/l LE swelling.            Data:   Labs and imaging below were independently reviewed and interpreted    BMP  Recent Labs  Lab 02/02/17  0312 02/01/17  0849 01/31/17  0833 01/30/17  0709   * 131* 130* 131*   POTASSIUM 4.9 4.6 4.4 4.1   CHLORIDE 99 97 98 97   DELFINO 7.3* 7.7* 7.5* 7.7*   CO2 21 24 24 25   BUN 23 19 14 14   CR 1.31* 1.42* 1.25 1.25   * 90 74 76     CBC  Recent Labs  Lab 02/02/17  0911 02/02/17  0312 02/01/17  2321 02/01/17  1614   WBC 6.9 6.3 5.7 6.1   RBC 2.31* 2.37* 2.80* 2.76*   HGB 7.3* 7.7* 8.9* 8.9*   HCT 21.5* 22.4* 26.5* 26.5*   MCV 93 95 95 96   MCH 31.6 32.5 31.8 32.2   MCHC 34.0 34.4 33.6 33.6   RDW 20.1* 19.8* 19.7* 19.9*    158 141* 171     INR  Recent Labs  Lab 02/01/17  0849 01/31/17  0833 01/30/17  0709 01/29/17  0720   INR 1.92* 1.71* 1.57* 1.94*     LFTs  Recent Labs  Lab 02/02/17  0312 02/01/17  1829 02/01/17  1614   ALKPHOS 295* 277* 302*   * 139* 150*   ALT 81* 84* 90*   BILITOTAL 6.5* 6.2* 6.5*   PROTTOTAL 5.5* 5.7* 6.4*   ALBUMIN 2.0* 2.1* 2.5*      PANC  Recent Labs  Lab 02/01/17  1829   LIPASE 655*       Imaging: reviewed    Seen and examined with GI fellow, agree with findings and recommendations.    Dylan Randall MD GI Staff

## 2017-02-03 ENCOUNTER — APPOINTMENT (OUTPATIENT)
Dept: OCCUPATIONAL THERAPY | Facility: CLINIC | Age: 47
DRG: 286 | End: 2017-02-03
Attending: INTERNAL MEDICINE
Payer: MEDICAID

## 2017-02-03 ENCOUNTER — APPOINTMENT (OUTPATIENT)
Dept: PHYSICAL THERAPY | Facility: CLINIC | Age: 47
DRG: 286 | End: 2017-02-03
Attending: INTERNAL MEDICINE
Payer: MEDICAID

## 2017-02-03 LAB
ALBUMIN SERPL-MCNC: 1.9 G/DL (ref 3.4–5)
ALP SERPL-CCNC: 364 U/L (ref 40–150)
ALT SERPL W P-5'-P-CCNC: 82 U/L (ref 0–70)
ANION GAP SERPL CALCULATED.3IONS-SCNC: 10 MMOL/L (ref 3–14)
AST SERPL W P-5'-P-CCNC: 124 U/L (ref 0–45)
BASE EXCESS BLDV CALC-SCNC: 0.7 MMOL/L
BILIRUB DIRECT SERPL-MCNC: 6.6 MG/DL (ref 0–0.2)
BILIRUB SERPL-MCNC: 8.6 MG/DL (ref 0.2–1.3)
BUN SERPL-MCNC: 24 MG/DL (ref 7–30)
CALCIUM SERPL-MCNC: 7.3 MG/DL (ref 8.5–10.1)
CHLORIDE SERPL-SCNC: 98 MMOL/L (ref 94–109)
CO2 SERPL-SCNC: 24 MMOL/L (ref 20–32)
CREAT SERPL-MCNC: 1.48 MG/DL (ref 0.66–1.25)
ERYTHROCYTE [DISTWIDTH] IN BLOOD BY AUTOMATED COUNT: 20.7 % (ref 10–15)
GFR SERPL CREATININE-BSD FRML MDRD: 51 ML/MIN/1.7M2
GLUCOSE SERPL-MCNC: 86 MG/DL (ref 70–99)
HCO3 BLDV-SCNC: 25 MMOL/L (ref 21–28)
HCT VFR BLD AUTO: 21.1 % (ref 40–53)
HGB BLD-MCNC: 7 G/DL (ref 13.3–17.7)
HGB BLD-MCNC: 7.3 G/DL (ref 13.3–17.7)
HGB BLD-MCNC: 7.8 G/DL (ref 13.3–17.7)
INR PPP: 2.51 (ref 0.86–1.14)
INR PPP: 3.24 (ref 0.86–1.14)
LACTATE BLD-SCNC: 1.1 MMOL/L (ref 0.7–2.1)
LACTATE BLD-SCNC: 1.2 MMOL/L (ref 0.7–2.1)
LMWH PPP CHRO-ACNC: 0.3 IU/ML
LMWH PPP CHRO-ACNC: 0.45 IU/ML
MAGNESIUM SERPL-MCNC: 2.2 MG/DL (ref 1.6–2.3)
MCH RBC QN AUTO: 32.3 PG (ref 26.5–33)
MCHC RBC AUTO-ENTMCNC: 34.6 G/DL (ref 31.5–36.5)
MCV RBC AUTO: 93 FL (ref 78–100)
O2/TOTAL GAS SETTING VFR VENT: 21 %
OXYHGB MFR BLDV: 66 %
PCO2 BLDV: 35 MM HG (ref 40–50)
PH BLDV: 7.46 PH (ref 7.32–7.43)
PLATELET # BLD AUTO: 151 10E9/L (ref 150–450)
PO2 BLDV: 36 MM HG (ref 25–47)
POTASSIUM SERPL-SCNC: 4.7 MMOL/L (ref 3.4–5.3)
PROT SERPL-MCNC: 5.1 G/DL (ref 6.8–8.8)
RBC # BLD AUTO: 2.26 10E12/L (ref 4.4–5.9)
SODIUM SERPL-SCNC: 131 MMOL/L (ref 133–144)
WBC # BLD AUTO: 5.7 10E9/L (ref 4–11)

## 2017-02-03 PROCEDURE — 85520 HEPARIN ASSAY: CPT | Performed by: INTERNAL MEDICINE

## 2017-02-03 PROCEDURE — 25000132 ZZH RX MED GY IP 250 OP 250 PS 637: Performed by: INTERNAL MEDICINE

## 2017-02-03 PROCEDURE — 99212 OFFICE O/P EST SF 10 MIN: CPT

## 2017-02-03 PROCEDURE — 25000128 H RX IP 250 OP 636: Performed by: INTERNAL MEDICINE

## 2017-02-03 PROCEDURE — 25000128 H RX IP 250 OP 636: Performed by: STUDENT IN AN ORGANIZED HEALTH CARE EDUCATION/TRAINING PROGRAM

## 2017-02-03 PROCEDURE — 83735 ASSAY OF MAGNESIUM: CPT | Performed by: INTERNAL MEDICINE

## 2017-02-03 PROCEDURE — 99232 SBSQ HOSP IP/OBS MODERATE 35: CPT | Mod: GC | Performed by: INTERNAL MEDICINE

## 2017-02-03 PROCEDURE — 40000193 ZZH STATISTIC PT WARD VISIT

## 2017-02-03 PROCEDURE — 85610 PROTHROMBIN TIME: CPT | Performed by: INTERNAL MEDICINE

## 2017-02-03 PROCEDURE — S0171 BUMETANIDE 0.5 MG: HCPCS | Performed by: STUDENT IN AN ORGANIZED HEALTH CARE EDUCATION/TRAINING PROGRAM

## 2017-02-03 PROCEDURE — 97116 GAIT TRAINING THERAPY: CPT | Mod: GP

## 2017-02-03 PROCEDURE — 25000132 ZZH RX MED GY IP 250 OP 250 PS 637: Performed by: STUDENT IN AN ORGANIZED HEALTH CARE EDUCATION/TRAINING PROGRAM

## 2017-02-03 PROCEDURE — 40000133 ZZH STATISTIC OT WARD VISIT

## 2017-02-03 PROCEDURE — 25000132 ZZH RX MED GY IP 250 OP 250 PS 637: Performed by: NURSE PRACTITIONER

## 2017-02-03 PROCEDURE — 80076 HEPATIC FUNCTION PANEL: CPT | Performed by: INTERNAL MEDICINE

## 2017-02-03 PROCEDURE — 25000125 ZZHC RX 250: Performed by: STUDENT IN AN ORGANIZED HEALTH CARE EDUCATION/TRAINING PROGRAM

## 2017-02-03 PROCEDURE — 85018 HEMOGLOBIN: CPT | Performed by: INTERNAL MEDICINE

## 2017-02-03 PROCEDURE — 25000125 ZZHC RX 250: Performed by: INTERNAL MEDICINE

## 2017-02-03 PROCEDURE — 82805 BLOOD GASES W/O2 SATURATION: CPT | Performed by: STUDENT IN AN ORGANIZED HEALTH CARE EDUCATION/TRAINING PROGRAM

## 2017-02-03 PROCEDURE — 97530 THERAPEUTIC ACTIVITIES: CPT | Mod: GP

## 2017-02-03 PROCEDURE — 80048 BASIC METABOLIC PNL TOTAL CA: CPT | Performed by: INTERNAL MEDICINE

## 2017-02-03 PROCEDURE — 97535 SELF CARE MNGMENT TRAINING: CPT | Mod: GO

## 2017-02-03 PROCEDURE — 97110 THERAPEUTIC EXERCISES: CPT | Mod: GP

## 2017-02-03 PROCEDURE — 83605 ASSAY OF LACTIC ACID: CPT | Performed by: INTERNAL MEDICINE

## 2017-02-03 PROCEDURE — 21400006 ZZH R&B CCU INTERMEDIATE UMMC

## 2017-02-03 PROCEDURE — 85027 COMPLETE CBC AUTOMATED: CPT | Performed by: INTERNAL MEDICINE

## 2017-02-03 RX ORDER — BUMETANIDE 0.25 MG/ML
2 INJECTION INTRAMUSCULAR; INTRAVENOUS ONCE
Status: COMPLETED | OUTPATIENT
Start: 2017-02-03 | End: 2017-02-03

## 2017-02-03 RX ORDER — OXYCODONE HYDROCHLORIDE 5 MG/1
5-10 TABLET ORAL EVERY 6 HOURS PRN
Status: DISCONTINUED | OUTPATIENT
Start: 2017-02-03 | End: 2017-02-06

## 2017-02-03 RX ADMIN — Medication: at 10:48

## 2017-02-03 RX ADMIN — BUMETANIDE 2 MG: 0.25 INJECTION, SOLUTION INTRAMUSCULAR; INTRAVENOUS at 14:09

## 2017-02-03 RX ADMIN — HYDROMORPHONE HYDROCHLORIDE 0.5 MG: 10 INJECTION, SOLUTION INTRAMUSCULAR; INTRAVENOUS; SUBCUTANEOUS at 01:29

## 2017-02-03 RX ADMIN — HYDROMORPHONE HYDROCHLORIDE 0.5 MG: 10 INJECTION, SOLUTION INTRAMUSCULAR; INTRAVENOUS; SUBCUTANEOUS at 15:10

## 2017-02-03 RX ADMIN — MELATONIN TAB 3 MG 3 MG: 3 TAB at 20:40

## 2017-02-03 RX ADMIN — HYDROMORPHONE HYDROCHLORIDE 0.5 MG: 10 INJECTION, SOLUTION INTRAMUSCULAR; INTRAVENOUS; SUBCUTANEOUS at 20:04

## 2017-02-03 RX ADMIN — Medication 0.25 MG: at 02:34

## 2017-02-03 RX ADMIN — HYDROMORPHONE HYDROCHLORIDE 0.5 MG: 10 INJECTION, SOLUTION INTRAMUSCULAR; INTRAVENOUS; SUBCUTANEOUS at 09:22

## 2017-02-03 RX ADMIN — OXYCODONE HYDROCHLORIDE 5 MG: 5 TABLET ORAL at 06:47

## 2017-02-03 RX ADMIN — Medication: at 20:27

## 2017-02-03 RX ADMIN — Medication 0.25 MG: at 12:31

## 2017-02-03 RX ADMIN — MULTIPLE VITAMINS W/ MINERALS TAB 1 TABLET: TAB at 10:44

## 2017-02-03 RX ADMIN — PIPERACILLIN AND TAZOBACTAM 3.38 G: 3; .375 INJECTION, POWDER, FOR SOLUTION INTRAVENOUS at 05:19

## 2017-02-03 RX ADMIN — HYDROMORPHONE HYDROCHLORIDE 0.5 MG: 10 INJECTION, SOLUTION INTRAMUSCULAR; INTRAVENOUS; SUBCUTANEOUS at 10:50

## 2017-02-03 RX ADMIN — LORAZEPAM 0.5 MG: 2 INJECTION INTRAMUSCULAR; INTRAVENOUS at 15:22

## 2017-02-03 RX ADMIN — PIPERACILLIN AND TAZOBACTAM 3.38 G: 3; .375 INJECTION, POWDER, FOR SOLUTION INTRAVENOUS at 18:19

## 2017-02-03 RX ADMIN — PANTOPRAZOLE SODIUM 40 MG: 40 INJECTION, POWDER, FOR SOLUTION INTRAVENOUS at 10:47

## 2017-02-03 RX ADMIN — LEVOTHYROXINE SODIUM 175 MCG: 25 TABLET ORAL at 10:43

## 2017-02-03 RX ADMIN — PIPERACILLIN AND TAZOBACTAM 3.38 G: 3; .375 INJECTION, POWDER, FOR SOLUTION INTRAVENOUS at 10:51

## 2017-02-03 RX ADMIN — OXYCODONE HYDROCHLORIDE 5 MG: 5 TABLET ORAL at 12:31

## 2017-02-03 RX ADMIN — PIPERACILLIN AND TAZOBACTAM 3.38 G: 3; .375 INJECTION, POWDER, FOR SOLUTION INTRAVENOUS at 23:06

## 2017-02-03 RX ADMIN — HEPARIN SODIUM 1250 UNITS/HR: 10000 INJECTION, SOLUTION INTRAVENOUS at 10:35

## 2017-02-03 RX ADMIN — HYDROMORPHONE HYDROCHLORIDE 0.5 MG: 10 INJECTION, SOLUTION INTRAMUSCULAR; INTRAVENOUS; SUBCUTANEOUS at 12:20

## 2017-02-03 RX ADMIN — PANTOPRAZOLE SODIUM 40 MG: 40 INJECTION, POWDER, FOR SOLUTION INTRAVENOUS at 20:06

## 2017-02-03 RX ADMIN — Medication 0.25 MG: at 20:06

## 2017-02-03 RX ADMIN — OXYCODONE HYDROCHLORIDE 10 MG: 5 TABLET ORAL at 18:19

## 2017-02-03 RX ADMIN — LIDOCAINE 2 PATCH: 50 PATCH CUTANEOUS at 20:07

## 2017-02-03 RX ADMIN — DOCUSATE SODIUM 100 MG: 100 CAPSULE, LIQUID FILLED ORAL at 10:44

## 2017-02-03 RX ADMIN — DOCUSATE SODIUM 100 MG: 100 CAPSULE, LIQUID FILLED ORAL at 20:06

## 2017-02-03 ASSESSMENT — PAIN DESCRIPTION - DESCRIPTORS
DESCRIPTORS: SHARP
DESCRIPTORS: CONSTANT
DESCRIPTORS: CONSTANT
DESCRIPTORS: SHARP

## 2017-02-03 NOTE — PROGRESS NOTES
GASTROENTEROLOGY PROGRESS NOTE      ASSESSMENT/ RECOMMENDATIONS:    Assessment:    46 year old male with a history of rheumatic heart disease s/p mechanical MVR x2 on warfarin, biventricular heart failure s/p ICD, chronic afib on amiodarone and digoxin (stoppped 1/16 due to toxicity, WPW ablation,  CKD, cholecystectomy who was transferred from [facility] for further evaluation of CHF and possible hemolysis and for LVAD evaluation. He underwent transjugular liver biopsy on 1/30. Patient started having abdominal pain and GI bleed. No HD instability. Hb dropped from 8.9 --> 7.7. He was also on IV heparin and warfarin for his mechanical mitral valve. Most likely source of bleeding is hemobilia based on clinical course and CT finding. CTA was done yesterday and was negative for active extravasation.  Also has elevated LFTs with bilirubin elevation to 8.6 today with direct 6.6, no signs of cholangitis, also underlying liver cirrhosis, biopsy showed advanced chronic liver disease (stage 3-4) of uncertain etiology, most likely from amiodarone toxicity. He had normal hepatic venous pressure gradient (HVPG). Also questionable mesenteric ischemia, based on CT scan. No bleeding overnight with stable hemoglobin overnight. GI bleeding most likely from resolving hemobilia, also possibility of oozing in setting of anticoagulation with underlying gastritis.       Recommendations    Recommend conservative management at this point.     No plan for ERCP at this point as no signs of cholangitis. No plan for EGD currently because of stable hemoglobin and no significant recurrent bleeding. Unlikely to have varices based on normal HVPG.    Continue H&H monitoring, with goal Hb according to primary team.      Continue IV PPI BID, switch to oral when eating.     Serial abdominal exam.     Recommend checking Cdiff, enteropathogen panel for this incidental colitis found to imaging.     Also need to clarify goals of care  as patient not candidate for heart transplant/LVAD or valvular surgery.     Gastroenterology follow up recommendations: TBD      The patient was discussed and plan agreed upon with GI staff, Dr. Randall.     Tej Mejia  [facility]  Gastroenterology Fellow  _______________________________________________________________    S: no BM or bleeding overnight, c/o little worse RUQ pain radiating to back, afebrile, denied any vomiting.     O:  Blood pressure 89/52, pulse 80, temperature 98.3  F (36.8  C), temperature source Oral, resp. rate 16, height 1.829 m (6'), weight 93.668 kg (206 lb 8 oz), SpO2 100 %.    Gen: mild distress  CV: normal S1, S2  Lungs: CTAB  Abd: soft, tenderness in RUQ, normal BS.   Neuro: no focal motor sensory deficits.       LABS:  BMP  Recent Labs  Lab 02/03/17  0530 02/02/17  0312 02/01/17  0849 01/31/17  0833   * 132* 131* 130*   POTASSIUM 4.7 4.9 4.6 4.4   CHLORIDE 98 99 97 98   DELFINO 7.3* 7.3* 7.7* 7.5*   CO2 24 21 24 24   BUN 24 23 19 14   CR 1.48* 1.31* 1.42* 1.25   GLC 86 122* 90 74     CBC  Recent Labs  Lab 02/03/17  0530  02/02/17  0911 02/02/17  0312 02/01/17  2321   WBC 5.7  --  6.9 6.3 5.7   RBC 2.26*  --  2.31* 2.37* 2.80*   HGB 7.3*  < > 7.3* 7.7* 8.9*   HCT 21.1*  --  21.5* 22.4* 26.5*   MCV 93  --  93 95 95   MCH 32.3  --  31.6 32.5 31.8   MCHC 34.6  --  34.0 34.4 33.6   RDW 20.7*  --  20.1* 19.8* 19.7*     --  199 158 141*   < > = values in this interval not displayed.  INR  Recent Labs  Lab 02/03/17  0530 02/02/17  1339 02/02/17  0312 02/01/17  0849   INR 3.24* 2.41* 2.51* 1.92*     LFTs  Recent Labs  Lab 02/03/17  0530 02/02/17  0312 02/01/17  1829 02/01/17  1614   ALKPHOS 364* 295* 277* 302*   * 127* 139* 150*   ALT 82* 81* 84* 90*   BILITOTAL 8.6* 6.5* 6.2* 6.5*   PROTTOTAL 5.1* 5.5* 5.7* 6.4*   ALBUMIN 1.9* 2.0* 2.1* 2.5*      PANC  Recent Labs  Lab 02/01/17  1829   LIPASE 655*

## 2017-02-03 NOTE — PROGRESS NOTES
General Surgery Progress Note    Subjective  Continues to have bloody/maroon stools. Pain improved this morning    Objective  BP 89/52 mmHg  Pulse 80  Temp(Src) 98.3  F (36.8  C) (Oral)  Resp 16  Ht 1.829 m (6')  Wt 93.668 kg (206 lb 8 oz)  BMI 28.00 kg/m2  SpO2 100%    On Exam  NAD, Awake, oriented  NLB  Abdomen soft, tender in RUQ, no rebound or guarding    I/O last 3 completed shifts:  In: 1367 [P.O.:360; I.V.:707]  Out: 1350 [Urine:1350]    WBC      5.7   2/3/2017  RBC     2.26   2/3/2017  HGB      7.3   2/3/2017  HCT     21.1   2/3/2017  No components found with this name: mct  MCV       93   2/3/2017  MCH     32.3   2/3/2017  MCHC     34.6   2/3/2017  RDW     20.7   2/3/2017  PLT      151   2/3/2017  basic metabolic panel    Assessment/Plan:  Samir Rodriguez is a 46 year old male with complex PMHx/PSHx including rhematic heart disease s/p MVR x2 on warfarin, CHF with EF of 25-30% s/p ICD, chronic afib, CKD III now presenting with RUQ pain after liver biopsy as part of VAD workup. Surgery consulted for bowel ischemia- no ischemia on imaging, patient does have an increased density in the biliary tree, suspicious for hemobilia given his clinical course and hematochezia. Patients Hgb has been stable overnight and remains hemodynamically stable.    - Recommend endoscopy to evaluate for GI bleeding    Patient discussed with chief resident Dr. Carpenter who discussed with staff    Omi Cantu   Surgery PGY2  [phone]

## 2017-02-03 NOTE — PROGRESS NOTES
[facility]  WO Nurse Inpatient Adult Pressure INJURY (PI) Wound Assessment     Follow up assessment of PU(s) on pt's:   Bilateral heels and Right Buttock/ sacrum     Data:   Patient History:      per MD note(s):  Samir Rodriguez is a 46 year old male  male with past medical history of Rheumatic Heart Disease s/p mechanical MVR x 2 (1980s and 1992) on warfarin (INR goal 2.5-3.5), biventricular CHF (EF 25-30%) s/p dual chamber ICD 2008, WPW ablation at age 11, CKD III, hypothyroid,  who is transferred from [facility]      Current mattress:  AtmosAir, overlay ordered  Current pressure relieving devices:  Heel lift boots, chair cushion and Pillows    Moisture Management:  Incontinence Protocol    Catheter secured? Not applicable    Current Diet / Nutrition:       Active Diet Order  Low Saturated Fat Na <2400 mg  Maximilian Score  Avg: 15.4  Min: 12  Max: 17     Recent Labs   Lab Test  01/26/17   0827   01/24/17   0726   01/13/17   0147   ALBUMIN   --    --   1.8*   < >   --    HGB  9.4*   < >  8.7*   < >   --    INR  2.32*   < >  3.50*   < >   --    WBC  3.5*   < >  3.8*   < >   --    CRP   --    --    --    --   6.2    < > = values in this interval not displayed.                                                                                                                      Pressure Injury Assessment : Bilateral heels   1/12/17 Left heel   1/12/17 right heel  2/3/17: Did not evaluate today - already wrapped by lymphedema  Wound History:   Present on admit  Left posterior heel: 1 x 1 x 0.1 cm  red moist dermis, erosion of epidermis.  Draining small amount of seroussangounous fluid.    Right posterior heel: 0.5 x 0.3 x 0 cm soft, no surrounding erythema, draining blister.  Periwound skin inact, macerated.  Draining scant amount serous fluid    Temperature  cool      Odor: none    Pain:  absent     Has right leg brace  Pressure Injury Assessment : Right  Buttock/sacrum  Wound History:  Present on admit, edema to pelvic region remains.      Wound Base: mixed yellow red dermis with hair follicles    Specific Dimensions (length x width x depth, in cm) :  4 x 3.5 x 0.1 cm irregular shape on right buttocks                                                                                           0.4 cm round epidermal erosion at lower sacrum-midline    Palpation of the wound bed:  normal    Slough appearance:  none    Eschar appearance:  none    Periwound Skin:erosion of epidermis      Color: normal and consistent with surrounding tissue    Temperature  normal     Drainage:  Small amount serosanguinous       Odor: none    Pain:  minimal , aching           Intervention:     Patient's chart evaluated.      Maximilian Interventions:  Current Maximilian Interventions and Care Plan reviewed and updated, appropriate at this time.    Wound was assessed.    Wound Care: was done: Removal of existing dressing    Visual inspection    Cleansing with Microklenz    Application of clean dressing,    Orders  reviewed    Supplies  Reviewed    Discussed plan of care with Patient and Nurse    Instructed patient and nurse to transfer patient to bed from chair intermittently versus sitting up in chair for entire day           Assessment:     Pressure Injury (PI) located on Sacrum: stage 2, Left heel: Stage 2, Right heel: Stage 2    Status: wound  Improving, Erosion at right buttock and small opening at lower sacrum    Wounds present on admit,           Plan:     Nursing to notify the Provider(s) and re-consult the WOC Nurse if wound(s) deteriorate(s).    Plan of care for wound located on Buttock/Sacrum and bilateral heels: cleanse with microklenz and pat dry, apply no sting barrier around wound, cover with mepilex.  Change every other day.    Heel lift boots on at all times while in bed.    WOC Nurse will return: weekly  Face to face time: 20 minutes.

## 2017-02-03 NOTE — PLAN OF CARE
Problem: Goal Outcome Summary  Goal: Goal Outcome Summary  OT 4E: Pt reported sharp/throbbing pain in R abdomen and lower back limiting tolerance for OOB activity. Pt mod A for supine > EOB. Pt completed sit > stand with min A x2 and transferred to bedside chair with min A x2 for balance. Pt completed ADLs seated in chair with set-up assistance. Pt appeared forgetful needing repetition of education throughout session.     Rec TCU when medically stable to progress cognitive function, functional transfers, and functional mobility leading to decreased ADL I.

## 2017-02-03 NOTE — PROGRESS NOTES
"Social Work Services Progress Note    Hospital Day: 23  Date of Initial Social Work Evaluation:  NA  Collaborated with:  Palliative Care Triage     Data:  Pt is a 46 year old  male being assessed for possible heart surgery.  Pt has returned from ICU to 6C and is now not a candidate for advanced heart procedures.      Intervention:  SW called palliative triage to have a palliative SW meet with pt to address adjustment to illness counseling.  Pt has mentioned to unit SW in the past that both he and his family were \"stressed\" and \"worried\" if he did not get the heart surgery.  SW will follow up with cardiology team regarding plan of care.  SW will also work with pt on his goals for discharge given the surgery is not possible.    Assessment:  Pt has family who are at bedside.  Plan:    Anticipated Disposition:  Pending discussion with cardiology team.    Barriers to d/c plan:  Insurance, no transfer agreement in place to assist with discharge to [facility].  Is on waitlist for Medicaid bed at [facility] if TCU is still wanted by pt/family.    Follow Up:  SW will continue to follow for discharge.    REJI Hernandez, APSW  6C Unit   Pager: [phone]  Phone: [phone]      ___________________________________________________________________________________________________________________________________________________    Referrals in process:     TCUs with phone calls/referrals out:     [place] Area: - [facility] - no beds this week, pt is on waitlist for next Medicaid bed.            PH: [phone]            F: [phone]    Referrals Discontinued:    Swing Bed Programs:   - [facility] - declined due to transportation concerns, concerns pt cannot tolerate therapies.  Admissions states even if can tolerate therapies, pt would be declined due to pt needing transport to the [facility].  SW explained pt has Title 19 to cover " transport and pt would be responsible.  Admissions states this still does not satisfy their concerns.  PH: [phone]  F: [phone]      - [facility] - declined due to no Medicaid beds available at this time.  PH - Cell: [phone]  PH - Main: [phone]  F: [phone]    TCU Units:    - [facility] - do not accept Medicaid Ins.  Do not accept IHS insurance.  PH: [phone]   - [facility] - do not accept Medicaid Ins.  Do not accept IHS insurance.  PH: [phone]   - [facility] - only a SNF/LTC and do not accept Medicaid only for rehab.  Have an over two year wait for a SNF Medicaid bed.  PH: [phone]   - [facility] - Accepts Medicaid, however has extensive wait for a bed.  PH: [phone]  - [facility] - No answer when attempted to call  PH: [phone]  - [facility] - no answer from admissions re if Medicaid is covered  PH: [phone]  - [facility] - No answer from admissions re if Medicaid is covered  PH: [phone]    Community Case Management/Community Services in place:   Pt has a Medicaid  [place]   PH: [phone] x [phone]   -  on Call for [place]: [phone]    [facility] Office: Main line: [phone]  [facility] - call for services to start  PH: [phone]    [facility], fax all documentation here  F: [phone]    Pt may have Title 19 services for transportation, will need to assess this at time of dc if he qualifies for TCU.      Update 1/31/17 - this is a wrong number.  Please do not call.

## 2017-02-03 NOTE — PROGRESS NOTES
"SPIRITUAL HEALTH SERVICES  SPIRITUAL ASSESSMENT Progress Note  [facility] 6C     Pt was on CVICU for procedure, was back on 6C when I visited, in a different room than pt was in before. Pt a little discouraged, asked for prayers \"to get stronger and be able to be more positive...\" Pt talked about:     liver biopsy and the pain he's feeling right now     that his son is visiting and is very supportive, brought pt some blankets (\"including a blanket that represents the sacred four directions...\")     his sacred pipe (pt showed me his pipe, \"made from a special stone here in [place]\". Pt said he hoped to be able to use the pipe for a ritual of james \"when it's the right time. I'd like you to be there too. It will be to give me strength and healing...\")  I shared a prayer at pt's request.    PLAN: continue to follow, visiting at least 2x weekly. Will look into possible ways of pt using his pipe for a healing ritual.    Gian Luciano) Lizbeth Mathias M.Div., [facility]  Staff   Pager [phone]      "

## 2017-02-03 NOTE — PLAN OF CARE
Problem: Goal Outcome Summary  Goal: Goal Outcome Summary  Outcome: Improving  VSS, afebrile, denies SOB. Pt having pain in his back and abd. PRN medications have been given multiple times this shift and are effective. Pt went down for an abd CT today. He has one large, loose, bloody/maroon BM today. Trending lactic which keeps going down, last result was 1.9. Pt received 1 unit of PRBCs this shift, last hemoglobin check was 7.8. He received 1mg of bumex and voided 750cc after. Pt participated in PT. His lymphadema wraps were redone today. Pt has orders to transfer to , awaiting on available bed. Will continue to monitor and update as needed.

## 2017-02-03 NOTE — PROGRESS NOTES
Cardiology 1 Progress Note    Samir Rodriguez MRN# [id number]   Age: 46 year old YOB: 1970   Date of Admission: 1/12/2017           Assessment and Plan:   Samir Rodriguez is a 46 year old  male with past medical history of Rheumatic Heart Disease s/p mechanical MVR x 2 (1980s and 1992) on warfarin (INR goal 2.5-3.5), biventricular CHF (EF 25-30%) s/p dual chamber ICD 2008, chronic afib on amiodarone and previously on digoxine (stopped 1/16/17 due to concern for toxicity), WPW ablation at age 11, CKD III, hypothyroid,  who was transferred from [facility] on 01/12 for further eval of CHF and concern of hemolysis in setting of mechanical valve.    Today:  - Await GI recs on possible EGD with ERCP as   -IR embolization vs EGD    # Lactic acidosis getting better. Normoalized this AM  # Possible hematobilia causing severe RUQ pain.   # Mesenteric ischemia  # Hematochezia, ?GIB- unable to rule out, did not have any black stools.   # Acute on chronic anemia  Lactate trended Peaked at 5.3 and 1.1 this AM. Abdominal pain and n/v resolved. Has back pain.  - Hemobilia possible source of bleeding, no further bleeding spots identified on CTA.   -Surgery consulted- appreciate recs, recommending EGD.   -q6h hgb checks. 7.3 this AM. Would consider pedi draw.   -restarted heparin gtt asap in context of mechanical mitral valve, he does have an elevated INR without being on coumadin. Possibly congestive hepatopathy but we were told that hepatic venous pressures were normal during the IR procedure. I still believe that this is secondary to congestion and liver disease.  - Continue IV diuresis.   -s/p 2u pRBC  -Attempted to reach family: son Sid without functioning phone and no answer from the mother of his children, Dinora Beckett. Will try today.     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 Hypervolemic  - Bumex 2mg IV this AM  - hypervolemic on exam and imaging.   - hold lisinopril 2.5 due to hypotension  - cont holding metoprolol and spironolactone for now    # Mechanical mitral valve (MV mean gradient 7)  # Aortic Insufficiency (mod - severe)  # TR (moderate)  History of BiV failure attributed to valvular disease. Echocardiogram on admission demonstrated moderate-severe aortic insufficiency which is his most acute cardiac pathology. His tricuspid regurgitation is likely due to his signficantly fluid overloaded state. Mechanical valve maintained on warfarin w/ INR 2.5-3.5 prior to this admission. Angiogram performed on 01/20 revealed nonobstructive CAD.  - EDEL shows probable restriction of one of the mitral valve leafets; if he is a candidate for valvular surgery, mitral valve replacemnt will have to be considered as well   - Tooth extractions cancelled, as patient is no longer valve replacement candidate and currently with active bleeding  - Not a candidate for heart transplant/LVAD considering his liver disease.     # Anemia and thrombocytopenia  # Right Arm Hematoma   Blood smear: blood smear- RBCs show some target cells, hypochromia, increased polychromasia, no schistocytes or spherocytes. Low haptoglobin, high LDH, and plasma free hemoglobin concerning for intra-vascular hemolysis possibly due to valvular disease. No evidence of overt infection causing DIC. EPO inappropriately low w/ recent ARF. Etiology of hematoma likely due to supratherapeutic INR- 4.6 on arrival  - warfarin w/ goal INR 2.5-3.0   - on heparin gtt for bridging- but now holding due to active GI bleed. Will restart as soon as possible.  - Still holding warfarin pending GI decision. Has supratherapeutic INR currently. Probably related to liver disease and congestion.     #. Cirrhosis and liver nodularity  Demonstrated on CT, although u/s was normal. CT read is concerning for  amiodarone toxicity. Liver biopsy 1/30 with evidence of advanced chronic liver disease (stage 3 or 4) of unclear etiology (amiodarone toxicity is possible). Consequently, does not appear to be candidate for LVAD or for valve replacements. Cards 2 will discuss these results and their consequence with patient.   -No LVAD  -No CT surgery/ valve replacement    # Paroxsymal afib:  digoxin level 0.7 on 1/12 and 1/17. 1/16/17 device interrogation: atrial tachycardia to 150s with AV block. Lower rate setting changed to 80bpm from 60bmp and device changed from DDDR to VVIR on 01/16; atrial tachycardia and AV block and V pacing. The patient has been in an atrial flutter that is undersensed, hence the device was switched to VVI mode. There is no point continuing amiodarone at this stage especially with concern for toxicity  - discontinued pta amiodarone considering his liver disease. .    # Hyperthyroid: Will need repeat TSH/T4 1-2 months post DC. Decreased levothyroxine from 224mcg to 175 mcg   # Anxiety and insomnia: Increased clonazepam to tid    # Right tibia fx and R knee trauma:  Fall in November prompted decompensation. Xray tibia and knee no fx this admission.  - Ortho evaluated; planning for outpatient follow up with orthopedic surgery in 4-6 weeks  - PT/OT    # Headaches  May be due to near-sightedness vs rebound headache from opioid use vs reduced cardiac output  - pain consult with pain for opioid titration  - eye exam 1/27- received new Rx  - using lidocaine patches  - PRN zofran    # Pulmonary nodules: incidentally found on CT  - repeat CT chest in 6 months    # CARSON- likely cardiorenal with TTE with dilated IVC without respiratory variation   -diuresis as tolerated in setting of active bleeding, giving 2mg of IV bumex today.   -Recheck BMP in am    FEN: cardiac diet   PPX: on heparin gtt  Dispo: Difficult placement due to out of state Medicaid.    Code Status: Full CODE      Patient discussed with staff  attending, Dr. Gan.     Dexter page MD  [phone].      CARDIOLOGY STAFF  Patient seen and examined by me.  History and physical examination discussed with Dr. Jacques whose note reflects our joint assessment and recommendation/plans.  GI and IR are currently observing his GI status.  Lactate is down to 1.1 this morning.  He continues to have intermittent abdominal pain.  Mr. Rodriguez's son Sid is here this morning.  I updated both on the events this week.  Dr. Briones (Cards 2 attending) was also present.  We informed them of the biopsy results, showing liver cirrhosis.  As such, he would not be a candidate for LVAD and not a candidate for valve surgery.  We also had confirmed this conclusion with Dr. Lance yesterday (also Cards 2).  From a heart failure standpoint, patient has been reasonably stable.  I/O net 400 cc yesterday.  However, INR is increasing in the absence of warfarin, suggesting liver congestion.  Continuing IV bumex by bolus, we will increase dose today.    We updated phone contacts with patient's son.  He provided the following names and current phone numbers in order of preference:  (1) Sid (son) [phone]  (2) Dorina  [phone]  (3) Carlee [phone]    Ganesh Gan           Interval History/Subjective   Abdominal pain without nausea vomiting.   CT CAP with evidence of mesenteric ischemia and possible hemobilia  Lactate peaked at 5.3 and now 1.1.  Hgb trended up to 7.3 from 7.   q6h hgb checks  On heparin.  GI, surgery, IR all involved.    Patient with decreased abdominal pain this morning but now with back pain. Denies chest pain, pressure, sob, n/v. 1 episode of hematochezia and no BM since.          Objective   Temp:  [98.1  F (36.7  C)-98.3  F (36.8  C)] 98.3  F (36.8  C)  Heart Rate:  [79-89] 80  Resp:  [16-18] 16  BP: (81-96)/(45-58) 89/52 mmHg  SpO2:  [91 %-100 %] 100 %    Physical exam:  Gen: AA&Ox3, no acute distress  HEENT: NACT, poor dentition    PULM: Clear lungs  CV: RRR; mechanical s2 w/ 2+ systolic murmur at LUSB and LISB: JVD+  ABD:  soft, nontender, nondistended.  BS+  EXT: trace sonam edema to knees. Right arm ecchymosis stable   SKIN: Right arm resolving ecchymosis outlined w/ marker   NEURO: alert and oriented; speech intact         Data:   CBC    Recent Labs  Lab 02/03/17  0530 02/03/17  0204 02/02/17  2202 02/02/17  1623  02/02/17  0911 02/02/17  0312 02/01/17  2321   WBC 5.7  --   --   --   --  6.9 6.3 5.7   RBC 2.26*  --   --   --   --  2.31* 2.37* 2.80*   HGB 7.3* 7.0* 7.3* 7.8*  < > 7.3* 7.7* 8.9*   HCT 21.1*  --   --   --   --  21.5* 22.4* 26.5*   MCV 93  --   --   --   --  93 95 95   MCH 32.3  --   --   --   --  31.6 32.5 31.8   MCHC 34.6  --   --   --   --  34.0 34.4 33.6   RDW 20.7*  --   --   --   --  20.1* 19.8* 19.7*     --   --   --   --  199 158 141*   < > = values in this interval not displayed.  CMP    Recent Labs  Lab 02/03/17  0530 02/02/17  0312 02/01/17  1829 02/01/17  1614 02/01/17  0849 01/31/17  0833   * 132*  --   --  131* 130*   POTASSIUM 4.7 4.9  --   --  4.6 4.4   CHLORIDE 98 99  --   --  97 98   CO2 24 21  --   --  24 24   ANIONGAP 10 12  --   --  10 9   GLC 86 122*  --   --  90 74   BUN 24 23  --   --  19 14   CR 1.48* 1.31*  --   --  1.42* 1.25   GFRESTIMATED 51* 58*  --   --  53* 62   GFRESTBLACK 61 71  --   --  64 75   DELFINO 7.3* 7.3*  --   --  7.7* 7.5*   MAG 2.2 2.1  --   --  2.1 2.0   PROTTOTAL 5.1* 5.5* 5.7* 6.4*  --   --    ALBUMIN 1.9* 2.0* 2.1* 2.5*  --   --    BILITOTAL 8.6* 6.5* 6.2* 6.5*  --   --    ALKPHOS 364* 295* 277* 302*  --   --    * 127* 139* 150*  --   --    ALT 82* 81* 84* 90*  --   --      INR    Recent Labs  Lab 02/03/17  0530 02/02/17  1339 02/02/17  0312 02/01/17  0849   INR 3.24* 2.41* 2.51* 1.92*        CT CAP 2/1/17    EXAMINATION: CT CHEST ABDOMEN PELVIS W/O CONTRAST, 2/1/2017 3:14 PM     TECHNIQUE:  Helical CT images from the thoracic inlet through the  symphysis pubis  were obtained  without IV contrast.       COMPARISON: CT 1/23/2017     HISTORY: concern for bleed after IR transjugular liver biopsy on 1/30     FINDINGS:[id number]     Chest: Cardiomegaly. Left pectoral implantable cardiac defibrillator  is again seen. No pericardial effusion. Increase in small right and  trace left pleural effusion. There are scattered 4 mm groundglass  attenuations in the lungs for example on the right lower lobe (series  2, image 85), not visualized on prior exam. The previously seen  groundglass attenuation in the right lower lobe is not conspicuous on  today's exam. Breathing motion artifact slightly limits evaluation.  Patchy nodular densities in the dependent aspect of the left lower  lobe which appear more prominent to be accounted for breathing  artifact, new since prior exam and may represent aspiration. Bibasilar  atelectasis. No pneumothorax.     Abdomen and pelvis: Gallbladder is surgically absent. Diffuse  hyperdense appearance of the liver. Some hyperdense material in the  common bile duct, secreted contrast versus hemobilia. Spleen is not  enlarged. There is a small splenule at the splenic hilum. Pancreas is  unremarkable in this noncontrast exam. Adrenals are unremarkable. No  hydronephrosis. No renal calculus. Bladder is partially distended and  appears unremarkable. Prostate is not enlarged. Moderate amount of  stool in the colon. Bowel loops are not distended. No significant  diverticula or evidence of diverticulitis. There are some  hyperdensities in the ascending colon, new since prior exam.     There is a new hypodensity in the mesentery (series 3, image 172),  suggestive of mesenteric infarct. There is significant mesenteric  infiltration, represent edematous mesentery. No retroperitoneal  hematoma or subcapsular hematoma of the liver. No pneumoperitoneum.     Bones and soft tissues: No suspicious osseous lesion. Multiple  bilateral old rib fractures. Unchanged severe  anasarca.                                                                       IMPRESSION:    1. Increased density in the biliary tree. Differential includes  biliary secretion of contrast and hematobilia.    1a. (Concern for hematobilia given recent transjugular hepatic biopsy,  however the density in the biliary tree is predominantly in the left,  the biopsy site is in the right making this less likely but still a  consideration with increased density in multiple bowel loops without  history of oral contrast. No retroperitoneal or hepatic subcapsular  hematoma.    2. Findings of mesenteric infarct, new since prior exam.  3. Severe anasarca and fanny mesentery, most likely secondary to  severe volume overload and/or hypoproteinemia. Slightly increased  right greater than left pleural effusion and bibasilar atelectasis.  4. Nodular densities in the dependent left lower lobe, may represent  aspiration superimposed on atelectasis.  5. Diffuse hyperdense appearance of the liver parenchyma, differential  multiple transfusions versus hemachromatosis.    6. In view of rising lactate an new mesenteric infarct, consider  repeat CT with IV contrast which would better evaluate bowel.     [Urgent Result: Possible hemobilia and possible mesenteric infarct]     Finding was identified on 2/1/2017 3:16 PM.           Medications:     Current Facility-Administered Medications   Medication     LORazepam (ATIVAN) injection 0.5 mg     heparin  drip 25,000 units in 0.45% NaCl 250 mL (see additional administration details for dose)     heparin bolus from infusion pump     heparin lock flush 10 UNIT/ML injection 2-5 mL     lidocaine 1 % injection 0.5-1 mL     lidocaine (LMX4) kit     sodium chloride (PF) 0.9% PF flush 1-10 mL     sodium chloride (PF) 0.9% PF flush 5-10 mL     lidocaine 1 % 1 mL     sodium chloride (PF) 0.9% PF flush 10-20 mL     sodium chloride (PF) 0.9% PF flush 10 mL     piperacillin-tazobactam (ZOSYN) 3.375 g vial to  attach to  mL bag     HYDROmorphone (PF) (DILAUDID) injection 0.3-0.5 mg     pantoprazole (PROTONIX) 40 mg IV push injection     levothyroxine (SYNTHROID/LEVOTHROID) tablet 175 mcg     Warfarin Therapy Reminder (Check START DATE - warfarin may be starting in the FUTURE)     clonazePAM (klonoPIN) half-tab 0.25 mg     oxyCODONE (ROXICODONE) IR tablet 5 mg     menthol (ICY HOT) 5 % patch 1 patch    And     menthol (ICY HOT) Patch in Place    And     menthol (ICY HOT) patch REMOVAL     ondansetron (ZOFRAN) tablet 4 mg     lidocaine (LIDODERM) 5 % Patch 1-3 patch    And     lidocaine (LIDODERM) patch REMOVAL    And     lidocaine (LIDODERM) Patch in Place     docusate sodium (COLACE) capsule 100 mg     potassium chloride SA (K-DUR/KLOR-CON M) CR tablet 20-40 mEq     potassium chloride (KLOR-CON) Packet 20-40 mEq     potassium chloride 10 mEq in 100 mL intermittent infusion     potassium chloride 10 mEq in 100 mL intermittent infusion with 10 mg lidocaine     potassium chloride 20 mEq in 50 mL intermittent infusion     magnesium sulfate 2 g in NS intermittent infusion (PharMEDium or FV Cmpd)     magnesium sulfate 4 g in 100 mL sterile water (premade)     potassium phosphate 15 mmol in D5W 250 mL intermittent infusion     potassium phosphate 20 mmol in D5W 500 mL intermittent infusion     potassium phosphate 25 mmol in D5W 500 mL intermittent infusion     multivitamin, therapeutic with minerals (THERA-VIT-M) tablet 1 tablet     melatonin tablet 3 mg     miconazole (MICATIN; MICRO GUARD) 2 % powder     sodium chloride (PF) 0.9% PF flush 3 mL     sodium chloride (PF) 0.9% PF flush 3 mL     medication instruction     nitroglycerin (NITROSTAT) sublingual tablet 0.4 mg     alum & mag hydroxide-simethicone (MYLANTA ES/MAALOX  ES) suspension 15-30 mL     polyethylene glycol (MIRALAX/GLYCOLAX) Packet 17 g     albuterol (PROAIR HFA/PROVENTIL HFA/VENTOLIN HFA) Inhaler 2 puff     sennosides (SENOKOT) tablet 8.6 mg     naloxone  (NARCAN) injection 0.1-0.4 mg

## 2017-02-03 NOTE — CONSULTS
This note is for EPIC purposes, all GI related questions can be referred to Panc/bili team for now.    Thank you.    Mat Knutson MD

## 2017-02-03 NOTE — PROGRESS NOTES
N: A/O. Anxious with difficulty sleeping, PRN clonazepam given with significant improvement.     CV: 100% V-paced, rate set at 80. BP soft, 80s/50s with MAPs ~60. Denies dizziness or lightheadedness. Afeb. Weak pulses. Scrotal, dependent and flank edema. Mitral click audible. Heparin gtt adjusted per MAR, currently at 1250u/hr. Hgb not held at 0130 bc AM cardiology note says dental procedure postponed. Hgb trending down, per cardiology MD we'll likely transfuse when <7.     Resp: Good sats on RA. LSC, dim. Hesitant to cough 2/2 pain.     GI: Heart healthy diet, but unofficially NPO this morning as surgical team suspects pt will go for a scope of some kind later today. Denies n/v. BS active. No BM or rectal bleeding overnight. Abd round, obese. Pt endorses RUQ tenderness upon palpation, rating abd pain at 9-10/10. Treating with IV dilaudid and PO oxy.     : Voids x1 spontaneously overnight. [place] UOP.     Skin: Lymphadema wraps remain in place. PU on buttocks covered with mepelex foam.

## 2017-02-03 NOTE — PLAN OF CARE
Problem: Goal Outcome Summary  Goal: Goal Outcome Summary  Outcome: Improving    Neuro: A & O x4  CV:  100% V-paced  Resp: Sats in high 90's-100% on R/A  GI: Was NPO all shift, abd round pain only on R flank, R upper quad area - over lever  :  NPO - voids spontaneously, but no urine output this shift. Nurse on 6C notified at handoff  Skin: Lymphadema wraps on bilateral LE.  Generalized edema 3+ arms, legs.  Mepliex on buttom for sacral pressure sore. WOC nurse assessed this afternoon - getting better    No bloody or tarry stools this shift. Hgb improving 7.8 up from 7.1    Bailey Escalona RN  [phone]    Hep 10A .30 no change in dose

## 2017-02-03 NOTE — PROGRESS NOTES
GASTROENTEROLOGY PROGRESS NOTE  Date of Service: 2/3/2017    ASSESSMENT: 46 year old male with a history of rheumatic heart disease s/p mechanical MVR x2 on warfarin, biventricular heart failure s/p ICD, chronic afib on amiodarone and digoxin (stoppped 1/16 due to toxicity, WPW ablation and CKD who was transferred from [facility] for further evaluation of CHF and possible hemolysis. GI was asked to opine regarding abnormal LFT's and concern for cirrhosis as this may weigh into the decision for offering surgical treatment of mod-severe aortic insufficiency with LVAD backup. Given advanced fibrosis/cirrhosis, no plans for LVAD in place.     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 His child-montero score is C but difficult to interpret given his complex picture and normal wedge. Suspect  he would likely be moderate-high surgical risk.   *Diuretics per primary  *Consider lactulose if evidence of HE  *Consider hepatology f/u and HCC/EV screening as outpatient pending overall goals of care    #Acute Cholestatic LFT's: Likely biliary obstruction 2/2 hemobilia/clot. No signs of cholangitis but ongoing abd pain.  CTA w/o active bleeding.   *Daily MELD labs  *Pancreaticobiliary following for possible ERCP. Defer to them.     #GIB: Concern for hemobilia s/p liver bx. Normal hepatic wedge at time of bx so less likely related to EV's or PHG. Could also have oozing from other lesions in the setting of anticoagulation.   *No plans for endoscopy in the absence of active bleeding. If recurrent bleeding, could consider. However, should probably be done by pancreaticobiliary in case of hemobilia/ERCP need.   *CTA negative.     The patient was discussed and plan agreed upon with GI staff, Dr Oropeza.    Josef Franklin MD  Gastroenterology  Fellow    _______________________________________________________________  S: No further bleeding      ROS:   10 pt ROS negative unless noted in subjective.     O:  Blood pressure 98/57, pulse 80, temperature 98  F (36.7  C), temperature source Oral, resp. rate 16, height 1.829 m (6'), weight 93.668 kg (206 lb 8 oz), SpO2 99 %.  Gen: Chronically ill appearing  HEENT: Subtle icertus  Resp: Clear bilaterally  CV: Regular  Abd: Soft, NT, ND. Not TTP. No HSM  Ext: Wraps in place  Skin: No jaundice  Neuro: No asterixis    LABS:  BMP    Recent Labs  Lab 02/03/17  0530 02/02/17  0312 02/01/17  0849 01/31/17  0833   * 132* 131* 130*   POTASSIUM 4.7 4.9 4.6 4.4   CHLORIDE 98 99 97 98   DELFINO 7.3* 7.3* 7.7* 7.5*   CO2 24 21 24 24   BUN 24 23 19 14   CR 1.48* 1.31* 1.42* 1.25   GLC 86 122* 90 74     CBC  Recent Labs  Lab 02/03/17  1411 02/03/17  0530  02/02/17  0911 02/02/17  0312 02/01/17  2321   WBC  --  5.7  --  6.9 6.3 5.7   RBC  --  2.26*  --  2.31* 2.37* 2.80*   HGB 7.8* 7.3*  < > 7.3* 7.7* 8.9*   HCT  --  21.1*  --  21.5* 22.4* 26.5*   MCV  --  93  --  93 95 95   MCH  --  32.3  --  31.6 32.5 31.8   MCHC  --  34.6  --  34.0 34.4 33.6   RDW  --  20.7*  --  20.1* 19.8* 19.7*   PLT  --  151  --  199 158 141*   < > = values in this interval not displayed.  INR    Recent Labs  Lab 02/03/17  0530 02/02/17  1339 02/02/17  0312 02/01/17  0849   INR 3.24* 2.41* 2.51* 1.92*     LFTs    Recent Labs  Lab 02/03/17  0530 02/02/17  0312 02/01/17  1829 02/01/17  1614   ALKPHOS 364* 295* 277* 302*   * 127* 139* 150*   ALT 82* 81* 84* 90*   BILITOTAL 8.6* 6.5* 6.2* 6.5*   PROTTOTAL 5.1* 5.5* 5.7* 6.4*   ALBUMIN 1.9* 2.0* 2.1* 2.5*      PANC    Recent Labs  Lab 02/01/17  1829   LIPASE 655*     MELD-Na score: 33 at 2/3/2017  5:30 AM  MELD score: 31 at 2/3/2017  5:30 AM  Calculated from:  Serum Creatinine: 1.5 at 2/3/2017  5:30 AM  Serum Sodium: 131 at 2/3/2017  5:30 AM  Total Bilirubin: 8.6 at 2/3/2017  5:30 AM  INR(ratio): 3.2  at 2/3/2017  5:30 AM  Age: 46 years    Imaging: Personally reviewed

## 2017-02-03 NOTE — PLAN OF CARE
Problem: Goal Outcome Summary  Goal: Goal Outcome Summary  PT 4E: Pt requires mod A for bed mobility, mod-max A x1 for sit<>stand with 2WW, min A for standing balance, and mod A for bed>chair transfer with 2WW. Pt requires heavy encouragement to participate and needs frequent reorientation to task as is frequently perseverating on pain. Hemodynamically stable throughout session. Limited 2/2 to c/o low back and abdominal pain.    REC: TCU once medically stable to inc IND and safety with functional mobility.

## 2017-02-03 NOTE — PROGRESS NOTES
WOC:  Attempted to see pt x 2 for follow up wound assessment.  Pt with MDs and then at CT later.  Will see 2/3/17.

## 2017-02-04 ENCOUNTER — APPOINTMENT (OUTPATIENT)
Dept: OCCUPATIONAL THERAPY | Facility: CLINIC | Age: 47
DRG: 286 | End: 2017-02-04
Attending: INTERNAL MEDICINE
Payer: MEDICAID

## 2017-02-04 ENCOUNTER — APPOINTMENT (OUTPATIENT)
Dept: PHYSICAL THERAPY | Facility: CLINIC | Age: 47
DRG: 286 | End: 2017-02-04
Attending: INTERNAL MEDICINE
Payer: MEDICAID

## 2017-02-04 LAB
ALBUMIN SERPL-MCNC: 2.1 G/DL (ref 3.4–5)
ALP SERPL-CCNC: 446 U/L (ref 40–150)
ALT SERPL W P-5'-P-CCNC: 98 U/L (ref 0–70)
ANION GAP SERPL CALCULATED.3IONS-SCNC: 12 MMOL/L (ref 3–14)
AST SERPL W P-5'-P-CCNC: 138 U/L (ref 0–45)
BILIRUB DIRECT SERPL-MCNC: 8.2 MG/DL (ref 0–0.2)
BILIRUB SERPL-MCNC: 10.4 MG/DL (ref 0.2–1.3)
BUN SERPL-MCNC: 26 MG/DL (ref 7–30)
CALCIUM SERPL-MCNC: 7.6 MG/DL (ref 8.5–10.1)
CHLORIDE SERPL-SCNC: 94 MMOL/L (ref 94–109)
CO2 SERPL-SCNC: 22 MMOL/L (ref 20–32)
CREAT SERPL-MCNC: 1.66 MG/DL (ref 0.66–1.25)
ERYTHROCYTE [DISTWIDTH] IN BLOOD BY AUTOMATED COUNT: 21.1 % (ref 10–15)
GFR SERPL CREATININE-BSD FRML MDRD: 44 ML/MIN/1.7M2
GLUCOSE SERPL-MCNC: 96 MG/DL (ref 70–99)
HCT VFR BLD AUTO: 22.4 % (ref 40–53)
HGB BLD-MCNC: 7.6 G/DL (ref 13.3–17.7)
INR PPP: 2.76 (ref 0.86–1.14)
LMWH PPP CHRO-ACNC: 0.28 IU/ML
MAGNESIUM SERPL-MCNC: 2.2 MG/DL (ref 1.6–2.3)
MCH RBC QN AUTO: 31.9 PG (ref 26.5–33)
MCHC RBC AUTO-ENTMCNC: 33.9 G/DL (ref 31.5–36.5)
MCV RBC AUTO: 94 FL (ref 78–100)
PLATELET # BLD AUTO: 163 10E9/L (ref 150–450)
POTASSIUM SERPL-SCNC: 4.3 MMOL/L (ref 3.4–5.3)
PROT SERPL-MCNC: 5.5 G/DL (ref 6.8–8.8)
RBC # BLD AUTO: 2.38 10E12/L (ref 4.4–5.9)
SODIUM SERPL-SCNC: 128 MMOL/L (ref 133–144)
TSH SERPL DL<=0.05 MIU/L-ACNC: 0.22 MU/L (ref 0.4–4)
WBC # BLD AUTO: 6.6 10E9/L (ref 4–11)

## 2017-02-04 PROCEDURE — 97530 THERAPEUTIC ACTIVITIES: CPT | Mod: GO

## 2017-02-04 PROCEDURE — 25000132 ZZH RX MED GY IP 250 OP 250 PS 637: Performed by: INTERNAL MEDICINE

## 2017-02-04 PROCEDURE — 83735 ASSAY OF MAGNESIUM: CPT | Performed by: INTERNAL MEDICINE

## 2017-02-04 PROCEDURE — 97116 GAIT TRAINING THERAPY: CPT | Mod: GP

## 2017-02-04 PROCEDURE — 40000193 ZZH STATISTIC PT WARD VISIT

## 2017-02-04 PROCEDURE — 80076 HEPATIC FUNCTION PANEL: CPT | Performed by: INTERNAL MEDICINE

## 2017-02-04 PROCEDURE — 36592 COLLECT BLOOD FROM PICC: CPT | Performed by: INTERNAL MEDICINE

## 2017-02-04 PROCEDURE — 99232 SBSQ HOSP IP/OBS MODERATE 35: CPT | Mod: GC | Performed by: INTERNAL MEDICINE

## 2017-02-04 PROCEDURE — 25000132 ZZH RX MED GY IP 250 OP 250 PS 637: Performed by: STUDENT IN AN ORGANIZED HEALTH CARE EDUCATION/TRAINING PROGRAM

## 2017-02-04 PROCEDURE — 97530 THERAPEUTIC ACTIVITIES: CPT | Mod: GP

## 2017-02-04 PROCEDURE — 40000133 ZZH STATISTIC OT WARD VISIT: Performed by: OCCUPATIONAL THERAPIST

## 2017-02-04 PROCEDURE — 97140 MANUAL THERAPY 1/> REGIONS: CPT | Mod: GO | Performed by: OCCUPATIONAL THERAPIST

## 2017-02-04 PROCEDURE — 85610 PROTHROMBIN TIME: CPT | Performed by: INTERNAL MEDICINE

## 2017-02-04 PROCEDURE — 85520 HEPARIN ASSAY: CPT | Performed by: INTERNAL MEDICINE

## 2017-02-04 PROCEDURE — 84443 ASSAY THYROID STIM HORMONE: CPT | Performed by: INTERNAL MEDICINE

## 2017-02-04 PROCEDURE — 25000128 H RX IP 250 OP 636: Performed by: INTERNAL MEDICINE

## 2017-02-04 PROCEDURE — 80048 BASIC METABOLIC PNL TOTAL CA: CPT | Performed by: INTERNAL MEDICINE

## 2017-02-04 PROCEDURE — 25000125 ZZHC RX 250: Performed by: STUDENT IN AN ORGANIZED HEALTH CARE EDUCATION/TRAINING PROGRAM

## 2017-02-04 PROCEDURE — 25000128 H RX IP 250 OP 636: Performed by: STUDENT IN AN ORGANIZED HEALTH CARE EDUCATION/TRAINING PROGRAM

## 2017-02-04 PROCEDURE — 40000133 ZZH STATISTIC OT WARD VISIT

## 2017-02-04 PROCEDURE — S0171 BUMETANIDE 0.5 MG: HCPCS | Performed by: STUDENT IN AN ORGANIZED HEALTH CARE EDUCATION/TRAINING PROGRAM

## 2017-02-04 PROCEDURE — 25000125 ZZHC RX 250: Performed by: INTERNAL MEDICINE

## 2017-02-04 PROCEDURE — 85027 COMPLETE CBC AUTOMATED: CPT | Performed by: INTERNAL MEDICINE

## 2017-02-04 PROCEDURE — 21400006 ZZH R&B CCU INTERMEDIATE UMMC

## 2017-02-04 RX ORDER — BUMETANIDE 0.25 MG/ML
2 INJECTION INTRAMUSCULAR; INTRAVENOUS 2 TIMES DAILY
Status: DISCONTINUED | OUTPATIENT
Start: 2017-02-04 | End: 2017-02-04

## 2017-02-04 RX ORDER — FUROSEMIDE 10 MG/ML
60 INJECTION INTRAMUSCULAR; INTRAVENOUS ONCE
Status: COMPLETED | OUTPATIENT
Start: 2017-02-04 | End: 2017-02-04

## 2017-02-04 RX ORDER — MORPHINE SULFATE 15 MG/1
20 TABLET, FILM COATED, EXTENDED RELEASE ORAL EVERY 12 HOURS SCHEDULED
Status: DISCONTINUED | OUTPATIENT
Start: 2017-02-04 | End: 2017-02-04

## 2017-02-04 RX ORDER — MORPHINE SULFATE 15 MG/1
15 TABLET, FILM COATED, EXTENDED RELEASE ORAL EVERY 12 HOURS SCHEDULED
Status: DISCONTINUED | OUTPATIENT
Start: 2017-02-04 | End: 2017-02-08

## 2017-02-04 RX ORDER — BUMETANIDE 0.25 MG/ML
2 INJECTION INTRAMUSCULAR; INTRAVENOUS 3 TIMES DAILY
Status: DISCONTINUED | OUTPATIENT
Start: 2017-02-04 | End: 2017-02-04

## 2017-02-04 RX ORDER — BUMETANIDE 0.25 MG/ML
4 INJECTION INTRAMUSCULAR; INTRAVENOUS ONCE
Status: COMPLETED | OUTPATIENT
Start: 2017-02-04 | End: 2017-02-04

## 2017-02-04 RX ORDER — WARFARIN SODIUM 1 MG/1
2 TABLET ORAL
Status: COMPLETED | OUTPATIENT
Start: 2017-02-04 | End: 2017-02-04

## 2017-02-04 RX ADMIN — PIPERACILLIN AND TAZOBACTAM 3.38 G: 3; .375 INJECTION, POWDER, FOR SOLUTION INTRAVENOUS at 12:04

## 2017-02-04 RX ADMIN — BUMETANIDE 2 MG: 0.25 INJECTION, SOLUTION INTRAMUSCULAR; INTRAVENOUS at 13:32

## 2017-02-04 RX ADMIN — MELATONIN TAB 3 MG 3 MG: 3 TAB at 23:24

## 2017-02-04 RX ADMIN — Medication 12.5 MG: at 23:24

## 2017-02-04 RX ADMIN — Medication 0.25 MG: at 02:47

## 2017-02-04 RX ADMIN — HYDROMORPHONE HYDROCHLORIDE 0.5 MG: 10 INJECTION, SOLUTION INTRAMUSCULAR; INTRAVENOUS; SUBCUTANEOUS at 08:59

## 2017-02-04 RX ADMIN — OXYCODONE HYDROCHLORIDE 10 MG: 5 TABLET ORAL at 23:42

## 2017-02-04 RX ADMIN — PANTOPRAZOLE SODIUM 40 MG: 40 INJECTION, POWDER, FOR SOLUTION INTRAVENOUS at 09:06

## 2017-02-04 RX ADMIN — Medication 0.25 MG: at 08:55

## 2017-02-04 RX ADMIN — FUROSEMIDE 60 MG: 10 INJECTION, SOLUTION INTRAVENOUS at 23:24

## 2017-02-04 RX ADMIN — LEVOTHYROXINE SODIUM 175 MCG: 25 TABLET ORAL at 08:56

## 2017-02-04 RX ADMIN — SENNOSIDES 8.6 MG: 8.6 TABLET, FILM COATED ORAL at 20:16

## 2017-02-04 RX ADMIN — DOCUSATE SODIUM 100 MG: 100 CAPSULE, LIQUID FILLED ORAL at 20:15

## 2017-02-04 RX ADMIN — BUMETANIDE 2 MG: 0.25 INJECTION, SOLUTION INTRAMUSCULAR; INTRAVENOUS at 09:08

## 2017-02-04 RX ADMIN — PANTOPRAZOLE SODIUM 40 MG: 40 INJECTION, POWDER, FOR SOLUTION INTRAVENOUS at 20:36

## 2017-02-04 RX ADMIN — PIPERACILLIN AND TAZOBACTAM 3.38 G: 3; .375 INJECTION, POWDER, FOR SOLUTION INTRAVENOUS at 23:25

## 2017-02-04 RX ADMIN — OXYCODONE HYDROCHLORIDE 10 MG: 5 TABLET ORAL at 17:50

## 2017-02-04 RX ADMIN — WARFARIN SODIUM 2 MG: 1 TABLET ORAL at 17:50

## 2017-02-04 RX ADMIN — Medication: at 20:20

## 2017-02-04 RX ADMIN — HEPARIN SODIUM 1250 UNITS/HR: 10000 INJECTION, SOLUTION INTRAVENOUS at 05:41

## 2017-02-04 RX ADMIN — OXYCODONE HYDROCHLORIDE 10 MG: 5 TABLET ORAL at 00:30

## 2017-02-04 RX ADMIN — MORPHINE SULFATE 15 MG: 15 TABLET, EXTENDED RELEASE ORAL at 12:10

## 2017-02-04 RX ADMIN — HYDROMORPHONE HYDROCHLORIDE 0.5 MG: 10 INJECTION, SOLUTION INTRAMUSCULAR; INTRAVENOUS; SUBCUTANEOUS at 05:37

## 2017-02-04 RX ADMIN — MORPHINE SULFATE 15 MG: 15 TABLET, EXTENDED RELEASE ORAL at 20:15

## 2017-02-04 RX ADMIN — HEPARIN SODIUM 1250 UNITS/HR: 10000 INJECTION, SOLUTION INTRAVENOUS at 20:19

## 2017-02-04 RX ADMIN — LIDOCAINE 2 PATCH: 50 PATCH CUTANEOUS at 20:15

## 2017-02-04 RX ADMIN — PIPERACILLIN AND TAZOBACTAM 3.38 G: 3; .375 INJECTION, POWDER, FOR SOLUTION INTRAVENOUS at 05:37

## 2017-02-04 RX ADMIN — OXYCODONE HYDROCHLORIDE 10 MG: 5 TABLET ORAL at 06:24

## 2017-02-04 RX ADMIN — BUMETANIDE 4 MG: 0.25 INJECTION, SOLUTION INTRAMUSCULAR; INTRAVENOUS at 18:54

## 2017-02-04 RX ADMIN — PIPERACILLIN AND TAZOBACTAM 3.38 G: 3; .375 INJECTION, POWDER, FOR SOLUTION INTRAVENOUS at 17:51

## 2017-02-04 RX ADMIN — Medication 0.25 MG: at 18:14

## 2017-02-04 RX ADMIN — OXYCODONE HYDROCHLORIDE 10 MG: 5 TABLET ORAL at 12:02

## 2017-02-04 RX ADMIN — HEPARIN SODIUM 1250 UNITS/HR: 10000 INJECTION, SOLUTION INTRAVENOUS at 23:42

## 2017-02-04 RX ADMIN — DOCUSATE SODIUM 100 MG: 100 CAPSULE, LIQUID FILLED ORAL at 08:57

## 2017-02-04 RX ADMIN — MULTIPLE VITAMINS W/ MINERALS TAB 1 TABLET: TAB at 08:57

## 2017-02-04 RX ADMIN — Medication: at 09:07

## 2017-02-04 ASSESSMENT — PAIN DESCRIPTION - DESCRIPTORS
DESCRIPTORS: BURNING
DESCRIPTORS: STABBING
DESCRIPTORS: BURNING;CONSTANT
DESCRIPTORS: CONSTANT
DESCRIPTORS: BURNING;ACHING;CONSTANT

## 2017-02-04 NOTE — PLAN OF CARE
Problem: Goal Outcome Summary  Goal: Goal Outcome Summary  Outcome: No Change  Patient admitted 1/12 for advanced management of HF. VSS, paced. Pain controlled with prn dilaudid and oxycodone. Ativan given x1 for anxiety. Heparin infusing at 12.5 ml/hr, last 10A (.30) therapeutic. Currently on clear liquid diet, advance as tolerated. Mepilex changed on saccral ulcer. Brace on right leg from previous fracture. Patient is no longer a candidate for transplant or LVAD due to liver cirrhosis. Anticipate discharge to TCU when placement becomes available. Continue to assess and monitor, notify Cards 1 with concerns.

## 2017-02-04 NOTE — PLAN OF CARE
Problem: Goal Outcome Summary  Goal: Goal Outcome Summary  PT 6C: Ambulates 6ft with FWW and CGAx1.  Limited by R knee pain, although states pain is better today.  STS with mod Ax2 and FWW.  Brace fixed and donned appropriately during session.  Encouraged pt to doff brace when not performing mobility. PT recommends TCU when medically stable in order to increase functional mobility .

## 2017-02-04 NOTE — PLAN OF CARE
Problem: Goal Outcome Summary  Goal: Goal Outcome Summary  OT/6C:  Pt performs bed mobility with min A at R LE due to strength and pain.  Total A for donning R leg brace, unsuccessful with both OT and RN attempting due to potential improper fit.  Min AX2 for sit to stand and stand pivot transfer from bed to chair after education on technique with use of FWW.  Pt limited by pain, strength, and endurance.     REC:  TCU for improved safety and IND with ADLs, transfers, and mobility.

## 2017-02-04 NOTE — PLAN OF CARE
D: Pt c/o abd pain from liver biopsy.  I: Monitored/assessed pt. Assisted with cares.  A: Pt VS stable and rhythm remains paced. IV Dilaudid and po oxycodone given. Lidoderm patches applied. Pt up in chair, then in bed, then dangled at the edge. Dressing changed x2 to right hip. RN witnessed serous fluid beading up from edematous skin. Heparin gtt continues at 1250u/hr. Pt requested clonazepam twice this shift. Melatonin also given at hs. Pt voiced disappointment about the news given today.  P: Continue to monitor/assess pt, contact provider with concerns.

## 2017-02-04 NOTE — PROGRESS NOTES
No events o/n.  Abdominal pain improved, still localized to RUQ.  Denies N/V, tolerating diet, having BMs.    Lactate normalized, WBC wnl.  AFebrile.    No signs of bowel ischemia.  If has evidence of GI bleeding recommend GI consult and upper endoscopy +/- colonoscopy, and would suspect hemobilia as source.      Surgery will sign off -- please call with questions.    Pablo Carpenter  pgY 5

## 2017-02-04 NOTE — PLAN OF CARE
Problem: Goal Outcome Summary  Goal: Goal Outcome Summary  Edema 6C: Wraps removed and measurements taken with 9/15 noted reductions bilaterally. Skin remains intact with dryness throughout and 1+ pitting in legs with 2+ in feet. Skin cares performed prior to reapplication of GCB - R LE from MTPs to mid thigh, L LE from MTPs to knee crease. Remove wraps if causing pain/numbness.

## 2017-02-04 NOTE — PROGRESS NOTES
Patient transferred from . VSS, telemetry applied. Paced. Pain controlled with prn oxycodone and dilaudid. Ativan given for anxiety. Sitting in chair, no current concerns.

## 2017-02-04 NOTE — PROGRESS NOTES
Social Work On Call Note    Hospital Day: 23    Focus:  Meal Vouchers    D: Received referral from RN stating patient is requesting assistance with meal vouchers. Spoke with patient by phone and he reported that he was instructed to call SW when his family arrived. Unclear if patient has benefits for meals - patient was not sure either. Provided 4 tickets to patient for his family (cousin, her , nephew and granddaughter).    I:  Coordination    A:  Patient requesting assistance with meal tickets for his visiting family.    P: Provided 4 tickets to patient today. Will update weekday SWer to above and ask that meal benefits be clarified for patient. Please page the on-call  should questions or concerns arise.     Roz JERNIGAN LICSW  2/4/2017  On call Pager [phone]  AFTER  1600 - [phone]

## 2017-02-04 NOTE — PROGRESS NOTES
Cardiology 1 Progress Note    Samir Rodriguez MRN# [id number]   Age: 46 year old YOB: 1970   Date of Admission: 1/12/2017           Assessment and Plan:   Samir Rodriguez is a 46 year old  male with past medical history of Rheumatic Heart Disease s/p mechanical MVR x 2 (1980s and 1992) on warfarin (INR goal 2.5-3.5), biventricular CHF (EF 25-30%) s/p dual chamber ICD 2008, chronic afib on amiodarone and previously on digoxine (stopped 1/16/17 due to concern for toxicity), WPW ablation at age 11, CKD III, hypothyroid,  who was transferred from [facility] on 01/12 for further eval of CHF and concern of hemolysis in setting of mechanical valve. Subsequently diagnosed with cirrhosis on liver biopsy and was deemed not to be a candidate for valve replacement or for LVAD.     Today:  Changed pain regimen: morphine 15 mg q12 h scheduled  Discontinued dilaudid: not effective   Cntinued oxycodone 5-10 mg q6h prn  IV diuresis  TCU when euvolemic    # Lactic acidosis- resolved  # Possible hemobilia  # Mesenteric ischemia  # Hematochezia, ?GIB  # Acute on chronic anemia  Lactate now normal and no further episodes of bleeding. Still with severe RUQ abdominal pain likely worse with hypervolemia. Likely source of bleeding was complication from IR liver biopsy causing hemobilia. No procedure scheduled at this time, as there is currently no evidence of bleeding. CTA abdo/pelvis without evidence of bleeding.  -EGD if patient starts to bleed again.  -IR aware  -GI panc/bili planning for ERCP on non-emergent basis if evidence of obstruction on labs or urgently if evidence of infection  -CTA abdo/pelvis without evidence of hemobilia and no source of bleeding identified  -Surgery consulted- appreciate recs  -q6h hgb checks  -s/p 1u pRBC    # Non-ischemic dilated cardiomyopathy 2/2 valvular heart disease  # Acute on chronic systolic heart failure, EF 37% (12/2016) s/p dual chamber  ICD 2008  NYHA class III. Hypervolemic  - Bumex IV as BP tolerates  - hypervolemic with IVC dilated and no variation with respirations  - initiated hydralazine 12.5 mg TID for afterload reduction  - hold lisinopril 2.5 due to hypotension  - cont holding metoprolol and spironolactone for now    # Mechanical mitral valve (MV diastolic gradient 7)  # Aortic Insufficiency (mod - severe)  # TR (moderate)  History of BiV failure attributed to valvular disease. Echocardiogram on admission demonstrated moderate-severe aortic insufficiency which is his most acute cardiac pathology. His tricuspid regurgitation is likely due to his signficantly fluid overloaded state. Mechanical valve maintained on warfarin w/ INR 2.5-3.5 prior to this admission. Angiogram performed on 01/20 revealed nonobstructive CAD. EDEL shows probable restriction of one of the mitral valve leaflets. However, he is not a if candidate for valvular surgery due to cirrhosis diagnosed on liver biopsy  - Tooth extractions cancelled, as patient is no longer valve replacement candidate    #. Cirrhosis and liver nodularity  Demonstrated on CT, although u/s was normal. CT read is concerning for amiodarone toxicity. Liver biopsy 1/30 with evidence of advanced chronic liver disease (stage 3 or 4) of unclear etiology (amiodarone toxicity is possible). Consequently, not a candidate for LVAD or for valve replacements. Discussed these results and their consequence with patient and his son Sid.  -No LVAD  -No CT surgery/ valve replacement    # Anemia and thrombocytopenia  # Right Arm Hematoma   Blood smear: blood smear- RBCs show some target cells, hypochromia, increased polychromasia, no schistocytes or spherocytes. Low haptoglobin, high LDH, and plasma free hemoglobin concerning for intra-vascular hemolysis possibly due to valvular disease. No evidence of overt infection causing DIC. EPO inappropriately low w/ recent ARF. Etiology of hematoma likely due to  supratherapeutic INR- 4.6 on arrival  - warfarin w/ goal INR 2.5-3.0   - on heparin gtt for bridging  - restarted warfarin 1/30    # Paroxsymal afib:  digoxin level 0.7 on 1/12 and 1/17. 1/16/17 device interrogation: atrial tachycardia to 150s with AV block. Lower rate setting changed to 80bpm from 60bmp and device changed from DDDR to VVIR on 01/16; atrial tachycardia and AV block and V pacing. The patient has been in an atrial flutter that is undersensed, hence the device was switched to VVI mode. There is no point continuing amiodarone at this stage especially with concern for toxicity  - discontinued pta amiodarone.    # Hyperthyroid: Will need repeat TSH/T4 1-2 months post DC. Decreased levothyroxine from 224mcg to 175 mcg   # Anxiety and insomnia: Increased clonazepam to tid    # Right tibia fx and R knee trauma:  Fall in November prompted decompensation. Xray tibia and knee no fx this admission.  - Ortho evaluated; planning for outpatient follow up with orthopedic surgery in 4-6 weeks  - PT/OT    # Headaches  May be due to near-sightedness vs rebound headache from opioid use vs reduced cardiac output  - pain consult with pain for opioid titration  - eye exam 1/27- received new Rx  - using lidocaine patches  - PRN zofran    # Pulmonary nodules: incidentally found on CT  - repeat CT chest in 6 months    # CARSON- likely cardiorenal with TTE with dilated IVC without respiratory variation (also received IVF overnight due to lactic acidosis)  -diuresis as tolerated   -afterload reduction, see supra    FEN: 2 g Na diet, 1.5 L fluid restriction  PPX: on heparin gtt, warfarin  Dispo: Difficult placement due to out of [place] Medicaid. TCU when euvolemic    Code Status: Full CODE      Patient discussed with staff attending, Dr. Gan.     Joan Burks MD  IM PGY3  [phone]    CARDIOLOGY STAFF  Patient seen and examined by me.  History and physical examination discussed with Dr. Burks whose note reflects our  joint assessment and recommendation/plans.  Mr. Rodriguez slept very poorly due to RUQ pain.  No urgent intervention is currently planned per GI or IR.  We are revising his pain regimen to try to provide more constant relief.  We will ask Palliative Care for further assistance.  We will give more aggressive diuresis to mobilize the fluid that her received for lactic acidosis.  Diuresis may help some of the RUQ pain if a component of hepatic congestion is contributing to this pain.  Ganesh Gan               Interval History/Subjective   Patient with significant RUQ pain. Preventing him from sleeping. Aslo with net positive I/O's over past 24 hours.         Objective   Temp:  [97.8  F (36.6  C)-98  F (36.7  C)] 97.8  F (36.6  C)  Heart Rate:  [79-80] 80  Resp:  [16] 16  BP: ()/(45-59) 97/45 mmHg  SpO2:  [97 %-99 %] 97 %    Physical exam:  Gen: AA&Ox3, no acute distress  HEENT: NACT, poor dentition   PULM: Clear lungs  CV: RRR; mechanical s2 w/ 2+ systolic murmur at LUSB and LISB: JVD+  ABD:  soft, nontender, nondistended.  BS+  EXT: trace sonam edema to knees. Right arm ecchymosis stable   SKIN: Right arm resolving ecchymosis outlined w/ marker   NEURO: alert and oriented; speech intact         Data:   CBC    Recent Labs  Lab 02/04/17  0715 02/03/17  1411 02/03/17  0530 02/03/17  0204  02/02/17  0911 02/02/17  0312   WBC 6.6  --  5.7  --   --  6.9 6.3   RBC 2.38*  --  2.26*  --   --  2.31* 2.37*   HGB 7.6* 7.8* 7.3* 7.0*  < > 7.3* 7.7*   HCT 22.4*  --  21.1*  --   --  21.5* 22.4*   MCV 94  --  93  --   --  93 95   MCH 31.9  --  32.3  --   --  31.6 32.5   MCHC 33.9  --  34.6  --   --  34.0 34.4   RDW 21.1*  --  20.7*  --   --  20.1* 19.8*     --  151  --   --  199 158   < > = values in this interval not displayed.  CMP    Recent Labs  Lab 02/04/17  0715 02/03/17  0530 02/02/17  0312 02/01/17  1829  02/01/17  0849   * 131* 132*  --   --  131*   POTASSIUM 4.3 4.7 4.9  --   --  4.6   CHLORIDE 94  98 99  --   --  97   CO2 22 24 21  --   --  24   ANIONGAP 12 10 12  --   --  10   GLC 96 86 122*  --   --  90   BUN 26 24 23  --   --  19   CR 1.66* 1.48* 1.31*  --   --  1.42*   GFRESTIMATED 44* 51* 58*  --   --  53*   GFRESTBLACK 54* 61 71  --   --  64   DELFINO 7.6* 7.3* 7.3*  --   --  7.7*   MAG 2.2 2.2 2.1  --   --  2.1   PROTTOTAL 5.5* 5.1* 5.5* 5.7*  < >  --    ALBUMIN 2.1* 1.9* 2.0* 2.1*  < >  --    BILITOTAL 10.4* 8.6* 6.5* 6.2*  < >  --    ALKPHOS 446* 364* 295* 277*  < >  --    * 124* 127* 139*  < >  --    ALT 98* 82* 81* 84*  < >  --    < > = values in this interval not displayed.  INR    Recent Labs  Lab 02/04/17  0715 02/03/17  0530 02/02/17  1339 02/02/17  0312   INR 2.76* 3.24* 2.41* 2.51*            Medications:     Current Facility-Administered Medications   Medication     oxyCODONE (ROXICODONE) IR tablet 5-10 mg     heparin  drip 25,000 units in 0.45% NaCl 250 mL (see additional administration details for dose)     heparin bolus from infusion pump     heparin lock flush 10 UNIT/ML injection 2-5 mL     lidocaine 1 % injection 0.5-1 mL     lidocaine (LMX4) kit     sodium chloride (PF) 0.9% PF flush 1-10 mL     sodium chloride (PF) 0.9% PF flush 5-10 mL     lidocaine 1 % 1 mL     sodium chloride (PF) 0.9% PF flush 10-20 mL     sodium chloride (PF) 0.9% PF flush 10 mL     piperacillin-tazobactam (ZOSYN) 3.375 g vial to attach to  mL bag     HYDROmorphone (PF) (DILAUDID) injection 0.3-0.5 mg     pantoprazole (PROTONIX) 40 mg IV push injection     levothyroxine (SYNTHROID/LEVOTHROID) tablet 175 mcg     Warfarin Therapy Reminder (Check START DATE - warfarin may be starting in the FUTURE)     clonazePAM (klonoPIN) half-tab 0.25 mg     menthol (ICY HOT) 5 % patch 1 patch    And     menthol (ICY HOT) Patch in Place    And     menthol (ICY HOT) patch REMOVAL     ondansetron (ZOFRAN) tablet 4 mg     lidocaine (LIDODERM) 5 % Patch 1-3 patch    And     lidocaine (LIDODERM) patch REMOVAL    And      lidocaine (LIDODERM) Patch in Place     docusate sodium (COLACE) capsule 100 mg     potassium chloride SA (K-DUR/KLOR-CON M) CR tablet 20-40 mEq     potassium chloride (KLOR-CON) Packet 20-40 mEq     potassium chloride 10 mEq in 100 mL intermittent infusion     potassium chloride 10 mEq in 100 mL intermittent infusion with 10 mg lidocaine     potassium chloride 20 mEq in 50 mL intermittent infusion     magnesium sulfate 2 g in NS intermittent infusion (PharMEDium or FV Cmpd)     magnesium sulfate 4 g in 100 mL sterile water (premade)     potassium phosphate 15 mmol in D5W 250 mL intermittent infusion     potassium phosphate 20 mmol in D5W 500 mL intermittent infusion     potassium phosphate 25 mmol in D5W 500 mL intermittent infusion     multivitamin, therapeutic with minerals (THERA-VIT-M) tablet 1 tablet     melatonin tablet 3 mg     miconazole (MICATIN; MICRO GUARD) 2 % powder     sodium chloride (PF) 0.9% PF flush 3 mL     sodium chloride (PF) 0.9% PF flush 3 mL     medication instruction     nitroglycerin (NITROSTAT) sublingual tablet 0.4 mg     alum & mag hydroxide-simethicone (MYLANTA ES/MAALOX  ES) suspension 15-30 mL     polyethylene glycol (MIRALAX/GLYCOLAX) Packet 17 g     albuterol (PROAIR HFA/PROVENTIL HFA/VENTOLIN HFA) Inhaler 2 puff     sennosides (SENOKOT) tablet 8.6 mg     naloxone (NARCAN) injection 0.1-0.4 mg

## 2017-02-05 ENCOUNTER — APPOINTMENT (OUTPATIENT)
Dept: PHYSICAL THERAPY | Facility: CLINIC | Age: 47
DRG: 286 | End: 2017-02-05
Attending: INTERNAL MEDICINE
Payer: MEDICAID

## 2017-02-05 LAB
ALBUMIN SERPL-MCNC: 2 G/DL (ref 3.4–5)
ALP SERPL-CCNC: 467 U/L (ref 40–150)
ALT SERPL W P-5'-P-CCNC: 100 U/L (ref 0–70)
ANION GAP SERPL CALCULATED.3IONS-SCNC: 13 MMOL/L (ref 3–14)
ANION GAP SERPL CALCULATED.3IONS-SCNC: 14 MMOL/L (ref 3–14)
AST SERPL W P-5'-P-CCNC: 140 U/L (ref 0–45)
BILIRUB DIRECT SERPL-MCNC: 6.2 MG/DL (ref 0–0.2)
BILIRUB SERPL-MCNC: 8.6 MG/DL (ref 0.2–1.3)
BUN SERPL-MCNC: 29 MG/DL (ref 7–30)
BUN SERPL-MCNC: 32 MG/DL (ref 7–30)
CALCIUM SERPL-MCNC: 7.4 MG/DL (ref 8.5–10.1)
CALCIUM SERPL-MCNC: 7.4 MG/DL (ref 8.5–10.1)
CHLORIDE SERPL-SCNC: 91 MMOL/L (ref 94–109)
CHLORIDE SERPL-SCNC: 92 MMOL/L (ref 94–109)
CO2 SERPL-SCNC: 20 MMOL/L (ref 20–32)
CO2 SERPL-SCNC: 21 MMOL/L (ref 20–32)
CREAT SERPL-MCNC: 2.05 MG/DL (ref 0.66–1.25)
CREAT SERPL-MCNC: 2.39 MG/DL (ref 0.66–1.25)
ERYTHROCYTE [DISTWIDTH] IN BLOOD BY AUTOMATED COUNT: 21.3 % (ref 10–15)
GFR SERPL CREATININE-BSD FRML MDRD: 29 ML/MIN/1.7M2
GFR SERPL CREATININE-BSD FRML MDRD: 35 ML/MIN/1.7M2
GLUCOSE SERPL-MCNC: 96 MG/DL (ref 70–99)
GLUCOSE SERPL-MCNC: 98 MG/DL (ref 70–99)
HCO3 BLDV-SCNC: 22 MMOL/L (ref 21–28)
HCT VFR BLD AUTO: 21.8 % (ref 40–53)
HGB BLD-MCNC: 7.4 G/DL (ref 13.3–17.7)
INR PPP: 3.31 (ref 0.86–1.14)
LMWH PPP CHRO-ACNC: 0.33 IU/ML
MAGNESIUM SERPL-MCNC: 2.1 MG/DL (ref 1.6–2.3)
MAGNESIUM SERPL-MCNC: 2.1 MG/DL (ref 1.6–2.3)
MCH RBC QN AUTO: 32.5 PG (ref 26.5–33)
MCHC RBC AUTO-ENTMCNC: 33.9 G/DL (ref 31.5–36.5)
MCV RBC AUTO: 96 FL (ref 78–100)
O2/TOTAL GAS SETTING VFR VENT: ABNORMAL %
OXYHGB MFR BLDV: 69 %
PCO2 BLDV: 34 MM HG (ref 40–50)
PH BLDV: 7.41 PH (ref 7.32–7.43)
PLATELET # BLD AUTO: 165 10E9/L (ref 150–450)
PO2 BLDV: 36 MM HG (ref 25–47)
POTASSIUM SERPL-SCNC: 4.1 MMOL/L (ref 3.4–5.3)
POTASSIUM SERPL-SCNC: 4.2 MMOL/L (ref 3.4–5.3)
PROT SERPL-MCNC: 5.4 G/DL (ref 6.8–8.8)
RBC # BLD AUTO: 2.28 10E12/L (ref 4.4–5.9)
SODIUM SERPL-SCNC: 124 MMOL/L (ref 133–144)
SODIUM SERPL-SCNC: 126 MMOL/L (ref 133–144)
WBC # BLD AUTO: 7.1 10E9/L (ref 4–11)

## 2017-02-05 PROCEDURE — 82805 BLOOD GASES W/O2 SATURATION: CPT | Performed by: STUDENT IN AN ORGANIZED HEALTH CARE EDUCATION/TRAINING PROGRAM

## 2017-02-05 PROCEDURE — 25000125 ZZHC RX 250: Performed by: STUDENT IN AN ORGANIZED HEALTH CARE EDUCATION/TRAINING PROGRAM

## 2017-02-05 PROCEDURE — 25000132 ZZH RX MED GY IP 250 OP 250 PS 637: Performed by: STUDENT IN AN ORGANIZED HEALTH CARE EDUCATION/TRAINING PROGRAM

## 2017-02-05 PROCEDURE — 83735 ASSAY OF MAGNESIUM: CPT | Performed by: INTERNAL MEDICINE

## 2017-02-05 PROCEDURE — 85027 COMPLETE CBC AUTOMATED: CPT | Performed by: INTERNAL MEDICINE

## 2017-02-05 PROCEDURE — 25000128 H RX IP 250 OP 636: Performed by: STUDENT IN AN ORGANIZED HEALTH CARE EDUCATION/TRAINING PROGRAM

## 2017-02-05 PROCEDURE — 80048 BASIC METABOLIC PNL TOTAL CA: CPT | Performed by: INTERNAL MEDICINE

## 2017-02-05 PROCEDURE — 25000132 ZZH RX MED GY IP 250 OP 250 PS 637: Performed by: INTERNAL MEDICINE

## 2017-02-05 PROCEDURE — 40000558 ZZH STATISTIC CVC DRESSING CHANGE

## 2017-02-05 PROCEDURE — 40000193 ZZH STATISTIC PT WARD VISIT

## 2017-02-05 PROCEDURE — 25000125 ZZHC RX 250: Performed by: INTERNAL MEDICINE

## 2017-02-05 PROCEDURE — 36592 COLLECT BLOOD FROM PICC: CPT | Performed by: INTERNAL MEDICINE

## 2017-02-05 PROCEDURE — 85610 PROTHROMBIN TIME: CPT | Performed by: INTERNAL MEDICINE

## 2017-02-05 PROCEDURE — 25000128 H RX IP 250 OP 636: Performed by: INTERNAL MEDICINE

## 2017-02-05 PROCEDURE — 83735 ASSAY OF MAGNESIUM: CPT | Performed by: STUDENT IN AN ORGANIZED HEALTH CARE EDUCATION/TRAINING PROGRAM

## 2017-02-05 PROCEDURE — 99232 SBSQ HOSP IP/OBS MODERATE 35: CPT | Mod: GC | Performed by: INTERNAL MEDICINE

## 2017-02-05 PROCEDURE — 97530 THERAPEUTIC ACTIVITIES: CPT | Mod: GP

## 2017-02-05 PROCEDURE — 80076 HEPATIC FUNCTION PANEL: CPT | Performed by: INTERNAL MEDICINE

## 2017-02-05 PROCEDURE — 80048 BASIC METABOLIC PNL TOTAL CA: CPT | Performed by: STUDENT IN AN ORGANIZED HEALTH CARE EDUCATION/TRAINING PROGRAM

## 2017-02-05 PROCEDURE — 85520 HEPARIN ASSAY: CPT | Performed by: INTERNAL MEDICINE

## 2017-02-05 PROCEDURE — 97110 THERAPEUTIC EXERCISES: CPT | Mod: GP

## 2017-02-05 PROCEDURE — 21400006 ZZH R&B CCU INTERMEDIATE UMMC

## 2017-02-05 PROCEDURE — 36592 COLLECT BLOOD FROM PICC: CPT | Performed by: STUDENT IN AN ORGANIZED HEALTH CARE EDUCATION/TRAINING PROGRAM

## 2017-02-05 PROCEDURE — S0171 BUMETANIDE 0.5 MG: HCPCS | Performed by: STUDENT IN AN ORGANIZED HEALTH CARE EDUCATION/TRAINING PROGRAM

## 2017-02-05 RX ORDER — BUMETANIDE 0.25 MG/ML
4 INJECTION INTRAMUSCULAR; INTRAVENOUS 2 TIMES DAILY
Status: DISCONTINUED | OUTPATIENT
Start: 2017-02-05 | End: 2017-02-06

## 2017-02-05 RX ADMIN — CHLOROTHIAZIDE SODIUM 500 MG: 500 INJECTION, POWDER, LYOPHILIZED, FOR SOLUTION INTRAVENOUS at 09:33

## 2017-02-05 RX ADMIN — PANTOPRAZOLE SODIUM 40 MG: 40 INJECTION, POWDER, FOR SOLUTION INTRAVENOUS at 08:43

## 2017-02-05 RX ADMIN — Medication 0.25 MG: at 06:57

## 2017-02-05 RX ADMIN — PIPERACILLIN AND TAZOBACTAM 3.38 G: 3; .375 INJECTION, POWDER, FOR SOLUTION INTRAVENOUS at 11:06

## 2017-02-05 RX ADMIN — PIPERACILLIN AND TAZOBACTAM 3.38 G: 3; .375 INJECTION, POWDER, FOR SOLUTION INTRAVENOUS at 17:05

## 2017-02-05 RX ADMIN — BUMETANIDE 4 MG: 0.25 INJECTION, SOLUTION INTRAMUSCULAR; INTRAVENOUS at 13:52

## 2017-02-05 RX ADMIN — OXYCODONE HYDROCHLORIDE 10 MG: 5 TABLET ORAL at 17:39

## 2017-02-05 RX ADMIN — DOCUSATE SODIUM 100 MG: 100 CAPSULE, LIQUID FILLED ORAL at 08:41

## 2017-02-05 RX ADMIN — LIDOCAINE 2 PATCH: 50 PATCH CUTANEOUS at 20:47

## 2017-02-05 RX ADMIN — MORPHINE SULFATE 15 MG: 15 TABLET, EXTENDED RELEASE ORAL at 20:44

## 2017-02-05 RX ADMIN — OXYCODONE HYDROCHLORIDE 10 MG: 5 TABLET ORAL at 23:37

## 2017-02-05 RX ADMIN — OXYCODONE HYDROCHLORIDE 10 MG: 5 TABLET ORAL at 05:46

## 2017-02-05 RX ADMIN — PANTOPRAZOLE SODIUM 40 MG: 40 INJECTION, POWDER, FOR SOLUTION INTRAVENOUS at 20:47

## 2017-02-05 RX ADMIN — DOCUSATE SODIUM 100 MG: 100 CAPSULE, LIQUID FILLED ORAL at 20:44

## 2017-02-05 RX ADMIN — SENNOSIDES 8.6 MG: 8.6 TABLET, FILM COATED ORAL at 20:44

## 2017-02-05 RX ADMIN — MELATONIN TAB 3 MG 3 MG: 3 TAB at 23:32

## 2017-02-05 RX ADMIN — MULTIPLE VITAMINS W/ MINERALS TAB 1 TABLET: TAB at 08:41

## 2017-02-05 RX ADMIN — BUMETANIDE 4 MG: 0.25 INJECTION, SOLUTION INTRAMUSCULAR; INTRAVENOUS at 08:58

## 2017-02-05 RX ADMIN — OXYCODONE HYDROCHLORIDE 10 MG: 5 TABLET ORAL at 11:52

## 2017-02-05 RX ADMIN — Medication 0.25 MG: at 23:31

## 2017-02-05 RX ADMIN — Medication 0.25 MG: at 18:13

## 2017-02-05 RX ADMIN — LEVOTHYROXINE SODIUM 175 MCG: 25 TABLET ORAL at 08:40

## 2017-02-05 RX ADMIN — Medication 0.5 MG: at 17:39

## 2017-02-05 RX ADMIN — PIPERACILLIN AND TAZOBACTAM 3.38 G: 3; .375 INJECTION, POWDER, FOR SOLUTION INTRAVENOUS at 05:46

## 2017-02-05 RX ADMIN — PIPERACILLIN AND TAZOBACTAM 3.38 G: 3; .375 INJECTION, POWDER, FOR SOLUTION INTRAVENOUS at 23:31

## 2017-02-05 RX ADMIN — MORPHINE SULFATE 15 MG: 15 TABLET, EXTENDED RELEASE ORAL at 08:41

## 2017-02-05 RX ADMIN — HEPARIN SODIUM 1250 UNITS/HR: 10000 INJECTION, SOLUTION INTRAVENOUS at 01:48

## 2017-02-05 RX ADMIN — Medication: at 08:49

## 2017-02-05 ASSESSMENT — PAIN DESCRIPTION - DESCRIPTORS
DESCRIPTORS: ACHING;BURNING
DESCRIPTORS: BURNING
DESCRIPTORS: ACHING;BURNING

## 2017-02-05 NOTE — BRIEF OP NOTE
Brief GI Note  - Unclear if LFT abnormalities are entirely obstructive from clots in bile duct  - Will await LFTs in am  - If LFTs continue to be worse, will tentatively schedule ERCP on Monday  - Will need to be off heparin GTT 6 hours before procedure and NPO after midnight on sunday

## 2017-02-05 NOTE — PROGRESS NOTES
[facility]    Hepatology Follow-up    24 hour events:  Liver biopsy results with advanced chronic liver disease (stage 3-4) of uncertain etiology.  Patient not an LVAD candidate due to cirrhosis - expresses disappointment.    Pain med requirements increasing per patient.      Subjective:  Ongoing RUQ abdominal pain radiating the R flank and back.  Denies nausea/vomiting, fevers/chills.  Denies diarrhea.        Medications  Current Facility-Administered Medications   Medication     hydrALAZINE (APRESOLINE) half-tab 12.5 mg     morphine (MS CONTIN) 12 hr tablet 15 mg     oxyCODONE (ROXICODONE) IR tablet 5-10 mg     heparin  drip 25,000 units in 0.45% NaCl 250 mL (see additional administration details for dose)     heparin bolus from infusion pump     heparin lock flush 10 UNIT/ML injection 2-5 mL     lidocaine 1 % injection 0.5-1 mL     lidocaine (LMX4) kit     sodium chloride (PF) 0.9% PF flush 1-10 mL     sodium chloride (PF) 0.9% PF flush 5-10 mL     lidocaine 1 % 1 mL     sodium chloride (PF) 0.9% PF flush 10-20 mL     sodium chloride (PF) 0.9% PF flush 10 mL     piperacillin-tazobactam (ZOSYN) 3.375 g vial to attach to  mL bag     pantoprazole (PROTONIX) 40 mg IV push injection     levothyroxine (SYNTHROID/LEVOTHROID) tablet 175 mcg     Warfarin Therapy Reminder (Check START DATE - warfarin may be starting in the FUTURE)     clonazePAM (klonoPIN) half-tab 0.25 mg     menthol (ICY HOT) 5 % patch 1 patch    And     menthol (ICY HOT) Patch in Place    And     menthol (ICY HOT) patch REMOVAL     ondansetron (ZOFRAN) tablet 4 mg     lidocaine (LIDODERM) 5 % Patch 1-3 patch    And     lidocaine (LIDODERM) patch REMOVAL    And     lidocaine (LIDODERM) Patch in Place     docusate sodium (COLACE) capsule 100 mg     potassium chloride SA (K-DUR/KLOR-CON M) CR tablet 20-40 mEq     potassium chloride (KLOR-CON) Packet 20-40 mEq     potassium chloride 10 mEq in 100 mL intermittent infusion      potassium chloride 10 mEq in 100 mL intermittent infusion with 10 mg lidocaine     potassium chloride 20 mEq in 50 mL intermittent infusion     magnesium sulfate 2 g in NS intermittent infusion (PharMEDium or FV Cmpd)     magnesium sulfate 4 g in 100 mL sterile water (premade)     potassium phosphate 15 mmol in D5W 250 mL intermittent infusion     potassium phosphate 20 mmol in D5W 500 mL intermittent infusion     potassium phosphate 25 mmol in D5W 500 mL intermittent infusion     multivitamin, therapeutic with minerals (THERA-VIT-M) tablet 1 tablet     melatonin tablet 3 mg     miconazole (MICATIN; MICRO GUARD) 2 % powder     sodium chloride (PF) 0.9% PF flush 3 mL     sodium chloride (PF) 0.9% PF flush 3 mL     medication instruction     nitroglycerin (NITROSTAT) sublingual tablet 0.4 mg     alum & mag hydroxide-simethicone (MYLANTA ES/MAALOX  ES) suspension 15-30 mL     polyethylene glycol (MIRALAX/GLYCOLAX) Packet 17 g     albuterol (PROAIR HFA/PROVENTIL HFA/VENTOLIN HFA) Inhaler 2 puff     sennosides (SENOKOT) tablet 8.6 mg     naloxone (NARCAN) injection 0.1-0.4 mg       Allergies      Allergies   Allergen Reactions     Blood Transfusion Related (Informational Only) Other (See Comments)     Patient has a history of a clinically significant antibody against RBC antigens.  A delay in compatible RBCs may occur.     Asa [Aspirin] Other (See Comments) and Diarrhea     goosebumps  nausea     Gabapentin Nausea     Nabumetone Nausea     Sulfamethoxazole Unknown     Trimethoprim Unknown     Allopurinol Rash     Clindamycin Rash       Review of systems  A 10-point review of systems was negative.    Examination  BP 94/55 mmHg  Pulse 80  Temp(Src) 97.4  F (36.3  C) (Oral)  Resp 18  Ht 1.829 m (6')  Wt 95.754 kg (211 lb 1.6 oz)  BMI 28.62 kg/m2  SpO2 95%    Intake/Output Summary (Last 24 hours) at 02/04/17 2036  Last data filed at 02/04/17 1900   Gross per 24 hour   Intake 2072.5 ml   Output    500 ml   Net  "1572.5 ml       Gen- NAD, A+Ox3, jaundice, chronically ill appearing  CVS- RRR  RS- Decreased breath sounds at the bilateral bases  Abd- distended, tender to palpation in the epigastrum and RUQ/R flank  Extr- warm and well perfused, edema b/l  Neuro- grossly intact  Skin- no rash  Psych- normal mood  Lym- no LAD    Laboratory  NA      128   2/4/2017   POTASSIUM      4.3   2/4/2017  CHLORIDE       94   2/4/2017  CO2       22   2/4/2017  BUN       26   2/4/2017  CR     1.66   2/4/2017    WBC      6.6   2/4/2017  HGB      7.6   2/4/2017  HCT     22.4   2/4/2017  MCV       94   2/4/2017  PLT      163   2/4/2017    AST      138   2/4/2017  ALT       98   2/4/2017  No results found for this basename: BiliConj   BILITOTAL     10.4   2/4/2017    ALBUMIN      2.1   2/4/2017  PROTTOTAL      5.5   2/4/2017   ALKPHOS      446   2/4/2017    INR     2.76   2/4/2017    Radiology  CTAP 2/2:  1. No site of active hemorrhage is appreciated. No hemorrhage  appreciated at the site of previous hepatic vein biopsy.    2. Circumferential thickening and hyperattenuation of the common bile  duct without hemorrhage on arterial, portal venous, or delayed phases.  This is nonspecific and may be related to infection, neoplastic  process, or edema.  3. Diffuse colonic thickening extending up to the level of the sigmoid  colon, which may represent congestion, infectious colitis, ischemia,  or neoplastic process. The KAISER and SMA are patent throughout.  4. The previously seen hypoattenuating focus in the central mesentery  is unchanged, possibly representing lipoma.  5. Large portosystemic and portal renal shunt in the setting of  ascites and diffuse hepatic hyperattenuation, which may represent  changes from chronic congestive failure, fibrosis, multiple  transfusions, hemachromatosis, or \"nutmeg liver.\" No portal venous  thrombosis visualized.  6. Cardiomegaly with diffuse anasarca.  7. Bibasilar groundglass opacities which may represent " atelectasis,  pulmonary edema and/or consolidation. Stable small bilateral pleural  effusions.       Assessment  46 year old male with a history of rheumatic heart disease s/p mechanical MVR x2 on warfarin, biventricular heart failure s/p ICD, chronic afib on amiodarone and digoxin (stoppped 1/16 due to toxicity), WPW ablation and CKD who was transferred from [facility] for further evaluation of CHF and possible hemolysis. GI was asked to provide an opinion regarding abnormal LFT's and concern for cirrhosis as this may weigh into the decision for offering surgical treatment of mod-severe aortic insufficiency with LVAD backup.     Liver biopsy performed on 1/30/17, notable for stage 3 to 4 fibrosis/cirrhosis which is presumed amiodarone associated.  Post-procedure the patient had onset of severe abdominal pain, hematochezia and acute cholestatic liver function tests concerning for biliary obstruction 2/2 hemobilia/clot.  No active extravasation on CTA.  No cholangitis though ongoing abdominal pain and rising bilirubin,      *Trend lfts - if worsening, tentative plan for ERCP on Monday  *The patient will need to be NPO after midnight Sunday night if procedure is planned; Goal INR prior to procedure would be <1.6     Case d/w Dr. Oropeza who is in agreement with the plan of care.      Maureen Rivero MD  Hepatology  [phone]

## 2017-02-05 NOTE — PROGRESS NOTES
Cardiology 1 Progress Note    Samir Rodriguez MRN# [id number]   Age: 46 year old YOB: 1970   Date of Admission: 1/12/2017           Assessment and Plan:   Samir Rodriguez is a 46 year old  male with past medical history of Rheumatic Heart Disease s/p mechanical MVR x 2 (1980s and 1992) on warfarin (INR goal 2.5-3.5), biventricular CHF (EF 25-30%) s/p dual chamber ICD 2008, chronic afib on amiodarone and previously on digoxine (stopped 1/16/17 due to concern for toxicity), WPW ablation at age 11, CKD III, hypothyroid,  who was transferred from [facility] on 01/12 for further eval of CHF and concern of hemolysis in setting of mechanical valve. Subsequently diagnosed with cirrhosis on liver biopsy and was deemed not to be a candidate for valve replacement or for LVAD.     Today:  IV diuresis: bumex and diuril today  GI planning for possible MRCP Monday depending on LFT's (no plan for ERCP at this time)  D/c heparin gtt as INR therapeutic on warfarin  TCU when euvolemic    # Lactic acidosis- resolved  # Possible hemobilia  # Mesenteric ischemia  # Hematochezia, ?GIB  # Acute on chronic anemia  Lactate now normal and no further episodes of bleeding. Still with severe RUQ abdominal pain likely worse with hypervolemia. Likely source of bleeding was complication from IR liver biopsy causing hemobilia. No procedure scheduled at this time, as there is currently no evidence of bleeding. CTA abdo/pelvis without evidence of bleeding.  -EGD if patient starts to bleed again.  -IR aware  -GI panc/bili planning for ERCP on non-emergent basis if evidence of obstruction on labs or urgently if evidence of infection  -CTA abdo/pelvis without evidence of hemobilia and no source of bleeding identified  -Surgery consulted- appreciate recs  -q6h hgb checks  -s/p 1u pRBC    # Non-ischemic dilated cardiomyopathy 2/2 valvular heart disease  # Acute on chronic systolic heart failure,  EF 37% (12/2016) s/p dual chamber ICD 2008  NYHA class III. Hypervolemic  - Bumex IV with diuril today  - hypervolemic with IVC dilated and no variation with respirations  - initiated hydralazine 12.5 mg TID for afterload reduction  - hold lisinopril 2.5 due to hypotension  - cont holding metoprolol and spironolactone for now    # Mechanical mitral valve (MV diastolic gradient 7)  # Aortic Insufficiency (mod - severe)  # TR (moderate)  History of BiV failure attributed to valvular disease. Echocardiogram on admission demonstrated moderate-severe aortic insufficiency which is his most acute cardiac pathology. His tricuspid regurgitation is likely due to his signficantly fluid overloaded state. Mechanical valve maintained on warfarin w/ INR 2.5-3.5 prior to this admission. Angiogram performed on 01/20 revealed nonobstructive CAD. EDEL shows probable restriction of one of the mitral valve leaflets. However, he is not a if candidate for valvular surgery due to cirrhosis diagnosed on liver biopsy  - Tooth extractions cancelled, as patient is no longer valve replacement candidate    #. Cirrhosis and liver nodularity  Demonstrated on CT, although u/s was normal. CT read is concerning for amiodarone toxicity. Liver biopsy 1/30 with evidence of advanced chronic liver disease (stage 3 or 4) of unclear etiology (amiodarone toxicity is possible). Consequently, not a candidate for LVAD or for valve replacements. Discussed these results and their consequence with patient and his son Sid.  -No LVAD  -No CT surgery/ valve replacement    # Anemia and thrombocytopenia  # Right Arm Hematoma   Blood smear: blood smear- RBCs show some target cells, hypochromia, increased polychromasia, no schistocytes or spherocytes. Low haptoglobin, high LDH, and plasma free hemoglobin concerning for intra-vascular hemolysis possibly due to valvular disease. No evidence of overt infection causing DIC. EPO inappropriately low w/ recent ARF. Etiology of  hematoma likely due to supratherapeutic INR- 4.6 on arrival  - warfarin w/ goal INR 2.5-3.0   - on heparin gtt for bridging  - restarted warfarin 1/30    # Paroxsymal afib:  digoxin level 0.7 on 1/12 and 1/17. 1/16/17 device interrogation: atrial tachycardia to 150s with AV block. Lower rate setting changed to 80bpm from 60bmp and device changed from DDDR to VVIR on 01/16; atrial tachycardia and AV block and V pacing. The patient has been in an atrial flutter that is undersensed, hence the device was switched to VVI mode. There is no point continuing amiodarone at this stage especially with concern for toxicity  - discontinued pta amiodarone.    # Hyperthyroid: Will need repeat TSH/T4 1-2 months post DC. Decreased levothyroxine from 224mcg to 175 mcg   # Anxiety and insomnia: Increased clonazepam to tid    # Right tibia fx and R knee trauma:  Fall in November prompted decompensation. Xray tibia and knee no fx this admission.  - Ortho evaluated; planning for outpatient follow up with orthopedic surgery in 4-6 weeks  - PT/OT    # Headaches  May be due to near-sightedness vs rebound headache from opioid use vs reduced cardiac output  - pain consult with pain for opioid titration  - eye exam 1/27- received new Rx  - using lidocaine patches  - PRN zofran    # Pulmonary nodules: incidentally found on CT  - repeat CT chest in 6 months    # CARSON- likely cardiorenal with TTE with dilated IVC without respiratory variation (also received IVF overnight due to lactic acidosis)  -diuresis as tolerated   -afterload reduction, see supra    FEN: 2 g Na diet, 1.5 L fluid restriction  PPX: on heparin gtt, warfarin  Dispo: Difficult placement due to out of [place] Medicaid. TCU when euvolemic    Code Status: Full CODE      Patient discussed with staff attending, Dr. Gan.     Joan Burks MD  IM PGY3  [phone]    CARDIOLOGY STAFF  Patient seen and examined by me.  History and physical examination discussed with Dr. Burks  whose note reflects our joint assessment and recommendation/plans.  Pain is under much better control today.  We learned last evening that GI was now considering ERCP for rising bilirubin.  This will require some warning in advance as Mr. Rodriguez has a therapeutic INR level for warfarin (for mechanical mitral valve).  He has had poor response to diuretic in the mario 38 hours.  We are pushing Bumex and hydrodiuril along with hydralazine.    Ganesh Gan           Interval History/Subjective   Patient with significant RUQ pain improved on new pain regimen. Preventing him from sleeping. Gaining weight and low UOP          Objective   Temp:  [97.4  F (36.3  C)-98.1  F (36.7  C)] 98  F (36.7  C)  Heart Rate:  [79-80] 79  Resp:  [16-18] 18  BP: (79-97)/(41-62) 96/55 mmHg  SpO2:  [95 %-98 %] 97 %    Physical exam:  Gen: AA&Ox3, no acute distress  HEENT: NACT, poor dentition   PULM: Clear lungs  CV: RRR; mechanical s2 w/ 2+ systolic murmur at LUSB and LISB: JVD+  ABD:  soft, nontender, nondistended.  BS+  EXT: trace sonam edema to knees. Right arm ecchymosis stable   SKIN: Right arm resolving ecchymosis outlined w/ marker   NEURO: alert and oriented; speech intact         Data:   CBC    Recent Labs  Lab 02/04/17  0715 02/03/17  1411 02/03/17  0530 02/03/17  0204  02/02/17  0911 02/02/17  0312   WBC 6.6  --  5.7  --   --  6.9 6.3   RBC 2.38*  --  2.26*  --   --  2.31* 2.37*   HGB 7.6* 7.8* 7.3* 7.0*  < > 7.3* 7.7*   HCT 22.4*  --  21.1*  --   --  21.5* 22.4*   MCV 94  --  93  --   --  93 95   MCH 31.9  --  32.3  --   --  31.6 32.5   MCHC 33.9  --  34.6  --   --  34.0 34.4   RDW 21.1*  --  20.7*  --   --  20.1* 19.8*     --  151  --   --  199 158   < > = values in this interval not displayed.  CMP    Recent Labs  Lab 02/04/17  0715 02/03/17  0530 02/02/17  0312 02/01/17  1829  02/01/17  0849   * 131* 132*  --   --  131*   POTASSIUM 4.3 4.7 4.9  --   --  4.6   CHLORIDE 94 98 99  --   --  97   CO2 22 24 21   --   --  24   ANIONGAP 12 10 12  --   --  10   GLC 96 86 122*  --   --  90   BUN 26 24 23  --   --  19   CR 1.66* 1.48* 1.31*  --   --  1.42*   GFRESTIMATED 44* 51* 58*  --   --  53*   GFRESTBLACK 54* 61 71  --   --  64   DELFINO 7.6* 7.3* 7.3*  --   --  7.7*   MAG 2.2 2.2 2.1  --   --  2.1   PROTTOTAL 5.5* 5.1* 5.5* 5.7*  < >  --    ALBUMIN 2.1* 1.9* 2.0* 2.1*  < >  --    BILITOTAL 10.4* 8.6* 6.5* 6.2*  < >  --    ALKPHOS 446* 364* 295* 277*  < >  --    * 124* 127* 139*  < >  --    ALT 98* 82* 81* 84*  < >  --    < > = values in this interval not displayed.  INR    Recent Labs  Lab 02/04/17  0715 02/03/17  0530 02/02/17  1339 02/02/17  0312   INR 2.76* 3.24* 2.41* 2.51*            Medications:     Current Facility-Administered Medications   Medication     bumetanide (BUMEX) injection 4 mg     chlorothiazide (DIURIL) intermittent infusion 500 mg     hydrALAZINE (APRESOLINE) half-tab 12.5 mg     morphine (MS CONTIN) 12 hr tablet 15 mg     oxyCODONE (ROXICODONE) IR tablet 5-10 mg     heparin lock flush 10 UNIT/ML injection 2-5 mL     lidocaine 1 % injection 0.5-1 mL     lidocaine (LMX4) kit     sodium chloride (PF) 0.9% PF flush 1-10 mL     sodium chloride (PF) 0.9% PF flush 5-10 mL     lidocaine 1 % 1 mL     sodium chloride (PF) 0.9% PF flush 10-20 mL     sodium chloride (PF) 0.9% PF flush 10 mL     piperacillin-tazobactam (ZOSYN) 3.375 g vial to attach to  mL bag     pantoprazole (PROTONIX) 40 mg IV push injection     levothyroxine (SYNTHROID/LEVOTHROID) tablet 175 mcg     Warfarin Therapy Reminder (Check START DATE - warfarin may be starting in the FUTURE)     clonazePAM (klonoPIN) half-tab 0.25 mg     menthol (ICY HOT) 5 % patch 1 patch    And     menthol (ICY HOT) Patch in Place    And     menthol (ICY HOT) patch REMOVAL     ondansetron (ZOFRAN) tablet 4 mg     lidocaine (LIDODERM) 5 % Patch 1-3 patch    And     lidocaine (LIDODERM) patch REMOVAL    And     lidocaine (LIDODERM) Patch in Place      docusate sodium (COLACE) capsule 100 mg     potassium chloride SA (K-DUR/KLOR-CON M) CR tablet 20-40 mEq     potassium chloride (KLOR-CON) Packet 20-40 mEq     potassium chloride 10 mEq in 100 mL intermittent infusion     potassium chloride 10 mEq in 100 mL intermittent infusion with 10 mg lidocaine     potassium chloride 20 mEq in 50 mL intermittent infusion     magnesium sulfate 2 g in NS intermittent infusion (PharMEDium or FV Cmpd)     magnesium sulfate 4 g in 100 mL sterile water (premade)     potassium phosphate 15 mmol in D5W 250 mL intermittent infusion     potassium phosphate 20 mmol in D5W 500 mL intermittent infusion     potassium phosphate 25 mmol in D5W 500 mL intermittent infusion     multivitamin, therapeutic with minerals (THERA-VIT-M) tablet 1 tablet     melatonin tablet 3 mg     miconazole (MICATIN; MICRO GUARD) 2 % powder     sodium chloride (PF) 0.9% PF flush 3 mL     sodium chloride (PF) 0.9% PF flush 3 mL     medication instruction     nitroglycerin (NITROSTAT) sublingual tablet 0.4 mg     alum & mag hydroxide-simethicone (MYLANTA ES/MAALOX  ES) suspension 15-30 mL     polyethylene glycol (MIRALAX/GLYCOLAX) Packet 17 g     albuterol (PROAIR HFA/PROVENTIL HFA/VENTOLIN HFA) Inhaler 2 puff     sennosides (SENOKOT) tablet 8.6 mg     naloxone (NARCAN) injection 0.1-0.4 mg

## 2017-02-05 NOTE — PLAN OF CARE
Problem: Goal Outcome Summary  Goal: Goal Outcome Summary  Outcome: No Change  Patient admitted 1/12 for advanced management of HF. VSS, paced. Pain controlled with prn oxycodone and schedule morphine. Clonazepam given x 2 for anxiety. Heparin infusing at 12.5 ml/hr, last 10A (.28) therapeutic. Currently tolerating regular diet.  Mepilex in place on saccral ulcer. Patient up in recliner most of shift, brace on right leg from previous fracture when out of bed. Patient is no longer a candidate for transplant or LVAD due to liver cirrhosis. Anticipate possible ERCP on Monday, pending LFT's. Discharge to TCU when placement becomes available. Continue to assess and monitor, notify Cards 1 with concerns.

## 2017-02-05 NOTE — PLAN OF CARE
D: 60mg IV lasix ordered at hs, pt requesting hot packs to hands due to cramping.  I: Monitored/assessed pt. Assisted with cares.  A: Pt VS stable and rhythm remains paced. Heparin infusing at 1250u/hr, last 10A therapeutic.  Mepilex in place on saccral ulcer. Patient up in recliner most of night, brace on right leg from fracture when out of bed. Abd Pain controlled with prn oxycodone and schedule morphine. Pt has had little output. Senna given for constipation x3 days, unable to get stool sample to r/o CDiff, however pt not having loose stools.  P: Continue to monitor/assess pt, contact provider with concerns.

## 2017-02-05 NOTE — PLAN OF CARE
Problem: Goal Outcome Summary  Goal: Goal Outcome Summary  Outcome: No Change  Patient admitted 1/12 for advanced management of HF. VSS, paced. Pain controlled with prn oxycodone and schedule morphine. Clonazepam given for anxiety. Heparin discontinued, INR therapeutic. Currently tolerating regular diet.  Mepilex in place on sacral ulcer. Patient up in recliner most of shift, brace on right leg from previous fracture when out of bed. Patient is no longer a candidate for transplant or LVAD due to liver cirrhosis. Anticipate possible ERCP on Monday, pending LFT's. NPO at midnight. Discharge to TCU when placement becomes available. Continue to assess and monitor, notify Cards 1 with concerns.

## 2017-02-06 ENCOUNTER — APPOINTMENT (OUTPATIENT)
Dept: OCCUPATIONAL THERAPY | Facility: CLINIC | Age: 47
DRG: 286 | End: 2017-02-06
Attending: INTERNAL MEDICINE
Payer: MEDICAID

## 2017-02-06 LAB
ALBUMIN SERPL-MCNC: 2 G/DL (ref 3.4–5)
ALP SERPL-CCNC: 449 U/L (ref 40–150)
ALT SERPL W P-5'-P-CCNC: 101 U/L (ref 0–70)
ANION GAP SERPL CALCULATED.3IONS-SCNC: 12 MMOL/L (ref 3–14)
ANION GAP SERPL CALCULATED.3IONS-SCNC: 13 MMOL/L (ref 3–14)
ANION GAP SERPL CALCULATED.3IONS-SCNC: 14 MMOL/L (ref 3–14)
AST SERPL W P-5'-P-CCNC: 130 U/L (ref 0–45)
BASE DEFICIT BLDV-SCNC: 3.2 MMOL/L
BILIRUB DIRECT SERPL-MCNC: 4.9 MG/DL (ref 0–0.2)
BILIRUB SERPL-MCNC: 6.3 MG/DL (ref 0.2–1.3)
BUN SERPL-MCNC: 32 MG/DL (ref 7–30)
BUN SERPL-MCNC: 32 MG/DL (ref 7–30)
BUN SERPL-MCNC: 33 MG/DL (ref 7–30)
CALCIUM SERPL-MCNC: 7.2 MG/DL (ref 8.5–10.1)
CALCIUM SERPL-MCNC: 7.6 MG/DL (ref 8.5–10.1)
CALCIUM SERPL-MCNC: 7.8 MG/DL (ref 8.5–10.1)
CHLORIDE SERPL-SCNC: 90 MMOL/L (ref 94–109)
CHLORIDE SERPL-SCNC: 90 MMOL/L (ref 94–109)
CHLORIDE SERPL-SCNC: 91 MMOL/L (ref 94–109)
CO2 SERPL-SCNC: 19 MMOL/L (ref 20–32)
CO2 SERPL-SCNC: 20 MMOL/L (ref 20–32)
CO2 SERPL-SCNC: 21 MMOL/L (ref 20–32)
CREAT SERPL-MCNC: 2.55 MG/DL (ref 0.66–1.25)
CREAT SERPL-MCNC: 2.68 MG/DL (ref 0.66–1.25)
CREAT SERPL-MCNC: 2.72 MG/DL (ref 0.66–1.25)
ERYTHROCYTE [DISTWIDTH] IN BLOOD BY AUTOMATED COUNT: 21.5 % (ref 10–15)
ERYTHROCYTE [DISTWIDTH] IN BLOOD BY AUTOMATED COUNT: 21.5 % (ref 10–15)
GFR SERPL CREATININE-BSD FRML MDRD: 25 ML/MIN/1.7M2
GFR SERPL CREATININE-BSD FRML MDRD: 26 ML/MIN/1.7M2
GFR SERPL CREATININE-BSD FRML MDRD: 27 ML/MIN/1.7M2
GLUCOSE SERPL-MCNC: 88 MG/DL (ref 70–99)
GLUCOSE SERPL-MCNC: 90 MG/DL (ref 70–99)
GLUCOSE SERPL-MCNC: 96 MG/DL (ref 70–99)
HCO3 BLDV-SCNC: 22 MMOL/L (ref 21–28)
HCT VFR BLD AUTO: 20.8 % (ref 40–53)
HCT VFR BLD AUTO: 21.4 % (ref 40–53)
HGB BLD-MCNC: 7.2 G/DL (ref 13.3–17.7)
HGB BLD-MCNC: 7.2 G/DL (ref 13.3–17.7)
INR PPP: 2.7 (ref 0.86–1.14)
MAGNESIUM SERPL-MCNC: 2.2 MG/DL (ref 1.6–2.3)
MAGNESIUM SERPL-MCNC: 2.2 MG/DL (ref 1.6–2.3)
MCH RBC QN AUTO: 32.1 PG (ref 26.5–33)
MCH RBC QN AUTO: 32.7 PG (ref 26.5–33)
MCHC RBC AUTO-ENTMCNC: 33.6 G/DL (ref 31.5–36.5)
MCHC RBC AUTO-ENTMCNC: 34.6 G/DL (ref 31.5–36.5)
MCV RBC AUTO: 95 FL (ref 78–100)
MCV RBC AUTO: 96 FL (ref 78–100)
O2/TOTAL GAS SETTING VFR VENT: NORMAL %
OXYHGB MFR BLDV: 65 %
PCO2 BLDV: 40 MM HG (ref 40–50)
PH BLDV: 7.35 PH (ref 7.32–7.43)
PLATELET # BLD AUTO: 122 10E9/L (ref 150–450)
PLATELET # BLD AUTO: 164 10E9/L (ref 150–450)
PO2 BLDV: 36 MM HG (ref 25–47)
POTASSIUM SERPL-SCNC: 3.8 MMOL/L (ref 3.4–5.3)
POTASSIUM SERPL-SCNC: 4.1 MMOL/L (ref 3.4–5.3)
POTASSIUM SERPL-SCNC: 4.2 MMOL/L (ref 3.4–5.3)
PROT SERPL-MCNC: 5.6 G/DL (ref 6.8–8.8)
RBC # BLD AUTO: 2.2 10E12/L (ref 4.4–5.9)
RBC # BLD AUTO: 2.24 10E12/L (ref 4.4–5.9)
SODIUM SERPL-SCNC: 123 MMOL/L (ref 133–144)
SODIUM SERPL-SCNC: 124 MMOL/L (ref 133–144)
SODIUM SERPL-SCNC: 124 MMOL/L (ref 133–144)
T3FREE SERPL-MCNC: 0.8 PG/ML (ref 2.3–4.2)
T4 FREE SERPL-MCNC: 2.45 NG/DL (ref 0.76–1.46)
WBC # BLD AUTO: 5.5 10E9/L (ref 4–11)
WBC # BLD AUTO: 5.9 10E9/L (ref 4–11)

## 2017-02-06 PROCEDURE — 36415 COLL VENOUS BLD VENIPUNCTURE: CPT | Performed by: INTERNAL MEDICINE

## 2017-02-06 PROCEDURE — 36592 COLLECT BLOOD FROM PICC: CPT | Performed by: INTERNAL MEDICINE

## 2017-02-06 PROCEDURE — 82805 BLOOD GASES W/O2 SATURATION: CPT | Performed by: INTERNAL MEDICINE

## 2017-02-06 PROCEDURE — S0171 BUMETANIDE 0.5 MG: HCPCS | Performed by: STUDENT IN AN ORGANIZED HEALTH CARE EDUCATION/TRAINING PROGRAM

## 2017-02-06 PROCEDURE — 25000128 H RX IP 250 OP 636: Performed by: INTERNAL MEDICINE

## 2017-02-06 PROCEDURE — 25000132 ZZH RX MED GY IP 250 OP 250 PS 637: Performed by: INTERNAL MEDICINE

## 2017-02-06 PROCEDURE — 80048 BASIC METABOLIC PNL TOTAL CA: CPT | Performed by: INTERNAL MEDICINE

## 2017-02-06 PROCEDURE — 21400003 ZZH R&B CCU CRITICAL UMMC

## 2017-02-06 PROCEDURE — 83735 ASSAY OF MAGNESIUM: CPT | Performed by: INTERNAL MEDICINE

## 2017-02-06 PROCEDURE — 40000133 ZZH STATISTIC OT WARD VISIT

## 2017-02-06 PROCEDURE — 99232 SBSQ HOSP IP/OBS MODERATE 35: CPT | Performed by: NURSE PRACTITIONER

## 2017-02-06 PROCEDURE — 25000125 ZZHC RX 250: Performed by: STUDENT IN AN ORGANIZED HEALTH CARE EDUCATION/TRAINING PROGRAM

## 2017-02-06 PROCEDURE — 36592 COLLECT BLOOD FROM PICC: CPT | Performed by: STUDENT IN AN ORGANIZED HEALTH CARE EDUCATION/TRAINING PROGRAM

## 2017-02-06 PROCEDURE — 85610 PROTHROMBIN TIME: CPT | Performed by: INTERNAL MEDICINE

## 2017-02-06 PROCEDURE — 85027 COMPLETE CBC AUTOMATED: CPT | Performed by: INTERNAL MEDICINE

## 2017-02-06 PROCEDURE — 80076 HEPATIC FUNCTION PANEL: CPT | Performed by: INTERNAL MEDICINE

## 2017-02-06 PROCEDURE — 99207 ZZC CDG-CORRECTLY CODED, REVIEWED AND AGREE: CPT | Performed by: NURSE PRACTITIONER

## 2017-02-06 PROCEDURE — 83735 ASSAY OF MAGNESIUM: CPT | Performed by: STUDENT IN AN ORGANIZED HEALTH CARE EDUCATION/TRAINING PROGRAM

## 2017-02-06 PROCEDURE — 97530 THERAPEUTIC ACTIVITIES: CPT | Mod: GO

## 2017-02-06 PROCEDURE — 80048 BASIC METABOLIC PNL TOTAL CA: CPT | Performed by: STUDENT IN AN ORGANIZED HEALTH CARE EDUCATION/TRAINING PROGRAM

## 2017-02-06 PROCEDURE — 25000132 ZZH RX MED GY IP 250 OP 250 PS 637: Performed by: STUDENT IN AN ORGANIZED HEALTH CARE EDUCATION/TRAINING PROGRAM

## 2017-02-06 PROCEDURE — 84439 ASSAY OF FREE THYROXINE: CPT | Performed by: INTERNAL MEDICINE

## 2017-02-06 PROCEDURE — 99232 SBSQ HOSP IP/OBS MODERATE 35: CPT | Mod: GC

## 2017-02-06 PROCEDURE — 84481 FREE ASSAY (FT-3): CPT | Performed by: INTERNAL MEDICINE

## 2017-02-06 PROCEDURE — 97535 SELF CARE MNGMENT TRAINING: CPT | Mod: GO

## 2017-02-06 PROCEDURE — 25000125 ZZHC RX 250: Performed by: INTERNAL MEDICINE

## 2017-02-06 RX ORDER — OXYCODONE HYDROCHLORIDE 5 MG/1
5 TABLET ORAL EVERY 4 HOURS PRN
Status: DISCONTINUED | OUTPATIENT
Start: 2017-02-06 | End: 2017-02-08

## 2017-02-06 RX ORDER — POLYETHYLENE GLYCOL 3350 17 G/17G
17 POWDER, FOR SOLUTION ORAL DAILY
Status: DISCONTINUED | OUTPATIENT
Start: 2017-02-07 | End: 2017-02-12 | Stop reason: HOSPADM

## 2017-02-06 RX ORDER — SENNOSIDES 8.6 MG
1 TABLET ORAL 2 TIMES DAILY
Status: DISCONTINUED | OUTPATIENT
Start: 2017-02-06 | End: 2017-02-09

## 2017-02-06 RX ORDER — BUMETANIDE 0.25 MG/ML
4 INJECTION INTRAMUSCULAR; INTRAVENOUS ONCE
Status: COMPLETED | OUTPATIENT
Start: 2017-02-06 | End: 2017-02-06

## 2017-02-06 RX ADMIN — DOCUSATE SODIUM 100 MG: 100 CAPSULE, LIQUID FILLED ORAL at 20:53

## 2017-02-06 RX ADMIN — OXYCODONE HYDROCHLORIDE 10 MG: 5 TABLET ORAL at 17:59

## 2017-02-06 RX ADMIN — POLYETHYLENE GLYCOL 3350 17 G: 17 POWDER, FOR SOLUTION ORAL at 09:09

## 2017-02-06 RX ADMIN — PIPERACILLIN AND TAZOBACTAM 3.38 G: 3; .375 INJECTION, POWDER, FOR SOLUTION INTRAVENOUS at 05:38

## 2017-02-06 RX ADMIN — OXYCODONE HYDROCHLORIDE 10 MG: 5 TABLET ORAL at 05:38

## 2017-02-06 RX ADMIN — OXYCODONE HYDROCHLORIDE 10 MG: 5 TABLET ORAL at 11:48

## 2017-02-06 RX ADMIN — Medication 12.5 MG: at 20:53

## 2017-02-06 RX ADMIN — BUMETANIDE 4 MG: 0.25 INJECTION, SOLUTION INTRAMUSCULAR; INTRAVENOUS at 08:47

## 2017-02-06 RX ADMIN — BUMETANIDE 0.5 MG/HR: 0.25 INJECTION INTRAMUSCULAR; INTRAVENOUS at 17:34

## 2017-02-06 RX ADMIN — Medication: at 09:04

## 2017-02-06 RX ADMIN — PIPERACILLIN AND TAZOBACTAM 3.38 G: 3; .375 INJECTION, POWDER, FOR SOLUTION INTRAVENOUS at 11:48

## 2017-02-06 RX ADMIN — PANTOPRAZOLE SODIUM 40 MG: 40 INJECTION, POWDER, FOR SOLUTION INTRAVENOUS at 20:52

## 2017-02-06 RX ADMIN — LEVOTHYROXINE SODIUM 175 MCG: 25 TABLET ORAL at 08:47

## 2017-02-06 RX ADMIN — Medication: at 20:55

## 2017-02-06 RX ADMIN — SENNOSIDES 8.6 MG: 8.6 TABLET, FILM COATED ORAL at 09:09

## 2017-02-06 RX ADMIN — PIPERACILLIN AND TAZOBACTAM 3.38 G: 3; .375 INJECTION, POWDER, FOR SOLUTION INTRAVENOUS at 17:35

## 2017-02-06 RX ADMIN — PANTOPRAZOLE SODIUM 40 MG: 40 INJECTION, POWDER, FOR SOLUTION INTRAVENOUS at 08:47

## 2017-02-06 RX ADMIN — MORPHINE SULFATE 15 MG: 15 TABLET, EXTENDED RELEASE ORAL at 20:52

## 2017-02-06 RX ADMIN — SENNOSIDES 1 TABLET: 8.6 TABLET, FILM COATED ORAL at 20:53

## 2017-02-06 RX ADMIN — BUMETANIDE 4 MG: 0.25 INJECTION, SOLUTION INTRAMUSCULAR; INTRAVENOUS at 13:35

## 2017-02-06 RX ADMIN — MORPHINE SULFATE 15 MG: 15 TABLET, EXTENDED RELEASE ORAL at 08:47

## 2017-02-06 RX ADMIN — DOCUSATE SODIUM 100 MG: 100 CAPSULE, LIQUID FILLED ORAL at 08:47

## 2017-02-06 RX ADMIN — Medication 1.5 MG: at 17:36

## 2017-02-06 RX ADMIN — MULTIPLE VITAMINS W/ MINERALS TAB 1 TABLET: TAB at 08:47

## 2017-02-06 RX ADMIN — LIDOCAINE 2 PATCH: 50 PATCH CUTANEOUS at 20:54

## 2017-02-06 RX ADMIN — BUMETANIDE 4 MG: 0.25 INJECTION, SOLUTION INTRAMUSCULAR; INTRAVENOUS at 17:35

## 2017-02-06 ASSESSMENT — PAIN DESCRIPTION - DESCRIPTORS: DESCRIPTORS: ACHING;BURNING

## 2017-02-06 NOTE — PLAN OF CARE
Problem: Goal Outcome Summary  Goal: Goal Outcome Summary  PT 6C: STS x5 with mod Ax2 and FWW.  Improvement present with anterior lean increasing ease of transfer although continues to require additional time to bring RLE under ALBINA when standing.  Reports continued pain in RLE and LLE buckling today.  B hamstring stretch and gastroc stretch provided today due reports of soreness.  During gastroc stretch, plantarflexion clonus beat is present in B ankles when quick stretch given in dorsiflexion.  PT continues to recommends to TCU when medically stable in order to increase functional mobility and strength.

## 2017-02-06 NOTE — PLAN OF CARE
Problem: Goal Outcome Summary  Goal: Goal Outcome Summary  OT/6C:  Pt dependently transfers from recliner chair to standard chair with total AX2 due to broken foot rest on recliner limiting pt's ability to stand from chair.  Pt performs seated ADLs with set up, max A for washing back, max A for changing gown, and min A for placing shower cap for modified hair washing task.  Max AX2 for sit<>stand from standard chair, tolerates ~2 min of standing for max A for nicola cares.  Limited by strength and fatigue.     REC:  TCU for improved safety and IND with transfers, mobility and ADLs.

## 2017-02-06 NOTE — PROGRESS NOTES
Social Work Services Progress Note    Hospital Day: 26  Date of Initial Social Work Evaluation:  NA  Collaborated with:  SNF admissions, Cards 1, palliative SW    Data:  Pt is a 46 year old  male being assessed for possible heart surgery.  Pt has returned from ICU to  and is not a candidate for advanced heart procedures.      Intervention:  SHAQUILLE discussed POC with Cards 1 and palliative SW team.  Per Cards 1 pt would benefit from some TCU prior to discharging to home.  Cards 1 is not sure that the pt is ready for hospice care.  SHAQUILLE will discuss with palliative SW if the pt would consider a POLST or HCD for further cares.  It is possible pt could discharge to home with rehab or TCU for rehab with the goal of not having to return to [facility] as advanced heart failure surgeries are not an option.    Assessment:  Pt has family who are visiting.  SHAQUILLE and  accommodations again tried today to contact pt's [place] for support for his family.  Accommodations was able to get options from [place] Medicaid that the family can pre-pay for food and submit receipts for reimbursement.  The [place] has yet to contact  with authorized services.    Plan:    Anticipated Disposition:  Pending.  SHAQUILLE has asked that palliative SW's be invovled for goals of care discussions.    Barriers to d/c plan:  Insurance, no transfer agreement in place to assist with discharge to [facility].  Is on waitlist for Medicaid bed at [facility] if TCU is still wanted by pt/family.  SHAQUILLE faxed updated notes to TCU.    Follow Up:  SW will continue to follow for discharge.  Pt likely ready to discharge on Friday.    REJI Hernandez, ALYSSAW  6C Unit   Pager: [phone]  Phone: [phone]      ___________________________________________________________________________________________________________________________________________________    Referrals in process:     TCUs with phone calls/referrals out:     [place]: - [facility] - pt continues to be on the waitlist for beds..            PH: [phone]            F: [phone]    Referrals Discontinued:    Swing Bed Programs:   - [facility] ARU - declined due to transportation concerns, concerns pt cannot tolerate therapies.  Admissions states even if can tolerate therapies, pt would be declined due to pt needing transport to the [facility].  SW explained pt has Title 19 to cover transport and pt would be responsible.  Admissions states this still does not satisfy their concerns.  PH: [phone]  F: [phone]      - [facility] ARU - declined due to no Medicaid beds available at this time.  PH - Cell: [phone]  PH - Main: [phone]  F: [phone]    TCU Units:    - [facility] - do not accept Medicaid Ins.  Do not accept IHS insurance.  PH: [phone]   - [facility] - do not accept Medicaid Ins.  Do not accept IHS insurance.  PH: [phone]   - [facility] - only a SNF/LTC and do not accept Medicaid only for rehab.  Have an over two year wait for a SNF Medicaid bed.  PH: [phone]   - [facility] - Accepts Medicaid, however has extensive wait for a bed.  PH: [phone]  - [facility] - No answer when attempted to call  PH: [phone]  - [facility] - no answer from admissions re if Medicaid is covered  PH: [phone]  - [place] - No answer from admissions re if Medicaid is covered  PH: [phone]    Community Case Management/Community Services in place:   Pt has a Medicaid  [place]   PH: [phone] x [phone]   -  on Call for [place]: [phone]    [facility] Office: Main line: [phone]  [facility] - call for services to start  PH: [phone]    [facility] - medical administration, fax all documentation here  F: [phone]    Pt may have Title 19 services for transportation, will need  to assess this at time of dc if he qualifies for TCU.      Update 1/31/17 - this is a wrong number.  Please do not call.

## 2017-02-06 NOTE — PROGRESS NOTES
"[facility], [place]   Palliative Care Daily Progress Note          Recommendations, Patient/Family Counseling & Coordination     Was seen and examined- Discussed goals and plan of care with patient and Cards 1 team. He is aware from his team(s) of liver bx with findings of   \" Advanced chronic liver disease (stage 3-4)of uncertain etiology\"   This finding precludes  further cardiac interventions (Valvular surgery and or LVAD placement)   Earlier plan for dental extractions discontinued.    RECC:   Endorses his pain is overall better managed with combination of MS Contin and Oxycodone. Oxycodone dosing presently 5-10 mg q 6 hours prn. No changes today.   Additional diuresis likely will help overall discomfort and mobility.     POLST: not completed as of yet      Thank you for the opportunity to continue to participate in the care of this patient and family.  Please feel free to contact on-call palliative provider with any emergent needs.  We can be reached via team pager [phone] (answered 8-4:30 Monday-Friday); after-hours answering service ([phone]); Main Palliative Clinic - PWB 1C: [phone].     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  Acute on chronic systolic heart failure, EF 37% (12/2016) s/p dual chamber ICD 2008  Mechanical mitral valve   Aortic Insufficiency   Cirrhosis and liver nodularity  Right tibia fx and R knee trauma- now with chronic pain (doi 11/16)   Headaches- new rx for glasses received after opth appt- chronic     2)  Psychosocial/Spiritual Needs:   Ongoing: Will continue to follow   New:No        Is new assessment/intervention required by palliative team?:  No         Interval History:   Pt aware that results of liver biopsy confirming " advanced disease- this prevents consideration for advanced interventions (LVAD) or further heart valve surgery. Son (Sid) arrived and according to patient is providing support and decision assistance. Pt stated goal is to get PT and OT for functional strength and then move to TCU close to home-  Presently feels better- weight down with diuresis- poor activity tolerance- wants to eat more  Open to concept of Hospice care if Cardiologists truly feel they have nothing to offer from further hospitalization.            Review of Systems:   Palliative Symptom Review (0=no symptom/no concern, 1=mild, 2=moderate, 3=severe):      Pain: 1-2 (abdominal pain with chronic knee/headache pain)       Fatigue: 2      Nausea: 0-1      Constipation: 0      Diarrhea: 0      Depressive Symptoms: 1-2      Anxiety: 1-2      Drowsiness: 0-1      Poor Appetite: 0-1      Shortness of Breath: 0-1               Medications:   I have reviewed this patient's medication profile and medications given in past 24 hours.        {   Physical exam: Vitals: BP 88/48 mmHg  Pulse 80  Temp(Src) 97.9  F (36.6  C) (Oral)  Resp 18  Ht 1.829 m (6')  Wt 100.245 kg (221 lb)  BMI 29.97 kg/m2  SpO2 96%  BMI= Body mass index is 29.97 kg/(m^2).   Gen: A&Ox3, sitting in bedside chair. NAD. While he still has pain in abdomen and back it is improved with addition of MS Contin 15 mg q 12 hours   HEENT: NC AT, poor dentition.   PULM: Clear lungs without wheeze. Good inspiratory effort.   CV: RRR; remains mildly tachycardic with s1 mechanical s2 w/ 2+ systolic murmur at LUSB.  ABD:  soft, nontender, nondistended.  EXT: 1-2+ sonam edema to knees. Right arm ecchymosis stable R knee brace in place  SKIN: no rashes   NEURO: alert and oriented; speech intact           Data Reviewed:     ROUTINE LABS (Last four results)  BMP    Recent Labs  Lab 02/06/17  0718 02/05/17 2127 02/05/17  0848 02/04/17  0715   * 124* 126* 128*   POTASSIUM 4.1 4.2 4.1 4.3   CHLORIDE 90*  91* 92* 94   DELFINO 7.8* 7.4* 7.4* 7.6*   CO2 21 20 21 22   BUN 32* 32* 29 26   CR 2.55* 2.39* 2.05* 1.66*   GLC 88 96 98 96     CBC    Recent Labs  Lab 02/06/17  0718 02/05/17  0848 02/04/17  0715 02/03/17  1411 02/03/17  0530   WBC 5.9 7.1 6.6  --  5.7   RBC 2.24* 2.28* 2.38*  --  2.26*   HGB 7.2* 7.4* 7.6* 7.8* 7.3*   HCT 21.4* 21.8* 22.4*  --  21.1*   MCV 96 96 94  --  93   MCH 32.1 32.5 31.9  --  32.3   MCHC 33.6 33.9 33.9  --  34.6   RDW 21.5* 21.3* 21.1*  --  20.7*    165 163  --  151     INR    Recent Labs  Lab 02/06/17  0718 02/05/17  0848 02/04/17  0715 02/03/17  0530   INR 2.70* 3.31* 2.76* 3.24*

## 2017-02-06 NOTE — PROGRESS NOTES
GASTROENTEROLOGY PROGRESS NOTE      ASSESSMENT/ RECOMMENDATIONS:    Assessment:    46 year old male with a history of rheumatic heart disease s/p mechanical MVR x2 on warfarin, biventricular heart failure s/p ICD, chronic afib on amiodarone and digoxin (stoppped 1/16 due to toxicity, WPW ablation,  CKD, cholecystectomy who was transferred from [facility] for further evaluation of CHF and possible hemolysis and for LVAD evaluation. He underwent transjugular liver biopsy on 1/30. Patient started having abdominal pain and GI bleed. No HD instability. Hb dropped from 8.9 --> 7.7. He was also on IV heparin and warfarin for his mechanical mitral valve. Most likely source of bleeding was hemobilia based on clinical course and CT finding. CTA was done on 2/2/17 and was negative for active extravasation.  Also has elevated LFTs with bilirubin peaked to 10.4 currently downtrending with 6.3 today, No signs of cholangitis, also underlying liver cirrhosis, biopsy showed advanced chronic liver disease (stage 3-4) of uncertain etiology, most likely from amiodarone toxicity. He had normal hepatic venous pressure gradient (HVPG). Also questionable mesenteric ischemia, based on CT scan. No bleeding overnight with stable hemoglobin. GI bleeding most likely from resolving hemobilia, also possibility of oozing in setting of anticoagulation with underlying gastritis.       Recommendations    Recommend conservative management at this point.     No plan for ERCP at this point as no signs of cholangitis, bilirubin downtrending.  No plan for EGD currently because of stable hemoglobin and no significant recurrent bleeding. Unlikely to have varices based on normal HVPG.    Continue CBC daily, with goal Hb according to primary team.      Continue IV PPI BID, switch to oral when eating.     Serial abdominal exam.     Recommend checking Cdiff, enteropathogen panel for this incidental colitis found to imaging, once he has  bowel movement.    Also need to clarify goals of care as patient not candidate for heart transplant/LVAD or valvular surgery.     Gastroenterology follow up recommendations: TBD      The patient was discussed and plan agreed upon with GI staff, Dr. Rodrigues.     Tej Mejia  [facility]  Gastroenterology Fellow  _______________________________________________________________    S: no BM or bleeding overnight, last BM couple of days ago, still same RUQ pain, no worsening, looks comfortable,  afebrile, denied any vomiting, hematemesis.     O:  Blood pressure 94/46, pulse 80, temperature 97.6  F (36.4  C), temperature source Oral, resp. rate 18, height 1.829 m (6'), weight 100.245 kg (221 lb), SpO2 97 %.    Gen: no distress  CV: normal S1, S2, systolic murmur  Lungs: CTAB  Abd: soft, tenderness in RUQ, normal BS.   Neuro: no focal motor sensory deficits.       LABS:  BMP    Recent Labs  Lab 02/06/17 0718 02/05/17 2127 02/05/17  0848 02/04/17  0715   * 124* 126* 128*   POTASSIUM 4.1 4.2 4.1 4.3   CHLORIDE 90* 91* 92* 94   DELFINO 7.8* 7.4* 7.4* 7.6*   CO2 21 20 21 22   BUN 32* 32* 29 26   CR 2.55* 2.39* 2.05* 1.66*   GLC 88 96 98 96     CBC  Recent Labs  Lab 02/06/17 0718 02/05/17  0848 02/04/17  0715  02/03/17  0530   WBC 5.9 7.1 6.6  --  5.7   RBC 2.24* 2.28* 2.38*  --  2.26*   HGB 7.2* 7.4* 7.6*  < > 7.3*   HCT 21.4* 21.8* 22.4*  --  21.1*   MCV 96 96 94  --  93   MCH 32.1 32.5 31.9  --  32.3   MCHC 33.6 33.9 33.9  --  34.6   RDW 21.5* 21.3* 21.1*  --  20.7*    165 163  --  151   < > = values in this interval not displayed.  INR    Recent Labs  Lab 02/06/17 0718 02/05/17  0848 02/04/17  0715 02/03/17  0530   INR 2.70* 3.31* 2.76* 3.24*     LFTs    Recent Labs  Lab 02/06/17  0718 02/05/17  0848 02/04/17  0715 02/03/17  0530   ALKPHOS 449* 467* 446* 364*   * 140* 138* 124*   * 100* 98* 82*   BILITOTAL 6.3* 8.6* 10.4* 8.6*   PROTTOTAL 5.6* 5.4* 5.5* 5.1*    ALBUMIN 2.0* 2.0* 2.1* 1.9*      PANC    Recent Labs  Lab 02/01/17  1829   LIPASE 655*

## 2017-02-06 NOTE — PLAN OF CARE
Problem: Goal Outcome Summary  Goal: Goal Outcome Summary  Edema 6C: Attempted to see patient for edema session however unavailable. Will reschedule.

## 2017-02-06 NOTE — PLAN OF CARE
Problem: Goal Outcome Summary  Goal: Goal Outcome Summary  Outcome: No Change  Patient admitted 1/12 for advanced management of HF. VSS, paced. Pain controlled with prn oxycodone and schedule morphine. Clonazepam given for anxiety. Mepilex in place on sacral ulcer. Patient up in chair/recliner most of shift, brace on right leg from previous fracture when out of bed. Reinforced importance of fluid restriction and low sodium diet. Senna and miralax given, patient has not had BM since 2/1. Patient is no longer a candidate for transplant or LVAD due to liver cirrhosis. Anticipate medical management and discharge to TCU when placement becomes available. Continue to assess and monitor, notify Cards 1 with concerns.

## 2017-02-06 NOTE — PROGRESS NOTES
GI Fellow Brief Note    Case discussed with liver and pancreas services.  Case also discussed with cardiology fellow.    Liver tests seem to have stabilized. Bilirubin better.  Abdominal pain reported as the same to improved.    Patient continues to be afebrile HD stable.   Still with significant abdominal tenderness in the RUQ.   Will plan to monitor for now.  No plans for ERCP at this time as this would require reversal of anticoagulation in a patient with mechanical valve who would be at high risk for thrombosis.    Likely hemobilia causing obstruction of the bile duct, CBD noted to be 1.4 cm with hemorrhage on CT.  Hopeful for dissolution of the clot or passage into the small intestine with time.      Recs:  Trend lfts daily  Antibiotics for fever - page GI if c/f cholangitis.   GI will follow     Maureen Rivero  Case discussed with Dr. Rodrigues.

## 2017-02-06 NOTE — PLAN OF CARE
D: Pt understands he is NPO for possible ERCP dependant on his am lab values.  I: Monitored/assessed pt. Assisted with cares.  A: Pt VS stable and rhythm remains paced. Pain controlled with prn oxycodone and schedule morphine. Clonazepam given for anxiety x1. Melatonin given at hs, pt requesting hot packs to hands due to cramping. Senna given for constipation x4 days, unable to get stool sample to r/o CDiff, pt is not having any (loose) stools. Pt is in good spirits and wants to continue to work with PT so he can get stronger and go home.  P: Continue to monitor/assess pt, contact provider with concerns.

## 2017-02-07 ENCOUNTER — APPOINTMENT (OUTPATIENT)
Dept: OCCUPATIONAL THERAPY | Facility: CLINIC | Age: 47
DRG: 286 | End: 2017-02-07
Attending: INTERNAL MEDICINE
Payer: MEDICAID

## 2017-02-07 ENCOUNTER — APPOINTMENT (OUTPATIENT)
Dept: CARDIOLOGY | Facility: CLINIC | Age: 47
DRG: 286 | End: 2017-02-07
Attending: STUDENT IN AN ORGANIZED HEALTH CARE EDUCATION/TRAINING PROGRAM
Payer: MEDICAID

## 2017-02-07 ENCOUNTER — APPOINTMENT (OUTPATIENT)
Dept: PHYSICAL THERAPY | Facility: CLINIC | Age: 47
DRG: 286 | End: 2017-02-07
Attending: INTERNAL MEDICINE
Payer: MEDICAID

## 2017-02-07 LAB
ALBUMIN SERPL-MCNC: 1.9 G/DL (ref 3.4–5)
ALP SERPL-CCNC: 428 U/L (ref 40–150)
ALT SERPL W P-5'-P-CCNC: 95 U/L (ref 0–70)
ANION GAP SERPL CALCULATED.3IONS-SCNC: 15 MMOL/L (ref 3–14)
AST SERPL W P-5'-P-CCNC: 116 U/L (ref 0–45)
BACTERIA SPEC CULT: NO GROWTH
BACTERIA SPEC CULT: NO GROWTH
BILIRUB DIRECT SERPL-MCNC: 4.3 MG/DL (ref 0–0.2)
BILIRUB SERPL-MCNC: 6.1 MG/DL (ref 0.2–1.3)
BLD PROD TYP BPU: NORMAL
BLD UNIT ID BPU: 0
BLOOD PRODUCT CODE: NORMAL
BPU ID: NORMAL
BUN SERPL-MCNC: 34 MG/DL (ref 7–30)
CALCIUM SERPL-MCNC: 7.7 MG/DL (ref 8.5–10.1)
CHLORIDE SERPL-SCNC: 87 MMOL/L (ref 94–109)
CO2 SERPL-SCNC: 20 MMOL/L (ref 20–32)
CREAT SERPL-MCNC: 2.85 MG/DL (ref 0.66–1.25)
ERYTHROCYTE [DISTWIDTH] IN BLOOD BY AUTOMATED COUNT: 21.6 % (ref 10–15)
ERYTHROCYTE [DISTWIDTH] IN BLOOD BY AUTOMATED COUNT: 21.7 % (ref 10–15)
GFR SERPL CREATININE-BSD FRML MDRD: 24 ML/MIN/1.7M2
GLUCOSE SERPL-MCNC: 88 MG/DL (ref 70–99)
HCT VFR BLD AUTO: 19.6 % (ref 40–53)
HCT VFR BLD AUTO: 21.1 % (ref 40–53)
HGB BLD-MCNC: 6.8 G/DL (ref 13.3–17.7)
HGB BLD-MCNC: 7.1 G/DL (ref 13.3–17.7)
INR PPP: 2.67 (ref 0.86–1.14)
MAGNESIUM SERPL-MCNC: 2.3 MG/DL (ref 1.6–2.3)
MCH RBC QN AUTO: 32 PG (ref 26.5–33)
MCH RBC QN AUTO: 33.2 PG (ref 26.5–33)
MCHC RBC AUTO-ENTMCNC: 33.6 G/DL (ref 31.5–36.5)
MCHC RBC AUTO-ENTMCNC: 34.7 G/DL (ref 31.5–36.5)
MCV RBC AUTO: 95 FL (ref 78–100)
MCV RBC AUTO: 96 FL (ref 78–100)
MICRO REPORT STATUS: NORMAL
MICRO REPORT STATUS: NORMAL
PLATELET # BLD AUTO: 125 10E9/L (ref 150–450)
PLATELET # BLD AUTO: 144 10E9/L (ref 150–450)
POTASSIUM SERPL-SCNC: 4 MMOL/L (ref 3.4–5.3)
PROT SERPL-MCNC: 5.5 G/DL (ref 6.8–8.8)
RBC # BLD AUTO: 2.05 10E12/L (ref 4.4–5.9)
RBC # BLD AUTO: 2.22 10E12/L (ref 4.4–5.9)
SODIUM SERPL-SCNC: 122 MMOL/L (ref 133–144)
SPECIMEN SOURCE: NORMAL
SPECIMEN SOURCE: NORMAL
TRANSFUSION STATUS PATIENT QL: NORMAL
TRANSFUSION STATUS PATIENT QL: NORMAL
WBC # BLD AUTO: 5.5 10E9/L (ref 4–11)
WBC # BLD AUTO: 5.7 10E9/L (ref 4–11)

## 2017-02-07 PROCEDURE — 25000132 ZZH RX MED GY IP 250 OP 250 PS 637: Performed by: STUDENT IN AN ORGANIZED HEALTH CARE EDUCATION/TRAINING PROGRAM

## 2017-02-07 PROCEDURE — S0171 BUMETANIDE 0.5 MG: HCPCS | Performed by: STUDENT IN AN ORGANIZED HEALTH CARE EDUCATION/TRAINING PROGRAM

## 2017-02-07 PROCEDURE — 40000133 ZZH STATISTIC OT WARD VISIT

## 2017-02-07 PROCEDURE — 84300 ASSAY OF URINE SODIUM: CPT | Performed by: STUDENT IN AN ORGANIZED HEALTH CARE EDUCATION/TRAINING PROGRAM

## 2017-02-07 PROCEDURE — 80048 BASIC METABOLIC PNL TOTAL CA: CPT | Performed by: INTERNAL MEDICINE

## 2017-02-07 PROCEDURE — 85610 PROTHROMBIN TIME: CPT | Performed by: INTERNAL MEDICINE

## 2017-02-07 PROCEDURE — 83735 ASSAY OF MAGNESIUM: CPT | Performed by: INTERNAL MEDICINE

## 2017-02-07 PROCEDURE — 85027 COMPLETE CBC AUTOMATED: CPT | Performed by: STUDENT IN AN ORGANIZED HEALTH CARE EDUCATION/TRAINING PROGRAM

## 2017-02-07 PROCEDURE — 93308 TTE F-UP OR LMTD: CPT | Mod: 26 | Performed by: INTERNAL MEDICINE

## 2017-02-07 PROCEDURE — 25000132 ZZH RX MED GY IP 250 OP 250 PS 637: Performed by: INTERNAL MEDICINE

## 2017-02-07 PROCEDURE — 21400003 ZZH R&B CCU CRITICAL UMMC

## 2017-02-07 PROCEDURE — 99232 SBSQ HOSP IP/OBS MODERATE 35: CPT | Mod: GC

## 2017-02-07 PROCEDURE — 97140 MANUAL THERAPY 1/> REGIONS: CPT | Mod: GO

## 2017-02-07 PROCEDURE — 86850 RBC ANTIBODY SCREEN: CPT | Performed by: INTERNAL MEDICINE

## 2017-02-07 PROCEDURE — 25000128 H RX IP 250 OP 636: Performed by: INTERNAL MEDICINE

## 2017-02-07 PROCEDURE — 85027 COMPLETE CBC AUTOMATED: CPT | Performed by: INTERNAL MEDICINE

## 2017-02-07 PROCEDURE — 86902 BLOOD TYPE ANTIGEN DONOR EA: CPT | Performed by: INTERNAL MEDICINE

## 2017-02-07 PROCEDURE — 93325 DOPPLER ECHO COLOR FLOW MAPG: CPT | Mod: 26 | Performed by: INTERNAL MEDICINE

## 2017-02-07 PROCEDURE — P9016 RBC LEUKOCYTES REDUCED: HCPCS | Performed by: INTERNAL MEDICINE

## 2017-02-07 PROCEDURE — 25000128 H RX IP 250 OP 636: Performed by: STUDENT IN AN ORGANIZED HEALTH CARE EDUCATION/TRAINING PROGRAM

## 2017-02-07 PROCEDURE — 25000125 ZZHC RX 250: Performed by: INTERNAL MEDICINE

## 2017-02-07 PROCEDURE — 86922 COMPATIBILITY TEST ANTIGLOB: CPT | Performed by: INTERNAL MEDICINE

## 2017-02-07 PROCEDURE — 93321 DOPPLER ECHO F-UP/LMTD STD: CPT | Mod: 26 | Performed by: INTERNAL MEDICINE

## 2017-02-07 PROCEDURE — 93308 TTE F-UP OR LMTD: CPT

## 2017-02-07 PROCEDURE — 40000193 ZZH STATISTIC PT WARD VISIT

## 2017-02-07 PROCEDURE — 82565 ASSAY OF CREATININE: CPT | Performed by: INTERNAL MEDICINE

## 2017-02-07 PROCEDURE — 25000125 ZZHC RX 250: Performed by: STUDENT IN AN ORGANIZED HEALTH CARE EDUCATION/TRAINING PROGRAM

## 2017-02-07 PROCEDURE — 80076 HEPATIC FUNCTION PANEL: CPT | Performed by: INTERNAL MEDICINE

## 2017-02-07 PROCEDURE — 86901 BLOOD TYPING SEROLOGIC RH(D): CPT | Performed by: INTERNAL MEDICINE

## 2017-02-07 PROCEDURE — 36592 COLLECT BLOOD FROM PICC: CPT | Performed by: STUDENT IN AN ORGANIZED HEALTH CARE EDUCATION/TRAINING PROGRAM

## 2017-02-07 PROCEDURE — 86900 BLOOD TYPING SEROLOGIC ABO: CPT | Performed by: INTERNAL MEDICINE

## 2017-02-07 PROCEDURE — 40000802 ZZH SITE CHECK

## 2017-02-07 PROCEDURE — 97530 THERAPEUTIC ACTIVITIES: CPT | Mod: GP

## 2017-02-07 RX ORDER — BUMETANIDE 0.25 MG/ML
2 INJECTION INTRAMUSCULAR; INTRAVENOUS ONCE
Status: COMPLETED | OUTPATIENT
Start: 2017-02-07 | End: 2017-02-07

## 2017-02-07 RX ORDER — HEPARIN SODIUM 10000 [USP'U]/100ML
0-3500 INJECTION, SOLUTION INTRAVENOUS CONTINUOUS
Status: DISCONTINUED | OUTPATIENT
Start: 2017-02-07 | End: 2017-02-10

## 2017-02-07 RX ADMIN — Medication: at 20:57

## 2017-02-07 RX ADMIN — OXYCODONE HYDROCHLORIDE 5 MG: 5 TABLET ORAL at 06:53

## 2017-02-07 RX ADMIN — BUMETANIDE 2 MG: 0.25 INJECTION, SOLUTION INTRAMUSCULAR; INTRAVENOUS at 00:36

## 2017-02-07 RX ADMIN — MELATONIN TAB 3 MG 3 MG: 3 TAB at 00:37

## 2017-02-07 RX ADMIN — Medication: at 08:55

## 2017-02-07 RX ADMIN — MORPHINE SULFATE 15 MG: 15 TABLET, EXTENDED RELEASE ORAL at 08:52

## 2017-02-07 RX ADMIN — MULTIPLE VITAMINS W/ MINERALS TAB 1 TABLET: TAB at 08:52

## 2017-02-07 RX ADMIN — DOCUSATE SODIUM 100 MG: 100 CAPSULE, LIQUID FILLED ORAL at 20:54

## 2017-02-07 RX ADMIN — MORPHINE SULFATE 15 MG: 15 TABLET, EXTENDED RELEASE ORAL at 20:55

## 2017-02-07 RX ADMIN — Medication 12.5 MG: at 06:50

## 2017-02-07 RX ADMIN — LEVOTHYROXINE SODIUM 175 MCG: 25 TABLET ORAL at 06:50

## 2017-02-07 RX ADMIN — PANTOPRAZOLE SODIUM 40 MG: 40 INJECTION, POWDER, FOR SOLUTION INTRAVENOUS at 08:53

## 2017-02-07 RX ADMIN — PIPERACILLIN AND TAZOBACTAM 3.38 G: 3; .375 INJECTION, POWDER, FOR SOLUTION INTRAVENOUS at 18:06

## 2017-02-07 RX ADMIN — OXYCODONE HYDROCHLORIDE 5 MG: 5 TABLET ORAL at 20:54

## 2017-02-07 RX ADMIN — SENNOSIDES 1 TABLET: 8.6 TABLET, FILM COATED ORAL at 08:53

## 2017-02-07 RX ADMIN — DOCUSATE SODIUM 100 MG: 100 CAPSULE, LIQUID FILLED ORAL at 08:53

## 2017-02-07 RX ADMIN — PANTOPRAZOLE SODIUM 40 MG: 40 INJECTION, POWDER, FOR SOLUTION INTRAVENOUS at 20:55

## 2017-02-07 RX ADMIN — Medication 0.25 MG: at 09:08

## 2017-02-07 RX ADMIN — Medication 0.25 MG: at 00:44

## 2017-02-07 RX ADMIN — OXYCODONE HYDROCHLORIDE 5 MG: 5 TABLET ORAL at 00:37

## 2017-02-07 RX ADMIN — PIPERACILLIN AND TAZOBACTAM 3.38 G: 3; .375 INJECTION, POWDER, FOR SOLUTION INTRAVENOUS at 00:37

## 2017-02-07 RX ADMIN — HEPARIN SODIUM 1200 UNITS/HR: 10000 INJECTION, SOLUTION INTRAVENOUS at 17:04

## 2017-02-07 RX ADMIN — SENNOSIDES 1 TABLET: 8.6 TABLET, FILM COATED ORAL at 20:55

## 2017-02-07 RX ADMIN — PIPERACILLIN AND TAZOBACTAM 3.38 G: 3; .375 INJECTION, POWDER, FOR SOLUTION INTRAVENOUS at 06:50

## 2017-02-07 RX ADMIN — PIPERACILLIN AND TAZOBACTAM 3.38 G: 3; .375 INJECTION, POWDER, FOR SOLUTION INTRAVENOUS at 12:17

## 2017-02-07 RX ADMIN — OXYCODONE HYDROCHLORIDE 5 MG: 5 TABLET ORAL at 12:16

## 2017-02-07 RX ADMIN — POTASSIUM CHLORIDE 20 MEQ: 750 TABLET, EXTENDED RELEASE ORAL at 09:08

## 2017-02-07 RX ADMIN — POLYETHYLENE GLYCOL 3350 17 G: 17 POWDER, FOR SOLUTION ORAL at 08:59

## 2017-02-07 RX ADMIN — LIDOCAINE 2 PATCH: 50 PATCH CUTANEOUS at 20:55

## 2017-02-07 ASSESSMENT — PAIN DESCRIPTION - DESCRIPTORS: DESCRIPTORS: DISCOMFORT

## 2017-02-07 NOTE — PLAN OF CARE
"Problem: Goal Outcome Summary  Goal: Goal Outcome Summary  OT/Edema 6C: Patient tolerating wear of GCB well, however, with continued 3+ pitting edema from MTP to groin and increases in 10/12 circumferential measurements. Patient will continue to benefit from wear of compression to aid in fluid mobilization, skin integrity and comfort. Wound cares performed by nursing and wraps applied from MTP to groin on (R) LE and MTP to knee crease on (L) LE using \"Quick Wrap Technique\". Patient may wear wraps x24-48 hours, however, please remove immediately if increased pain, numbness/tingling or soiling occurs.        "

## 2017-02-07 NOTE — PLAN OF CARE
Problem: Cardiac: Heart Failure (Adult)  Goal: Signs and Symptoms of Listed Potential Problems Will be Absent or Manageable (Cardiac: Heart Failure)  Signs and symptoms of listed potential problems will be absent or manageable by discharge/transition of care (reference Cardiac: Heart Failure (Adult) CPG).   Outcome: No Change  D/days reported that he said he had a lot more edema.   I/Bumex drip started when arrived on unit. Name and action explained to him.   D/He was pleased that the MD ordered this to help him  A/no pain patches on at present. Day nurse says both go on overnight and come off in am  P/monitor for changes  I/per his routine and preferences 2 icy hot patches to right shoulder, and 2 Lido patches to back, one mid back and right flank on back  D/was in bed with special mattress, and wants to move to recliner for the night  P/move when he is ready

## 2017-02-07 NOTE — PROGRESS NOTES
Focus:  Palliative Care    D:  Samir is a 46 year old male who was transferred from [facility] for further eval of CHF and concern of hemolysis in setting of mechanical valve. Palliative care consult for goals of care and support in setting of complex chronic illness with possible poor access to advanced therapies due to comorbid problems.      I/A:  I attempted to meet with Samir on two occasions this afternoon, he was not available at the time of my visits.     P:  I will continue with assessment and intervention as indicated. Plan to follow up later this week. Will consult with Palliative Care team members during interdisciplinary rounds tomorrow and will collaborate with Cardiology service.    RERE Bhat, [facility]  Palliative Care Clinical Social Work Fellow  Pager: [phone]

## 2017-02-07 NOTE — PROGRESS NOTES
GASTROENTEROLOGY PROGRESS NOTE      ASSESSMENT/ RECOMMENDATIONS:    Assessment:    46 year old male with a history of rheumatic heart disease s/p mechanical MVR x2 on warfarin, biventricular heart failure s/p ICD, chronic afib on amiodarone and digoxin (stoppped 1/16 due to toxicity, WPW ablation,  CKD, cholecystectomy who was transferred from [facility] for further evaluation of CHF and possible hemolysis and for LVAD evaluation. He underwent transjugular liver biopsy on 1/30. Patient started having abdominal pain and GI bleed. No HD instability. Hb dropped from 8.9 --> 7.7. He was also on IV heparin and warfarin for his mechanical mitral valve. Most likely source of bleeding was hemobilia based on clinical course and CT finding. CTA was done on 2/2/17 and was negative for active extravasation.  -  Also has elevated LFTs with bilirubin peaked to 10.4 currently downtrending with 6.1 today. No signs of cholangitis, also underlying liver cirrhosis, biopsy showed advanced chronic liver disease (stage 3-4) of uncertain etiology, most likely from amiodarone toxicity. He had normal hepatic venous pressure gradient (HVPG). Elevated LFTs most likely from congestive hepatopathy, possible  Hemobilia. Also questionable mesenteric ischemia, based on CT scan. No bleeding overnight currently,  stable hemoglobin. GI bleeding most likely from resolving hemobilia, also possibility of oozing in setting of anticoagulation with underlying gastritis. RUQ pain multifactorial including post procedure, hepatic congestion from CHF, biliary colic.       Recommendations    Recommend conservative management at this point.     No plan for ERCP at this point as no signs of cholangitis, bilirubin downtrending.  No plan for EGD currently because of stable hemoglobin and no significant recurrent bleeding. Unlikely to have varices based on normal HVPG.    Continue CBC daily, with goal Hb according to primary team.       Continue IV PPI BID, switch to oral when eating.     Serial abdominal exams.    Aggressive bowel regimen.     CHF management according to primary team.     Also need to clarify goals of care as patient not candidate for heart transplant/LVAD or valvular surgery.     Gastroenterology follow up recommendations: TBD      The patient was discussed and plan agreed upon with GI staff, Dr. Rodrigues.     Tej Mejia  [facility]  Gastroenterology Fellow  _______________________________________________________________    S: no BM or bleeding overnight, last BM couple of days ago, passing gas,  still same RUQ pain radiating to back, looks comfortable,  afebrile, denied any vomiting, hematemesis.     O:  Blood pressure 82/44, pulse 80, temperature 98.6  F (37  C), temperature source Oral, resp. rate 12, height 1.829 m (6'), weight 100.245 kg (221 lb), SpO2 94 %.    Gen: no distress  CV: normal S1, S2, systolic murmur  Lungs: CTAB  Abd: soft, tenderness in RUQ, epigastric region, normal BS.   Neuro: no focal motor sensory deficits.       LABS:  BMP    Recent Labs  Lab 02/07/17 0735 02/06/17 2201 02/06/17 2038 02/06/17  0718   * 123* 124* 124*   POTASSIUM 4.0 3.8 4.2 4.1   CHLORIDE 87* 90* 91* 90*   DELFINO 7.7* 7.6* 7.2* 7.8*   CO2 20 19* 20 21   BUN 34* 32* 33* 32*   CR 2.85* 2.72* 2.68* 2.55*   GLC 88 90 96 88     CBC    Recent Labs  Lab 02/07/17 0735 02/06/17 2201 02/06/17 0718 02/05/17  0848   WBC 5.7 5.5 5.9 7.1   RBC 2.22* 2.20* 2.24* 2.28*   HGB 7.1* 7.2* 7.2* 7.4*   HCT 21.1* 20.8* 21.4* 21.8*   MCV 95 95 96 96   MCH 32.0 32.7 32.1 32.5   MCHC 33.6 34.6 33.6 33.9   RDW 21.7* 21.5* 21.5* 21.3*   * 122* 164 165     INR    Recent Labs  Lab 02/07/17  0735 02/06/17  0718 02/05/17  0848 02/04/17  0715   INR 2.67* 2.70* 3.31* 2.76*     LFTs    Recent Labs  Lab 02/07/17  0735 02/06/17  0718 02/05/17  0848 02/04/17  0715   ALKPHOS 428* 449* 467* 446*   * 130* 140*  138*   ALT 95* 101* 100* 98*   BILITOTAL 6.1* 6.3* 8.6* 10.4*   PROTTOTAL 5.5* 5.6* 5.4* 5.5*   ALBUMIN 1.9* 2.0* 2.0* 2.1*      PANC    Recent Labs  Lab 02/01/17  1829   LIPASE 655*

## 2017-02-07 NOTE — PROGRESS NOTES
"SPIRITUAL HEALTH SERVICES  SPIRITUAL ASSESSMENT Progress Note (Palliative Focus)  [employer] [facility] 6C    Pt now being followed by Palliative Care Service. I visited with pt after reviewing note from VILMA Rodriguez CNP of the palliative team.  Pt shared that:     \"I'm doing pretty good, feel like I'm improving...\"     he feels that his pain is more under control      \"my  is working on getting me to a transitional care unit closer to home, but it has not been easy. If I can't get to a TCU close to home, then I might just go home...\"     he \"doesn't need heart surgery...they'll try to manage it with medications\"  Pt requested prayers for \"improvement in my blood pressure\" , and \"for me to have a bowel movement...\"   When I asked pt how he feels he is doing overall, pt said \"I'm getting better\".  I shared prayer with pt, and will continue to follow, visiting again later this week. I will be gone Wednesday, returning Thursday. On-call  support always available.  Gian Luciano) Lizbeth Mathias M.Div., [facility]  Staff   Pager [phone]       "

## 2017-02-07 NOTE — PLAN OF CARE
Problem: Cardiac: Heart Failure (Adult)  Goal: Signs and Symptoms of Listed Potential Problems Will be Absent or Manageable (Cardiac: Heart Failure)  Signs and symptoms of listed potential problems will be absent or manageable by discharge/transition of care (reference Cardiac: Heart Failure (Adult) CPG).   I/order for Heparin drip start and request for IR to place dialysis access. I called IR to see if he will get done tonight as I would start the Heparin after the central line is placed. If he is not getting his line placed, I will start Heparin drip tonight, and then tomorrow IR can let us know when to turn it off prior to their procedure. IR nurse said he was not on the schedule yet, and would call me.   D/IR RN just called me and they do not want dialysis access placed tonight, so start the Heparin drip  A/Bumex drip is dc'd   I/I told him that they want to place a dialysis access tomorrow to remove fluid. He says he was on dialysis before. He said renal talked to him today, He also says he did not agree to have dialysis again, and he is not sure he wants the catheter placed. Reviewed with him that Bumex is off, and Heparin is starting.  A/He is up in the recliner and is napping, but wakes up easily.   P/monitor for changes  A/butt dressing in place, sleeps in recliner, up to commode with help of 3 and belt, severe edema in perineum, scrotum, penis, hips and buttocks  P/monitor for changes    Transfusion  D/blood bank called and said he has positive antibody and so it will be a while until blood is ready  P/transfuse when ready

## 2017-02-07 NOTE — PLAN OF CARE
Problem: Goal Outcome Summary  Goal: Goal Outcome Summary  Outcome: No Change    Pt alert and oriented, soft BP's- team aware.  Bumex gtt continues at 1mg/hr.  225 UOP this shfit, team aware.  Paced rhythm.  5mg PRN oxy and scheduled morphine for pain.  No BM- bowel program meds given, prune juice with breakfast.  Pt up with therapy today.  Off enteric precautions.  Antibiotics per orders.  Endocrine consult, possible HD?  Continue with plan of care and notify team with changes/concerns.  Urine sample still needed.

## 2017-02-07 NOTE — PLAN OF CARE
Problem: Goal Outcome Summary  Goal: Goal Outcome Summary  Outcome: No Change  Pt VSS with soft BP. Continues on bumex drip which has been increased to 1 mg/hr after 2mg Bolus. Pt has not had any more UO since 2200 last night. Pt is 100% paced at 80 BPM. Pain treated with 5mg oxy. Pt has not had a BM since 2/1/16 however he is passing gas. Will recommend to take pt off enteric precautions. Will continue to monitor and address as needed.

## 2017-02-07 NOTE — PROGRESS NOTES
T, 98; spO2 95%, BP, 86/43, 100% paced Rhythm at 80 BPM. I/O 610/225. No bowel movement. Pain treatment with Morphine and oxy. Enteric precaution is taken off.  Wound care for both heels. Eating well. Will continue to monitor.

## 2017-02-07 NOTE — PROGRESS NOTES
Cardiology 1 Progress Note    Samir Rodriguez MRN# [id number]   Age: 46 year old YOB: 1970   Date of Admission: 1/12/2017           Assessment and Plan:   Samir Rodriguez is a 46 year old  male with past medical history of rheumatic heart disease s/p mechanical MVR x 2 (1980s and 1992) on warfarin (INR goal 2.5-3.5), biventricular CHF (EF 25-30%) s/p dual chamber ICD 2008, chronic afib on amiodarone and previously on digoxine (stopped 1/16/17 due to concern for toxicity), WPW ablation at age 11, CKD III, hypothyroid,  who was transferred from [facility] on 01/12 for further eval of CHF and concern of hemolysis in setting of mechanical valve. Subsequently diagnosed with cirrhosis on liver biopsy and was deemed not to be a candidate for valve replacement or for LVAD.     Today:  IV diuresis: restart bumex gtt with bolus   Increase frequency of PRN oxy and decrease dose.    Start bowel regimen  Fluid restriction 1.5L/day  Free T3/T4  TCU when euvolemic    # Non-ischemic dilated cardiomyopathy 2/2 valvular heart disease  # Acute on chronic systolic heart failure, EF 37% (12/2016) s/p dual chamber ICD 2008  NYHA class III. Hypervolemic  - Bumex gtt with bolus   - continue hydralazine 12.5 mg TID for afterload reduction  - hold lisinopril 2.5 due to hypotension  - cont holding metoprolol and spironolactone for now  - will check FT3/T4 due to low TSH    # CARSON, worsening- likely cardiorenal with TTE with dilated IVC without respiratory variation (also received IVF overnight due to lactic acidosis).  Monitor, should improve with improved diuresis.  Pt also received 100mL IV contrast.   -diuresis as tolerated   -afterload reduction  -Daily BMP    # Mechanical mitral valve (MV diastolic gradient 7)  # Aortic Insufficiency (mod - severe)  # TR (moderate)  History of BiV failure attributed to valvular disease. Echocardiogram on admission demonstrated moderate-severe  aortic insufficiency which is his most acute cardiac pathology. His tricuspid regurgitation is likely due to his signficantly fluid overloaded state. Mechanical valve maintained on warfarin w/ INR 2.5-3.5 prior to this admission. Angiogram performed on 01/20 revealed nonobstructive CAD. EDEL shows probable restriction of one of the mitral valve leaflets. However, he is not a if candidate for valvular surgery due to cirrhosis diagnosed on liver biopsy  - Tooth extractions cancelled, as patient is no longer valve replacement candidate    #. Cirrhosis and liver nodularity  Demonstrated on CT, although u/s was normal. CT read is concerning for amiodarone toxicity. Liver biopsy 1/30 with evidence of advanced chronic liver disease (stage 3 or 4) of unclear etiology (amiodarone toxicity is possible). Consequently, not a candidate for LVAD or for valve replacements. Discussed these results and their consequence with patient and his son Sid.  -No LVAD  -No CT surgery/ valve replacement    # Lactic acidosis- resolved  # Possible hemobilia  # Mesenteric ischemia  # Hematochezia, ?GIB  # Acute on chronic anemia  Lactate now normal and no further episodes of bleeding. Still with severe RUQ abdominal pain likely worse with hypervolemia. Likely source of bleeding was complication from IR liver biopsy causing hemobilia. No procedure scheduled at this time, as there is currently no evidence of bleeding. CTA abdo/pelvis without evidence of bleeding.  -EGD if patient starts to bleed again.  -IR aware  -Daily MELD labs  -Surgery consulted- appreciate recs  -Daily Hgb  -s/p 1u pRBC    # Anemia and thrombocytopenia  # Right Arm Hematoma   Blood smear: blood smear- RBCs show some target cells, hypochromia, increased polychromasia, no schistocytes or spherocytes. Low haptoglobin, high LDH, and plasma free hemoglobin concerning for intra-vascular hemolysis possibly due to valvular disease. No evidence of overt infection causing DIC. EPO  inappropriately low w/ recent ARF. Etiology of hematoma likely due to supratherapeutic INR- 4.6 on arrival  - warfarin w/ goal INR 2.5-3.0   - restarted warfarin 1/30    # Paroxsymal afib:  digoxin level 0.7 on 1/12 and 1/17. 1/16/17 device interrogation: atrial tachycardia to 150s with AV block. Lower rate setting changed to 80bpm from 60bmp and device changed from DDDR to VVIR on 01/16; atrial tachycardia and AV block and V pacing. The patient has been in an atrial flutter that is undersensed, hence the device was switched to VVI mode. There is no point continuing amiodarone at this stage especially with concern for toxicity  - discontinued pta amiodarone.    # Hyperthyroid: Will need repeat TSH/T4 1-2 months post DC. Decreased levothyroxine from 224mcg to 175 mcg   - Free T3/T4.     # Anxiety and insomnia: Increased clonazepam to tid    # Right tibia fx and R knee trauma:  Fall in November prompted decompensation. Xray tibia and knee no fx this admission.  - Ortho evaluated; planning for outpatient follow up with orthopedic surgery in 4-6 weeks  - PT/OT    # Headaches  May be due to near-sightedness vs rebound headache from opioid use vs reduced cardiac output  - pain consult with pain for opioid titration  - eye exam 1/27- received new Rx  - using lidocaine patches  - PRN zofran    # Pulmonary nodules: incidentally found on CT  - repeat CT chest in 6 months    FEN: 2 g Na diet, 1.5 L fluid restriction  PPX: on heparin gtt, warfarin  Dispo: Difficult placement due to out of state Medicaid. TCU when euvolemic    Code Status: Full CODE      Patient discussed with staff attending, Dr. Skinner.    Amador Carter MD  PGY-2, IM   Pager: [phone]  ------------------------------------------------------------------------------------------------------------------  [facility] Vascular Medicine/Cardiovascular Division  Attending  ------------------------------------------------------------------------------------------------------------------    I have seen and examined the patient and the note above reflects our mutual assessment and plan.    Each complex care issue is accurately detailed, above.  The need to establish a diuretic regime that can maintain clinical stability is our first priority.     Griffin Skinner MD  Director, Vascular Medicine Program  Professor of Medicine, Epidemiology and Community Health  [facility] [place]  [address]    Tel: [phone]  Fax: [phone]  Pager: [phone]    Vascular medicine nurse clinician: Vaughn Weinberg - Office [phone]  : Amie Puentes - Office [phone]         Interval History/Subjective   No acute events overnight.  Pleasant.  Pain is better controlled today, but would like less meds more frequently.  He thinks he's not diuresing as well as past few days.  No CP/SOB, nausea/vomiting, palpitations.  LE edema improved slightly per his report.           Objective   Temp:  [97.6  F (36.4  C)-97.9  F (36.6  C)] 97.8  F (36.6  C)  Heart Rate:  [79-82] 80  Resp:  [18] 18  BP: (83-94)/(46-57) 83/50 mmHg  SpO2:  [95 %-97 %] 95 %    Physical exam:  Gen: AA&Ox3, no acute distress  HEENT: NACT, poor dentition   PULM: Clear lungs  CV: RRR; mechanical s2 w/ 2+ systolic murmur at LUSB and LISB: JVD+ to mid neck while sitting at 90 degrees   ABD:  soft, nontender, nondistended.  BS+  EXT: trace sonam edema to knees. 1+ peripheral UE/LE pulses   NEURO: alert and oriented; speech intact, CN II-XII grossly intact.          Data:   CBC    Recent Labs  Lab 02/06/17  0718 02/05/17  0848 02/04/17  0715 02/03/17  1411 02/03/17  0530   WBC 5.9 7.1 6.6  --  5.7   RBC 2.24* 2.28* 2.38*  --  2.26*   HGB 7.2* 7.4* 7.6* 7.8* 7.3*   HCT 21.4* 21.8* 22.4*  --  21.1*   MCV 96 96 94  --  93   MCH 32.1 32.5 31.9  --  32.3   MCHC 33.6 33.9 33.9  --   34.6   RDW 21.5* 21.3* 21.1*  --  20.7*    165 163  --  151     CMP    Recent Labs  Lab 02/06/17 0718 02/05/17 2127 02/05/17  0848 02/04/17  0715 02/03/17  0530   * 124* 126* 128* 131*   POTASSIUM 4.1 4.2 4.1 4.3 4.7   CHLORIDE 90* 91* 92* 94 98   CO2 21 20 21 22 24   ANIONGAP 13 13 14 12 10   GLC 88 96 98 96 86   BUN 32* 32* 29 26 24   CR 2.55* 2.39* 2.05* 1.66* 1.48*   GFRESTIMATED 27* 29* 35* 44* 51*   GFRESTBLACK 33* 35* 42* 54* 61   DELFINO 7.8* 7.4* 7.4* 7.6* 7.3*   MAG 2.2 2.1 2.1 2.2 2.2   PROTTOTAL 5.6*  --  5.4* 5.5* 5.1*   ALBUMIN 2.0*  --  2.0* 2.1* 1.9*   BILITOTAL 6.3*  --  8.6* 10.4* 8.6*   ALKPHOS 449*  --  467* 446* 364*   *  --  140* 138* 124*   *  --  100* 98* 82*     INR    Recent Labs  Lab 02/06/17 0718 02/05/17  0848 02/04/17  0715 02/03/17  0530   INR 2.70* 3.31* 2.76* 3.24*            Medications:     Current Facility-Administered Medications   Medication     [START ON 2/7/2017] polyethylene glycol (MIRALAX/GLYCOLAX) Packet 17 g     sennosides (SENOKOT) tablet 1 tablet     bumetanide (BUMEX) 0.25 mg/mL infusion     oxyCODONE (ROXICODONE) IR tablet 5 mg     hydrALAZINE (APRESOLINE) half-tab 12.5 mg     morphine (MS CONTIN) 12 hr tablet 15 mg     heparin lock flush 10 UNIT/ML injection 2-5 mL     lidocaine 1 % injection 0.5-1 mL     lidocaine (LMX4) kit     sodium chloride (PF) 0.9% PF flush 1-10 mL     sodium chloride (PF) 0.9% PF flush 5-10 mL     lidocaine 1 % 1 mL     sodium chloride (PF) 0.9% PF flush 10-20 mL     sodium chloride (PF) 0.9% PF flush 10 mL     piperacillin-tazobactam (ZOSYN) 3.375 g vial to attach to  mL bag     pantoprazole (PROTONIX) 40 mg IV push injection     levothyroxine (SYNTHROID/LEVOTHROID) tablet 175 mcg     Warfarin Therapy Reminder (Check START DATE - warfarin may be starting in the FUTURE)     clonazePAM (klonoPIN) half-tab 0.25 mg     menthol (ICY HOT) 5 % patch 1 patch    And     menthol (ICY HOT) Patch in Place    And      menthol (ICY HOT) patch REMOVAL     ondansetron (ZOFRAN) tablet 4 mg     lidocaine (LIDODERM) 5 % Patch 1-3 patch    And     lidocaine (LIDODERM) patch REMOVAL    And     lidocaine (LIDODERM) Patch in Place     docusate sodium (COLACE) capsule 100 mg     potassium chloride SA (K-DUR/KLOR-CON M) CR tablet 20-40 mEq     potassium chloride (KLOR-CON) Packet 20-40 mEq     potassium chloride 10 mEq in 100 mL intermittent infusion     potassium chloride 10 mEq in 100 mL intermittent infusion with 10 mg lidocaine     potassium chloride 20 mEq in 50 mL intermittent infusion     magnesium sulfate 2 g in NS intermittent infusion (PharMEDium or FV Cmpd)     magnesium sulfate 4 g in 100 mL sterile water (premade)     potassium phosphate 15 mmol in D5W 250 mL intermittent infusion     potassium phosphate 20 mmol in D5W 500 mL intermittent infusion     potassium phosphate 25 mmol in D5W 500 mL intermittent infusion     multivitamin, therapeutic with minerals (THERA-VIT-M) tablet 1 tablet     melatonin tablet 3 mg     miconazole (MICATIN; MICRO GUARD) 2 % powder     sodium chloride (PF) 0.9% PF flush 3 mL     sodium chloride (PF) 0.9% PF flush 3 mL     medication instruction     nitroglycerin (NITROSTAT) sublingual tablet 0.4 mg     alum & mag hydroxide-simethicone (MYLANTA ES/MAALOX  ES) suspension 15-30 mL     albuterol (PROAIR HFA/PROVENTIL HFA/VENTOLIN HFA) Inhaler 2 puff     sennosides (SENOKOT) tablet 8.6 mg     naloxone (NARCAN) injection 0.1-0.4 mg

## 2017-02-07 NOTE — PLAN OF CARE
Problem: Goal Outcome Summary  Goal: Goal Outcome Summary  OT6C: CX: Pt refused OT session upon attempt this pm. Asked to be seen tomorrow.

## 2017-02-07 NOTE — PLAN OF CARE
Problem: Goal Outcome Summary  Goal: Goal Outcome Summary  PT 6C: STS x3 with mod-max Ax2 and FWW. Continued difficulty with leaning forward throughout transfer.  C/o pain and fatigue.  PT recommends d/c to TCU when medically stable.

## 2017-02-08 ENCOUNTER — APPOINTMENT (OUTPATIENT)
Dept: OCCUPATIONAL THERAPY | Facility: CLINIC | Age: 47
DRG: 286 | End: 2017-02-08
Attending: INTERNAL MEDICINE
Payer: MEDICAID

## 2017-02-08 ENCOUNTER — APPOINTMENT (OUTPATIENT)
Dept: PHYSICAL THERAPY | Facility: CLINIC | Age: 47
DRG: 286 | End: 2017-02-08
Attending: INTERNAL MEDICINE
Payer: MEDICAID

## 2017-02-08 LAB
ALBUMIN SERPL-MCNC: 1.7 G/DL (ref 3.4–5)
ALP SERPL-CCNC: 378 U/L (ref 40–150)
ALT SERPL W P-5'-P-CCNC: 83 U/L (ref 0–70)
ANION GAP SERPL CALCULATED.3IONS-SCNC: 14 MMOL/L (ref 3–14)
AST SERPL W P-5'-P-CCNC: 103 U/L (ref 0–45)
BILIRUB DIRECT SERPL-MCNC: 3.9 MG/DL (ref 0–0.2)
BILIRUB SERPL-MCNC: 5.3 MG/DL (ref 0.2–1.3)
BLD PROD TYP BPU: NORMAL
BLD UNIT ID BPU: 0
BLOOD PRODUCT CODE: NORMAL
BPU ID: NORMAL
BUN SERPL-MCNC: 35 MG/DL (ref 7–30)
CALCIUM SERPL-MCNC: 7.3 MG/DL (ref 8.5–10.1)
CHLORIDE SERPL-SCNC: 92 MMOL/L (ref 94–109)
CO2 SERPL-SCNC: 20 MMOL/L (ref 20–32)
CREAT SERPL-MCNC: 3.1 MG/DL (ref 0.66–1.25)
ERYTHROCYTE [DISTWIDTH] IN BLOOD BY AUTOMATED COUNT: 20.8 % (ref 10–15)
GFR SERPL CREATININE-BSD FRML MDRD: 22 ML/MIN/1.7M2
GLUCOSE SERPL-MCNC: 95 MG/DL (ref 70–99)
HCT VFR BLD AUTO: 22.5 % (ref 40–53)
HGB BLD-MCNC: 7.6 G/DL (ref 13.3–17.7)
INR PPP: 2.19 (ref 0.86–1.14)
INR PPP: 2.39 (ref 0.86–1.14)
INR PPP: 2.83 (ref 0.86–1.14)
INR PPP: 3.08 (ref 0.86–1.14)
LMWH PPP CHRO-ACNC: 0.25 IU/ML
MAGNESIUM SERPL-MCNC: 2.2 MG/DL (ref 1.6–2.3)
MCH RBC QN AUTO: 31.8 PG (ref 26.5–33)
MCHC RBC AUTO-ENTMCNC: 33.8 G/DL (ref 31.5–36.5)
MCV RBC AUTO: 94 FL (ref 78–100)
NUM BPU REQUESTED: 2
PLATELET # BLD AUTO: 126 10E9/L (ref 150–450)
POTASSIUM SERPL-SCNC: 4.2 MMOL/L (ref 3.4–5.3)
PROT SERPL-MCNC: 5.1 G/DL (ref 6.8–8.8)
RBC # BLD AUTO: 2.39 10E12/L (ref 4.4–5.9)
SODIUM SERPL-SCNC: 125 MMOL/L (ref 133–144)
TRANSFUSION STATUS PATIENT QL: NORMAL
WBC # BLD AUTO: 6.3 10E9/L (ref 4–11)

## 2017-02-08 PROCEDURE — 25000125 ZZHC RX 250: Performed by: STUDENT IN AN ORGANIZED HEALTH CARE EDUCATION/TRAINING PROGRAM

## 2017-02-08 PROCEDURE — 97530 THERAPEUTIC ACTIVITIES: CPT | Mod: GO | Performed by: OCCUPATIONAL THERAPIST

## 2017-02-08 PROCEDURE — 85610 PROTHROMBIN TIME: CPT | Performed by: INTERNAL MEDICINE

## 2017-02-08 PROCEDURE — 36592 COLLECT BLOOD FROM PICC: CPT

## 2017-02-08 PROCEDURE — 25000128 H RX IP 250 OP 636: Performed by: STUDENT IN AN ORGANIZED HEALTH CARE EDUCATION/TRAINING PROGRAM

## 2017-02-08 PROCEDURE — P9059 PLASMA, FRZ BETWEEN 8-24HOUR: HCPCS | Performed by: STUDENT IN AN ORGANIZED HEALTH CARE EDUCATION/TRAINING PROGRAM

## 2017-02-08 PROCEDURE — 25000132 ZZH RX MED GY IP 250 OP 250 PS 637: Performed by: STUDENT IN AN ORGANIZED HEALTH CARE EDUCATION/TRAINING PROGRAM

## 2017-02-08 PROCEDURE — 40000802 ZZH SITE CHECK

## 2017-02-08 PROCEDURE — 25000128 H RX IP 250 OP 636: Performed by: INTERNAL MEDICINE

## 2017-02-08 PROCEDURE — 21400003 ZZH R&B CCU CRITICAL UMMC

## 2017-02-08 PROCEDURE — 40000133 ZZH STATISTIC OT WARD VISIT: Performed by: OCCUPATIONAL THERAPIST

## 2017-02-08 PROCEDURE — 85520 HEPARIN ASSAY: CPT | Performed by: INTERNAL MEDICINE

## 2017-02-08 PROCEDURE — 40000344 ZZHCL STATISTIC THAWING COMPONENT: Performed by: STUDENT IN AN ORGANIZED HEALTH CARE EDUCATION/TRAINING PROGRAM

## 2017-02-08 PROCEDURE — 36592 COLLECT BLOOD FROM PICC: CPT | Performed by: STUDENT IN AN ORGANIZED HEALTH CARE EDUCATION/TRAINING PROGRAM

## 2017-02-08 PROCEDURE — 25000132 ZZH RX MED GY IP 250 OP 250 PS 637: Performed by: INTERNAL MEDICINE

## 2017-02-08 PROCEDURE — 40000133 ZZH STATISTIC OT WARD VISIT

## 2017-02-08 PROCEDURE — 97140 MANUAL THERAPY 1/> REGIONS: CPT | Mod: GO

## 2017-02-08 PROCEDURE — 25000125 ZZHC RX 250: Performed by: INTERNAL MEDICINE

## 2017-02-08 PROCEDURE — 83735 ASSAY OF MAGNESIUM: CPT | Performed by: INTERNAL MEDICINE

## 2017-02-08 PROCEDURE — 80048 BASIC METABOLIC PNL TOTAL CA: CPT | Performed by: INTERNAL MEDICINE

## 2017-02-08 PROCEDURE — 80076 HEPATIC FUNCTION PANEL: CPT | Performed by: INTERNAL MEDICINE

## 2017-02-08 PROCEDURE — 99207 ZZC CDG-CORRECTLY CODED, REVIEWED AND AGREE: CPT | Performed by: INTERNAL MEDICINE

## 2017-02-08 PROCEDURE — 97530 THERAPEUTIC ACTIVITIES: CPT | Mod: GP

## 2017-02-08 PROCEDURE — 99233 SBSQ HOSP IP/OBS HIGH 50: CPT | Performed by: INTERNAL MEDICINE

## 2017-02-08 PROCEDURE — 85610 PROTHROMBIN TIME: CPT | Performed by: STUDENT IN AN ORGANIZED HEALTH CARE EDUCATION/TRAINING PROGRAM

## 2017-02-08 PROCEDURE — 40000193 ZZH STATISTIC PT WARD VISIT

## 2017-02-08 PROCEDURE — 97535 SELF CARE MNGMENT TRAINING: CPT | Mod: GO | Performed by: OCCUPATIONAL THERAPIST

## 2017-02-08 PROCEDURE — 85027 COMPLETE CBC AUTOMATED: CPT | Performed by: INTERNAL MEDICINE

## 2017-02-08 PROCEDURE — 85610 PROTHROMBIN TIME: CPT

## 2017-02-08 PROCEDURE — S0171 BUMETANIDE 0.5 MG: HCPCS | Performed by: STUDENT IN AN ORGANIZED HEALTH CARE EDUCATION/TRAINING PROGRAM

## 2017-02-08 PROCEDURE — 99232 SBSQ HOSP IP/OBS MODERATE 35: CPT | Mod: GC

## 2017-02-08 PROCEDURE — 36592 COLLECT BLOOD FROM PICC: CPT | Performed by: INTERNAL MEDICINE

## 2017-02-08 RX ORDER — OXYCODONE HCL 10 MG/1
10 TABLET, FILM COATED, EXTENDED RELEASE ORAL EVERY 12 HOURS
Status: DISCONTINUED | OUTPATIENT
Start: 2017-02-08 | End: 2017-02-12 | Stop reason: HOSPADM

## 2017-02-08 RX ORDER — METOLAZONE 2.5 MG/1
2.5 TABLET ORAL ONCE
Status: COMPLETED | OUTPATIENT
Start: 2017-02-08 | End: 2017-02-08

## 2017-02-08 RX ORDER — OXYMETAZOLINE HYDROCHLORIDE 0.05 G/100ML
2 SPRAY NASAL 2 TIMES DAILY PRN
Status: DISCONTINUED | OUTPATIENT
Start: 2017-02-08 | End: 2017-02-12 | Stop reason: HOSPADM

## 2017-02-08 RX ORDER — BUMETANIDE 0.25 MG/ML
4 INJECTION INTRAMUSCULAR; INTRAVENOUS 2 TIMES DAILY
Status: DISCONTINUED | OUTPATIENT
Start: 2017-02-08 | End: 2017-02-10

## 2017-02-08 RX ORDER — OXYCODONE HYDROCHLORIDE 5 MG/1
5-10 TABLET ORAL EVERY 4 HOURS PRN
Status: DISCONTINUED | OUTPATIENT
Start: 2017-02-08 | End: 2017-02-12 | Stop reason: HOSPADM

## 2017-02-08 RX ORDER — OXYCODONE HYDROCHLORIDE 5 MG/1
5 TABLET ORAL ONCE
Status: COMPLETED | OUTPATIENT
Start: 2017-02-08 | End: 2017-02-08

## 2017-02-08 RX ADMIN — HEPARIN SODIUM 1200 UNITS/HR: 10000 INJECTION, SOLUTION INTRAVENOUS at 12:25

## 2017-02-08 RX ADMIN — PIPERACILLIN AND TAZOBACTAM 3.38 G: 3; .375 INJECTION, POWDER, FOR SOLUTION INTRAVENOUS at 00:58

## 2017-02-08 RX ADMIN — DOCUSATE SODIUM 100 MG: 100 CAPSULE, LIQUID FILLED ORAL at 20:57

## 2017-02-08 RX ADMIN — OXYCODONE HYDROCHLORIDE 10 MG: 10 TABLET, FILM COATED, EXTENDED RELEASE ORAL at 20:57

## 2017-02-08 RX ADMIN — SENNOSIDES 1 TABLET: 8.6 TABLET, FILM COATED ORAL at 08:25

## 2017-02-08 RX ADMIN — PHYTONADIONE: 10 INJECTION, EMULSION INTRAMUSCULAR; INTRAVENOUS; SUBCUTANEOUS at 13:41

## 2017-02-08 RX ADMIN — METOLAZONE 2.5 MG: 2.5 TABLET ORAL at 13:34

## 2017-02-08 RX ADMIN — Medication 0.25 MG: at 00:54

## 2017-02-08 RX ADMIN — POLYETHYLENE GLYCOL 3350 17 G: 17 POWDER, FOR SOLUTION ORAL at 08:25

## 2017-02-08 RX ADMIN — MULTIPLE VITAMINS W/ MINERALS TAB 1 TABLET: TAB at 08:25

## 2017-02-08 RX ADMIN — Medication 0.25 MG: at 23:41

## 2017-02-08 RX ADMIN — OXYCODONE HYDROCHLORIDE 5 MG: 5 TABLET ORAL at 00:54

## 2017-02-08 RX ADMIN — MORPHINE SULFATE 15 MG: 15 TABLET, EXTENDED RELEASE ORAL at 08:29

## 2017-02-08 RX ADMIN — PIPERACILLIN AND TAZOBACTAM 3.38 G: 3; .375 INJECTION, POWDER, FOR SOLUTION INTRAVENOUS at 18:10

## 2017-02-08 RX ADMIN — MELATONIN TAB 3 MG 3 MG: 3 TAB at 00:54

## 2017-02-08 RX ADMIN — DOCUSATE SODIUM 100 MG: 100 CAPSULE, LIQUID FILLED ORAL at 08:25

## 2017-02-08 RX ADMIN — Medication: at 08:41

## 2017-02-08 RX ADMIN — PIPERACILLIN AND TAZOBACTAM 3.38 G: 3; .375 INJECTION, POWDER, FOR SOLUTION INTRAVENOUS at 12:22

## 2017-02-08 RX ADMIN — BUMETANIDE 4 MG: 0.25 INJECTION, SOLUTION INTRAMUSCULAR; INTRAVENOUS at 13:33

## 2017-02-08 RX ADMIN — PANTOPRAZOLE SODIUM 40 MG: 40 INJECTION, POWDER, FOR SOLUTION INTRAVENOUS at 20:56

## 2017-02-08 RX ADMIN — OXYCODONE HYDROCHLORIDE 10 MG: 5 TABLET ORAL at 14:51

## 2017-02-08 RX ADMIN — BUMETANIDE 4 MG: 0.25 INJECTION, SOLUTION INTRAMUSCULAR; INTRAVENOUS at 20:57

## 2017-02-08 RX ADMIN — OXYCODONE HYDROCHLORIDE 10 MG: 5 TABLET ORAL at 19:09

## 2017-02-08 RX ADMIN — OXYCODONE HYDROCHLORIDE 5 MG: 5 TABLET ORAL at 08:29

## 2017-02-08 RX ADMIN — LIDOCAINE 2 PATCH: 50 PATCH CUTANEOUS at 20:58

## 2017-02-08 RX ADMIN — Medication: at 23:39

## 2017-02-08 RX ADMIN — PIPERACILLIN AND TAZOBACTAM 3.38 G: 3; .375 INJECTION, POWDER, FOR SOLUTION INTRAVENOUS at 06:46

## 2017-02-08 RX ADMIN — LEVOTHYROXINE SODIUM 175 MCG: 25 TABLET ORAL at 08:24

## 2017-02-08 RX ADMIN — Medication 1 CAPSULE: at 20:57

## 2017-02-08 RX ADMIN — SENNOSIDES 1 TABLET: 8.6 TABLET, FILM COATED ORAL at 20:57

## 2017-02-08 RX ADMIN — Medication 12.5 MG: at 06:46

## 2017-02-08 RX ADMIN — Medication 0.25 MG: at 11:54

## 2017-02-08 RX ADMIN — OXYCODONE HYDROCHLORIDE 5 MG: 5 TABLET ORAL at 09:22

## 2017-02-08 RX ADMIN — PANTOPRAZOLE SODIUM 40 MG: 40 INJECTION, POWDER, FOR SOLUTION INTRAVENOUS at 08:25

## 2017-02-08 ASSESSMENT — PAIN DESCRIPTION - DESCRIPTORS
DESCRIPTORS: DISCOMFORT

## 2017-02-08 NOTE — CONSULTS
Nephrology Initial Consult  February 8, 2017      Samir Rodriguez MRN:[id number] YOB: 1970  Date of Admission:1/12/2017  Primary care provider: No Ref-Primary, Physician  Requesting physician: Griffin Skinner MD    ASSESSMENT AND RECOMMENDATIONS:   46 year old  male with PMH of rheumatic heart disease status post MVR  X 2 on warfarin, biventricular CHF with EF 25-30 % status post dual chamber ICD in 2008, chronic atrial fibrillation on amiodarone, WPW ablation, CKD stage III , hypothyroidism transferred from [facility] on 1/12/2017 for further management of heart failure. Patient received liver biopsy which showed cirrhosis. Patient not a candidate for Valve replacement or LVAD. Patient with CARSON on admission with serum Cr 2 which improved to 1.2 with IV diuresis likely from cardiorenal physiology. Patient now with worsening renal failure and oliguria refractory to high dose diuretics. Suspect ATN likely from multifactorial causes - hypotension, exposure to IV contrast on 2/2/17, pre renal turned ATN .      Recommendations :-    1. CARSON on CKD :- likely from multifactorial causes as outlined above. Serum creatinine 2.8 today. Patient with declining urine output since past few days despite use of high dose of IV diuretics.   - discussed at length with patient regarding option of dialysis. Explained risks and benefits. Patient reported he is willing to try dialysis for now.   - IR to place tunneled dialysis catheter tomorrow and will initiate dialysis for volume and metabolic management. Patient hypotensive. May require CRRT. Patient to be NPO aftre midnight. Please reverse INR to 1.8 and may use heparin drip for overnight.  - Given multiple co morbidities with heart failure, cirrhosis, hypotension, patient being not a candidate for LVAD or valve replacement and   now renal failure with oliguria resistant to diuretics recommend addressing goals of care in the  coming days. Palliative team on board.     2. BP :- hypotensive. Patient on low dose hydralazine for afterload reduction. Low BP could limit UF with HD.     3. Hypervolemic on exam. Stable respiratory status.     4. Recommend fluid restriction of 1 L daily.     5. Serum K 4 and serum HCO3 20.     6. Hb 6.8. Management per primary team.      Recommendations were communicated to primary team directly.       Amber Miller MD   [phone].       REASON FOR CONSULT: Oliguria, CARSON, refractory to high dose diuretics.     HISTORY OF PRESENT ILLNESS:  46 year old  male with PMH of rheumatic heart disease status post MVR  X 2 on warfarin, biventricular CHF with EF 25-30 % status post dual chamber ICD in 2008, chronic atrial fibrillation on amiodarone, WPW ablation, CKD stage III , hypothyroidism transferred from [facility] on 1/12/2017 for further management of heart failure. Patient received liver biopsy which showed cirrhosis. Patient not a candidate for Valve replacement or LVAD. Patient with CARSON on admission with serum Cr 2 which improved to 1.2 with IV diuresis likely from cardiorenal physiology. Patient now with worsening renal failure and oliguria refractory to high dose diuretics. Suspect ATN likely from multifactorial causes - hypotension, exposure to IV contrast on 2/2/17, pre renal turned ATN        patient received IV contrast on 2/2/17. Since past few days patient noted to have rising serum Cr and poor response to high dose IV diuretics.     Patient denied SOB, no nausea or vomiting. Appetite okay per patient. Patient with hyponatremia. Recently started on fluid restriction of 1.5 L.     Lower extremities swollen. Patient reports he feel better than at the time of admission. No ascites on recent abdominal US.     PAST MEDICAL HISTORY:  Reviewed with patient on 02/07/2017     Past Medical History   Diagnosis Date     Rheumatic heart disease        Past Surgical History   Procedure  Laterality Date     Cholecystectomy       Hernia repair       Appendectomy       Mitral valve replacement       Mechanical ([place] Tilting Disc); 1992     Fusion cervical anterior three+ levels       Eye surgery Left      Pt notes eye surgery to LE secondary to Barbed wire injury as a child.        MEDICATIONS:       Current Meds    polyethylene glycol  17 g Oral Daily     sennosides  1 tablet Oral BID     hydrALAZINE  12.5 mg Oral Q8H GO     morphine  15 mg Oral Q12H GO     sodium chloride (PF)  5-10 mL Intracatheter Q7 Days     sodium chloride (PF)  10 mL Intracatheter Q7 Days     piperacillin-tazobactam  3.375 g Intravenous Q6H     pantoprazole  40 mg Intravenous BID     levothyroxine  175 mcg Oral QAM AC     menthol   Transdermal Q8H     lidocaine  1-3 patch Transdermal Q24h    And     lidocaine   Transdermal Q24H    And     lidocaine   Transdermal Q8H     docusate sodium  100 mg Oral BID     multivitamin, therapeutic with minerals  1 tablet Oral Daily     miconazole   Topical BID     sodium chloride (PF)  3 mL Intracatheter Q8H     Infusion Meds    HEParin 1,200 Units/hr (02/07/17 1703)     [START ON 2/8/2017] - MEDICATION INSTRUCTIONS -       - MEDICATION INSTRUCTIONS -         ALLERGIES:    Allergies   Allergen Reactions     Blood Transfusion Related (Informational Only) Other (See Comments)     Patient has a history of a clinically significant antibody against RBC antigens.  A delay in compatible RBCs may occur.     Asa [Aspirin] Other (See Comments) and Diarrhea     goosebumps  nausea     Gabapentin Nausea     Nabumetone Nausea     Sulfamethoxazole Unknown     Trimethoprim Unknown     Allopurinol Rash     Clindamycin Rash       REVIEW OF SYSTEMS:  A 10 point review of systems was negative except as noted above.    SOCIAL HISTORY:   Social History     Social History     Marital Status: Single     Spouse Name: N/A     Number of Children: N/A     Years of Education: N/A     Occupational History     Not on  file.     Social History Main Topics     Smoking status: Never Smoker      Smokeless tobacco: Not on file     Alcohol Use: No     Drug Use: No     Sexual Activity: Not on file     Other Topics Concern     Not on file     Social History Narrative     Reviewed with patient     FAMILY MEDICAL HISTORY:   Family History   Problem Relation Age of Onset     DIABETES Mother      Hypertension Brother      Macular Degeneration No family hx of      Glaucoma Father      Reviewed with patient   PHYSICAL EXAM:   Temp  Av.3  F (36.8  C)  Min: 97.2  F (36.2  C)  Max: 99.6  F (37.6  C)      Pulse  Av  Min: 80  Max: 80 Resp  Av  Min: 7  Max: 20  SpO2  Av.6 %  Min: 91 %  Max: 100 %       BP 76/40 mmHg  Pulse 80  Temp(Src) 98  F (36.7  C) (Oral)  Resp 16  Ht 1.829 m (6')  Wt 100.245 kg (221 lb)  BMI 29.97 kg/m2  SpO2 96%   Date 17 07 - 17 0659   Shift 3534-5141 4707-5670 5312-8209 24 Hour Total   I  N  T  A  K  E   P.O. 590   590    I.V. 20 192.83  212.83    Shift Total  (mL/kg) 610  (6.09) 192.83  (1.92)  802.83  (8.01)   O  U  T  P  U  T   Urine 225   225    Shift Total  (mL/kg) 225  (2.24)   225  (2.24)   Weight (kg) 100.24 100.24 100.24 100.24        Admit Weight: 107 kg (235 lb 14.3 oz) (with brace in place)     GENERAL APPEARANCE: no acute  distress,  awake  EYES:  No scleral icterus, pupils equal  HENT: NC/AT,  mouth  without ulcers or lesions  Lymphatics: no cervical or supraclavicular LAD  Pulmonary: lungs clear to auscultation with equal breath sounds bilaterally, no clubbing  CV: regular rhythm, normal rate, no rub   - Edema 3+  GI: soft, nontender, normal bowel sounds, no HSM   MS: no evidence of inflammation in joints, no muscle tenderness  : no [place]  SKIN: no rash, warm, dry, no cyanosis  NEURO: mentation intact and speech normal. no asterexis.     LABS:   CMP  Recent Labs  Lab 17  0735 17  2201 17  2038 17  0718 17  2127 17  0848  02/04/17  0715   * 123* 124* 124* 124* 126* 128*   POTASSIUM 4.0 3.8 4.2 4.1 4.2 4.1 4.3   CHLORIDE 87* 90* 91* 90* 91* 92* 94   CO2 20 19* 20 21 20 21 22   ANIONGAP 15* 14 12 13 13 14 12   GLC 88 90 96 88 96 98 96   BUN 34* 32* 33* 32* 32* 29 26   CR 2.85* 2.72* 2.68* 2.55* 2.39* 2.05* 1.66*   GFRESTIMATED 24* 25* 26* 27* 29* 35* 44*   GFRESTBLACK 29* 30* 31* 33* 35* 42* 54*   DELFINO 7.7* 7.6* 7.2* 7.8* 7.4* 7.4* 7.6*   MAG 2.3  --  2.2 2.2 2.1 2.1 2.2   PROTTOTAL 5.5*  --   --  5.6*  --  5.4* 5.5*   ALBUMIN 1.9*  --   --  2.0*  --  2.0* 2.1*   BILITOTAL 6.1*  --   --  6.3*  --  8.6* 10.4*   ALKPHOS 428*  --   --  449*  --  467* 446*   *  --   --  130*  --  140* 138*   ALT 95*  --   --  101*  --  100* 98*     CBC  Recent Labs  Lab 02/07/17  1623 02/07/17  0735 02/06/17  2201 02/06/17  0718   HGB 6.8* 7.1* 7.2* 7.2*   WBC 5.5 5.7 5.5 5.9   RBC 2.05* 2.22* 2.20* 2.24*   HCT 19.6* 21.1* 20.8* 21.4*   MCV 96 95 95 96   MCH 33.2* 32.0 32.7 32.1   MCHC 34.7 33.6 34.6 33.6   RDW 21.6* 21.7* 21.5* 21.5*   * 144* 122* 164     INR  Recent Labs  Lab 02/07/17  0735 02/06/17  0718 02/05/17  0848 02/04/17  0715   INR 2.67* 2.70* 3.31* 2.76*     ABG  Recent Labs  Lab 02/06/17  2050 02/05/17  1451 02/03/17  1051 02/02/17  1339   O2PER ROOM AIR NOT GIVEN 21.0 21      URINE STUDIES  Recent Labs   Lab Test  01/13/17   0800   COLOR  Yellow   APPEARANCE  Clear   URINEGLC  Negative   URINEBILI  Negative   URINEKETONE  Negative   SG  1.008   UBLD  Negative   URINEPH  6.0   PROTEIN  Negative   NITRITE  Negative   LEUKEST  Negative     IRON STUDIES  Recent Labs   Lab Test  01/13/17   0650   IRON  51   FEB  158*   IRONSAT  32   DORA  2664*       IMAGING:  Reviewed.      Amber Miller MD    Attending Note: I have seen and examined this patient and the above note reflects my historical findings, exam findings, and assessment and plan.  Ganesh Malone MD

## 2017-02-08 NOTE — PLAN OF CARE
Problem: Goal Outcome Summary  Goal: Goal Outcome Summary  OT 6C: Pt limited by weakness, fatigue, and activity tolerance. Pt refused OOB activity today, however after much encouragement was agreeable to performing seated ADLs and BUE AROM. Pt educated on the importance of getting exercise throughout the day, but reported that he is feeling discouraged after receiving recent medical updates. Recommend discharge to TCU to increase strength, ADL IND, and activity tolerance.

## 2017-02-08 NOTE — PROGRESS NOTES
Social Work Services Progress Note    Hospital Day: 28  Date of Initial Social Work Evaluation:  NA  Collaborated with:  SNF admissions, Cards 1, palliative SW    Data:  Pt is a 46 year old  male who is not a candidate for advanced heart procedures.      Intervention:  SHAQUILLE called [facility] admissions.  Pt is fourth on the waitlist for a TCU bed.  However, the facility does not support dialysis patients.  SW asked that pt remain on the waitlist until dialisys is confirmed.    SW will also try to call TCUs in [place] again to see if there are any alternate for placement options.  Will update Cards 1 with options.  Update (all [facility]):    - [facility] - No dialysis offered in nursing home, no stand alone TCU.  Few rehab beds onsite.   - [facility] - message for admissions, per [facility] staff, they do dialysis.   - [facility] - offers dialysis, has limited beds for rehab, are concerned that pt may be too medically acute for rehab, and nursing home is full for Medicaid.    - [facility] - Message for admissions   - [facility] - No dialysis, no TCU, only nursing home.     - [facility] - message with admissions.    Assessment:  Pt has family who are visiting.  SHAQUILLE faxed a new letter to [facility] yesterday confirming continued hospitalization for pt's nephew to receive accommodations.  Lencho again instructed to call [facility] to collaborate on services support.    Plan:    Anticipated Disposition:  TCU vs home with palliative homecare.  Palliative team consulted for goals of care discussion.  SHAQUILLE discussed needs with palliative SW yesterday.    Barriers to d/c plan:  Insurance, no transfer agreement in place to assist with discharge to [facility].  Is on waitlist for Medicaid bed at [facility] if TCU is still wanted by pt/family.  Pt will need a POLST equivalent for [place] pending goals of care. Transportation to home  will take time to secure depending on where he is discharging.     Follow Up:  SW will continue to follow for discharge.  SW will call and updated  to start working on transportation.  Concerns about getting the patient home in a timely manner were discussed with Cards 1.    REJI Hernandez, APSW  6C Unit   Pager: [phone]  Phone: [phone]      ___________________________________________________________________________________________________________________________________________________    Referrals in process:     TCUs with phone calls/referrals out:    - [facility] - [facility] - pt continues to be on the waitlist for beds.  Is fourth on waitlist for services.  Possible bed in as early as 5-7 days pending progress of other residents.  Possibly longer.  PH: [phone]  F: [phone]     - [facility] - message for admissions, per [facility] staff, they do dialysis.     - [facility] - offers dialysis, has limited beds for rehab, are concerned that pt may be too medically acute for rehab, and nursing home is full for Medicaid.      - [facility] - Message for admissions     - [facility] - message with admissions.    Referrals Discontinued:    Swing Bed Programs:   - [facility] - declined due to transportation concerns, concerns pt cannot tolerate therapies.  Admissions states even if can tolerate therapies, pt would be declined due to pt needing transport to the  of [place].  SW explained pt has Title 19 to cover transport and pt would be responsible.  Admissions states this still does not satisfy their concerns.  PH: [phone]  F: [phone]      - [facility] - declined due to no Medicaid beds available at this time.  PH - Cell: [phone]  PH - Main: [phone]  F: [phone]    TCU Units (updates in red):    - [facility] - do not accept  Medicaid Ins.  Do not accept IHS insurance.  PH: [phone]   - [facility] - do not accept Medicaid Ins.  Do not accept IHS insurance.  PH: [phone]   - [facility] - only a SNF/LTC and do not accept Medicaid only for rehab.  Have an over two year wait for a SNF Medicaid bed.  PH: [phone]   - [facility] - Accepts Medicaid, however has extensive wait for a bed.  PH: [phone]  - [facility] - No answer when attempted to call  PH: [phone]  - [facility] - no answer from admissions re if Medicaid is covered  PH: [phone]  - [facility] - No answer from admissions re if Medicaid is covered  PH: [phone]   - [facility] - No dialysis offered in nursing home, no stand alone TCU.  Few rehab beds onsite.   - [facility] - No dialysis, no TCU, only nursing home.     Community Case Management/Community Services in place:   Pt has a Medicaid  [place]   PH: [phone]   -  on Call for [place]: [phone]    [facility] Office: Main line: [phone]  [facility] - call for services to start  PH: [phone]    [facility] - medical administration, fax all documentation here  F: [phone]    Pt may have Title 19 services for transportation, will need to assess this at time of dc if he qualifies for TCU.      Update 1/31/17 - this is a wrong number.  Please do not call.

## 2017-02-08 NOTE — PROGRESS NOTES
Cardiology 1 Progress Note    Samir Rodriguez MRN# [id number]   Age: 46 year old YOB: 1970   Date of Admission: 1/12/2017           Assessment and Plan:   Samir Rodriguez is a 46 year old  male with past medical history of rheumatic heart disease s/p mechanical MVR x 2 (1980s and 1992) on warfarin (INR goal 2.5-3.5), biventricular CHF (EF 25-30%) s/p dual chamber ICD 2008, chronic afib on amiodarone and previously on digoxin (stopped 1/16/17 due to concern for toxicity), WPW ablation at age 11, CKD III, hypothyroid,  who was transferred from [facility] on 01/12 for further eval of CHF and concern of hemolysis in setting of mechanical valve. Subsequently diagnosed with cirrhosis on liver biopsy and was deemed not to be a candidate for valve replacement or for LVAD, now with worsening renal function with plan to initiate dialysis.     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 Hypervolemic  - Bumex 4mg BID plus metolazone 2.5mg daily  - hold lisinopril 2.5 due to hypotension  - cont holding metoprolol and spironolactone for now    # CARSON - Likely cardiorenal/prerenal with TTE with dilated IVC without respiratory variation (also received IVF overnight due to lactic acidosis). Pt also received 100mL IV contrast. Worsening fluid retention with minimal UOP despite diuresis.  Very interested in starting dialysis  -Restart diuresis with 4mg bumex BID plus metolazone.   -IR in AM for tunneled catheter placement.   -FFP for goal INR <1.8 in AM.   -Daily BMP    # Mechanical mitral valve (MV diastolic gradient 7)  # Aortic Insufficiency (mod - severe)  # TR (moderate)  History of BiV failure attributed to valvular disease. Echocardiogram on admission demonstrated moderate-severe aortic insufficiency which is his most acute cardiac pathology. His  tricuspid regurgitation is likely due to his signficantly fluid overloaded state. Mechanical valve maintained on warfarin w/ INR 2.5-3.5 prior to this admission. Angiogram performed on 01/20 revealed nonobstructive CAD. EDEL shows probable restriction of one of the mitral valve leaflets. However, he is not a if candidate for valvular surgery due to cirrhosis diagnosed on liver biopsy  - Stop warfarin.   - Start low intensity heparin gtt w/o bolus.   - Heparin gtt off 1 hour prior to procedure in AM.     #. Cirrhosis and liver nodularity  Demonstrated on CT, although u/s was normal. CT read is concerning for amiodarone toxicity. Liver biopsy 1/30 with evidence of advanced chronic liver disease (stage 3 or 4) of unclear etiology (amiodarone toxicity is possible). Consequently, not a candidate for LVAD or for valve replacements. Discussed these results and their consequence with patient and his son Sid.  -No LVAD  -No CT surgery/ valve replacement    # Lactic acidosis- resolved  # Possible hemobilia  # Mesenteric ischemia  # Hematochezia, ?GIB  # Acute on chronic anemia  Lactate now normal and no further episodes of bleeding. Still with severe RUQ abdominal pain likely worse with hypervolemia. Likely source of bleeding was complication from IR liver biopsy causing hemobilia. No procedure scheduled at this time, as there is currently no evidence of bleeding. CTA abdo/pelvis without evidence of bleeding.  -EGD if patient starts to bleed again.  -Daily MELD labs  -Daily Hgb  -s/p 1u pRBC on 2/7.     # Anemia and thrombocytopenia  # Right Arm Hematoma   Blood smear: blood smear- RBCs show some target cells, hypochromia, increased polychromasia, no schistocytes or spherocytes. Low haptoglobin, high LDH, and plasma free hemoglobin concerning for intra-vascular hemolysis possibly due to valvular disease. No evidence of overt infection causing DIC. EPO inappropriately low w/ recent ARF. Etiology of hematoma likely due to  supratherapeutic INR- 4.6 on arrival  - warfarin w/ goal INR 2.5-3.0-  - Heparin gtt, low intensity no bolus.     # Paroxsymal afib:  digoxin level 0.7 on 1/12 and 1/17. 1/16/17 device interrogation: atrial tachycardia to 150s with AV block. Lower rate setting changed to 80bpm from 60bmp and device changed from DDDR to VVIR on 01/16; atrial tachycardia and AV block and V pacing. The patient has been in an atrial flutter that is undersensed, hence the device was switched to VVI mode. There is no point continuing amiodarone at this stage especially with concern for toxicity  - discontinued pta amiodarone.    # Hyperthyroid: Will need repeat TSH/T4 1-2 months post DC. Decreased levothyroxine from 224mcg to 175 mcg   - Free T3/T4 checked.   - Will consult endo in AM regarding replacement therapy.     # Anxiety and insomnia: Increased clonazepam to tid    FEN: 2 g Na diet, NPO @[place], 1.0 L fluid restriction  PPX: on heparin gtt, HOLD warfarin overnight 2/7  Dispo: Difficult placement due to out of state Medicaid. Pending placement and transportation.     Code Status: Full CODE      Patient discussed with staff attending, Dr. Skinner.    Amador Carter MD  PGY-2, IM   Pager: [phone]  ------------------------------------------------------------------------------------------------------------------  [facility] Vascular Medicine/Cardiovascular Division Attending  ------------------------------------------------------------------------------------------------------------------    I have seen and examined the patient and the note above reflects our mutual assessment and plan.    Despite our intensive work to establish a stabilized medical treatment program, and to establish an optimistic short- to medium-term treatment plan, we are frankly shocked that there does not seem to be a nursing or transitional care site in [place] that is currently willing to continue the care we have established. This is illogical,  creates barriers to quality of life, survival and sustaining his health.    We will continue to serve as advocates for this ill, but highly motivated patient.    Griffin Skinner MD  Director, Vascular Medicine Program  Professor of Medicine, Epidemiology and Community Health  [place]  [address]    Tel: [phone]  Fax: [phone]  Pager: [phone]    Vascular medicine nurse clinician: Vaughn Weinberg - Office [phone]  : Amie Puentes - Office [phone]  ------------------------------------------------------------------------------------------------------------------         Interval History/Subjective   No acute events overnight.  Didn't sleep well overnight. Anxious about possible dialysis, but wants to try it.  He has a lot to live for, including his 8 year old granddaughter.  Abdominal pain still bothering him.  No CP, SOB or palpitations.            Objective   Temp:  [97.4  F (36.3  C)-98.3  F (36.8  C)] 98.2  F (36.8  C)  Pulse:  [80] 80  Heart Rate:  [79-80] 80  Resp:  [16] 16  BP: (76-95)/(40-54) 91/49 mmHg  SpO2:  [94 %-98 %] 97 %    Physical exam:  Gen: AA&Ox3, no acute distress  HEENT: NACT, poor dentition with missing teeth  PULM: Normal effort on RA, crackles bilateral bases.   CV: RRR; mechanical s2 w/ 2+ systolic murmur at LUSB and LISB: JVD+ to mid neck while sitting at 90 degrees   ABD:  soft, nontender, nondistended.  BS+  EXT: 1+ sonam edema to knees. .  1+ peripheral UE/LE pulses   NEURO: alert and oriented; speech intact, CN II-XII grossly intact.          Data:   CBC    Recent Labs  Lab 02/08/17  0323 02/07/17  1623 02/07/17  0735 02/06/17  2201   WBC 6.3 5.5 5.7 5.5   RBC 2.39* 2.05* 2.22* 2.20*   HGB 7.6* 6.8* 7.1* 7.2*   HCT 22.5* 19.6* 21.1* 20.8*   MCV 94 96 95 95   MCH 31.8 33.2* 32.0 32.7   MCHC 33.8 34.7 33.6 34.6   RDW 20.8* 21.6* 21.7* 21.5*   * 125* 144* 122*     CMP    Recent Labs  Lab  02/08/17  0323 02/07/17  0735 02/06/17  2201 02/06/17  2038 02/06/17  0718  02/05/17  0848   * 122* 123* 124* 124*  < > 126*   POTASSIUM 4.2 4.0 3.8 4.2 4.1  < > 4.1   CHLORIDE 92* 87* 90* 91* 90*  < > 92*   CO2 20 20 19* 20 21  < > 21   ANIONGAP 14 15* 14 12 13  < > 14   GLC 95 88 90 96 88  < > 98   BUN 35* 34* 32* 33* 32*  < > 29   CR 3.10* 2.85* 2.72* 2.68* 2.55*  < > 2.05*   GFRESTIMATED 22* 24* 25* 26* 27*  < > 35*   GFRESTBLACK 26* 29* 30* 31* 33*  < > 42*   DELFINO 7.3* 7.7* 7.6* 7.2* 7.8*  < > 7.4*   MAG 2.2 2.3  --  2.2 2.2  < > 2.1   PROTTOTAL 5.1* 5.5*  --   --  5.6*  --  5.4*   ALBUMIN 1.7* 1.9*  --   --  2.0*  --  2.0*   BILITOTAL 5.3* 6.1*  --   --  6.3*  --  8.6*   ALKPHOS 378* 428*  --   --  449*  --  467*   * 116*  --   --  130*  --  140*   ALT 83* 95*  --   --  101*  --  100*   < > = values in this interval not displayed.  INR    Recent Labs  Lab 02/08/17  1340 02/08/17  0847 02/08/17  0323 02/07/17  0735   INR 2.39* 2.83* 3.08* 2.67*            Medications:     Current Facility-Administered Medications   Medication     oxyCODONE (ROXICODONE) IR tablet 5-10 mg     oxyCODONE (OxyCONTIN) 12 hr tablet 10 mg     bumetanide (BUMEX) injection 4 mg     B complex-C-folic acid (NEPHROCAPS/TRIPHROCAPS) capsule 1 capsule     heparin  drip 25,000 units in 0.45% NaCl 250 mL (see additional administration details for dose)     heparin bolus from infusion pump     Medication Instructions     polyethylene glycol (MIRALAX/GLYCOLAX) Packet 17 g     sennosides (SENOKOT) tablet 1 tablet     hydrALAZINE (APRESOLINE) half-tab 12.5 mg     heparin lock flush 10 UNIT/ML injection 2-5 mL     lidocaine 1 % injection 0.5-1 mL     lidocaine (LMX4) kit     sodium chloride (PF) 0.9% PF flush 1-10 mL     sodium chloride (PF) 0.9% PF flush 5-10 mL     lidocaine 1 % 1 mL     sodium chloride (PF) 0.9% PF flush 10-20 mL     sodium chloride (PF) 0.9% PF flush 10 mL     piperacillin-tazobactam (ZOSYN) 3.375 g vial to attach to   mL bag     pantoprazole (PROTONIX) 40 mg IV push injection     levothyroxine (SYNTHROID/LEVOTHROID) tablet 175 mcg     clonazePAM (klonoPIN) half-tab 0.25 mg     menthol (ICY HOT) 5 % patch 1 patch    And     menthol (ICY HOT) Patch in Place    And     menthol (ICY HOT) patch REMOVAL     ondansetron (ZOFRAN) tablet 4 mg     lidocaine (LIDODERM) 5 % Patch 1-3 patch    And     lidocaine (LIDODERM) patch REMOVAL    And     lidocaine (LIDODERM) Patch in Place     docusate sodium (COLACE) capsule 100 mg     potassium chloride SA (K-DUR/KLOR-CON M) CR tablet 20-40 mEq     potassium chloride (KLOR-CON) Packet 20-40 mEq     potassium chloride 10 mEq in 100 mL intermittent infusion     potassium chloride 10 mEq in 100 mL intermittent infusion with 10 mg lidocaine     potassium chloride 20 mEq in 50 mL intermittent infusion     magnesium sulfate 2 g in NS intermittent infusion ([facility]ar[facility] or [facility] C[facility])     magnesium sulfate 4 g in 100 mL sterile water (premade)     potassium phosphate 15 mmol in D5W 250 mL intermittent infusion     potassium phosphate 20 mmol in D5W 500 mL intermittent infusion     potassium phosphate 25 mmol in D5W 500 mL intermittent infusion     melatonin tablet 3 mg     miconazole (MICATIN; MICRO GUARD) 2 % powder     sodium chloride (PF) 0.9% PF flush 3 mL     sodium chloride (PF) 0.9% PF flush 3 mL     medication instruction     nitroglycerin (NITROSTAT) sublingual tablet 0.4 mg     alum & mag hydroxide-simethicone (MYLANTA ES/MAALOX  ES) suspension 15-30 mL     albuterol (PROAIR HFA/PROVENTIL HFA/VENTOLIN HFA) Inhaler 2 puff     sennosides (SENOKOT) tablet 8.6 mg     naloxone (NARCAN) injection 0.1-0.4 mg

## 2017-02-08 NOTE — PROGRESS NOTES
"[facility], [place]   Palliative Care Daily Progress Note         Recommendations & Counseling       Discussion: Samir has a lot of complex medical problems and he tells me that he feels very angry and upset because of all the bad news he has been getting over the past days. He states that he is processing everything that he has been told and when I asked him to share what his understanding is of his medical problems he tells me that he can't get an LVAD or heart surgery/valve replacement because of his liver or kidney and now he has kidney damage and was told that if he didn't have dialysis, he would die within a few days or weeks and is coming to terms with what all of this means. Pt states that he's \"angry\" and \"mad\" at \"everything\" and is thinking \"why me\" since he \"has always taken good care of [himself]\". He was a little frustrated with the delivery yesterday about potential renal failure if he didn't get dialysis by the nephrology team, saying that he \"didn't like what [she] said\" or \"how [she] said it\". Nephrology team came in as we were discussing the situation with Samir, which Samir was happy about since he wants to start the dialysis after his INR is in range and \"kick [his] kidneys in the butt\". Samir believes that getting the \"fluid off\" and decreasing the \"toxins\" will help his heart work better. Pt believes that he'll improve significantly after dialysis, like he did last time. I did talk to Samir about how this time things are different than last time and unfortunately this time he is sicker than before and because of that, I was worried that his kidneys would not improve after 2 runs like last time. The nephrology team also stated that they would only do dialysis if the benefits outweigh the costs, since he has been having low blood pressures. Pt said that he felt \"like giving up\" and was \"low\" yesterday after the dialysis discussions, but after today's talk, he " "feels in a better place. Still of concern whether Samir fully grasps his medical complexities and reasoning behind some things, such as him thinking he is unable to get the heart surgery due to his kidneys.    Pt ultimately wants to be with family back home in [place], [place] and is up for being placed in a facility, if able. Pt lives with son and daughter, and two grandchildren back in [place] and feels that he needs his \"family here to help\" him. Pt wants to be at or near home, surrounded by family when he \"takes his journey\" - dies. Per nephrology team, it was discussed that he has roughly 1-2 years left and Samir repeated this to me today.     Symptom management: Agree with discontinuing MS Contin 15mg BID due to poor renal fxn and starting oxycontin 10mg BID.    Constipation: On scheduled miralax and senna - BM this morning      Inter-disciplinary (nurses, physicians, clinical , chaplains) plan of care is discussed daily Monday-Friday in palliative care team rounds for all our patients.          Attestation:   Thank you for the opportunity to continue to participate in the care of this patient and family.  Please feel free to contact on-call palliative provider with any emergent needs.  We can be reached via team pager [phone] (answered 8-4:30 Monday-Friday); after-hours answering service ([phone]); Main Palliative Clinic - [phone]      This patient has been seen and evaluated by me and discussed with cardiology team.      I have reviewed today's vital signs, medications, labs and imaging.   Total time on the floor involved in the patient's care: 50 minutes  Greater than 50% of my time was spent counseling and/or coordination of care regarding symptoms and goals.  :     Nathaly Oglesby MD  Palliative Care Physician  Pager [phone]          Disease Process/es & Symptoms       Non-ischemic dilated cardiomyopathy 2/2 valvular heart disease  Acute on chronic systolic heart failure, " EF 37% (12/2016) s/p dual chamber ICD 2008 - not an LVAD condidate due to multiple co-morbidities  Mechanical mitral valve   Aortic Insufficiency - not candidate for valve replacement due to multiple co-morbidities  Cirrhosis and liver nodularity - dx this hospitalization  New oliguric renal failure - planning for dialysis  Right tibia fx and R knee trauma- now with chronic pain (doi 11/16)    Headaches- new rx for glasses received after opth appt- chronic           Interval History:   Transfused 1 unit RBC last night, had 1 BM this AM. Took Clonazepam due to anxiety surrounding his diagnosis.           Medications:   I have reviewed this patient's medication profile and medications given in past 24 hours.    Pain Regimen:  Oxycodone 10 mg Q12H started 2/08  Oxycodone 5-10 mg Q4H PRN    Senna BID  Miralax 17 g QD    Clonazepam 0.25 mg TID PRN for anxiety          Review of Systems:   + pain from liver biopsy, anxiety, frustration, depressed mood     Vital signs:   Heart Rate: 79, Blood pressure 83/41, pulse 80, temperature 98.1  F (36.7  C), temperature source Oral, resp. rate 16, height 1.829 m (6'), weight 100.245 kg (221 lb), SpO2 95 %.  Estimated body mass index is 29.97 kg/(m^2) as calculated from the following:    Height as of this encounter: 1.829 m (6').    Weight as of this encounter: 100.245 kg (221 lb).  General: awake, sitting upright in bed, appears older than stated age, in NA  Eyes: EOMI, sclera clear  HEENT: NC/AT; mucous membranes moist  Lungs: no increased work of breathing, speaking full sentences   Neuro: A&O x 3; CN II-XII grossly intact;   Neuropsych: alert, good eye contact, engaged, affect slightly blunted, sensorium intact            Data Reviewed:   Creatinine 3.1, Na 125; alb 1.7; platelets 126  Minimal urine output yesterday  Last BM recorded 2/08

## 2017-02-08 NOTE — PROGRESS NOTES
GASTROENTEROLOGY PROGRESS NOTE      ASSESSMENT/ RECOMMENDATIONS:    Assessment:    46 year old male with a history of rheumatic heart disease s/p mechanical MVR x2 on warfarin, biventricular heart failure s/p ICD, chronic afib on amiodarone and digoxin (stopped 1/16 due to toxicity, WPW ablation,  CKD, cholecystectomy who was transferred from [facility] for further evaluation of CHF and possible hemolysis and for LVAD evaluation. He underwent transjugular liver biopsy on 1/30. Patient started having abdominal pain and GI bleed. No HD instability. Hb dropped from 8.9 --> 7.7. He was also on IV heparin and warfarin for his mechanical mitral valve. Most likely source of bleeding was hemobilia based on clinical course and CT finding. CTA was done on 2/2/17 and was negative for active extravasation.  -  Also has elevated LFTs with bilirubin peaked to 10.4 currently downtrending with 5.3 today. No signs of cholangitis, also underlying liver cirrhosis, biopsy showed advanced chronic liver disease (stage 3-4) of uncertain etiology, most likely from amiodarone toxicity. He had normal hepatic venous pressure gradient (HVPG). Elevated LFTs most likely from congestive hepatopathy, possible hemobilia. Also questionable mesenteric ischemia, based on CT scan. No bleeding overnight currently,  stable hemoglobin, did get 1 unit PRBC overnight for hb 6.8, hb 7.6 today.  GI bleeding most likely from hemobilia which stopped, also possibility of oozing in setting of anticoagulation with underlying gastritis. RUQ pain multifactorial including post procedure, hepatic congestion from CHF, biliary colic.       Recommendations    Recommend conservative management at this point.     No plan for ERCP as no signs of cholangitis, bilirubin downtrending.  No plan for EGD currently because of stable hemoglobin and no significant recurrent bleeding. Unlikely to have varices based on normal HVPG.    Continue CBC daily, with  goal Hb according to primary team.      Continue IV PPI BID, switch to oral when eating.     Serial abdominal exams.    Aggressive bowel regimen.     CHF management according to primary team.     Also need to clarify goals of care as patient not candidate for heart transplant/LVAD or valvular surgery.     The patient was discussed and plan agreed upon with GI staff, Dr. Rodrigues. We will sign off, please call if any questions.     Tej Mejia  [facility]  Gastroenterology Fellow  _______________________________________________________________    S: big BM last evening, patient doesn't remember any blood, no documentation in chart from nurses,  still same RUQ pain radiating to back, looks comfortable,  afebrile, denied any vomiting, hematemesis. Anxious about getting HD catheter today.     O:  Blood pressure 85/54, pulse 80, temperature 97.5  F (36.4  C), temperature source Oral, resp. rate 16, height 1.829 m (6'), weight 100.245 kg (221 lb), SpO2 98 %.    Gen: no distress  CV: normal S1, S2, systolic murmur  Lungs: CTAB  Abd: soft, tenderness in RUQ, epigastric region, normal BS.   Neuro: no focal motor sensory deficits.       LABS:  BMP    Recent Labs  Lab 02/08/17  0323 02/07/17  0735 02/06/17  2201 02/06/17  2038   * 122* 123* 124*   POTASSIUM 4.2 4.0 3.8 4.2   CHLORIDE 92* 87* 90* 91*   DELFINO 7.3* 7.7* 7.6* 7.2*   CO2 20 20 19* 20   BUN 35* 34* 32* 33*   CR 3.10* 2.85* 2.72* 2.68*   GLC 95 88 90 96     CBC    Recent Labs  Lab 02/08/17  0323 02/07/17  1623 02/07/17  0735 02/06/17  2201   WBC 6.3 5.5 5.7 5.5   RBC 2.39* 2.05* 2.22* 2.20*   HGB 7.6* 6.8* 7.1* 7.2*   HCT 22.5* 19.6* 21.1* 20.8*   MCV 94 96 95 95   MCH 31.8 33.2* 32.0 32.7   MCHC 33.8 34.7 33.6 34.6   RDW 20.8* 21.6* 21.7* 21.5*   * 125* 144* 122*     INR    Recent Labs  Lab 02/08/17  0847 02/08/17  0323 02/07/17  0735 02/06/17  0718   INR 2.83* 3.08* 2.67* 2.70*     LFTs    Recent Labs  Lab  02/08/17  0323 02/07/17  0735 02/06/17  0718 02/05/17  0848   ALKPHOS 378* 428* 449* 467*   * 116* 130* 140*   ALT 83* 95* 101* 100*   BILITOTAL 5.3* 6.1* 6.3* 8.6*   PROTTOTAL 5.1* 5.5* 5.6* 5.4*   ALBUMIN 1.7* 1.9* 2.0* 2.0*      PANC    Recent Labs  Lab 02/01/17  1829   LIPASE 655*

## 2017-02-08 NOTE — PLAN OF CARE
Problem: Goal Outcome Summary  Goal: Goal Outcome Summary  Outcome: No Change  Pt received 1 unit RBC last night for a Hgb of 6.8. Recheck came back at 7.6. INR am check was 3.08 with a goal of 1.8. MD was notified and FFP was ordered and administered. Pt tolerated well. BP soft over shift, SR, NPO for possible dialysis catheter placement if INR progresses towards goal. Pt continues on Heparin at 1200 units/hr. Pt would like to talk with Becca the  about family [place]. No other issues noted. Will continue to monitor and address as needed.

## 2017-02-08 NOTE — PROGRESS NOTES
Palliative Care Inpatient Clinical Social Work Assessment    Patient Information:  Samir is a 46 year old malewho was transferred from [facility] for further eval of CHF and concern of hemolysis in setting of mechanical valve. Palliative care consult for goals of care and support in setting of complex chronic illness with possible poor access to advanced therapies due to comorbid problems.         Relevant Symptoms/Concerns     Physical:     Psychological/Emotional/Existential:  Names that he was angry and disappointed to learn about his kidney injury and need for dialysis. He also shared that he was recently given a prognosis of 1-2 years and he was saddened by this information. He says yesterday and this morning were difficult, but he is feeling physically better and more positive this afternoon. This afternoon he is focused on a concern related to losing his health insurance base on a letter his son received.    Family/Social/Caregiver:  Has not discussed new information with his family members yet. He says he would like to do this in person when family comes to the hospital. He is also hoping to have a care conference with provided involved in his care at that time.     Developmental:      Mental Health:      End of Life:      Cultural/Congregational/Spiritual:      Grief/Loss:      Concurrent Stressors:        Comments:       Strengths     Physical: Says that he is feeling well this afternoon and is accepting of plan to initiate dialysis. He says that he hopes to prolong his    Psychological/Emotional/Existential:      Family/Social/Caregiver:  Describes a supportive network of family members in [place]. He believes they would be able to care for him at home and manage his health needs if he is not accepted to a facility for rehab or long-term care.    Developmental:      Mental Health:   Endorses anger and anxiety, yet names that medication has been helpful regarding managing  these mental health symptoms.    End of Life:  Reflected that if his time is limited, he would ultimately want to be at home with his family members   Cultural/Buddhism/Spiritual:      Grief/Loss:         Comments:       Goals/Decision Making/Advance Care Planning   Preferences:      Concerns:  Full code    Documents:  No health care directive in electronic medical record.    Decision Making Issues:        Comments:       Resource Needs     Discharge Planning:  Per Unit/Program  and/or Care Coordinator   Other:      Comments:       Sources of Information   Patient:  ARLETH Dawson    Family:      Staff:  X Unit , Ezio Castañeda, Lead CORNELIUS with Palliative Care    Chart Review:  X   Other:       Intervention (Check all that apply)    X   Assessment of palliative specific issues      X   Introduction of Palliative clinical social work interventions    X   Adjustment to illness counseling        Advanced care planning        Attended/participated in care conference        Behavioral interventions for symptom management    X   Facilitation of processing of thoughts/feelings        Family communication facilitated        Grief counseling        Goals of care discussion/facilitation        Life legacy work        Life review facilitation        Psychoeducation    X   Re-framing        Resource referral        Other:       Comments:  I met with Samir this afternoon in his room. The above interventions were provided. He was engaged and verbal, yet focused primarily on practical issues related to his health insurance and lodging needs for his family members. These concerns were discussed with the Unit .        Plan:  I will continue with assessment and intervention as indicated. Plan to follow up later this week.    RERE Bhat, [facility]  Palliative Care Clinical Social Work Fellow  Pager: [phone]

## 2017-02-08 NOTE — PROGRESS NOTES
Cardiology 1 Progress Note    Samir Rodriguez MRN# [id number]   Age: 46 year old YOB: 1970   Date of Admission: 1/12/2017           Assessment and Plan:   Samir Rodriguez is a 46 year old  male with past medical history of rheumatic heart disease s/p mechanical MVR x 2 (1980s and 1992) on warfarin (INR goal 2.5-3.5), biventricular CHF (EF 25-30%) s/p dual chamber ICD 2008, chronic afib on amiodarone and previously on digoxin (stopped 1/16/17 due to concern for toxicity), WPW ablation at age 11, CKD III, hypothyroid,  who was transferred from [facility] on 01/12 for further eval of CHF and concern of hemolysis in setting of mechanical valve. Subsequently diagnosed with cirrhosis on liver biopsy and was deemed not to be a candidate for valve replacement or for LVAD.     Today:  Nephrology consult.   IR for tunneled dialysis catheter in AM.    NPO at [place].   Stop diuresis.   Stop warfarin.  Start low intensity heparin gtt w/o bolus. Off 1hr prior to IR procedure in AM.   Transfuse 1U PRBC for Hgb of 6.8.  INR at 0300 with FFP as needed for correction to goal <1.8.   Fluid restriction 1.0L/day  Further discussion regarding goals of care    # Non-ischemic dilated cardiomyopathy 2/2 valvular heart disease  # Acute on chronic systolic heart failure, EF 37% (12/2016) s/p dual chamber ICD 2008  NYHA class III. Hypervolemic  - Stop bumex gtt and diuresis.    - continue hydralazine 12.5 mg TID for afterload reduction  - hold lisinopril 2.5 due to hypotension  - cont holding metoprolol and spironolactone for now  - will check FT3/T4 due to low TSH    # CARSON vs ATN- likely cardiorenal with TTE with dilated IVC without respiratory variation (also received IVF overnight due to lactic acidosis). Pt also received 100mL IV contrast. Worsening fluid retention with minimal UOP despite   -Stop diuresis.   -IR in AM for tunneled catheter placement.   -FFP for goal INR <1.8 in AM.    -Goals of care discussion, as he will likely not tolerate dialysis well given hypotension  -Daily BMP    # Mechanical mitral valve (MV diastolic gradient 7)  # Aortic Insufficiency (mod - severe)  # TR (moderate)  History of BiV failure attributed to valvular disease. Echocardiogram on admission demonstrated moderate-severe aortic insufficiency which is his most acute cardiac pathology. His tricuspid regurgitation is likely due to his signficantly fluid overloaded state. Mechanical valve maintained on warfarin w/ INR 2.5-3.5 prior to this admission. Angiogram performed on 01/20 revealed nonobstructive CAD. EDEL shows probable restriction of one of the mitral valve leaflets. However, he is not a if candidate for valvular surgery due to cirrhosis diagnosed on liver biopsy  - Stop warfarin.   - Start low intensity heparin gtt w/o bolus.   - Heparin gtt off 1 hour prior to procedure in AM.     #. Cirrhosis and liver nodularity  Demonstrated on CT, although u/s was normal. CT read is concerning for amiodarone toxicity. Liver biopsy 1/30 with evidence of advanced chronic liver disease (stage 3 or 4) of unclear etiology (amiodarone toxicity is possible). Consequently, not a candidate for LVAD or for valve replacements. Discussed these results and their consequence with patient and his son Sid.  -No LVAD  -No CT surgery/ valve replacement    # Lactic acidosis- resolved  # Possible hemobilia  # Mesenteric ischemia  # Hematochezia, ?GIB  # Acute on chronic anemia  Lactate now normal and no further episodes of bleeding. Still with severe RUQ abdominal pain likely worse with hypervolemia. Likely source of bleeding was complication from IR liver biopsy causing hemobilia. No procedure scheduled at this time, as there is currently no evidence of bleeding. CTA abdo/pelvis without evidence of bleeding.  -EGD if patient starts to bleed again.  -Daily MELD labs  -Daily Hgb  -s/p 1u pRBC on 2/7.     # Anemia and thrombocytopenia  #  Right Arm Hematoma   Blood smear: blood smear- RBCs show some target cells, hypochromia, increased polychromasia, no schistocytes or spherocytes. Low haptoglobin, high LDH, and plasma free hemoglobin concerning for intra-vascular hemolysis possibly due to valvular disease. No evidence of overt infection causing DIC. EPO inappropriately low w/ recent ARF. Etiology of hematoma likely due to supratherapeutic INR- 4.6 on arrival  - warfarin w/ goal INR 2.5-3.0-HOLD overnight on 2/7  - Heparin gtt, low intensity no bolus.     # Paroxsymal afib:  digoxin level 0.7 on 1/12 and 1/17. 1/16/17 device interrogation: atrial tachycardia to 150s with AV block. Lower rate setting changed to 80bpm from 60bmp and device changed from DDDR to VVIR on 01/16; atrial tachycardia and AV block and V pacing. The patient has been in an atrial flutter that is undersensed, hence the device was switched to VVI mode. There is no point continuing amiodarone at this stage especially with concern for toxicity  - discontinued pta amiodarone.    # Hyperthyroid: Will need repeat TSH/T4 1-2 months post DC. Decreased levothyroxine from 224mcg to 175 mcg   - Free T3/T4 checked.   - Will consult endo in AM regarding replacement therapy.     # Anxiety and insomnia: Increased clonazepam to tid    # Right tibia fx and R knee trauma:  Fall in November prompted decompensation. Xray tibia and knee no fx this admission.  - Ortho evaluated; planning for outpatient follow up with orthopedic surgery in 4-6 weeks  - PT/OT    # Headaches  May be due to near-sightedness vs rebound headache from opioid use vs reduced cardiac output  - pain consult with pain for opioid titration  - eye exam 1/27- received new Rx  - using lidocaine patches  - PRN zofran    # Pulmonary nodules: incidentally found on CT  - repeat CT chest in 6 months    FEN: 2 g Na diet, NPO @MN, 1.0 L fluid restriction  PPX: on heparin gtt, HOLD warfarin overnight 2/7  Dispo: Difficult placement due to  out of state Medicaid. TCU when euvolemic vs goals of care discussion .     Code Status: Full CODE      Patient discussed with staff attending, Dr. Skinner.    Amador Carter MD  PGY-2, IM   Pager: [phone]  ------------------------------------------------------------------------------------------------------------------  [facility] Vascular Medicine/Cardiovascular Division Attending  ------------------------------------------------------------------------------------------------------------------    I have seen and examined the patient and the note above reflects our mutual assessment and plan.      HPI, PMH, FH, SH, and ROS per notes above.    Griffin Skinner MD  Director, Vascular Medicine Program  Professor of Medicine, Epidemiology and Community Health  [employer] [facility] [place]  [address]    Tel: [phone]  Fax: [phone]  Pager: [phone]    Vascular medicine nurse clinician: Vaughn Weinberg - Office [phone]  : Amie Puentes - Office [phone]  ------------------------------------------------------------------------------------------------------------------             Interval History/Subjective   No acute events overnight.  Continues to feel better. No CP, SOB or palpitations.  Still weak and wanting rehab before he goes home.  Eating and drinking well.  Slept well overnight as well.           Objective   Temp:  [97.7  F (36.5  C)-98.6  F (37  C)] 98  F (36.7  C)  Heart Rate:  [79-82] 79  Resp:  [12-18] 16  BP: ()/(40-52) 76/40 mmHg  SpO2:  [94 %-96 %] 96 %    Physical exam:  Gen: AA&Ox3, no acute distress  HEENT: NACT, poor dentition   PULM: Clear lungs  CV: RRR; mechanical s2 w/ 2+ systolic murmur at LUSB and LISB: JVD+ to mid neck while sitting at 90 degrees   ABD:  soft, nontender, nondistended.  BS+  EXT: trace sonam edema to knees. RLE in brace.  1+ peripheral UE/LE pulses   NEURO: alert and oriented; speech intact, CN II-XII  grossly intact.          Data:   CBC    Recent Labs  Lab 02/07/17  1623 02/07/17  0735 02/06/17 2201 02/06/17  0718   WBC 5.5 5.7 5.5 5.9   RBC 2.05* 2.22* 2.20* 2.24*   HGB 6.8* 7.1* 7.2* 7.2*   HCT 19.6* 21.1* 20.8* 21.4*   MCV 96 95 95 96   MCH 33.2* 32.0 32.7 32.1   MCHC 34.7 33.6 34.6 33.6   RDW 21.6* 21.7* 21.5* 21.5*   * 144* 122* 164     CMP    Recent Labs  Lab 02/07/17  0735 02/06/17 2201 02/06/17 2038 02/06/17 0718 02/05/17 2127 02/05/17  0848 02/04/17  0715   * 123* 124* 124* 124* 126* 128*   POTASSIUM 4.0 3.8 4.2 4.1 4.2 4.1 4.3   CHLORIDE 87* 90* 91* 90* 91* 92* 94   CO2 20 19* 20 21 20 21 22   ANIONGAP 15* 14 12 13 13 14 12   GLC 88 90 96 88 96 98 96   BUN 34* 32* 33* 32* 32* 29 26   CR 2.85* 2.72* 2.68* 2.55* 2.39* 2.05* 1.66*   GFRESTIMATED 24* 25* 26* 27* 29* 35* 44*   GFRESTBLACK 29* 30* 31* 33* 35* 42* 54*   DELFINO 7.7* 7.6* 7.2* 7.8* 7.4* 7.4* 7.6*   MAG 2.3  --  2.2 2.2 2.1 2.1 2.2   PROTTOTAL 5.5*  --   --  5.6*  --  5.4* 5.5*   ALBUMIN 1.9*  --   --  2.0*  --  2.0* 2.1*   BILITOTAL 6.1*  --   --  6.3*  --  8.6* 10.4*   ALKPHOS 428*  --   --  449*  --  467* 446*   *  --   --  130*  --  140* 138*   ALT 95*  --   --  101*  --  100* 98*     INR    Recent Labs  Lab 02/07/17  0735 02/06/17  0718 02/05/17  0848 02/04/17  0715   INR 2.67* 2.70* 3.31* 2.76*            Medications:     Current Facility-Administered Medications   Medication     heparin  drip 25,000 units in 0.45% NaCl 250 mL (see additional administration details for dose)     heparin bolus from infusion pump     [START ON 2/8/2017] Medication Instructions     polyethylene glycol (MIRALAX/GLYCOLAX) Packet 17 g     sennosides (SENOKOT) tablet 1 tablet     oxyCODONE (ROXICODONE) IR tablet 5 mg     hydrALAZINE (APRESOLINE) half-tab 12.5 mg     morphine (MS CONTIN) 12 hr tablet 15 mg     heparin lock flush 10 UNIT/ML injection 2-5 mL     lidocaine 1 % injection 0.5-1 mL     lidocaine (LMX4) kit     sodium chloride (PF)  0.9% PF flush 1-10 mL     sodium chloride (PF) 0.9% PF flush 5-10 mL     lidocaine 1 % 1 mL     sodium chloride (PF) 0.9% PF flush 10-20 mL     sodium chloride (PF) 0.9% PF flush 10 mL     piperacillin-tazobactam (ZOSYN) 3.375 g vial to attach to  mL bag     pantoprazole (PROTONIX) 40 mg IV push injection     levothyroxine (SYNTHROID/LEVOTHROID) tablet 175 mcg     clonazePAM (klonoPIN) half-tab 0.25 mg     menthol (ICY HOT) 5 % patch 1 patch    And     menthol (ICY HOT) Patch in Place    And     menthol (ICY HOT) patch REMOVAL     ondansetron (ZOFRAN) tablet 4 mg     lidocaine (LIDODERM) 5 % Patch 1-3 patch    And     lidocaine (LIDODERM) patch REMOVAL    And     lidocaine (LIDODERM) Patch in Place     docusate sodium (COLACE) capsule 100 mg     potassium chloride SA (K-DUR/KLOR-CON M) CR tablet 20-40 mEq     potassium chloride (KLOR-CON) Packet 20-40 mEq     potassium chloride 10 mEq in 100 mL intermittent infusion     potassium chloride 10 mEq in 100 mL intermittent infusion with 10 mg lidocaine     potassium chloride 20 mEq in 50 mL intermittent infusion     magnesium sulfate 2 g in NS intermittent infusion (PharMEDium or FV Cmpd)     magnesium sulfate 4 g in 100 mL sterile water (premade)     potassium phosphate 15 mmol in D5W 250 mL intermittent infusion     potassium phosphate 20 mmol in D5W 500 mL intermittent infusion     potassium phosphate 25 mmol in D5W 500 mL intermittent infusion     multivitamin, therapeutic with minerals (THERA-VIT-M) tablet 1 tablet     melatonin tablet 3 mg     miconazole (MICATIN; MICRO GUARD) 2 % powder     sodium chloride (PF) 0.9% PF flush 3 mL     sodium chloride (PF) 0.9% PF flush 3 mL     medication instruction     nitroglycerin (NITROSTAT) sublingual tablet 0.4 mg     alum & mag hydroxide-simethicone (MYLANTA ES/MAALOX  ES) suspension 15-30 mL     albuterol (PROAIR HFA/PROVENTIL HFA/VENTOLIN HFA) Inhaler 2 puff     sennosides (SENOKOT) tablet 8.6 mg     naloxone  (NARCAN) injection 0.1-0.4 mg

## 2017-02-08 NOTE — PLAN OF CARE
Problem: Goal Outcome Summary  Goal: Goal Outcome Summary  PT 6C: STS with FWW and max Ax2 with difficulty bring RLE under ALBINA.  C/o dizziness with transfer that decreases with time.  Stand pivot transfer to bed with min Ax2.  Sit to supine with max Ax2.  Pt presents with increased pain in B calf with palpation and soreness today significantly limiting mobility.

## 2017-02-08 NOTE — PROGRESS NOTES
CLINICAL NUTRITION SERVICES - REASSESSMENT NOTE     Nutrition Prescription    RECOMMENDATIONS FOR MDs/PROVIDERS TO ORDER:  If wounds do not improve, reinstate wound protocol for another three days to complete the recommended wound protocol supplementation: Order vitamin C (250 mg/day), and zinc (220 mg/day) - both for another three days as this was discontinued after seven days.    Malnutrition Status:    Non-severe malnutrition (minimally) in the context of chronic illness    Recommendations already ordered by Registered Dietitian (RD):  Modified oral supplements  Modified multivitamin      Future/Additional Recommendations:  1. Once able to readvance diet, rec a 2 g sodium diet. Fluid restriction as per team. Once pt on dialysis, monitor K+ and Phos trends for potential need for a dialysis diet. Rec keep diet as liberalized as possible to help encourage oral intake.  2. Encourage intake of high protein foods and beverages. Encourage oral supplements.   3. Monitor GI status (GI is consulted). Consider low fiber diet vs nutrition support if needed due to diffuse colonic thickening extending up to the level of the sigmoid colon per CT abd/pelvis on 2/2/2017. RD is available for recs if needed, pending POC.  4. Consider ordering thiamine, 100 mg/day, due to cardiac status.  5. If pt should need TFs, pending POC, see recommendations in nutrition 2/1/17.      EVALUATION OF THE PROGRESS TOWARD GOALS   Diet: NPO 2/8. Previously on a 2 g sodium diet.  Intake: Per nursing flowsheets, good diet tolerance. Flowsheets indicate pt consuming 75% of meals with a good appetite 2/5 and 2/7.  Pt states his appetite is pretty good when he is allowed to eat. No N/V lately, but he did have this earlier during admission. He does still have early satiety at times and another factor affecting his oral intake is NPO at times for tests or procedures. Dislikes the butter pecan Ensure Plus but likes the orange Magic Cup. He ordered only one  meal each of the past two days. Awaiting possible dialysis catheter placement before eating today.      NEW FINDINGS   - [facility] nurse 2/3: PI located on Sacrum: stage 2, Left heel: Stage 2, Right heel: Stage 2. Status: wound  Improving, Erosion at right buttock and small opening at lower sacrum. Wounds present on admit. Remains on thera-vit M. Received wound protocol for seven days starting 1/16 before team discontinued.   2/2 CT abd/pelvis: No site of active hemorrhage is appreciated. Diffuse colonic thickening extending up to the level of the sigmoid colon, which may represent congestion, infectious colitis, ischemia, or neoplastic process. Large portosystemic and portal renal shunt in the setting of ascites. Cardiomegaly with diffuse anasarca.    MALNUTRITION  % Intake: Decreased intake does not meet criteria  % Weight Loss: Difficult to assess with fluid status changes.  Subcutaneous Fat Loss: Facial region:  Mild, although difficult to assess with fluid status  Muscle Loss: Temporal:  Mild. Generalized, although difficult to assess with fluid status  Fluid Accumulation/Edema: Moderate-Severe  Malnutrition Diagnosis: Non-severe malnutrition (minimally) in the context of chronic illness    Previous Goals   Patient to consume % of nutritionally adequate meal trays TID, or the equivalent with supplements/snacks.  Evaluation: Meeting at times, but not on the days with extended NPO/clear liquid status.     Previous Nutrition Diagnosis  Inadequate oral intake related to decreased appetite, early satiety, NPO for tests/procedures, and N/V earlier this admission as evidenced by pt consuming 25-50% of meals at times.    Evaluation: Improving, but unresolved.    CURRENT NUTRITION DIAGNOSIS  Inadequate oral intake related to decreased appetite, early satiety, NPO for tests/procedures, and N/V earlier this admission as evidenced by pt consuming only one meal each of the past two days.      INTERVENTIONS  Implementation  1. Nutrition education for nutrition relationship to health/disease: Discussed the importance of high protein options while on dialysis. Gave examples of high protein food choices. Pt states he is already trying to consume adequate protein.   2. Medical food supplement therapy: Discontinued butter pecan Ensure Plus as per pt preference.   3. Multivitamin/mineral supplement therapy: Changed multivitamin with minerals to triphrocaps due to dialysis.     Goals  Patient to consume % of nutritionally adequate meal trays TID, or the equivalent with supplements/snacks.    Monitoring/Evaluation  Progress toward goals will be monitored and evaluated per protocol.     Nutrition will continue to follow.    Giana Damon, MS, RD, LD, [facility]   6C Pgr:  [phone]

## 2017-02-08 NOTE — PLAN OF CARE
Problem: Goal Outcome Summary  Goal: Goal Outcome Summary  Outcome: No Change  VSS, BPs continue to be soft in the 80s-90s/40s-50s.  Abdominal pain mildly controlled by increased PRN oxycodone dose.  Heparin at 1200 units/hr.  Large loose BM this AM. OT changed lymph wraps.  Two units of FFP given.  INR decreased from 2.83 to 2.19.  Pt not able to go to IR today because of high INR.  Plan for tunneled dialysis catheter in IR tomorrow AM.  Pt to be NPO after midnight.  Will continue to monitor and notify physician with any concerns or questions.

## 2017-02-09 LAB
ANION GAP SERPL CALCULATED.3IONS-SCNC: 11 MMOL/L (ref 3–14)
BLD PROD TYP BPU: NORMAL
BLD UNIT ID BPU: 0
BLOOD PRODUCT CODE: NORMAL
BPU ID: NORMAL
BUN SERPL-MCNC: 40 MG/DL (ref 7–30)
CALCIUM SERPL-MCNC: 7.2 MG/DL (ref 8.5–10.1)
CHLORIDE SERPL-SCNC: 94 MMOL/L (ref 94–109)
CO2 SERPL-SCNC: 20 MMOL/L (ref 20–32)
CREAT SERPL-MCNC: 3.26 MG/DL (ref 0.66–1.25)
ERYTHROCYTE [DISTWIDTH] IN BLOOD BY AUTOMATED COUNT: 20.5 % (ref 10–15)
GFR SERPL CREATININE-BSD FRML MDRD: 20 ML/MIN/1.7M2
GLUCOSE SERPL-MCNC: 84 MG/DL (ref 70–99)
HCT VFR BLD AUTO: 16.4 % (ref 40–53)
HCT VFR BLD AUTO: 19.1 % (ref 40–53)
HGB BLD-MCNC: 5.6 G/DL (ref 13.3–17.7)
HGB BLD-MCNC: 6.7 G/DL (ref 13.3–17.7)
HGB BLD-MCNC: 7.2 G/DL (ref 13.3–17.7)
INR PPP: 1.92 (ref 0.86–1.14)
INR PPP: 2.14 (ref 0.86–1.14)
LMWH PPP CHRO-ACNC: 0.12 IU/ML
MAGNESIUM SERPL-MCNC: 2.2 MG/DL (ref 1.6–2.3)
MCH RBC QN AUTO: 32.4 PG (ref 26.5–33)
MCHC RBC AUTO-ENTMCNC: 34.1 G/DL (ref 31.5–36.5)
MCV RBC AUTO: 95 FL (ref 78–100)
NUM BPU REQUESTED: 2
PLATELET # BLD AUTO: 139 10E9/L (ref 150–450)
POTASSIUM SERPL-SCNC: 3.7 MMOL/L (ref 3.4–5.3)
RBC # BLD AUTO: 1.73 10E12/L (ref 4.4–5.9)
SODIUM SERPL-SCNC: 126 MMOL/L (ref 133–144)
TRANSFUSION STATUS PATIENT QL: NORMAL
WBC # BLD AUTO: 7.8 10E9/L (ref 4–11)

## 2017-02-09 PROCEDURE — 85014 HEMATOCRIT: CPT | Performed by: HOSPITALIST

## 2017-02-09 PROCEDURE — 25000125 ZZHC RX 250: Performed by: INTERNAL MEDICINE

## 2017-02-09 PROCEDURE — 36592 COLLECT BLOOD FROM PICC: CPT | Performed by: STUDENT IN AN ORGANIZED HEALTH CARE EDUCATION/TRAINING PROGRAM

## 2017-02-09 PROCEDURE — 25000132 ZZH RX MED GY IP 250 OP 250 PS 637: Performed by: STUDENT IN AN ORGANIZED HEALTH CARE EDUCATION/TRAINING PROGRAM

## 2017-02-09 PROCEDURE — 85018 HEMOGLOBIN: CPT | Performed by: HOSPITALIST

## 2017-02-09 PROCEDURE — 99232 SBSQ HOSP IP/OBS MODERATE 35: CPT | Performed by: NURSE PRACTITIONER

## 2017-02-09 PROCEDURE — 99207 ZZC CDG-CORRECTLY CODED, REVIEWED AND AGREE: CPT | Performed by: NURSE PRACTITIONER

## 2017-02-09 PROCEDURE — 85027 COMPLETE CBC AUTOMATED: CPT | Performed by: INTERNAL MEDICINE

## 2017-02-09 PROCEDURE — 85018 HEMOGLOBIN: CPT | Performed by: STUDENT IN AN ORGANIZED HEALTH CARE EDUCATION/TRAINING PROGRAM

## 2017-02-09 PROCEDURE — 80048 BASIC METABOLIC PNL TOTAL CA: CPT | Performed by: INTERNAL MEDICINE

## 2017-02-09 PROCEDURE — 40000802 ZZH SITE CHECK

## 2017-02-09 PROCEDURE — 40000344 ZZHCL STATISTIC THAWING COMPONENT: Performed by: STUDENT IN AN ORGANIZED HEALTH CARE EDUCATION/TRAINING PROGRAM

## 2017-02-09 PROCEDURE — 25000128 H RX IP 250 OP 636: Performed by: INTERNAL MEDICINE

## 2017-02-09 PROCEDURE — 99232 SBSQ HOSP IP/OBS MODERATE 35: CPT | Mod: GC

## 2017-02-09 PROCEDURE — 25000132 ZZH RX MED GY IP 250 OP 250 PS 637: Performed by: INTERNAL MEDICINE

## 2017-02-09 PROCEDURE — 40000558 ZZH STATISTIC CVC DRESSING CHANGE

## 2017-02-09 PROCEDURE — 85520 HEPARIN ASSAY: CPT | Performed by: STUDENT IN AN ORGANIZED HEALTH CARE EDUCATION/TRAINING PROGRAM

## 2017-02-09 PROCEDURE — 86901 BLOOD TYPING SEROLOGIC RH(D): CPT | Performed by: STUDENT IN AN ORGANIZED HEALTH CARE EDUCATION/TRAINING PROGRAM

## 2017-02-09 PROCEDURE — 83735 ASSAY OF MAGNESIUM: CPT | Performed by: INTERNAL MEDICINE

## 2017-02-09 PROCEDURE — P9016 RBC LEUKOCYTES REDUCED: HCPCS | Performed by: INTERNAL MEDICINE

## 2017-02-09 PROCEDURE — S0171 BUMETANIDE 0.5 MG: HCPCS | Performed by: STUDENT IN AN ORGANIZED HEALTH CARE EDUCATION/TRAINING PROGRAM

## 2017-02-09 PROCEDURE — 86900 BLOOD TYPING SEROLOGIC ABO: CPT | Performed by: STUDENT IN AN ORGANIZED HEALTH CARE EDUCATION/TRAINING PROGRAM

## 2017-02-09 PROCEDURE — 86850 RBC ANTIBODY SCREEN: CPT | Performed by: STUDENT IN AN ORGANIZED HEALTH CARE EDUCATION/TRAINING PROGRAM

## 2017-02-09 PROCEDURE — 25000125 ZZHC RX 250: Performed by: STUDENT IN AN ORGANIZED HEALTH CARE EDUCATION/TRAINING PROGRAM

## 2017-02-09 PROCEDURE — 36592 COLLECT BLOOD FROM PICC: CPT | Performed by: INTERNAL MEDICINE

## 2017-02-09 PROCEDURE — 85610 PROTHROMBIN TIME: CPT | Performed by: STUDENT IN AN ORGANIZED HEALTH CARE EDUCATION/TRAINING PROGRAM

## 2017-02-09 PROCEDURE — P9059 PLASMA, FRZ BETWEEN 8-24HOUR: HCPCS | Performed by: STUDENT IN AN ORGANIZED HEALTH CARE EDUCATION/TRAINING PROGRAM

## 2017-02-09 PROCEDURE — 20000004 ZZH R&B ICU UMMC

## 2017-02-09 RX ORDER — SENNOSIDES 8.6 MG
1 TABLET ORAL DAILY PRN
Status: DISCONTINUED | OUTPATIENT
Start: 2017-02-09 | End: 2017-02-12 | Stop reason: HOSPADM

## 2017-02-09 RX ORDER — HYDROMORPHONE HYDROCHLORIDE 1 MG/ML
0.5 INJECTION, SOLUTION INTRAMUSCULAR; INTRAVENOUS; SUBCUTANEOUS ONCE
Status: COMPLETED | OUTPATIENT
Start: 2017-02-09 | End: 2017-02-09

## 2017-02-09 RX ORDER — SIMETHICONE
LIQUID (ML) MISCELLANEOUS PRN
Status: DISCONTINUED | OUTPATIENT
Start: 2017-02-09 | End: 2017-02-10 | Stop reason: HOSPADM

## 2017-02-09 RX ADMIN — LEVOTHYROXINE SODIUM 175 MCG: 25 TABLET ORAL at 09:02

## 2017-02-09 RX ADMIN — OXYCODONE HYDROCHLORIDE 10 MG: 10 TABLET, FILM COATED, EXTENDED RELEASE ORAL at 21:00

## 2017-02-09 RX ADMIN — PANTOPRAZOLE SODIUM 40 MG: 40 INJECTION, POWDER, FOR SOLUTION INTRAVENOUS at 20:22

## 2017-02-09 RX ADMIN — OXYCODONE HYDROCHLORIDE 10 MG: 5 TABLET ORAL at 01:44

## 2017-02-09 RX ADMIN — PANTOPRAZOLE SODIUM 40 MG: 40 INJECTION, POWDER, FOR SOLUTION INTRAVENOUS at 08:56

## 2017-02-09 RX ADMIN — Medication 12.5 MG: at 05:32

## 2017-02-09 RX ADMIN — Medication 0.25 MG: at 13:09

## 2017-02-09 RX ADMIN — OXYCODONE HYDROCHLORIDE 10 MG: 5 TABLET ORAL at 20:10

## 2017-02-09 RX ADMIN — LIDOCAINE 1 PATCH: 50 PATCH CUTANEOUS at 09:42

## 2017-02-09 RX ADMIN — Medication 1 CAPSULE: at 20:23

## 2017-02-09 RX ADMIN — BUMETANIDE 4 MG: 0.25 INJECTION, SOLUTION INTRAMUSCULAR; INTRAVENOUS at 08:23

## 2017-02-09 RX ADMIN — HYDROMORPHONE HYDROCHLORIDE 0.5 MG: 10 INJECTION, SOLUTION INTRAMUSCULAR; INTRAVENOUS; SUBCUTANEOUS at 23:08

## 2017-02-09 RX ADMIN — OXYCODONE HYDROCHLORIDE 10 MG: 5 TABLET ORAL at 08:15

## 2017-02-09 RX ADMIN — PIPERACILLIN AND TAZOBACTAM 3.38 G: 3; .375 INJECTION, POWDER, FOR SOLUTION INTRAVENOUS at 01:44

## 2017-02-09 RX ADMIN — Medication: at 20:23

## 2017-02-09 RX ADMIN — Medication: at 09:08

## 2017-02-09 RX ADMIN — BUMETANIDE 4 MG: 0.25 INJECTION, SOLUTION INTRAMUSCULAR; INTRAVENOUS at 22:12

## 2017-02-09 RX ADMIN — Medication 0.25 MG: at 09:55

## 2017-02-09 RX ADMIN — OXYCODONE HYDROCHLORIDE 10 MG: 5 TABLET ORAL at 13:09

## 2017-02-09 RX ADMIN — OXYCODONE HYDROCHLORIDE 10 MG: 10 TABLET, FILM COATED, EXTENDED RELEASE ORAL at 08:14

## 2017-02-09 RX ADMIN — PIPERACILLIN AND TAZOBACTAM 3.38 G: 3; .375 INJECTION, POWDER, FOR SOLUTION INTRAVENOUS at 05:42

## 2017-02-09 RX ADMIN — Medication 12.5 MG: at 01:44

## 2017-02-09 ASSESSMENT — PAIN DESCRIPTION - DESCRIPTORS
DESCRIPTORS: SHARP
DESCRIPTORS: SHARP;SORE
DESCRIPTORS: SHARP
DESCRIPTORS: SHARP

## 2017-02-09 NOTE — PLAN OF CARE
Problem: Goal Outcome Summary  Goal: Goal Outcome Summary  Outcome: No Change  Pt to have dialysis catheter placed today around 1500 pending INR goals of 1.8 met. Last 0300 INR (2.19) and Hgb (7.1) came back, MD notified. 2 unit plasma ordered. 1 unit going in now (0539) no adverse reactions noted. Heparin continues at 1200units/hr. Paced 80's, soft BPs, pain treated q4 with 10mg oxy. Will continue to monitor and address as needed.

## 2017-02-09 NOTE — PROGRESS NOTES
[facility]  WO Nurse Inpatient Adult Pressure INJURY (PI) Wound Assessment     Follow up assessment of PU(s) on pt's:    Right Buttock/ sacrum   Bilateral heels assessment deferred until tomorrow when compression wraps are due to be changes    Data:   Patient History:      per MD note(s):  Samir Rodriguez is a 46 year old male  male with past medical history of Rheumatic Heart Disease s/p mechanical MVR x 2 (1980s and 1992) on warfarin (INR goal 2.5-3.5), biventricular CHF (EF 25-30%) s/p dual chamber ICD 2008, WPW ablation at age 11, CKD III, hypothyroid,  who is transferred from [facility]      Current mattress:  AtmosAir, overlay ordered  Current pressure relieving devices:  Heel lift boots, chair cushion and Pillows    Moisture Management:  Incontinence Protocol    Catheter secured? Not applicable    Current Diet / Nutrition:       Active Diet Order  NPO Time Specified Meds  Maximilian Score  Avg: 15.7  Min: 12  Max: 19   Recent Labs   Lab Test  02/09/17   1041   02/08/17   0323   01/13/17   0147   ALBUMIN   --    --   1.7*   < >   --    HGB  5.6*   < >  7.6*   < >   --    INR  1.92*   < >  3.08*   < >   --    WBC  7.8   --   6.3   < >   --    CRP   --    --    --    --   6.2    < > = values in this interval not displayed.                                                                                                                Pressure Injury Assessment : Right Buttock/sacrum  Wound History:  Present on admit, edema to pelvic region remains.      Wound Base: mixed yellow red dermis with hair follicles    Specific Dimensions (length x width x depth, in cm) :  4 x 3.5 x 0.1 cm irregular shape on right buttocks                                                                                           0.4 cm round epidermal erosion at lower sacrum-midline    Palpation of the wound bed:  normal    Slough appearance:  none    Eschar appearance:  none    Periwound  Skin:erosion of epidermis      Color: normal and consistent with surrounding tissue    Temperature  normal     Drainage:  Small amount serosanguinous       Odor: none    Pain:  none           Intervention:     Patient's chart evaluated.      Maximilian Interventions:  Current Maximilian Interventions and Care Plan reviewed and updated, appropriate at this time.    Wound was assessed.    Wound Care: was done: Removal of existing dressing    Visual inspection    Cleansing with Microklenz    Application of clean dressing,    Orders  reviewed    Supplies  Reviewed    Discussed plan of care with Patient and Nurse    Instructed patient and nurse to transfer patient to bed from chair intermittently versus sitting up in chair for entire day           Assessment:     Pressure Injury (PI) located on Sacrum: stage 2,    Status: wound  Improving, Erosion at right buttock and small opening at lower sacrum    Wounds present on admit,           Plan:     Nursing to notify the Provider(s) and re-consult the [facility] Nurse if wound(s) deteriorate(s).    Plan of care for wound located on Buttock/Sacrum and bilateral heels: cleanse with microklenz and pat dry, apply no sting barrier around wound, cover with mepilex.  Change every other day.    Heel lift boots on at all times while in bed.    WO Nurse will return: weekly  Face to face time: 15 minutes.

## 2017-02-09 NOTE — PROGRESS NOTES
Cardiology 1 Progress Note    Samir Rodriguez MRN# [id number]   Age: 46 year old YOB: 1970   Date of Admission: 1/12/2017    Care conference today:    Discussed goals of care, patient wants to proceed with dialysis, would like to see where he is bleeding from, planning to get stable to get to a place close to home for further care.          Assessment and Plan:   Samir Rodriguez is a 46 year old  male with past medical history of rheumatic heart disease s/p mechanical MVR x 2 (1980s and 1992) on warfarin (INR goal 2.5-3.5), biventricular CHF (EF 25-30%) s/p dual chamber ICD 2008, chronic afib on amiodarone and previously on digoxin (stopped 1/16/17 due to concern for toxicity), WPW ablation at age 11, CKD III, hypothyroid,  who was transferred from [facility] on 01/12 for further eval of CHF and concern of hemolysis in setting of mechanical valve. Subsequently diagnosed with cirrhosis on liver biopsy and was deemed not to be a candidate for valve replacement or for LVAD, now with worsening renal function with plan to initiate dialysis.     #Acute blood loss anemia with known chronic anemia secondary GI bleeding and epistaxis:  - Hb this AM was as low as 5, got one unit of Hb yesterday night secondary to epistaxis and had nasal packing.  - Getting 2 more units of Hb, giving FFP for INR reversal.  - Stopped heparin.  - GI canceled scope tonight. Will monitor Hb q6h and will plan for an EGD tomorrow.  Will transfuse tonight if needed.     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 Hypervolemic  - Bumex 4mg BID plus metolazone 2.5mg daily, making urine but cr rising, 3.26 today.   - hold lisinopril due to hypotension  - cont holding metoprolol and spironolactone for now    # CARSON - Likely multifactorial secondary to ATN versus  cardiorenal. TTE with dilated IVC without respiratory variation.. Pt also received 100mL IV contrast. Worsening fluid retention with minimal UOP despite diuresis. Patient wants to pursue dialysis.  -Restarted diuresis with 4mg bumex BID plus metolazone.   -IR in AM for tunneled catheter placement (canceled today).   -FFP for goal INR <1.8 in AM, today it is at 1.9.   -Daily BMP    # Mechanical mitral valve (MV diastolic gradient 7)  # Aortic Insufficiency (mod - severe)  # TR (moderate)  History of BiV failure attributed to valvular disease. Echocardiogram on admission demonstrated moderate-severe aortic insufficiency which is his most acute cardiac pathology. His tricuspid regurgitation is likely due to his signficantly fluid overloaded state. Mechanical valve maintained on warfarin w/ INR 2.5-3.5 prior to this admission. Angiogram performed on 01/20 revealed nonobstructive CAD. EDEL shows probable restriction of one of the mitral valve leaflets. However, he is not a if candidate for valvular surgery due to cirrhosis diagnosed on liver biopsy  - Stopped warfarin.   - Dced heparin    #. Cirrhosis and liver nodularity  Demonstrated on CT, although u/s was normal. CT read is concerning for amiodarone toxicity. Liver biopsy 1/30 with evidence of advanced chronic liver disease (stage 3 or 4) of unclear etiology (amiodarone toxicity is possible). Consequently, not a candidate for LVAD or for valve replacements. Discussed these results and their consequence with patient and his son Sid.  -No LVAD  -No CT surgery/ valve replacement    # Lactic acidosis- resolved  # Possible hemobilia  # Mesenteric ischemia    # Anemia and thrombocytopenia  # Right Arm Hematoma   Stable    # Paroxsymal afib:  digoxin level 0.7 on 1/12 and 1/17. 1/16/17 device interrogation: atrial tachycardia to 150s with AV block. Lower rate setting changed to 80bpm from 60bmp and device changed from DDDR to VVIR on 01/16; atrial tachycardia and AV block  and V pacing. The patient has been in an atrial flutter that is undersensed, hence the device was switched to VVI mode. There is no point continuing amiodarone at this stage especially with concern for toxicity    # Hyperthyroid: Will need repeat TSH/T4 1-2 months post DC. Decreased levothyroxine from 224mcg to 175 mcg   - Free T3/T4 checked.   - Endo consult pending.   - Amio stopped, this might help with thyroid issues.     # Anxiety and insomnia: Increased clonazepam to tid    FEN: 2 g Na diet, NPO @MN, 1.0 L fluid restriction  PPX: on heparin gtt, HOLD warfarin overnight 2/7  Dispo: Difficult placement due to out of state Medicaid. Pending placement and transportation.     Code Status: Full CODE      Patient discussed with staff attending, Dr. Skinner.    Dexter Lopez MD  [id number].  ------------------------------------------------------------------------------------------------------------------  [facility] Vascular Medicine/Cardiovascular Division Attending  ------------------------------------------------------------------------------------------------------------------    I have seen and examined the patient and the note above reflects our mutual assessment and plan.    We held a care conference today to review current medical and social impediments to Mr. Butler's quality and quantity of life.  First, we have noted his overall fragility, and the nose bleed was substantial and we do not yet know if another GI (esophageal variceal or gastric/duodenal source) has contributed.   While he can be offered blood products and other supportive care, I have urged us to continue to pursue nasal and GI diagnostic and therapeutic procedures.    OUR GOAL IS TO PROVIDE CLINICAL STABILITY so as to permit him to return to [place].    Similarly, renal supportive care and placement of access for dialysis remains reasonable.  This should occur, while we simultaneously work to avoid progressive renal  dysfunction. CARSON can resolve.    And, we are extremely grateful for the superb social service support that now provides at least the possibility of a hospital to hospital ground transfer back to [place]. While we consider how to provide the most medically appropriate and humanistic support, this is all ideally linked to the patient's return to his home town.    Please coordinate care steps with me, and avoid care that is not integrated. Our team would appreciate maximal direct faculty to faculty communication, and clear, simple statements provided to Mr. Rodriguez.    With thanks,    Griffin Skinner MD  Director, Vascular Medicine Program  Professor of Medicine, Epidemiology and Community Health  [facility] [place]  [address]    Tel: [phone]  Fax: [phone]  Pager: [phone]    Vascular medicine nurse clinician: Vaughn Weinberg - Office [phone]  : Amie Puentes - Office [phone]         Interval History/Subjective   Pt had two episodes this morning each of 1 L liquid bloody stool. Heparin gtt was stopped at 0900. Has had epistaxis overnight and looked stable with nasal packing this AM. Maintaining MAPs today.            Objective   Temp:  [97.4  F (36.3  C)-98.7  F (37.1  C)] 97.6  F (36.4  C)  Heart Rate:  [78-81] 80  Resp:  [10-18] 10  BP: (78-94)/(39-53) 92/46 mmHg  SpO2:  [96 %-100 %] 100 %    Physical exam:  Gen: AA&Ox3, no acute distress  HEENT: NACT, poor dentition with missing teeth  PULM: Normal effort on RA, crackles bilateral bases.   CV: RRR; mechanical s2 w/ 2+ systolic murmur at LUSB and LISB: JVD+ to mid neck while sitting at 90 degrees   ABD:  soft, nontender, nondistended.  BS+  EXT: 1+ sonam edema to knees. .  1+ peripheral UE/LE pulses   NEURO: alert and oriented; speech intact, CN II-XII grossly intact.          Data:   CBC    Recent Labs  Lab 02/09/17  1041 02/09/17  0254 02/08/17  0323 02/07/17  1623 02/07/17  0735    WBC 7.8  --  6.3 5.5 5.7   RBC 1.73*  --  2.39* 2.05* 2.22*   HGB 5.6* 7.2* 7.6* 6.8* 7.1*   HCT 16.4*  --  22.5* 19.6* 21.1*   MCV 95  --  94 96 95   MCH 32.4  --  31.8 33.2* 32.0   MCHC 34.1  --  33.8 34.7 33.6   RDW 20.5*  --  20.8* 21.6* 21.7*   *  --  126* 125* 144*     CMP    Recent Labs  Lab 02/09/17  1041 02/08/17  0323 02/07/17  0735 02/06/17  2201 02/06/17  2038 02/06/17  0718  02/05/17  0848   * 125* 122* 123* 124* 124*  < > 126*   POTASSIUM 3.7 4.2 4.0 3.8 4.2 4.1  < > 4.1   CHLORIDE 94 92* 87* 90* 91* 90*  < > 92*   CO2 20 20 20 19* 20 21  < > 21   ANIONGAP 11 14 15* 14 12 13  < > 14   GLC 84 95 88 90 96 88  < > 98   BUN 40* 35* 34* 32* 33* 32*  < > 29   CR 3.26* 3.10* 2.85* 2.72* 2.68* 2.55*  < > 2.05*   GFRESTIMATED 20* 22* 24* 25* 26* 27*  < > 35*   GFRESTBLACK 25* 26* 29* 30* 31* 33*  < > 42*   DELFINO 7.2* 7.3* 7.7* 7.6* 7.2* 7.8*  < > 7.4*   MAG 2.2 2.2 2.3  --  2.2 2.2  < > 2.1   PROTTOTAL  --  5.1* 5.5*  --   --  5.6*  --  5.4*   ALBUMIN  --  1.7* 1.9*  --   --  2.0*  --  2.0*   BILITOTAL  --  5.3* 6.1*  --   --  6.3*  --  8.6*   ALKPHOS  --  378* 428*  --   --  449*  --  467*   AST  --  103* 116*  --   --  130*  --  140*   ALT  --  83* 95*  --   --  101*  --  100*   < > = values in this interval not displayed.  INR    Recent Labs  Lab 02/09/17  1041 02/09/17  0254 02/08/17  1557 02/08/17  1340   INR 1.92* 2.14* 2.19* 2.39*            Medications:     Current Facility-Administered Medications   Medication     0.9% sodium chloride BOLUS     0.9% sodium chloride BOLUS     gelatin absorbable (GELFOAM) sponge 1 each     No heparin via hemodialysis machine     sodium chloride (PF) 0.9% PF flush 3 mL     sodium chloride (PF) 0.9% PF flush 3 mL     sodium chloride (PF) 0.9% PF flush 10 mL     sodium chloride (PF) 0.9% PF flush 10 mL     alteplase (CATHFLO ACTIVASE) injection 2 mg     alteplase (CATHFLO ACTIVASE) injection 2 mg     sennosides (SENOKOT) tablet 1 tablet     simethicone  (MYLICON) suspension     oxyCODONE (ROXICODONE) IR tablet 5-10 mg     oxyCODONE (OxyCONTIN) 12 hr tablet 10 mg     bumetanide (BUMEX) injection 4 mg     B complex-C-folic acid (NEPHROCAPS/TRIPHROCAPS) capsule 1 capsule     oxymetazoline (AFRIN) 0.05 % spray 2 spray     heparin  drip 25,000 units in 0.45% NaCl 250 mL (see additional administration details for dose)     heparin bolus from infusion pump     polyethylene glycol (MIRALAX/GLYCOLAX) Packet 17 g     hydrALAZINE (APRESOLINE) half-tab 12.5 mg     heparin lock flush 10 UNIT/ML injection 2-5 mL     lidocaine 1 % injection 0.5-1 mL     lidocaine (LMX4) kit     sodium chloride (PF) 0.9% PF flush 1-10 mL     sodium chloride (PF) 0.9% PF flush 5-10 mL     lidocaine 1 % 1 mL     sodium chloride (PF) 0.9% PF flush 10-20 mL     sodium chloride (PF) 0.9% PF flush 10 mL     pantoprazole (PROTONIX) 40 mg IV push injection     levothyroxine (SYNTHROID/LEVOTHROID) tablet 175 mcg     clonazePAM (klonoPIN) half-tab 0.25 mg     menthol (ICY HOT) 5 % patch 1 patch    And     menthol (ICY HOT) Patch in Place    And     menthol (ICY HOT) patch REMOVAL     ondansetron (ZOFRAN) tablet 4 mg     lidocaine (LIDODERM) 5 % Patch 1-3 patch    And     lidocaine (LIDODERM) patch REMOVAL    And     lidocaine (LIDODERM) Patch in Place     potassium chloride SA (K-DUR/KLOR-CON M) CR tablet 20-40 mEq     potassium chloride (KLOR-CON) Packet 20-40 mEq     potassium chloride 10 mEq in 100 mL intermittent infusion     potassium chloride 10 mEq in 100 mL intermittent infusion with 10 mg lidocaine     potassium chloride 20 mEq in 50 mL intermittent infusion     magnesium sulfate 2 g in NS intermittent infusion (PharMEDium or FV Cmpd)     magnesium sulfate 4 g in 100 mL sterile water (premade)     potassium phosphate 15 mmol in D5W 250 mL intermittent infusion     potassium phosphate 20 mmol in D5W 500 mL intermittent infusion     potassium phosphate 25 mmol in D5W 500 mL intermittent infusion      melatonin tablet 3 mg     miconazole (MICATIN; MICRO GUARD) 2 % powder     sodium chloride (PF) 0.9% PF flush 3 mL     sodium chloride (PF) 0.9% PF flush 3 mL     medication instruction     nitroglycerin (NITROSTAT) sublingual tablet 0.4 mg     alum & mag hydroxide-simethicone (MYLANTA ES/MAALOX  ES) suspension 15-30 mL     albuterol (PROAIR HFA/PROVENTIL HFA/VENTOLIN HFA) Inhaler 2 puff     naloxone (NARCAN) injection 0.1-0.4 mg

## 2017-02-09 NOTE — PLAN OF CARE
Problem: Goal Outcome Summary  Goal: Goal Outcome Summary  OT 6C: Cancel. Pt inappropriate for therapy, transferring for higher level of cares.

## 2017-02-09 NOTE — PROGRESS NOTES
Interdisciplinary team conference note:   Attended IDT with representatives from Cardiology, Palliative Care, Unit SW, , Nephrology and Nursing.   Meeting from 1515 to 1605 in patient ICU room with 100% of time face to face.  Dr Skinner (Cards 1 staff)  led the discussion of current status and Samir's ability to function well in the setting of many complex problems. Overall situation much different than thought at the time of admission in terms of treatment options and possible outcomes. Despite set backs Samir has remained focused on improving enough to return to his family. This am  problem of GI bleed ? Etiology developed.   Discussion of current plan included further evaluation of epistaxis episode and possible UGI vs LGI bleed. EGD planned this afternoon.   - Cardiology- primary service hoping to avoid HD before hospital discharge and limit nephro injuring meds.   - Plan being developed for hospital to hospital transfer back to [facility] for proximity to family support and further care.  -Samir expressed concern about MS Contin being held with ICU transfer (Nephrology concerns)   - Recommend - Oxycontin 20 mg q 12 hours and continue prn oxycodone dosing if BP and mentation allow.   Vitals: BP 87/43 mmHg  Pulse 80  Temp(Src) 97.6  F (36.4  C) (Oral)  Resp 11  Ht 1.829 m (6')  Wt 100.245 kg (221 lb)  BMI 29.97 kg/m2  SpO2 99%  BMI= Body mass index is 29.97 kg/(m^2).   ROUTINE LABS (Last four results)  BMP  Recent Labs  Lab 02/09/17  1041 02/08/17  0323 02/07/17  0735 02/06/17  2201   * 125* 122* 123*   POTASSIUM 3.7 4.2 4.0 3.8   CHLORIDE 94 92* 87* 90*   DELFINO 7.2* 7.3* 7.7* 7.6*   CO2 20 20 20 19*   BUN 40* 35* 34* 32*   CR 3.26* 3.10* 2.85* 2.72*   GLC 84 95 88 90     CBC  Recent Labs  Lab 02/09/17  1041 02/09/17  0254 02/08/17  0323 02/07/17  1623 02/07/17  0735   WBC 7.8  --  6.3 5.5 5.7   RBC 1.73*  --  2.39* 2.05* 2.22*   HGB 5.6* 7.2* 7.6* 6.8* 7.1*   HCT 16.4*  --  22.5* 19.6*  21.1*   MCV 95  --  94 96 95   MCH 32.4  --  31.8 33.2* 32.0   MCHC 34.1  --  33.8 34.7 33.6   RDW 20.5*  --  20.8* 21.6* 21.7*   *  --  126* 125* 144*     INR  Recent Labs  Lab 02/09/17  1041 02/09/17  0254 02/08/17  1557 02/08/17  1340   INR 1.92* 2.14* 2.19* 2.39*     Will follow in am    Ezio ESPARZA NP  Nurse Practitioner- Lead Advanced Practice Provider  [facility] Palliative Medicine Consult Service   [phone]

## 2017-02-09 NOTE — PLAN OF CARE
Problem: Goal Outcome Summary  Goal: Goal Outcome Summary  OT/Edema 6C: Patient able to tolerate GCB to bilateral lower extremities according to set wearing schedule of 24 hours and with reductions made in 10/12 circumferential measurements from MTP to knee creases. 3-4+ pitting edema still present and wraps reapplied using 25% torque to aid in fluid mobilization, skin integrity and comfort. Wraps may be worn x48 hours, however, please remove if increased pain, numbness/tingling or soiling occur.

## 2017-02-09 NOTE — PLAN OF CARE
Pt had two episodes this morning each of 1 L liquid bloody stool. Heparin gtt was stopped at 0900. Given 2nd uFFP at start of shift to bring INR down (need <1.8) for dialysis catheter placement. INR & Hgb recheck after FFP complete was 1.92 & 5.6. New order for another 2u RBCs. BP was soft at start of [id number]; BP began to drift lower into 70s/40s (MAPs 50s). MDs notified & pt transferred to  for closer monitoring & care. Social work attempting to set up phone care conference with teams, pt and family before pursuing HD catheter placement. Per pt request, RN notified his ex-wife Carlee in [place] about transfer.

## 2017-02-09 NOTE — CONSULTS
GASTROENTEROLOGY CONSULTATION      Date of Admission:  1/12/2017          ASSESSMENT AND RECOMMENDATIONS:   Assessment:  46 year old male with a history of rheumatic heart disease s/p mechanical MVR x2 on warfarin, biventricular heart failure s/p ICD, chronic afib on amiodarone and digoxin (stoppped 1/16 due to toxicity, WPW ablation and CKD who was transferred from [facility] for further evaluation of CHF and possible hemolysis and for LVAD evaluation. Hepatology initially following for elevated LFTs (likely due to congestive hepatopathy and cirrhosis with normal hepatic venous pressure gradient. Patient underwent liver biopsy on 1/30 which showed advanced chronic live disease (stage 3-4) of unclear etiology but amiodarone toxicity favored.  He started having RUQ pain and melena on 1/31/17 following liver biopsy. CT abdomen w/o contrast showed possible hemobilia and questionable mesenteric infarct.  CTA on 2/2/17 was negative for active extravasation.  Hemobilia was felt to be the most likely source of melena.  Anticoagulation was restarted on 2/7/17 and maroon stools and 2 gram hgb drop was noted overnight on 2/8/17.  Prompting luminal GI consult.   Given there was probable bleeding from hematobilia still favor that as the most likely source.  Lower GI sources are considered less likely and less likely to cause a life threatening bleed.  Favor starting with an upper endoscopy.      Regarding Transaminitis, felt to be related to congestive hepatopathy.  No signs of cholangitis.  Panc / biliary service recommended conservative management.  RUQ pain multifactorial including post procedure, hepatic congestion from CHF, biliary colic.     Recommendations    Continue CBC daily, with goal Hb according to primary team.      Continue IV PPI BID.      Serial abdominal exams.    Aggressive bowel regimen.      CHF management according to primary team.     Clear liquid diet    NPO at midnight    Tentative  plan for EGD in the AM if patient consents    Please place formal luminal GI consult      Thank you for involving us in this patient's care. Please do not hesitate to contact the GI service with any questions or concerns.     Pt care plan discussed with Dr. Moran, GI staff physician.    Mat Knutson  -------------------------------------------------------------------------------------------------------------------          Chief Complaint:   We were asked by Dr. Skinner of Cardiology to evaluate this patient with hematochezia.      History is obtained from the patient and the medical record.          History of Present Illness:   Samir Rodriguez is a 46 year old male with a history of rheumatic heart disease s/p mechanical MVR x2 on warfarin, biventricular heart failure s/p ICD, chronic afib on amiodarone and digoxin (stoppped 1/16 due to toxicity, WPW ablation and CKD who was transferred from [facility] for further evaluation of CHF and possible hemolysis and for LVAD evaluation. Hepatology was following this patient before for elevated LFTs and cirrhosis. Patient underwent liver biopsy on 1/30. He started having RUQ pain next day of the procedure and also had melena yesterday, CT abdomen w/o contrast was done and showed possible hemobilia and questionable mesenteric infarct.  CTA showed no active extravasation on 2/2/17.  Pancreatic / biliary service followed through 2/8/17 and felt there was no role for ERCP at this time.  GI luminal service was consulted on 2/9/17 as patient's hemoglobin dropped from 7 to 5.6 with report of hematochezia.      On consultation, the patient reports 2 maroon liquid stools and persistent epigastric pain with radiation to the back.  His prior GI care provider reports the pain started the day after the liver biopsy.  He denies nausea, vomiting, heartburn, reflux, hematemesis.  The patient denies NSAIDS, alcohol abuse, and has never had upper or lower  endoscopy.               Past Medical History:   Reviewed and edited as appropriate  Past Medical History   Diagnosis Date     Rheumatic heart disease      s/p mechanical MVR x2 ('80, '92)     Biventricular CHF (congestive heart failure) (H)      s/p dual chamber ICD '08     Chronic atrial fibrillation (H)      amiodarone history            Past Surgical History:   Reviewed and edited as appropriate   Past Surgical History   Procedure Laterality Date     Cholecystectomy       Hernia repair       Appendectomy       Mitral valve replacement       Mechanical ([place] Tilting Disc); 1992     Fusion cervical anterior three+ levels       Eye surgery Left      Pt notes eye surgery to LE secondary to Barbed wire injury as a child.            Previous Endoscopy:   No results found for this or any previous visit.         Social History:   Reviewed and edited as appropriate  Social History     Social History     Marital Status: Single     Spouse Name: N/A     Number of Children: N/A     Years of Education: N/A     Occupational History     Not on file.     Social History Main Topics     Smoking status: Never Smoker      Smokeless tobacco: Not on file     Alcohol Use: No     Drug Use: No     Sexual Activity: Not on file     Other Topics Concern     Not on file     Social History Narrative    Lives in [place], [place]. Has a son named Sid.            Family History:   Reviewed and edited as appropriate  Family History   Problem Relation Age of Onset     DIABETES Mother      Hypertension Brother      Macular Degeneration No family hx of      Glaucoma Father            Allergies:   Reviewed and edited as appropriate     Allergies   Allergen Reactions     Blood Transfusion Related (Informational Only) Other (See Comments)     Patient has a history of a clinically significant antibody against RBC antigens.  A delay in compatible RBCs may occur.     Asa [Aspirin] Other (See Comments) and Diarrhea     goosebumps  nausea     Gabapentin  Nausea     Nabumetone Nausea     Sulfamethoxazole Unknown     Trimethoprim Unknown     Allopurinol Rash     Clindamycin Rash            Medications:     Current Facility-Administered Medications   Medication     0.9% sodium chloride BOLUS     0.9% sodium chloride BOLUS     gelatin absorbable (GELFOAM) sponge 1 each     No heparin via hemodialysis machine     sodium chloride (PF) 0.9% PF flush 3 mL     sodium chloride (PF) 0.9% PF flush 3 mL     sodium chloride (PF) 0.9% PF flush 10 mL     sodium chloride (PF) 0.9% PF flush 10 mL     alteplase (CATHFLO ACTIVASE) injection 2 mg     alteplase (CATHFLO ACTIVASE) injection 2 mg     sennosides (SENOKOT) tablet 1 tablet     oxyCODONE (ROXICODONE) IR tablet 5-10 mg     oxyCODONE (OxyCONTIN) 12 hr tablet 10 mg     bumetanide (BUMEX) injection 4 mg     B complex-C-folic acid (NEPHROCAPS/TRIPHROCAPS) capsule 1 capsule     oxymetazoline (AFRIN) 0.05 % spray 2 spray     heparin  drip 25,000 units in 0.45% NaCl 250 mL (see additional administration details for dose)     heparin bolus from infusion pump     polyethylene glycol (MIRALAX/GLYCOLAX) Packet 17 g     hydrALAZINE (APRESOLINE) half-tab 12.5 mg     heparin lock flush 10 UNIT/ML injection 2-5 mL     lidocaine 1 % injection 0.5-1 mL     lidocaine (LMX4) kit     sodium chloride (PF) 0.9% PF flush 1-10 mL     sodium chloride (PF) 0.9% PF flush 5-10 mL     lidocaine 1 % 1 mL     sodium chloride (PF) 0.9% PF flush 10-20 mL     sodium chloride (PF) 0.9% PF flush 10 mL     pantoprazole (PROTONIX) 40 mg IV push injection     levothyroxine (SYNTHROID/LEVOTHROID) tablet 175 mcg     clonazePAM (klonoPIN) half-tab 0.25 mg     menthol (ICY HOT) 5 % patch 1 patch    And     menthol (ICY HOT) Patch in Place    And     menthol (ICY HOT) patch REMOVAL     ondansetron (ZOFRAN) tablet 4 mg     lidocaine (LIDODERM) 5 % Patch 1-3 patch    And     lidocaine (LIDODERM) patch REMOVAL    And     lidocaine (LIDODERM) Patch in Place     potassium  chloride SA (K-DUR/KLOR-CON M) CR tablet 20-40 mEq     potassium chloride (KLOR-CON) Packet 20-40 mEq     potassium chloride 10 mEq in 100 mL intermittent infusion     potassium chloride 10 mEq in 100 mL intermittent infusion with 10 mg lidocaine     potassium chloride 20 mEq in 50 mL intermittent infusion     magnesium sulfate 2 g in NS intermittent infusion ([facility]ar[facility] or FV Cmpd)     magnesium sulfate 4 g in 100 mL sterile water (premade)     potassium phosphate 15 mmol in D5W 250 mL intermittent infusion     potassium phosphate 20 mmol in D5W 500 mL intermittent infusion     potassium phosphate 25 mmol in D5W 500 mL intermittent infusion     melatonin tablet 3 mg     miconazole (MICATIN; MICRO GUARD) 2 % powder     sodium chloride (PF) 0.9% PF flush 3 mL     sodium chloride (PF) 0.9% PF flush 3 mL     medication instruction     nitroglycerin (NITROSTAT) sublingual tablet 0.4 mg     alum & mag hydroxide-simethicone (MYLANTA ES/MAALOX  ES) suspension 15-30 mL     albuterol (PROAIR HFA/PROVENTIL HFA/VENTOLIN HFA) Inhaler 2 puff     naloxone (NARCAN) injection 0.1-0.4 mg             Review of Systems:   A complete review of systems was performed and is negative except as noted in the HPI           Physical Exam:   BP 84/42 mmHg  Pulse 80  Temp(Src) 97.6  F (36.4  C) (Oral)  Resp 18  Ht 1.829 m (6')  Wt 100.245 kg (221 lb)  BMI 29.97 kg/m2  SpO2 98%  Wt:   Wt Readings from Last 2 Encounters:   02/06/17 100.245 kg (221 lb)      Constitutional: cooperative, pleasant, not dyspneic/diaphoretic, no acute distress  Eyes: Scleral icterus  Ears/nose/mouth/throat: Normal oropharynx without ulcers or exudate, mucus membranes moist, hearing intact, poor dentition  Neck: supple, thyroid normal size  CV: Anasarca  Respiratory: Unlabored breathing  Lymph: No axillary, submandibular, supraclavicular or inguinal lymphadenopathy  Abd: soft, Nondistended, +bs, no hepatosplenomegaly, nontender, no peritoneal signs  Skin: warm,  perfused, jaundice  Neuro: AAO x 3, No asterixis  Psych: Normal affect  MSK: Normal gait         Data:   Labs and imaging below were independently reviewed and interpreted    BMP  Recent Labs  Lab 02/09/17  1041 02/08/17  0323 02/07/17  0735 02/06/17  2201   * 125* 122* 123*   POTASSIUM 3.7 4.2 4.0 3.8   CHLORIDE 94 92* 87* 90*   DELFINO 7.2* 7.3* 7.7* 7.6*   CO2 20 20 20 19*   BUN 40* 35* 34* 32*   CR 3.26* 3.10* 2.85* 2.72*   GLC 84 95 88 90     CBC  Recent Labs  Lab 02/09/17  1041  02/08/17  0323 02/07/17  1623 02/07/17  0735   WBC 7.8  --  6.3 5.5 5.7   RBC 1.73*  --  2.39* 2.05* 2.22*   HGB 5.6*  < > 7.6* 6.8* 7.1*   HCT 16.4*  --  22.5* 19.6* 21.1*   MCV 95  --  94 96 95   MCH 32.4  --  31.8 33.2* 32.0   MCHC 34.1  --  33.8 34.7 33.6   RDW 20.5*  --  20.8* 21.6* 21.7*   *  --  126* 125* 144*   < > = values in this interval not displayed.  INR  Recent Labs  Lab 02/09/17  1041 02/09/17  0254 02/08/17  1557 02/08/17  1340   INR 1.92* 2.14* 2.19* 2.39*     LFTs  Recent Labs  Lab 02/08/17  0323 02/07/17  0735 02/06/17  0718 02/05/17  0848   ALKPHOS 378* 428* 449* 467*   * 116* 130* 140*   ALT 83* 95* 101* 100*   BILITOTAL 5.3* 6.1* 6.3* 8.6*   PROTTOTAL 5.1* 5.5* 5.6* 5.4*   ALBUMIN 1.7* 1.9* 2.0* 2.0*      PANCNo lab results found in last 7 days.    Imaging:  CT abdomen/pelvis  IMPRESSION:  1. No site of active hemorrhage is appreciated. No hemorrhage  appreciated at the site of previous hepatic vein biopsy.    2. Circumferential thickening and hyperattenuation of the common bile  duct without hemorrhage on arterial, portal venous, or delayed phases.  This is nonspecific and may be related to infection, neoplastic  process, or edema.  3. Diffuse colonic thickening extending up to the level of the sigmoid  colon, which may represent congestion, infectious colitis, ischemia,  or neoplastic process. The KAISER and SMA are patent throughout.  4. The previously seen hypoattenuating focus in the central  "mesentery  is unchanged, possibly representing lipoma.  5. Large portosystemic and portal renal shunt in the setting of  ascites and diffuse hepatic hyperattenuation, which may represent  changes from chronic congestive failure, fibrosis, multiple  transfusions, hemachromatosis, or \"nutmeg liver.\" No portal venous  thrombosis visualized.  6. Cardiomegaly with diffuse anasarca.  7. Bibasilar groundglass opacities which may represent atelectasis,  pulmonary edema and/or consolidation. Stable small bilateral pleural  effusions.  "

## 2017-02-09 NOTE — PLAN OF CARE
Problem: Goal Outcome Summary  Goal: Goal Outcome Summary  PT 6C:  Cancel.  Patient with active bleed, hemoglobin down to 5.6.  Not appropriate for therapy this date; transfered to ICU for higher level of cares.  Will rescheduled.

## 2017-02-09 NOTE — PROGRESS NOTES
Nephrology Progress Note  02/09/2017         Assessment & Recommendations:     46 year old  male with PMH of rheumatic heart disease status post MVR  X 2 on warfarin, biventricular CHF with EF 25-30 % status post dual chamber ICD in 2008, chronic atrial fibrillation on amiodarone, WPW ablation, CKD stage III , hypothyroidism transferred from [facility] on 1/12/2017 for further management of heart failure. Patient received liver biopsy which showed cirrhosis. Patient not a candidate for Valve replacement or LVAD. Patient with CARSON on admission with serum Cr 2 which improved to 1.2 with IV diuresis likely from cardiorenal physiology. Patient now with worsening renal failure and oliguria refractory to high dose diuretics. Suspect ATN likely from multifactorial causes - hypotension, exposure to IV contrast on 2/2/17, pre renal turned ATN .      Recommendations :-     1. CARSON on CKD :- likely from multifactorial causes as outlined above. Serum creatinine 2.8 today. Patient with declining urine output since past few days despite use of high dose of IV diuretics.   - IR to place tunneled dialysis catheter tomorrow and will initiate dialysis for volume and metabolic management.  Patient to be NPO after  midnight. INR to be 1.8.     2. BP :- hypotensive.  Low BP could limit UF with HD.     3. Hypervolemic on exam. Stable respiratory status. Patient remains oliguric on high dose diuretics.     4. No acute electrolyte or acid base issues. Continue fluid restriction of 1 L daily.     5. Acute on chronic anemia :- likely from epistaxis even. Heparin drip on hold. Patient receiving PRBC. Patient to get endoscopic evaluation by GI. Cancelled dialysis catheter placement for today.     Discussed with Dr Malone.     Recommendations were communicated to primary team via note and directly.         Amber Miller MD   [phone].     Interval History :     Patient had a significant episode of epistaxis  overnight. Had acute drop in Hb. Patient transferred to ICU. Patient receiving PRBC transfusion. Patient also had a large stool with blood. GI on board for endoscopic evaluation.     Review of Systems:     As in interval history.     Physical Exam:   I/O last 3 completed shifts:  In: 568.4 [I.V.:288.4]  Out: 2400 [Urine:400; Stool:2000]   BP 92/46 mmHg  Pulse 80  Temp(Src) 97.6  F (36.4  C) (Oral)  Resp 10  Ht 1.829 m (6')  Wt 100.245 kg (221 lb)  BMI 29.97 kg/m2  SpO2 100%     GENERAL APPEARANCE: alert, no acute distress  EYES:  No  scleral icterus, pupils equal  HENT: mouth without ulcers or lesions  PULM: diminished breath sounds bilaterally.  CV: regular rhythm, normal rate, no rub      -edema 3+  GI: soft, bowel sounds are +  INTEGUMENT: no cyanosis  NEURO:  NAD    Labs:   All labs reviewed by me  Electrolytes/Renal -   Recent Labs   Lab Test  02/09/17   1041  02/08/17   0323  02/07/17   0735   01/22/17   0806  01/21/17   0730   01/18/17   0707   NA  126*  125*  122*   < >  127*  127*   < >  131*   POTASSIUM  3.7  4.2  4.0   < >  3.8  3.6   < >  3.6   CHLORIDE  94  92*  87*   < >  91*  91*   < >  91*   CO2  20  20  20   < >  30  30   < >  31   BUN  40*  35*  34*   < >  16  19   < >  26   CR  3.26*  3.10*  2.85*   < >  1.48*  1.59*   < >  1.80*   GLC  84  95  88   < >  76  88   < >  77   DELFINO  7.2*  7.3*  7.7*   < >  7.4*  7.4*   < >  6.9*   MAG  2.2  2.2  2.3   < >  1.9  2.1   < >   --    PHOS   --    --    --    --   2.4*  2.2*   --   2.8    < > = values in this interval not displayed.       CBC -   Recent Labs   Lab Test  02/09/17   1041  02/09/17   0254  02/08/17   0323  02/07/17   1623   WBC  7.8   --   6.3  5.5   HGB  5.6*  7.2*  7.6*  6.8*   PLT  139*   --   126*  125*       LFTs -   Recent Labs   Lab Test  02/08/17   0323  02/07/17   0735  02/06/17   0718   ALKPHOS  378*  428*  449*   BILITOTAL  5.3*  6.1*  6.3*   ALT  83*  95*  101*   AST  103*  116*  130*   PROTTOTAL  5.1*  5.5*  5.6*   ALBUMIN   1.7*  1.9*  2.0*       Iron Panel -   Recent Labs   Lab Test  01/13/17   0650   IRON  51   IRONSAT  32   DORA  2664*         Imaging:  Reviewed.     Current Medications:    sodium chloride 0.9%  250 mL Intravenous Once in dialysis     sodium chloride 0.9%  1,000-2,000 mL Hemodialysis Machine Once     gelatin absorbable  1 each Topical During Hemodialysis (from stock)     - MEDICATION INSTRUCTIONS -   Does not apply Once     sodium chloride (PF)  3 mL Intracatheter During Hemodialysis (from stock)     sodium chloride (PF)  3 mL Intracatheter During Hemodialysis (from stock)     oxyCODONE  10 mg Oral Q12H     bumetanide  4 mg Intravenous BID     B complex-C-folic acid  1 capsule Oral QPM     polyethylene glycol  17 g Oral Daily     hydrALAZINE  12.5 mg Oral Q8H GO     sodium chloride (PF)  5-10 mL Intracatheter Q7 Days     sodium chloride (PF)  10 mL Intracatheter Q7 Days     pantoprazole  40 mg Intravenous BID     levothyroxine  175 mcg Oral QAM AC     menthol   Transdermal Q8H     lidocaine  1-3 patch Transdermal Q24h    And     lidocaine   Transdermal Q24H    And     lidocaine   Transdermal Q8H     miconazole   Topical BID     sodium chloride (PF)  3 mL Intracatheter Q8H       HEParin Stopped (02/09/17 0900)     - MEDICATION INSTRUCTIONS -       Amber Miller MD     Attending Note: I have seen and examined this patient and the above note reflects my historical findings, exam findings, and assessment and plan.  Ganesh Malone MD

## 2017-02-09 NOTE — PROGRESS NOTES
Social Work Services Progress Note    Hospital Day: 29  Date of Initial Social Work Evaluation:  NA  Collaborated with:  [place] Medicaid , [facility] [place], Medicaid [place] Office    Data:  Pt is a 46 year old  male who is not a candidate for advanced heart procedures.  POC is TBD pending pt's trial of dialysis.    Intervention:  SW has been working toward placement as pt's goals are restorative at this time.  SW has been able to find one TCU in [place] that will do rehab, but no dialysis.  There is one other facility in [place], with an extensive Medicaid waitlist and will do dialysis.  SW has made a referral to both to be on the waitlist.    Next barrier for pt's care is transportation.  SHAQUILLE discussed this issue with [place] directly.  Per Ivory, pt has no services from the [facility] for support to get home.  [place] also states [place] and has very limited resources to give to their residents.   was given the phone number for [facility] in [place].  [facility] states they only provide medical rides to and from medical facilities in [place].  [facility] staff gave SW the phone number for Medicaid Title 19.  SW called Title 19.  Pt can only get a bus pass, or he can try to retroactively submit gas and mileage costs to driving home.  Title 19 does not cover inter-state medical travel.  SHAQUILLE discussed this at length with Cards 1 team as pt has significant medical needs.    Assessment:  If pt were to discharge to home, pt would have a son and daughter who live with him at home.  Per Ivory, the family would be involved for cares.    Plan:    Anticipated Disposition:  TCU vs home with palliative homecare/hospice.  Pending trial of dialysis.     Barriers to d/c plan:  Insurance, no transfer agreement in place to assist with discharge to [facility].  Is on waitlist for Medicaid bed at [facility] if TCU is still wanted by pt/family.  Pt will need a POLST  equivalent for [place] pending goals of care.  Pt does not have transportation covered.  Will need to discuss further when pt is ready to discharge.    Follow Up:  SW will continue to follow for discharge.  Plan for dialysis catheter placement today.    REJI Hernandez, APSW  6C Unit   Pager: [phone]  Phone: [phone]      ___________________________________________________________________________________________________________________________________________________    Referrals in process:     TCUs with phone calls/referrals out:    - [place] [facility]: - [facility] - pt continues to be on the waitlist for beds.  Is fourth on waitlist for services.  Possible bed in as early as 5-7 days pending progress of other residents.  Possibly longer.  PH: [phone]  F: [phone]     - [facility] - message for admissions, per [facility] staff, they do dialysis.     - [facility] - offers dialysis, has limited beds for rehab, are concerned that pt may be too medically acute for rehab, and nursing home is full for Medicaid.   PH: [phone]  F: [phone]     - [facility] - Message for admissions     - [facility] - message with admissions.    Referrals Discontinued:    Swing Bed Programs:   - [facility] - declined due to transportation concerns, concerns pt cannot tolerate therapies.  Admissions states even if can tolerate therapies, pt would be declined due to pt needing transport to the [facility].  SW explained pt has Title 19 to cover transport and pt would be responsible.  Admissions states this still does not satisfy their concerns.  PH: [phone]  F: [phone]      - [facility] - declined due to no Medicaid beds available at this time.  PH - Cell: [phone]  PH - Main: [phone]  F: [phone]    TCU Units (updates in red):    - [facility] - do not accept  Medicaid Ins.  Do not accept IHS insurance.  PH: [phone]   - [facility] [place] - do not accept Medicaid Ins.  Do not accept IHS insurance.  PH: [phone]   - [facility] - only a SNF/LTC and do not accept Medicaid only for rehab.  Have an over two year wait for a SNF Medicaid bed.  PH: [phone]   - [facility] - Accepts Medicaid, however has extensive wait for a bed.  PH: [phone]  - [facility] - No answer when attempted to call  PH: [phone]  - [facility] - no answer from admissions re if Medicaid is covered  PH: [phone]  - [place] - No answer from admissions re if Medicaid is covered  PH: [phone]   - [facility] - No dialysis offered in nursing home, no stand alone TCU.  Few rehab beds onsite.   - [place] - No dialysis, no TCU, only nursing home.     Community Case Management/Community Services in place:   Pt has a Medicaid  [place]   PH: [phone] x [phone]   -  on Call for [place]: [phone]    [facility] Office: Main line: [phone]  [facility] - call for services to start  PH: [phone]    [facility] - medical administration, fax all documentation here  F: [phone]    Pt may have Title 19 services for transportation, will need to assess this at time of dc if he qualifies for TCU.      Update 1/31/17 - this is a wrong number.  Please do not call.

## 2017-02-10 ENCOUNTER — APPOINTMENT (OUTPATIENT)
Dept: INTERVENTIONAL RADIOLOGY/VASCULAR | Facility: CLINIC | Age: 47
DRG: 286 | End: 2017-02-10
Attending: INTERNAL MEDICINE
Payer: MEDICAID

## 2017-02-10 ENCOUNTER — APPOINTMENT (OUTPATIENT)
Dept: OCCUPATIONAL THERAPY | Facility: CLINIC | Age: 47
DRG: 286 | End: 2017-02-10
Attending: INTERNAL MEDICINE
Payer: MEDICAID

## 2017-02-10 ENCOUNTER — ANESTHESIA (OUTPATIENT)
Dept: SURGERY | Facility: CLINIC | Age: 47
DRG: 286 | End: 2017-02-10
Payer: MEDICAID

## 2017-02-10 ENCOUNTER — ANESTHESIA EVENT (OUTPATIENT)
Dept: SURGERY | Facility: CLINIC | Age: 47
DRG: 286 | End: 2017-02-10
Payer: MEDICAID

## 2017-02-10 ENCOUNTER — APPOINTMENT (OUTPATIENT)
Dept: GENERAL RADIOLOGY | Facility: CLINIC | Age: 47
DRG: 286 | End: 2017-02-10
Attending: INTERNAL MEDICINE
Payer: MEDICAID

## 2017-02-10 LAB
ABO + RH BLD: ABNORMAL
ABO + RH BLD: ABNORMAL
ALBUMIN SERPL-MCNC: 1.6 G/DL (ref 3.4–5)
ALP SERPL-CCNC: 235 U/L (ref 40–150)
ALT SERPL W P-5'-P-CCNC: 61 U/L (ref 0–70)
ANION GAP SERPL CALCULATED.3IONS-SCNC: 13 MMOL/L (ref 3–14)
ANION GAP SERPL CALCULATED.3IONS-SCNC: 14 MMOL/L (ref 3–14)
ANION GAP SERPL CALCULATED.3IONS-SCNC: 15 MMOL/L (ref 3–14)
AST SERPL W P-5'-P-CCNC: 108 U/L (ref 0–45)
BASE DEFICIT BLDV-SCNC: 5.1 MMOL/L
BASOPHILS # BLD AUTO: 0 10E9/L (ref 0–0.2)
BASOPHILS # BLD AUTO: 0 10E9/L (ref 0–0.2)
BASOPHILS NFR BLD AUTO: 0.2 %
BASOPHILS NFR BLD AUTO: 0.2 %
BILIRUB DIRECT SERPL-MCNC: 9.8 MG/DL (ref 0–0.2)
BILIRUB SERPL-MCNC: 11 MG/DL (ref 0.2–1.3)
BLD GP AB SCN SERPL QL: ABNORMAL
BLD PROD TYP BPU: ABNORMAL
BLD PROD TYP BPU: NORMAL
BLD UNIT ID BPU: 0
BLOOD BANK CMNT PATIENT-IMP: ABNORMAL
BLOOD BANK CMNT PATIENT-IMP: ABNORMAL
BLOOD PRODUCT CODE: NORMAL
BPU ID: NORMAL
BUN SERPL-MCNC: 43 MG/DL (ref 7–30)
BUN SERPL-MCNC: 43 MG/DL (ref 7–30)
BUN SERPL-MCNC: 46 MG/DL (ref 7–30)
CA-I BLD-MCNC: 4.3 MG/DL (ref 4.4–5.2)
CA-I SERPL ISE-MCNC: 4.1 MG/DL (ref 4.4–5.2)
CALCIUM SERPL-MCNC: 7.3 MG/DL (ref 8.5–10.1)
CALCIUM SERPL-MCNC: 7.6 MG/DL (ref 8.5–10.1)
CALCIUM SERPL-MCNC: 7.6 MG/DL (ref 8.5–10.1)
CHLORIDE SERPL-SCNC: 94 MMOL/L (ref 94–109)
CHLORIDE SERPL-SCNC: 94 MMOL/L (ref 94–109)
CHLORIDE SERPL-SCNC: 95 MMOL/L (ref 94–109)
CO2 SERPL-SCNC: 20 MMOL/L (ref 20–32)
CO2 SERPL-SCNC: 20 MMOL/L (ref 20–32)
CO2 SERPL-SCNC: 21 MMOL/L (ref 20–32)
CREAT SERPL-MCNC: 3.22 MG/DL (ref 0.66–1.25)
CREAT SERPL-MCNC: 3.47 MG/DL (ref 0.66–1.25)
CREAT SERPL-MCNC: 3.58 MG/DL (ref 0.66–1.25)
CREAT UR-MCNC: 102 MG/DL
D DIMER PPP FEU-MCNC: 1.2 UG/ML FEU (ref 0–0.5)
DIFFERENTIAL METHOD BLD: ABNORMAL
DIFFERENTIAL METHOD BLD: ABNORMAL
EOSINOPHIL # BLD AUTO: 0 10E9/L (ref 0–0.7)
EOSINOPHIL # BLD AUTO: 0.1 10E9/L (ref 0–0.7)
EOSINOPHIL NFR BLD AUTO: 0.3 %
EOSINOPHIL NFR BLD AUTO: 0.4 %
ERCP: NORMAL
ERYTHROCYTE [DISTWIDTH] IN BLOOD BY AUTOMATED COUNT: 18.2 % (ref 10–15)
ERYTHROCYTE [DISTWIDTH] IN BLOOD BY AUTOMATED COUNT: 18.6 % (ref 10–15)
ERYTHROCYTE [DISTWIDTH] IN BLOOD BY AUTOMATED COUNT: 18.8 % (ref 10–15)
FIBRINOGEN PPP-MCNC: 179 MG/DL (ref 200–420)
GFR SERPL CREATININE-BSD FRML MDRD: 18 ML/MIN/1.7M2
GFR SERPL CREATININE-BSD FRML MDRD: 19 ML/MIN/1.7M2
GFR SERPL CREATININE-BSD FRML MDRD: 21 ML/MIN/1.7M2
GLUCOSE SERPL-MCNC: 69 MG/DL (ref 70–99)
GLUCOSE SERPL-MCNC: 71 MG/DL (ref 70–99)
GLUCOSE SERPL-MCNC: 75 MG/DL (ref 70–99)
HCO3 BLDV-SCNC: 20 MMOL/L (ref 21–28)
HCT VFR BLD AUTO: 20.7 % (ref 40–53)
HCT VFR BLD AUTO: 22.2 % (ref 40–53)
HCT VFR BLD AUTO: 23.6 % (ref 40–53)
HGB BLD-MCNC: 7.2 G/DL (ref 13.3–17.7)
HGB BLD-MCNC: 7.5 G/DL (ref 13.3–17.7)
HGB BLD-MCNC: 7.7 G/DL (ref 13.3–17.7)
HGB BLD-MCNC: 8 G/DL (ref 13.3–17.7)
IMM GRANULOCYTES # BLD: 0.4 10E9/L (ref 0–0.4)
IMM GRANULOCYTES # BLD: 0.6 10E9/L (ref 0–0.4)
IMM GRANULOCYTES NFR BLD: 3.7 %
IMM GRANULOCYTES NFR BLD: 4.6 %
INR PPP: 1.72 (ref 0.86–1.14)
INR PPP: 1.78 (ref 0.86–1.14)
LACTATE BLD-SCNC: 1.6 MMOL/L (ref 0.7–2.1)
LDH SERPL L TO P-CCNC: 259 U/L (ref 85–227)
LMWH PPP CHRO-ACNC: NORMAL IU/ML
LYMPHOCYTES # BLD AUTO: 0.2 10E9/L (ref 0.8–5.3)
LYMPHOCYTES # BLD AUTO: 0.5 10E9/L (ref 0.8–5.3)
LYMPHOCYTES NFR BLD AUTO: 1.7 %
LYMPHOCYTES NFR BLD AUTO: 4.1 %
MAGNESIUM SERPL-MCNC: 2.1 MG/DL (ref 1.6–2.3)
MAGNESIUM SERPL-MCNC: 2.2 MG/DL (ref 1.6–2.3)
MAGNESIUM SERPL-MCNC: 2.2 MG/DL (ref 1.6–2.3)
MCH RBC QN AUTO: 30.7 PG (ref 26.5–33)
MCH RBC QN AUTO: 31 PG (ref 26.5–33)
MCH RBC QN AUTO: 31.4 PG (ref 26.5–33)
MCHC RBC AUTO-ENTMCNC: 33.9 G/DL (ref 31.5–36.5)
MCHC RBC AUTO-ENTMCNC: 34.7 G/DL (ref 31.5–36.5)
MCHC RBC AUTO-ENTMCNC: 34.8 G/DL (ref 31.5–36.5)
MCV RBC AUTO: 90 FL (ref 78–100)
MONOCYTES # BLD AUTO: 0.8 10E9/L (ref 0–1.3)
MONOCYTES # BLD AUTO: 0.9 10E9/L (ref 0–1.3)
MONOCYTES NFR BLD AUTO: 7.2 %
MONOCYTES NFR BLD AUTO: 7.2 %
NEUTROPHILS # BLD AUTO: 10.4 10E9/L (ref 1.6–8.3)
NEUTROPHILS # BLD AUTO: 9.8 10E9/L (ref 1.6–8.3)
NEUTROPHILS NFR BLD AUTO: 84.5 %
NEUTROPHILS NFR BLD AUTO: 85.9 %
NRBC # BLD AUTO: 0 10*3/UL
NRBC # BLD AUTO: 0.1 10*3/UL
NRBC BLD AUTO-RTO: 0 /100
NRBC BLD AUTO-RTO: 1 /100
NUM BPU REQUESTED: 5
O2/TOTAL GAS SETTING VFR VENT: ABNORMAL %
OXYHGB MFR BLDV: 77 %
PCO2 BLDV: 37 MM HG (ref 40–50)
PH BLDV: 7.35 PH (ref 7.32–7.43)
PHOSPHATE SERPL-MCNC: 5.8 MG/DL (ref 2.5–4.5)
PHOSPHATE SERPL-MCNC: 5.8 MG/DL (ref 2.5–4.5)
PLATELET # BLD AUTO: 121 10E9/L (ref 150–450)
PLATELET # BLD AUTO: 124 10E9/L (ref 150–450)
PLATELET # BLD AUTO: 99 10E9/L (ref 150–450)
PO2 BLDV: 44 MM HG (ref 25–47)
POTASSIUM SERPL-SCNC: 4.1 MMOL/L (ref 3.4–5.3)
POTASSIUM SERPL-SCNC: 4.2 MMOL/L (ref 3.4–5.3)
POTASSIUM SERPL-SCNC: 4.3 MMOL/L (ref 3.4–5.3)
PROT SERPL-MCNC: 4 G/DL (ref 6.8–8.8)
RBC # BLD AUTO: 2.29 10E12/L (ref 4.4–5.9)
RBC # BLD AUTO: 2.48 10E12/L (ref 4.4–5.9)
RBC # BLD AUTO: 2.61 10E12/L (ref 4.4–5.9)
SODIUM SERPL-SCNC: 128 MMOL/L (ref 133–144)
SODIUM SERPL-SCNC: 129 MMOL/L (ref 133–144)
SODIUM SERPL-SCNC: 131 MMOL/L (ref 133–144)
SPECIMEN EXP DATE BLD: ABNORMAL
TRANSFUSION STATUS PATIENT QL: NORMAL
TRANSFUSION STATUS PATIENT QL: NORMAL
WBC # BLD AUTO: 10.1 10E9/L (ref 4–11)
WBC # BLD AUTO: 11.6 10E9/L (ref 4–11)
WBC # BLD AUTO: 12.1 10E9/L (ref 4–11)

## 2017-02-10 PROCEDURE — 82805 BLOOD GASES W/O2 SATURATION: CPT | Performed by: INTERNAL MEDICINE

## 2017-02-10 PROCEDURE — 25000125 ZZHC RX 250: Performed by: PHYSICIAN ASSISTANT

## 2017-02-10 PROCEDURE — 85018 HEMOGLOBIN: CPT | Performed by: STUDENT IN AN ORGANIZED HEALTH CARE EDUCATION/TRAINING PROGRAM

## 2017-02-10 PROCEDURE — 37000008 ZZH ANESTHESIA TECHNICAL FEE, 1ST 30 MIN: Performed by: INTERNAL MEDICINE

## 2017-02-10 PROCEDURE — 25000125 ZZHC RX 250: Performed by: NURSE ANESTHETIST, CERTIFIED REGISTERED

## 2017-02-10 PROCEDURE — 85027 COMPLETE CBC AUTOMATED: CPT | Performed by: STUDENT IN AN ORGANIZED HEALTH CARE EDUCATION/TRAINING PROGRAM

## 2017-02-10 PROCEDURE — 25000132 ZZH RX MED GY IP 250 OP 250 PS 637: Performed by: INTERNAL MEDICINE

## 2017-02-10 PROCEDURE — 3E043XZ INTRODUCTION OF VASOPRESSOR INTO CENTRAL VEIN, PERCUTANEOUS APPROACH: ICD-10-PCS

## 2017-02-10 PROCEDURE — 25000132 ZZH RX MED GY IP 250 OP 250 PS 637: Performed by: STUDENT IN AN ORGANIZED HEALTH CARE EDUCATION/TRAINING PROGRAM

## 2017-02-10 PROCEDURE — 83735 ASSAY OF MAGNESIUM: CPT | Performed by: INTERNAL MEDICINE

## 2017-02-10 PROCEDURE — 85610 PROTHROMBIN TIME: CPT | Performed by: STUDENT IN AN ORGANIZED HEALTH CARE EDUCATION/TRAINING PROGRAM

## 2017-02-10 PROCEDURE — 25000128 H RX IP 250 OP 636: Performed by: NURSE ANESTHETIST, CERTIFIED REGISTERED

## 2017-02-10 PROCEDURE — 85520 HEPARIN ASSAY: CPT | Performed by: STUDENT IN AN ORGANIZED HEALTH CARE EDUCATION/TRAINING PROGRAM

## 2017-02-10 PROCEDURE — 83605 ASSAY OF LACTIC ACID: CPT | Performed by: STUDENT IN AN ORGANIZED HEALTH CARE EDUCATION/TRAINING PROGRAM

## 2017-02-10 PROCEDURE — 25000125 ZZHC RX 250: Performed by: STUDENT IN AN ORGANIZED HEALTH CARE EDUCATION/TRAINING PROGRAM

## 2017-02-10 PROCEDURE — 0F798DZ DILATION OF COMMON BILE DUCT WITH INTRALUMINAL DEVICE, VIA NATURAL OR ARTIFICIAL OPENING ENDOSCOPIC: ICD-10-PCS | Performed by: INTERNAL MEDICINE

## 2017-02-10 PROCEDURE — 99211 OFF/OP EST MAY X REQ PHY/QHP: CPT

## 2017-02-10 PROCEDURE — 37000009 ZZH ANESTHESIA TECHNICAL FEE, EACH ADDTL 15 MIN: Performed by: INTERNAL MEDICINE

## 2017-02-10 PROCEDURE — 25000128 H RX IP 250 OP 636: Performed by: RADIOLOGY

## 2017-02-10 PROCEDURE — 40000133 ZZH STATISTIC OT WARD VISIT

## 2017-02-10 PROCEDURE — 27210794 ZZH OR GENERAL SUPPLY STERILE: Performed by: INTERNAL MEDICINE

## 2017-02-10 PROCEDURE — 76937 US GUIDE VASCULAR ACCESS: CPT

## 2017-02-10 PROCEDURE — 25000128 H RX IP 250 OP 636: Performed by: INTERNAL MEDICINE

## 2017-02-10 PROCEDURE — 85025 COMPLETE CBC W/AUTO DIFF WBC: CPT | Performed by: INTERNAL MEDICINE

## 2017-02-10 PROCEDURE — 25500064 ZZH RX 255 OP 636: Performed by: INTERNAL MEDICINE

## 2017-02-10 PROCEDURE — 36558 INSERT TUNNELED CV CATH: CPT

## 2017-02-10 PROCEDURE — 25000125 ZZHC RX 250: Performed by: INTERNAL MEDICINE

## 2017-02-10 PROCEDURE — 36000059 ZZH SURGERY LEVEL 3 EA 15 ADDTL MIN UMMC: Performed by: INTERNAL MEDICINE

## 2017-02-10 PROCEDURE — 27210995 ZZH RX 272: Performed by: PHYSICIAN ASSISTANT

## 2017-02-10 PROCEDURE — 99153 MOD SED SAME PHYS/QHP EA: CPT

## 2017-02-10 PROCEDURE — 83735 ASSAY OF MAGNESIUM: CPT | Performed by: STUDENT IN AN ORGANIZED HEALTH CARE EDUCATION/TRAINING PROGRAM

## 2017-02-10 PROCEDURE — 80053 COMPREHEN METABOLIC PANEL: CPT | Performed by: STUDENT IN AN ORGANIZED HEALTH CARE EDUCATION/TRAINING PROGRAM

## 2017-02-10 PROCEDURE — 71000014 ZZH RECOVERY PHASE 1 LEVEL 2 FIRST HR: Performed by: INTERNAL MEDICINE

## 2017-02-10 PROCEDURE — 36000061 ZZH SURGERY LEVEL 3 W FLUORO 1ST 30 MIN - UMMC: Performed by: INTERNAL MEDICINE

## 2017-02-10 PROCEDURE — 82330 ASSAY OF CALCIUM: CPT | Performed by: STUDENT IN AN ORGANIZED HEALTH CARE EDUCATION/TRAINING PROGRAM

## 2017-02-10 PROCEDURE — 27210904 ZZH KIT CR6

## 2017-02-10 PROCEDURE — 25000125 ZZHC RX 250: Performed by: HOSPITALIST

## 2017-02-10 PROCEDURE — 25000128 H RX IP 250 OP 636: Performed by: PHYSICIAN ASSISTANT

## 2017-02-10 PROCEDURE — BF101ZZ FLUOROSCOPY OF BILE DUCTS USING LOW OSMOLAR CONTRAST: ICD-10-PCS | Performed by: INTERNAL MEDICINE

## 2017-02-10 PROCEDURE — 85384 FIBRINOGEN ACTIVITY: CPT | Performed by: STUDENT IN AN ORGANIZED HEALTH CARE EDUCATION/TRAINING PROGRAM

## 2017-02-10 PROCEDURE — 93005 ELECTROCARDIOGRAM TRACING: CPT

## 2017-02-10 PROCEDURE — 93010 ELECTROCARDIOGRAM REPORT: CPT | Performed by: INTERNAL MEDICINE

## 2017-02-10 PROCEDURE — S0171 BUMETANIDE 0.5 MG: HCPCS | Performed by: STUDENT IN AN ORGANIZED HEALTH CARE EDUCATION/TRAINING PROGRAM

## 2017-02-10 PROCEDURE — 27210738 ZZH ACCESSORY CR2

## 2017-02-10 PROCEDURE — 82330 ASSAY OF CALCIUM: CPT | Performed by: INTERNAL MEDICINE

## 2017-02-10 PROCEDURE — 99152 MOD SED SAME PHYS/QHP 5/>YRS: CPT

## 2017-02-10 PROCEDURE — 99232 SBSQ HOSP IP/OBS MODERATE 35: CPT | Mod: GC

## 2017-02-10 PROCEDURE — C1769 GUIDE WIRE: HCPCS

## 2017-02-10 PROCEDURE — 82248 BILIRUBIN DIRECT: CPT | Performed by: STUDENT IN AN ORGANIZED HEALTH CARE EDUCATION/TRAINING PROGRAM

## 2017-02-10 PROCEDURE — 97140 MANUAL THERAPY 1/> REGIONS: CPT | Mod: GO

## 2017-02-10 PROCEDURE — 84540 ASSAY OF URINE/UREA-N: CPT | Performed by: STUDENT IN AN ORGANIZED HEALTH CARE EDUCATION/TRAINING PROGRAM

## 2017-02-10 PROCEDURE — 84100 ASSAY OF PHOSPHORUS: CPT | Performed by: INTERNAL MEDICINE

## 2017-02-10 PROCEDURE — 27210732 ZZH ACCESSORY CR1

## 2017-02-10 PROCEDURE — C1750 CATH, HEMODIALYSIS,LONG-TERM: HCPCS

## 2017-02-10 PROCEDURE — 80048 BASIC METABOLIC PNL TOTAL CA: CPT | Performed by: INTERNAL MEDICINE

## 2017-02-10 PROCEDURE — 40000277 XR SURGERY CARM FLUORO LESS THAN 5 MIN W STILLS: Mod: TC

## 2017-02-10 PROCEDURE — 85379 FIBRIN DEGRADATION QUANT: CPT | Performed by: STUDENT IN AN ORGANIZED HEALTH CARE EDUCATION/TRAINING PROGRAM

## 2017-02-10 PROCEDURE — C1769 GUIDE WIRE: HCPCS | Performed by: INTERNAL MEDICINE

## 2017-02-10 PROCEDURE — 25000565 ZZH ISOFLURANE, EA 15 MIN: Performed by: INTERNAL MEDICINE

## 2017-02-10 PROCEDURE — 40000171 ZZH STATISTIC PRE-PROCEDURE ASSESSMENT III: Performed by: INTERNAL MEDICINE

## 2017-02-10 PROCEDURE — 20000004 ZZH R&B ICU UMMC

## 2017-02-10 PROCEDURE — 83615 LACTATE (LD) (LDH) ENZYME: CPT | Performed by: STUDENT IN AN ORGANIZED HEALTH CARE EDUCATION/TRAINING PROGRAM

## 2017-02-10 PROCEDURE — P9016 RBC LEUKOCYTES REDUCED: HCPCS | Performed by: INTERNAL MEDICINE

## 2017-02-10 PROCEDURE — 02H633Z INSERTION OF INFUSION DEVICE INTO RIGHT ATRIUM, PERCUTANEOUS APPROACH: ICD-10-PCS | Performed by: RADIOLOGY

## 2017-02-10 PROCEDURE — 27210805 ZZH SHEATH CR4

## 2017-02-10 PROCEDURE — C1877 STENT, NON-COAT/COV W/O DEL: HCPCS | Performed by: INTERNAL MEDICINE

## 2017-02-10 DEVICE — IMPLANTABLE DEVICE: Type: IMPLANTABLE DEVICE | Site: BILE DUCT | Status: FUNCTIONAL

## 2017-02-10 RX ORDER — ETOMIDATE 2 MG/ML
INJECTION INTRAVENOUS PRN
Status: DISCONTINUED | OUTPATIENT
Start: 2017-02-10 | End: 2017-02-10

## 2017-02-10 RX ORDER — CEFAZOLIN SODIUM 1 G/3ML
1 INJECTION, POWDER, FOR SOLUTION INTRAMUSCULAR; INTRAVENOUS
Status: COMPLETED | OUTPATIENT
Start: 2017-02-10 | End: 2017-02-10

## 2017-02-10 RX ORDER — HEPARIN SODIUM 1000 [USP'U]/ML
3 INJECTION, SOLUTION INTRAVENOUS; SUBCUTANEOUS ONCE
Status: DISCONTINUED | OUTPATIENT
Start: 2017-02-10 | End: 2017-02-12 | Stop reason: HOSPADM

## 2017-02-10 RX ORDER — HEPARIN SODIUM 1000 [USP'U]/ML
2 INJECTION, SOLUTION INTRAVENOUS; SUBCUTANEOUS ONCE
Status: COMPLETED | OUTPATIENT
Start: 2017-02-10 | End: 2017-02-10

## 2017-02-10 RX ORDER — SODIUM CHLORIDE 9 MG/ML
INJECTION, SOLUTION INTRAVENOUS
Status: DISCONTINUED
Start: 2017-02-10 | End: 2017-02-10 | Stop reason: HOSPADM

## 2017-02-10 RX ORDER — FENTANYL CITRATE 50 UG/ML
25-50 INJECTION, SOLUTION INTRAMUSCULAR; INTRAVENOUS EVERY 5 MIN PRN
Status: DISCONTINUED | OUTPATIENT
Start: 2017-02-10 | End: 2017-02-10 | Stop reason: HOSPADM

## 2017-02-10 RX ORDER — IOPAMIDOL 510 MG/ML
INJECTION, SOLUTION INTRAVASCULAR PRN
Status: DISCONTINUED | OUTPATIENT
Start: 2017-02-10 | End: 2017-02-10 | Stop reason: HOSPADM

## 2017-02-10 RX ORDER — NALOXONE HYDROCHLORIDE 0.4 MG/ML
.1-.4 INJECTION, SOLUTION INTRAMUSCULAR; INTRAVENOUS; SUBCUTANEOUS
Status: DISCONTINUED | OUTPATIENT
Start: 2017-02-10 | End: 2017-02-10 | Stop reason: HOSPADM

## 2017-02-10 RX ORDER — INDOMETHACIN 50 MG/1
100 SUPPOSITORY RECTAL
Status: DISCONTINUED | OUTPATIENT
Start: 2017-02-10 | End: 2017-02-10 | Stop reason: HOSPADM

## 2017-02-10 RX ORDER — GLYCOPYRROLATE 0.2 MG/ML
INJECTION, SOLUTION INTRAMUSCULAR; INTRAVENOUS PRN
Status: DISCONTINUED | OUTPATIENT
Start: 2017-02-10 | End: 2017-02-10

## 2017-02-10 RX ORDER — FLUMAZENIL 0.1 MG/ML
0.2 INJECTION, SOLUTION INTRAVENOUS
Status: ACTIVE | OUTPATIENT
Start: 2017-02-10 | End: 2017-02-11

## 2017-02-10 RX ORDER — POTASSIUM CHLORIDE 29.8 MG/ML
20 INJECTION INTRAVENOUS EVERY 6 HOURS PRN
Status: DISCONTINUED | OUTPATIENT
Start: 2017-02-10 | End: 2017-02-12 | Stop reason: HOSPADM

## 2017-02-10 RX ORDER — LIDOCAINE 40 MG/G
CREAM TOPICAL
Status: DISCONTINUED | OUTPATIENT
Start: 2017-02-10 | End: 2017-02-10 | Stop reason: HOSPADM

## 2017-02-10 RX ORDER — FLUMAZENIL 0.1 MG/ML
0.2 INJECTION, SOLUTION INTRAVENOUS
Status: DISCONTINUED | OUTPATIENT
Start: 2017-02-10 | End: 2017-02-10 | Stop reason: HOSPADM

## 2017-02-10 RX ORDER — NALOXONE HYDROCHLORIDE 0.4 MG/ML
.1-.4 INJECTION, SOLUTION INTRAMUSCULAR; INTRAVENOUS; SUBCUTANEOUS
Status: ACTIVE | OUTPATIENT
Start: 2017-02-10 | End: 2017-02-11

## 2017-02-10 RX ORDER — NEOSTIGMINE METHYLSULFATE 1 MG/ML
VIAL (ML) INJECTION PRN
Status: DISCONTINUED | OUTPATIENT
Start: 2017-02-10 | End: 2017-02-10

## 2017-02-10 RX ORDER — SODIUM CHLORIDE 9 MG/ML
INJECTION, SOLUTION INTRAVENOUS CONTINUOUS PRN
Status: DISCONTINUED | OUTPATIENT
Start: 2017-02-10 | End: 2017-02-10

## 2017-02-10 RX ORDER — FENTANYL CITRATE 50 UG/ML
INJECTION, SOLUTION INTRAMUSCULAR; INTRAVENOUS PRN
Status: DISCONTINUED | OUTPATIENT
Start: 2017-02-10 | End: 2017-02-10

## 2017-02-10 RX ADMIN — LEVOTHYROXINE SODIUM 175 MCG: 25 TABLET ORAL at 08:14

## 2017-02-10 RX ADMIN — Medication 12.5 ML/KG/HR: at 23:27

## 2017-02-10 RX ADMIN — MIDAZOLAM 1 MG: 1 INJECTION INTRAMUSCULAR; INTRAVENOUS at 09:52

## 2017-02-10 RX ADMIN — OXYCODONE HYDROCHLORIDE 10 MG: 10 TABLET, FILM COATED, EXTENDED RELEASE ORAL at 08:13

## 2017-02-10 RX ADMIN — LIDOCAINE 1 PATCH: 50 PATCH CUTANEOUS at 09:05

## 2017-02-10 RX ADMIN — OXYCODONE HYDROCHLORIDE 10 MG: 10 TABLET, FILM COATED, EXTENDED RELEASE ORAL at 19:57

## 2017-02-10 RX ADMIN — HEPARIN SODIUM 2000 UNITS: 1000 INJECTION, SOLUTION INTRAVENOUS; SUBCUTANEOUS at 10:14

## 2017-02-10 RX ADMIN — Medication: at 20:28

## 2017-02-10 RX ADMIN — MIDAZOLAM 1 MG: 1 INJECTION INTRAMUSCULAR; INTRAVENOUS at 09:39

## 2017-02-10 RX ADMIN — GLYCOPYRROLATE 0.6 MG: 0.2 INJECTION, SOLUTION INTRAMUSCULAR; INTRAVENOUS at 17:55

## 2017-02-10 RX ADMIN — PHENYLEPHRINE HYDROCHLORIDE 200 MCG: 10 INJECTION, SOLUTION INTRAMUSCULAR; INTRAVENOUS; SUBCUTANEOUS at 16:06

## 2017-02-10 RX ADMIN — Medication 3 MG: at 17:55

## 2017-02-10 RX ADMIN — MIDAZOLAM HYDROCHLORIDE 0.5 MG: 1 INJECTION, SOLUTION INTRAMUSCULAR; INTRAVENOUS at 15:55

## 2017-02-10 RX ADMIN — SODIUM CHLORIDE: 9 INJECTION, SOLUTION INTRAVENOUS at 15:41

## 2017-02-10 RX ADMIN — Medication: at 08:54

## 2017-02-10 RX ADMIN — ALTEPLASE 1 MG: 2.2 INJECTION, POWDER, LYOPHILIZED, FOR SOLUTION INTRAVENOUS at 20:28

## 2017-02-10 RX ADMIN — OXYCODONE HYDROCHLORIDE 10 MG: 5 TABLET ORAL at 20:40

## 2017-02-10 RX ADMIN — ETOMIDATE 8 MG: 2 INJECTION INTRAVENOUS at 16:05

## 2017-02-10 RX ADMIN — MIDAZOLAM HYDROCHLORIDE 0.5 MG: 1 INJECTION, SOLUTION INTRAMUSCULAR; INTRAVENOUS at 16:52

## 2017-02-10 RX ADMIN — MIDAZOLAM HYDROCHLORIDE 0.5 MG: 1 INJECTION, SOLUTION INTRAMUSCULAR; INTRAVENOUS at 17:26

## 2017-02-10 RX ADMIN — Medication: at 23:27

## 2017-02-10 RX ADMIN — PANTOPRAZOLE SODIUM 40 MG: 40 INJECTION, POWDER, FOR SOLUTION INTRAVENOUS at 08:13

## 2017-02-10 RX ADMIN — ROCURONIUM BROMIDE 50 MG: 10 INJECTION INTRAVENOUS at 16:08

## 2017-02-10 RX ADMIN — PANTOPRAZOLE SODIUM 40 MG: 40 INJECTION, POWDER, FOR SOLUTION INTRAVENOUS at 20:00

## 2017-02-10 RX ADMIN — OXYCODONE HYDROCHLORIDE 10 MG: 5 TABLET ORAL at 04:54

## 2017-02-10 RX ADMIN — FENTANYL CITRATE 100 MCG: 50 INJECTION, SOLUTION INTRAMUSCULAR; INTRAVENOUS at 16:05

## 2017-02-10 RX ADMIN — VASOPRESSIN 2.4 UNITS/HR: 20 INJECTION, SOLUTION INTRAMUSCULAR; SUBCUTANEOUS at 20:33

## 2017-02-10 RX ADMIN — MIDAZOLAM HYDROCHLORIDE 0.5 MG: 1 INJECTION, SOLUTION INTRAMUSCULAR; INTRAVENOUS at 17:40

## 2017-02-10 RX ADMIN — Medication 0.25 MG: at 00:14

## 2017-02-10 RX ADMIN — FENTANYL CITRATE 50 MCG: 50 INJECTION, SOLUTION INTRAMUSCULAR; INTRAVENOUS at 09:52

## 2017-02-10 RX ADMIN — MENTHOL 1 PATCH: 205.5 PATCH TOPICAL at 08:14

## 2017-02-10 RX ADMIN — Medication 12.5 ML/KG/HR: at 23:26

## 2017-02-10 RX ADMIN — Medication 1 CAPSULE: at 19:57

## 2017-02-10 RX ADMIN — BUMETANIDE 4 MG: 0.25 INJECTION, SOLUTION INTRAMUSCULAR; INTRAVENOUS at 08:13

## 2017-02-10 RX ADMIN — FENTANYL CITRATE 50 MCG: 50 INJECTION, SOLUTION INTRAMUSCULAR; INTRAVENOUS at 09:40

## 2017-02-10 RX ADMIN — CEFAZOLIN SODIUM 1 G: 1 INJECTION, POWDER, FOR SOLUTION INTRAMUSCULAR; INTRAVENOUS at 09:17

## 2017-02-10 RX ADMIN — LIDOCAINE HYDROCHLORIDE 10 ML: 10 INJECTION, SOLUTION EPIDURAL; INFILTRATION; INTRACAUDAL; PERINEURAL at 10:15

## 2017-02-10 RX ADMIN — Medication 0.25 MG: at 21:29

## 2017-02-10 RX ADMIN — PHENYLEPHRINE HYDROCHLORIDE 0.7 MCG/KG/MIN: 10 INJECTION, SOLUTION INTRAMUSCULAR; INTRAVENOUS; SUBCUTANEOUS at 16:07

## 2017-02-10 ASSESSMENT — PAIN DESCRIPTION - DESCRIPTORS
DESCRIPTORS: ACHING;CONSTANT
DESCRIPTORS: ACHING;CONSTANT
DESCRIPTORS: SHARP
DESCRIPTORS: ACHING;CONSTANT

## 2017-02-10 NOTE — PROCEDURES
Interventional Radiology Pre-Procedure Sedation Assessment   Time of Assessment: 9:18 AM    Expected Level: Moderate Sedation    Indication: Sedation is required for the following type of Procedure: Venous Access    Sedation and procedural consent: Risks, benefits and alternatives were discussed with Patient    PO Intake: Appropriately NPO for procedure    ASA Class: Class 3 - SEVERE SYSTEMIC DISEASE, DEFINITE FUNCTIONAL LIMITATIONS.    Mallampati: Grade 3:  Soft palate visible, posterior pharyngeal wall not visible    Lungs: Posterior auscultation not performed. Lungs otherwise clear.    Heart: Mitral valve click. Normal rate and rhythm    History and physical reviewed and no updates needed. I have reviewed the lab findings, diagnostic data, medications, and the plan for sedation. I have determined this patient to be an appropriate candidate for the planned sedation and procedure and have reassessed the patient IMMEDIATELY PRIOR to sedation and procedure.    Feliciano Jarvis PA-C

## 2017-02-10 NOTE — BRIEF OP NOTE
Interventional Radiology Brief Post Procedure Note    Procedure: @FVRISFRMTLINK([id number])@    Proceduralist: Suhas Wolff MD    Assistant: Suhas Wolff MD    Time Out: Prior to the start of the procedure and with procedural staff participation, I verbally confirmed the patient s identity using two indicators, relevant allergies, that the procedure was appropriate and matched the consent or emergent situation, and that the correct equipment/implants were available. Immediately prior to starting the procedure I conducted the Time Out with the procedural staff and re-confirmed the patient s name, procedure, and site/side. (The Joint Commission universal protocol was followed.)  Yes    Sedation: IR Nurse Monitored Care   Post Procedure Summary:  Prior to the start of the procedure and with procedural staff participation, I verbally confirmed the patient s identity using two indicators, relevant allergies, that the procedure was appropriate and matched the consent or emergent situation, and that the correct equipment/implants were available. Immediately prior to starting the procedure I conducted the Time Out with the procedural staff and re-confirmed the patient s name, procedure, and site/side. (The Joint Commission universal protocol was followed.)  Yes       Sedatives: Fentanyl and Midazolam (Versed)    Vital signs, airway and pulse oximetry were monitored and remained stable throughout the procedure and sedation was maintained until the procedure was complete.  The patient was monitored by staff until sedation discharge criteria were met.    Patient tolerance: Patient tolerated the procedure well with no immediate complications.    Time of sedation in minutes: 30 Minutes minutes from beginning to end of physician one to one monitoring.        Findings: right ij tunneled dialysis catheter placement    Estimated Blood Loss: Less than 10 ml    Fluoroscopy Time: Less than 1 minute    SPECIMENS:  None    Complications: 1. None     Condition: Stable    Plan: dialysis    Comments: See dictated procedure note for full details.    Suhas Wolff MD

## 2017-02-10 NOTE — PROGRESS NOTES
"SPIRITUAL HEALTH SERVICES  SPIRITUAL ASSESSMENT Progress Note (Palliative Focus)  [facility] ([place]) 4A    Follow-up visit with pt after attending care conference yesterday. Pt said he was praying \"in the sacred [place] language\" when I arrived. We talked for a bit, then pt said he would like to continue with his prayer, then for me to pray for him. Pt hoping for improved condition to be able to transfer to hospital in [place]. Pt said he expects his family will be coming on Monday.   I will follow-up 2x/week as pt receives ongoing support from the inpatient palliative consult service.    Gian Luciano) Lizbeth Mathias M.Div., [facility]  Staff   Pager [phone]         "

## 2017-02-10 NOTE — OR NURSING
Text message sent to Dr Ellison to place preop orders @ 1445.  12 lead EKG completed 2/10 @ 1200 reviewed per [facility] Jorge.  Pt hypotensive on arrival - per 4A Rn this has been pt;s baseline and has MAP goal per service of greater than or equal to 50.  PT reports he is comfortable.

## 2017-02-10 NOTE — PLAN OF CARE
Problem: Cardiac: Heart Failure (Adult)  Goal: Signs and Symptoms of Listed Potential Problems Will be Absent or Manageable (Cardiac: Heart Failure)  Signs and symptoms of listed potential problems will be absent or manageable by discharge/transition of care (reference Cardiac: Heart Failure (Adult) CPG).   D:  Patient transferred from [facility] on 01/12 for further eval of CHF and concern of hemolysis in setting of mechanical valve.     I/A: Patient remains hypotensive with BPs 80s/40s, occasionally 70s/30s. MAP dropped below 55 a few times, MD notified, but no interventions done as patients MAP improved.     VS: Hypotensive. Goal is to keep MAP>55.  HR 60-90s. On RA. Afebrile.  Lines/drains/airway: PICC. PIV.  Neuro: A/O. No neuro deficits.   CV: 100%V-paced.   Pulm: LS clear, diminished in bases.   GI:  No bloody stools BS active x4.   : Patient unable to void whole shift until 0400, voided 475 cc.  Skin: Coccyx pressure injury dressing changed. Generalized +3-4 pitting edema. Lymphedema wraps on bilateral lower extremities.   I/D: Afebrile.   Pain: C/o sharp right upper quadrant abdominal pain. PRN Oxycodone and one time dose of Dilaudid given for pain with some relief.         P:  Plan is for patient to go to IR for dialysis access catheter insertion and EGD today. Continue to monitor and notify MD of changes.

## 2017-02-10 NOTE — ANESTHESIA PREPROCEDURE EVALUATION
Anesthesia Evaluation     . Pt has had prior anesthetic. Type: General and MAC    No history of anesthetic complications     ROS/MED HX    ENT/Pulmonary:  - neg pulmonary ROS     Neurologic:  - neg neurologic ROS     Cardiovascular:     (+) ----. : . CHF . . :ICD . valvular problems/murmurs type: MVP MVR x2:.       METS/Exercise Tolerance:     Hematologic:     (+) History of Transfusion -      Musculoskeletal:  - neg musculoskeletal ROS       GI/Hepatic:         Renal/Genitourinary:  - ROS Renal section negative   (+) chronic renal disease, type: CRI, Pt requires dialysis,       Endo:  - neg endo ROS       Psychiatric:  - neg psychiatric ROS       Infectious Disease:  - neg infectious disease ROS       Malignancy:      - no malignancy   Other:               Physical Exam      Airway   Mallampati: III  TM distance: >3 FB  Neck ROM: limited    Dental   (+) upper dentures, chipped, missing and loose    Cardiovascular   Rhythm and rate: irregular      Pulmonary    breath sounds clear to auscultation                    Anesthesia Plan      History & Physical Review  History and physical reviewed and following examination; no interval change.    ASA Status:  3 .    NPO Status:  > 8 hours    Plan for General and ETT with Intravenous and Etomidate induction. Maintenance will be Balanced.    PONV prophylaxis:  Ondansetron (or other 5HT-3)       Postoperative Care  Postoperative pain management:  IV analgesics.      Consents  Anesthetic plan, risks, benefits and alternatives discussed with:  Patient..                          .

## 2017-02-10 NOTE — PROGRESS NOTES
[facility] Nurse Inpatient Adult Pressure INJURY (PI) Wound Assessment     Follow up assessment of PU(s) on pt's:   Bilateral heels      Data:   Patient History:      per MD note(s):  Samir Rodriguez is a 46 year old male  male with past medical history of Rheumatic Heart Disease s/p mechanical MVR x 2 (1980s and 1992) on warfarin (INR goal 2.5-3.5), biventricular CHF (EF 25-30%) s/p dual chamber ICD 2008, WPW ablation at age 11, CKD III, hypothyroid,  who is transferred from [facility]      Current mattress:  AtmosAir, overlay ordered  Current pressure relieving devices:  Heel lift boots, chair cushion and Pillows    Moisture Management:  Incontinence Protocol    Catheter secured? Not applicable    Current Diet / Nutrition:       Active Diet Order  NPO Time Specified Meds, Ice Chips  Maximilian Score  Avg: 15.7  Min: 12  Max: 19   Recent Labs   Lab Test  02/10/17   0833  02/10/17   0201   01/13/17   0147   ALBUMIN   --   1.6*   < >   --    HGB  7.5*  7.2*   < >   --    INR   --   1.78*   < >   --    WBC   --   10.1   < >   --    CRP   --    --    --   6.2    < > = values in this interval not displayed.                                                                                                Pressure Injury Assessment : Bilateral heels   1/12/17 Left heel   1/12/17 right heel    Wound History:   Present on admit  Left posterior heel: 1 x 1 x 0.1 cm  red dry drainage scab  Right posterior heel: 0.5 x 0.3 x 0 cm, flaking dry drainage scab, no surrounding erythema, resolving blister.  Periwound skin inact.     Temperature  cool      Odor: none    Pain:  absent     Has right leg brace           Intervention:     Patient's chart evaluated.      Maximilian Interventions:  Current Maximilian Interventions and Care Plan reviewed and updated, appropriate at this time.    Wound was assessed.    Wound Care: was done: Removal of existing dressing    Visual  inspection    Application of clean dressing,    Orders updated    Supplies  Reviewed    Discussed plan of care with Patient and Nurse    Instructed patient and nurse to transfer patient to bed from chair intermittently versus sitting up in chair for entire day           Assessment:     Pressure Injury (PI) located on  Left heel: Stage 2, Right heel: Stage 2    Status: wound  Improving, Wounds present on admit,           Plan:     Nursing to notify the Provider(s) and re-consult the [facility] Nurse if wound(s) deteriorate(s).    Plan of care for wound located on Buttock/Sacrum and bilateral heels: cleanse with microklenz and pat dry, apply no sting barrier around wound, cover with mepilex.  Change every other day.    Heel lift boots on at all times while in bed.    [facility] Nurse will return: weekly  Face to face time: 10 minutes.

## 2017-02-10 NOTE — PROGRESS NOTES
"Social Work Services Progress Note    Hospital Day: 30  Date of Initial Social Work Evaluation:  NA  Collaborated with:  [place] Medicaid , Cards 1 team    Data:  Pt is a 46 year old  male who is not a candidate for advanced heart procedures.   Pt has been referred to [facility] as a transfer for services.     Intervention:  SHAQUILLE discussed POC with Cards 1 team.  Pt currently continues to be on the waitlist for two TCUs:      - [facility] - will accept with no dialysis treatment     - [facility] - unclear if pt is on waitlist, referral made.  Could accommodate dialysis if accepted for cares medically.    Plan at this time is to transfer pt back to [facility].      Assessment: Pt has not been a candidate for advanced therapies and is on maintenance therapies currently.  Pt wants to be FULL code and wants all restorative cares available for his kidneys and liver.  Pt is understanding that he is not eligible for heart surgeries, LVAD or transplant.  Pt would like to have dialysis as he states in the past dialysis had made him feel better.  Pt is understanding that with his insurances, he is very limited for TCU placement.  At care conference yesterday, SW explained that the pt's safest discharge option is to discharge to [facility], be evaluated for medical stability and then admit to a local transitional care unit.      Pt yesterday did ask that SW find accommodations for his extended family for this weekend.  SHAQUILLE called [place] Medicaid Title 19, [facility] social workers in [place], pt's  [place], and [facility] in [place] accommodations.  Pt is ineligible for [place] coverage, [facility] services (only in [place] area) and the pt has not gotten any support from his home [facility].  Per [place]ory, pt's [place] is \"[place]\" and has very limited resources themselves.  SHAQUILLE completed request for accommodations for the family.  Family " will have for support:        - One hotel room for the nights of 2/11/17 and 2/12/17 at [employer], [place]: confirmation # is [phone].         - Meal Tickets for four persons (breakfast, lunch and dinner) for 2/11/17 and 2/12/17       - [place] Parking Passes as needed for 2/11/17 and 2/12/17.       - Transportation by ALS ambulance to [facility] for 2/12/17.  Pt has no other access to medically safe transportation options.    Plan:    Anticipated Disposition: [facility] as pt is not accepted for TCU and would need medical monitoring after transport.  Transport is 9 hours non-stop, 10-12 hours with breaks/administration of meds as needed.     Barriers to d/c plan:  Pending discharges at [facility], pt will be able to discharge Sunday morning.    Follow Up:  SW has set up transport.  SW will hand off to weekend staff to follow as needed.  Cards 1 team aware the ALS transport will need to be updated Saturday midday with the most current medications, equipment needed in transport.  Will complete ambulance PCS form in anticipation of discharge.    REJI Hernandez, APSW  6C Unit   Pager: [phone]  Phone: [phone]      ___________________________________________________________________________________________________________________________________________________    Referrals in process:     [facility] - current plan  [phone]    Once stable at [place]:    - [place] [facility]: - [facility] (no dialysis)  PH: [phone]  F: [phone]    - [facility] (if dialysis needed)  PH: [phone]  F: [phone]    Referrals Discontinued:    Swing Bed Programs:   - [facility] - declined due to transportation concerns, concerns pt cannot tolerate therapies.  Admissions states even if can tolerate therapies, pt would be declined due to pt needing transport to the [facility].  SW  explained pt has Title 19 to cover transport and pt would be responsible.  Admissions states this still does not satisfy their concerns.  PH: [phone]  F: [phone]      - [facility] - declined due to no Medicaid beds available at this time.  PH - Cell: [phone]  PH - Main: [phone]  F: [phone]    TCU Units (updates in red):    - [facility] - do not accept Medicaid Ins.  Do not accept IHS insurance.  PH: [phone]   - [facility] - do not accept Medicaid Ins.  Do not accept IHS insurance.  PH: [phone]   [facility] - only a SNF/LTC and do not accept Medicaid only for rehab.  Have an over two year wait for a SNF Medicaid bed.  PH: [phone]   - [facility] - Accepts Medicaid, however has extensive wait for a bed.  PH: [phone]  - [facility] - No answer when attempted to call  PH: [phone]  - [facility] - no answer from admissions re if Medicaid is covered  PH: [phone]  - [place] - No answer from admissions re if Medicaid is covered  PH: [phone]   - [facility] - No dialysis offered in nursing home, no stand alone TCU.  Few rehab beds onsite.   - [place] - No dialysis, no TCU, only nursing home.   Do not accept Medicaid   - [place] [facility] - do not accept Medicaid   - [facility] - too medically complex   - [facility] - admissions did not return calls.    Community Case Management/Community Services in place:   Pt has a Medicaid  [place]   PH: [phone] x [phone]   -  on Call for [place]: [phone]    [facility] Office: Main line: [phone]  [facility] - call for services to start  PH: [phone]    [facility] - medical administration, fax all documentation here  F: [phone]     Update:  Does not qualify for any transport support by medical means.  Could get a bus pass.    Update 1/31/17 - this is a wrong  number.  Please do not call.

## 2017-02-10 NOTE — PLAN OF CARE
Problem: Goal Outcome Summary  Goal: Goal Outcome Summary  OT 4A: Cancel- Pt with multiple procedures this date, in IR this AM and OR this PM. Will reschedule per OT POC.

## 2017-02-10 NOTE — PROGRESS NOTES
Interventional Radiology Intra-procedural Nursing Note    Patient Name: Samir Rodriguez  Medical Record Number: [id number]  Today's Date: February 10, 2017    Start Time: 0939  End of procedure time: 1009  Procedure: Tunneled dialysis catheter placement  Report given to: KAL RN Sandie  Time pt departs:  1021  : none    Other Notes: Pt to IR via bed.  Procedure discussed with pt, pt states is anxious, explained will be able to give medication for anxiety and pain.  Pt agreed to procedure.  Pt moved to procedure table, prepped and draped.  Pt tolerated placement of right IJ dialysis catheter with conscious sedation of 2 mg versed and 100 mcg fentanyl over 30 minute sedation time.  VSS.  Pt transferred via hover mat back to bed, taken to 4A per bed with pt transport and ICU float RN.  4A RN alerted to fact that pacer is either missing beats or beats are not capturing occasionally, strips sent upstairs in chart..2 significant pauses after procedure done; VSS.        YIFAN PAL

## 2017-02-10 NOTE — CONSULTS
Patient is on IR schedule 2/10/2017 for a large bore tunneled CVC placement.   INR must be less than 1.8 for placement.  Orders for NPO, scrubs and antibiotics have been entered.  Medications to be held include: heparin gtt  Consent will be done prior to procedure.     Please contact the IR charge RN at [phone] for estimated time of procedure.       Bonnie Guy DNP, APRN  Interventional Radiology  Phone: [phone]  Pager: [phone]

## 2017-02-10 NOTE — PLAN OF CARE
Problem: Goal Outcome Summary  Goal: Goal Outcome Summary  PT 4A: CANCEL - attempted to see the pt this PM but the pt in the OR. Will cancel and reschedule.

## 2017-02-10 NOTE — PROGRESS NOTES
Nephrology Progress Note  02/10/2017         Assessment & Recommendations:     46 year old  male with PMH of rheumatic heart disease status post MVR  X 2 on warfarin, biventricular CHF with EF 25-30 % status post dual chamber ICD in 2008, chronic atrial fibrillation on amiodarone, WPW ablation, CKD stage III , hypothyroidism transferred from [facility] on 1/12/2017 for further management of heart failure. Patient received liver biopsy which showed cirrhosis. Patient not a candidate for Valve replacement or LVAD. Patient with CARSON on admission with serum Cr 2 which improved to 1.2 with IV diuresis likely from cardiorenal physiology. Patient now with worsening renal failure and oliguria refractory to high dose diuretics. Suspect ATN likely from multifactorial causes - hypotension, exposure to IV contrast on 2/2/17, pre renal turned ATN .      Recommendations :-     1. CARSON on CKD :- likely from multifactorial causes as outlined above.  Patient with declining urine output since past few days despite use of high dose of IV diuretics.   - tunneled dialysis catheter placed by IR on 2/10/17. Appreciate help. Initiating CRRT for volume and metabolic management.     2. BP :- hypotensive.  Low BP could limit UF with HD.  Will attempt to run CRRT net negative 25-75 cc/hr. Maintain MAP >55.     3. Hypervolemic on exam. Stable respiratory status. Patient remains oliguric on high dose diuretics. May discontinue IV diuretics.     4. No acute electrolyte or acid base issues. Continue fluid restriction of 1 L daily.     5. Acute on chronic anemia :- GI on board. EGD today.     Discussed with Dr Malone.     Recommendations were communicated to primary team via note and directly.         Amber Miller MD   [phone].     Interval History :     Patient received tunneled dialysis catheter today. Denied SOB. Inadequate response to high dose Diuretics.   Review of Systems:     As in interval history.      Physical Exam:   I/O last 3 completed shifts:  In: 462   Out: 1575 [Urine:575; Stool:1000]   BP 79/49 mmHg  Pulse 80  Temp(Src) 97.8  F (36.6  C) (Oral)  Resp 76  Ht 1.829 m (6')  Wt 105.2 kg (231 lb 14.8 oz)  BMI 31.45 kg/m2  SpO2 96%     GENERAL APPEARANCE: alert, no acute distress  EYES:  No  scleral icterus, pupils equal  HENT: mouth without ulcers or lesions  PULM: diminished breath sounds bilaterally.  CV: regular rhythm, normal rate, no rub      -edema 3+  GI: soft, bowel sounds are +  INTEGUMENT: no cyanosis  NEURO:  NAD    Labs:   All labs reviewed by me  Electrolytes/Renal -   Recent Labs   Lab Test  02/10/17   0201 02/09/17   1041  02/08/17   0323   01/22/17   0806  01/21/17   0730   01/18/17   0707   NA  131*  126*  125*   < >  127*  127*   < >  131*   POTASSIUM  4.1  3.7  4.2   < >  3.8  3.6   < >  3.6   CHLORIDE  95  94  92*   < >  91*  91*   < >  91*   CO2  20  20  20   < >  30  30   < >  31   BUN  43*  40*  35*   < >  16  19   < >  26   CR  3.22*  3.26*  3.10*   < >  1.48*  1.59*   < >  1.80*   GLC  75  84  95   < >  76  88   < >  77   DELFINO  7.3*  7.2*  7.3*   < >  7.4*  7.4*   < >  6.9*   MAG  2.2  2.2  2.2   < >  1.9  2.1   < >   --    PHOS   --    --    --    --   2.4*  2.2*   --   2.8    < > = values in this interval not displayed.       CBC -   Recent Labs   Lab Test  02/10/17   0833  02/10/17   0201  02/09/17   1808  02/09/17   1041   02/08/17   0323   WBC   --   10.1   --   7.8   --   6.3   HGB  7.5*  7.2*  6.7*  5.6*   < >  7.6*   PLT   --   121*   --   139*   --   126*    < > = values in this interval not displayed.       LFTs -   Recent Labs   Lab Test  02/10/17   0201  02/08/17   0323  02/07/17   0735   ALKPHOS  235*  378*  428*   BILITOTAL  11.0*  5.3*  6.1*   ALT  61  83*  95*   AST  108*  103*  116*   PROTTOTAL  4.0*  5.1*  5.5*   ALBUMIN  1.6*  1.7*  1.9*       Iron Panel -   Recent Labs   Lab Test  01/13/17   0650   IRON  51   IRONSAT  32   DORA  2664*          Imaging:  Reviewed.     Current Medications:    sodium chloride 0.9%  1,000 mL CRRT Once     [Auto Hold] oxyCODONE  10 mg Oral Q12H     [Auto Hold] bumetanide  4 mg Intravenous BID     [Auto Hold] B complex-C-folic acid  1 capsule Oral QPM     [Auto Hold] polyethylene glycol  17 g Oral Daily     [Auto Hold] hydrALAZINE  12.5 mg Oral Q8H GO     [Auto Hold] sodium chloride (PF)  5-10 mL Intracatheter Q7 Days     [Auto Hold] sodium chloride (PF)  10 mL Intracatheter Q7 Days     [Auto Hold] pantoprazole  40 mg Intravenous BID     [Auto Hold] levothyroxine  175 mcg Oral QAM AC     [Auto Hold] menthol   Transdermal Q8H     [Auto Hold] lidocaine  1-3 patch Transdermal Q24h    And     [Auto Hold] lidocaine   Transdermal Q24H    And     [Auto Hold] lidocaine   Transdermal Q8H     [Auto Hold] miconazole   Topical BID     [Auto Hold] sodium chloride (PF)  3 mL Intracatheter Q8H       - MEDICATION INSTRUCTIONS -       dialysate for CVVHD & CVVHDF (Phoxillum BK4/2.5)       replacement solution for CVVHD & CVVHDF (Phoxillum BK4/2.5)       replacement solution for CVVHD & CVVHDF (Phoxillum BK4/2.5)       HEParin Stopped (02/09/17 0900)     - MEDICATION INSTRUCTIONS -       Amber Miller MD     Attending Note: I have seen and examined this patient and the above note reflects my historical findings, exam findings, and assessment and plan.  Ganesh Malone MD

## 2017-02-10 NOTE — PLAN OF CARE
Problem: Goal Outcome Summary  Goal: Goal Outcome Summary  D/I: Pt not complaining of any nausea/lightheadedness. Limited movement of all extremities BUEs>BLEs. 100% paced. BPs 80s/40s with MAPs high 50s-60s. Sats high 90s on RA. Urinal voids. No BM since transfer. WOC RN changed sacral mepilex. Wraps left on BLEs, unable to examine. 2u PRBCs given for Hgb 5.6, recheck pending. PRN oxy for pain.   A/P: Monitor pain. NPO for EGD and HD line placement tomorrow. Monitor stool appearance.

## 2017-02-10 NOTE — PROGRESS NOTES
GASTROENTEROLOGY PROGRESS NOTE      ASSESSMENT/ RECOMMENDATIONS:    Assessment:    46 year old male with a history of rheumatic heart disease s/p mechanical MVR x2 on warfarin, biventricular heart failure s/p ICD, chronic afib on amiodarone and digoxin (stopped 1/16 due to toxicity, WPW ablation,  CKD, cholecystectomy who was transferred from [facility] for further evaluation of CHF and possible hemolysis and for LVAD evaluation. He underwent transjugular liver biopsy on 1/30. Patient started having abdominal pain and GI bleed. No HD instability. He was also on IV heparin and warfarin for his mechanical mitral valve. Most likely source of bleeding was hemobilia based on clinical course and CT finding. CTA was done on 2/2/17 and was negative for active extravasation. His  Bleeding resolved, but bleeding restarted couple of days ago with Hb drop to 5.3 yesterday, he is s/p 2 units PRBC yesterday, hb 7.3 today. He is still having maroon stools. GI bleeding most likely from  Hemobilia, ischemic colitis.     -  Also has elevated LFTs with bilirubin peaked to 10.4 which downtrended to 5.3 yesterday, increased to 11 today . No signs of cholangitis, also underlying liver cirrhosis, biopsy showed advanced chronic liver disease (stage 3-4) of uncertain etiology, most likely from amiodarone toxicity. He had normal hepatic venous pressure gradient (HVPG). Elevated LFTs most likely from bile duct obstruction from blood clot, also component of congestive hepatopathy.   RUQ pain multifactorial including  biliary colic, post procedure, hepatic congestion from CHF.       Recommendations    Plan for EGD with ERCP today to evaluate for hemobilia and other sources. Will need IR backup if persistent bleeding after ERCP. NPO for the procedure.     Continue H&H monitoring, with goal Hb > 7, INR < 2, transfuse PRN, continue to hold anticoagulation for the procedure.     Continue IV PPI BID.     Serial abdominal  exams.    CHF management according to primary team.     Also need to clarify goals of care as patient not candidate for heart transplant/LVAD or valvular surgery.     The patient was discussed and plan agreed upon with GI staff, Dr. Salgado.     Tej Mejia  [facility]  Gastroenterology Fellow  _______________________________________________________________    S: still having RUQ pain, had big maroon stool this morning, getting dialysis catheter today.   O:  Blood pressure 84/39, pulse 80, temperature 97.8  F (36.6  C), temperature source Oral, resp. rate 11, height 1.829 m (6'), weight 105.2 kg (231 lb 14.8 oz), SpO2 96 %.    Gen: no distress  CV: normal S1, S2, systolic murmur  Lungs: CTAB  Abd: soft, tenderness in RUQ, epigastric region, normal BS.   Neuro: no focal motor sensory deficits.       LABS:  BMP    Recent Labs  Lab 02/10/17  0201 02/09/17  1041 02/08/17  0323 02/07/17  0735   * 126* 125* 122*   POTASSIUM 4.1 3.7 4.2 4.0   CHLORIDE 95 94 92* 87*   DELFINO 7.3* 7.2* 7.3* 7.7*   CO2 20 20 20 20   BUN 43* 40* 35* 34*   CR 3.22* 3.26* 3.10* 2.85*   GLC 75 84 95 88     CBC    Recent Labs  Lab 02/10/17  0833 02/10/17  0201 02/09/17  1808 02/09/17  1041  02/08/17  0323 02/07/17  1623   WBC  --  10.1  --  7.8  --  6.3 5.5   RBC  --  2.29*  --  1.73*  --  2.39* 2.05*   HGB 7.5* 7.2* 6.7* 5.6*  < > 7.6* 6.8*   HCT  --  20.7* 19.1* 16.4*  --  22.5* 19.6*   MCV  --  90  --  95  --  94 96   MCH  --  31.4  --  32.4  --  31.8 33.2*   MCHC  --  34.8  --  34.1  --  33.8 34.7   RDW  --  18.2*  --  20.5*  --  20.8* 21.6*   PLT  --  121*  --  139*  --  126* 125*   < > = values in this interval not displayed.  INR    Recent Labs  Lab 02/10/17  0201 02/09/17  1041 02/09/17  0254 02/08/17  1557   INR 1.78* 1.92* 2.14* 2.19*     LFTs    Recent Labs  Lab 02/10/17  0201 02/08/17  0323 02/07/17  0735 02/06/17  0718   ALKPHOS 235* 378* 428* 449*   * 103* 116* 130*   ALT 61 83* 95* 101*    BILITOTAL 11.0* 5.3* 6.1* 6.3*   PROTTOTAL 4.0* 5.1* 5.5* 5.6*   ALBUMIN 1.6* 1.7* 1.9* 2.0*      PANC  No lab results found in last 7 days.

## 2017-02-10 NOTE — PLAN OF CARE
Problem: Goal Outcome Summary  Goal: Goal Outcome Summary  Outcome: No Change  D;  Upon taking over care of pt at 07:00, interventional radiology called for pt to come down to have a dialysis catheter placed.  At the same time pt needing to go to the bathroom.  I:  RN prepared pt for going down for a dialysis catheter placement, RN cruzito pt's labs per orders.  RN sent a urine sample down per orders from 2/7/17.  RN monitored pt's skin integrity changing his coccyx wound dressing after having a bowel movement.  A;  Pt continues with loose liquid bloody stools.  HGB=7.5.  Pt went down for dialysis placement via bed and RN at 09:00.  P:  Continue with current plan of care.    Problem: Cardiac: Heart Failure (Adult)  Goal: Signs and Symptoms of Listed Potential Problems Will be Absent or Manageable (Cardiac: Heart Failure)  Signs and symptoms of listed potential problems will be absent or manageable by discharge/transition of care (reference Cardiac: Heart Failure (Adult) CPG).   Outcome: No Change    02/10/17 0507 02/10/17 0911   Cardiac: Heart Failure   Problems Assessed (Heart Failure) all --    Problems Present (Heart Failure) --  (v paced)       Pt came back from having a dialysis catheter placed in right side of chest returning to 4A at 10:45 am. RN noted pt bleeding from a site to right side of chest  Requiring a 10 minute pressure hold with a quick clot cloth on the site.  A pressure dressing was placed at the site with no further evidence of bleeding noted. Nephrology saw patient determining if pt able to be back from the procedure today by 16:00, then pt would have a single dialysis run, is pt is unable to return from the OR by that time, pt to CRRT starting this evening some time.  Pt left for OR at 13:00 for an endoscopy to determine where his bleeding is coming from.  Prior to going down to the OR a unit of PrBC's was started at 10:30.    Continue with current plan of care.

## 2017-02-11 ENCOUNTER — APPOINTMENT (OUTPATIENT)
Dept: OCCUPATIONAL THERAPY | Facility: CLINIC | Age: 47
DRG: 286 | End: 2017-02-11
Attending: INTERNAL MEDICINE
Payer: MEDICAID

## 2017-02-11 LAB
ABO + RH BLD: ABNORMAL
ABO + RH BLD: ABNORMAL
ALBUMIN SERPL-MCNC: 1.5 G/DL (ref 3.4–5)
ALP SERPL-CCNC: 244 U/L (ref 40–150)
ALT SERPL W P-5'-P-CCNC: 254 U/L (ref 0–70)
ANION GAP SERPL CALCULATED.3IONS-SCNC: 11 MMOL/L (ref 3–14)
ANION GAP SERPL CALCULATED.3IONS-SCNC: 11 MMOL/L (ref 3–14)
ANION GAP SERPL CALCULATED.3IONS-SCNC: 12 MMOL/L (ref 3–14)
ANION GAP SERPL CALCULATED.3IONS-SCNC: 13 MMOL/L (ref 3–14)
AST SERPL W P-5'-P-CCNC: 757 U/L (ref 0–45)
BASOPHILS # BLD AUTO: 0 10E9/L (ref 0–0.2)
BASOPHILS NFR BLD AUTO: 0.1 %
BASOPHILS NFR BLD AUTO: 0.2 %
BASOPHILS NFR BLD AUTO: 0.2 %
BILIRUB DIRECT SERPL-MCNC: 5.9 MG/DL (ref 0–0.2)
BILIRUB SERPL-MCNC: 7.8 MG/DL (ref 0.2–1.3)
BLD GP AB SCN SERPL QL: ABNORMAL
BLD PROD TYP BPU: ABNORMAL
BLOOD BANK CMNT PATIENT-IMP: ABNORMAL
BLOOD BANK CMNT PATIENT-IMP: ABNORMAL
BUN SERPL-MCNC: 22 MG/DL (ref 7–30)
BUN SERPL-MCNC: 23 MG/DL (ref 7–30)
BUN SERPL-MCNC: 27 MG/DL (ref 7–30)
BUN SERPL-MCNC: 39 MG/DL (ref 7–30)
CA-I BLD-MCNC: 4.2 MG/DL (ref 4.4–5.2)
CA-I BLD-MCNC: 4.3 MG/DL (ref 4.4–5.2)
CA-I SERPL ISE-MCNC: 4.1 MG/DL (ref 4.4–5.2)
CA-I SERPL ISE-MCNC: 4.1 MG/DL (ref 4.4–5.2)
CALCIUM SERPL-MCNC: 7.2 MG/DL (ref 8.5–10.1)
CALCIUM SERPL-MCNC: 7.4 MG/DL (ref 8.5–10.1)
CALCIUM SERPL-MCNC: 7.5 MG/DL (ref 8.5–10.1)
CALCIUM SERPL-MCNC: 7.5 MG/DL (ref 8.5–10.1)
CHLORIDE SERPL-SCNC: 100 MMOL/L (ref 94–109)
CHLORIDE SERPL-SCNC: 97 MMOL/L (ref 94–109)
CHLORIDE SERPL-SCNC: 98 MMOL/L (ref 94–109)
CHLORIDE SERPL-SCNC: 99 MMOL/L (ref 94–109)
CO2 SERPL-SCNC: 20 MMOL/L (ref 20–32)
CO2 SERPL-SCNC: 21 MMOL/L (ref 20–32)
CO2 SERPL-SCNC: 22 MMOL/L (ref 20–32)
CO2 SERPL-SCNC: 22 MMOL/L (ref 20–32)
CREAT SERPL-MCNC: 2.04 MG/DL (ref 0.66–1.25)
CREAT SERPL-MCNC: 2.19 MG/DL (ref 0.66–1.25)
CREAT SERPL-MCNC: 2.42 MG/DL (ref 0.66–1.25)
CREAT SERPL-MCNC: 2.91 MG/DL (ref 0.66–1.25)
DIFFERENTIAL METHOD BLD: ABNORMAL
EOSINOPHIL # BLD AUTO: 0 10E9/L (ref 0–0.7)
EOSINOPHIL # BLD AUTO: 0 10E9/L (ref 0–0.7)
EOSINOPHIL # BLD AUTO: 0.1 10E9/L (ref 0–0.7)
EOSINOPHIL NFR BLD AUTO: 0.4 %
EOSINOPHIL NFR BLD AUTO: 0.4 %
EOSINOPHIL NFR BLD AUTO: 0.6 %
ERYTHROCYTE [DISTWIDTH] IN BLOOD BY AUTOMATED COUNT: 18.8 % (ref 10–15)
ERYTHROCYTE [DISTWIDTH] IN BLOOD BY AUTOMATED COUNT: 18.9 % (ref 10–15)
ERYTHROCYTE [DISTWIDTH] IN BLOOD BY AUTOMATED COUNT: 19.1 % (ref 10–15)
GFR SERPL CREATININE-BSD FRML MDRD: 23 ML/MIN/1.7M2
GFR SERPL CREATININE-BSD FRML MDRD: 29 ML/MIN/1.7M2
GFR SERPL CREATININE-BSD FRML MDRD: 32 ML/MIN/1.7M2
GFR SERPL CREATININE-BSD FRML MDRD: 35 ML/MIN/1.7M2
GLUCOSE BLDC GLUCOMTR-MCNC: 54 MG/DL (ref 70–99)
GLUCOSE BLDC GLUCOMTR-MCNC: 60 MG/DL (ref 70–99)
GLUCOSE BLDC GLUCOMTR-MCNC: 63 MG/DL (ref 70–99)
GLUCOSE BLDC GLUCOMTR-MCNC: 94 MG/DL (ref 70–99)
GLUCOSE BLDC GLUCOMTR-MCNC: 96 MG/DL (ref 70–99)
GLUCOSE SERPL-MCNC: 51 MG/DL (ref 70–99)
GLUCOSE SERPL-MCNC: 60 MG/DL (ref 70–99)
GLUCOSE SERPL-MCNC: 72 MG/DL (ref 70–99)
GLUCOSE SERPL-MCNC: 88 MG/DL (ref 70–99)
HCT VFR BLD AUTO: 21.7 % (ref 40–53)
HCT VFR BLD AUTO: 22.1 % (ref 40–53)
HCT VFR BLD AUTO: 23.4 % (ref 40–53)
HGB BLD-MCNC: 7.5 G/DL (ref 13.3–17.7)
HGB BLD-MCNC: 7.7 G/DL (ref 13.3–17.7)
HGB BLD-MCNC: 7.8 G/DL (ref 13.3–17.7)
HGB BLD-MCNC: 8 G/DL (ref 13.3–17.7)
IMM GRANULOCYTES # BLD: 0.1 10E9/L (ref 0–0.4)
IMM GRANULOCYTES # BLD: 0.2 10E9/L (ref 0–0.4)
IMM GRANULOCYTES # BLD: 0.3 10E9/L (ref 0–0.4)
IMM GRANULOCYTES NFR BLD: 1.3 %
IMM GRANULOCYTES NFR BLD: 2.3 %
IMM GRANULOCYTES NFR BLD: 3.2 %
INR PPP: 1.88 (ref 0.86–1.14)
LACTATE BLD-SCNC: 1.2 MMOL/L (ref 0.7–2.1)
LYMPHOCYTES # BLD AUTO: 0.4 10E9/L (ref 0.8–5.3)
LYMPHOCYTES # BLD AUTO: 0.5 10E9/L (ref 0.8–5.3)
LYMPHOCYTES # BLD AUTO: 0.6 10E9/L (ref 0.8–5.3)
LYMPHOCYTES NFR BLD AUTO: 3.9 %
LYMPHOCYTES NFR BLD AUTO: 4.5 %
LYMPHOCYTES NFR BLD AUTO: 5.8 %
MAGNESIUM SERPL-MCNC: 2.2 MG/DL (ref 1.6–2.3)
MAGNESIUM SERPL-MCNC: 2.2 MG/DL (ref 1.6–2.3)
MAGNESIUM SERPL-MCNC: 2.3 MG/DL (ref 1.6–2.3)
MAGNESIUM SERPL-MCNC: 2.3 MG/DL (ref 1.6–2.3)
MCH RBC QN AUTO: 30.9 PG (ref 26.5–33)
MCH RBC QN AUTO: 31.5 PG (ref 26.5–33)
MCH RBC QN AUTO: 31.6 PG (ref 26.5–33)
MCHC RBC AUTO-ENTMCNC: 34.2 G/DL (ref 31.5–36.5)
MCHC RBC AUTO-ENTMCNC: 35.3 G/DL (ref 31.5–36.5)
MCHC RBC AUTO-ENTMCNC: 35.5 G/DL (ref 31.5–36.5)
MCV RBC AUTO: 89 FL (ref 78–100)
MCV RBC AUTO: 89 FL (ref 78–100)
MCV RBC AUTO: 90 FL (ref 78–100)
MONOCYTES # BLD AUTO: 0.8 10E9/L (ref 0–1.3)
MONOCYTES # BLD AUTO: 0.8 10E9/L (ref 0–1.3)
MONOCYTES # BLD AUTO: 0.9 10E9/L (ref 0–1.3)
MONOCYTES NFR BLD AUTO: 7.6 %
MONOCYTES NFR BLD AUTO: 7.9 %
MONOCYTES NFR BLD AUTO: 9.1 %
NEUTROPHILS # BLD AUTO: 8.1 10E9/L (ref 1.6–8.3)
NEUTROPHILS # BLD AUTO: 8.6 10E9/L (ref 1.6–8.3)
NEUTROPHILS # BLD AUTO: 8.8 10E9/L (ref 1.6–8.3)
NEUTROPHILS NFR BLD AUTO: 83.2 %
NEUTROPHILS NFR BLD AUTO: 84.6 %
NEUTROPHILS NFR BLD AUTO: 84.7 %
NRBC # BLD AUTO: 0 10*3/UL
NRBC BLD AUTO-RTO: 0 /100
NUM BPU REQUESTED: 2
PHOSPHATE SERPL-MCNC: 4.2 MG/DL (ref 2.5–4.5)
PHOSPHATE SERPL-MCNC: 4.4 MG/DL (ref 2.5–4.5)
PHOSPHATE SERPL-MCNC: 4.6 MG/DL (ref 2.5–4.5)
PHOSPHATE SERPL-MCNC: 5.1 MG/DL (ref 2.5–4.5)
PLATELET # BLD AUTO: 100 10E9/L (ref 150–450)
PLATELET # BLD AUTO: 113 10E9/L (ref 150–450)
PLATELET # BLD AUTO: 135 10E9/L (ref 150–450)
POTASSIUM SERPL-SCNC: 4.1 MMOL/L (ref 3.4–5.3)
POTASSIUM SERPL-SCNC: 4.2 MMOL/L (ref 3.4–5.3)
POTASSIUM SERPL-SCNC: 4.2 MMOL/L (ref 3.4–5.3)
POTASSIUM SERPL-SCNC: 4.3 MMOL/L (ref 3.4–5.3)
PROT SERPL-MCNC: 4.2 G/DL (ref 6.8–8.8)
RBC # BLD AUTO: 2.44 10E12/L (ref 4.4–5.9)
RBC # BLD AUTO: 2.48 10E12/L (ref 4.4–5.9)
RBC # BLD AUTO: 2.59 10E12/L (ref 4.4–5.9)
SODIUM SERPL-SCNC: 130 MMOL/L (ref 133–144)
SODIUM SERPL-SCNC: 131 MMOL/L (ref 133–144)
SODIUM SERPL-SCNC: 132 MMOL/L (ref 133–144)
SODIUM SERPL-SCNC: 132 MMOL/L (ref 133–144)
SPECIMEN EXP DATE BLD: ABNORMAL
UUN UR-MCNC: 251 MG/DL
UUN/CREAT 24H UR: 2 G/G CR
WBC # BLD AUTO: 10.2 10E9/L (ref 4–11)
WBC # BLD AUTO: 10.3 10E9/L (ref 4–11)
WBC # BLD AUTO: 9.7 10E9/L (ref 4–11)

## 2017-02-11 PROCEDURE — 40000133 ZZH STATISTIC OT WARD VISIT

## 2017-02-11 PROCEDURE — 80076 HEPATIC FUNCTION PANEL: CPT | Performed by: INTERNAL MEDICINE

## 2017-02-11 PROCEDURE — 85610 PROTHROMBIN TIME: CPT | Performed by: INTERNAL MEDICINE

## 2017-02-11 PROCEDURE — 25000125 ZZHC RX 250: Performed by: HOSPITALIST

## 2017-02-11 PROCEDURE — 86901 BLOOD TYPING SEROLOGIC RH(D): CPT | Performed by: STUDENT IN AN ORGANIZED HEALTH CARE EDUCATION/TRAINING PROGRAM

## 2017-02-11 PROCEDURE — 25000132 ZZH RX MED GY IP 250 OP 250 PS 637: Performed by: STUDENT IN AN ORGANIZED HEALTH CARE EDUCATION/TRAINING PROGRAM

## 2017-02-11 PROCEDURE — 82330 ASSAY OF CALCIUM: CPT | Performed by: INTERNAL MEDICINE

## 2017-02-11 PROCEDURE — 80048 BASIC METABOLIC PNL TOTAL CA: CPT | Performed by: INTERNAL MEDICINE

## 2017-02-11 PROCEDURE — 83605 ASSAY OF LACTIC ACID: CPT | Performed by: INTERNAL MEDICINE

## 2017-02-11 PROCEDURE — 85025 COMPLETE CBC W/AUTO DIFF WBC: CPT | Performed by: INTERNAL MEDICINE

## 2017-02-11 PROCEDURE — 25000125 ZZHC RX 250: Performed by: STUDENT IN AN ORGANIZED HEALTH CARE EDUCATION/TRAINING PROGRAM

## 2017-02-11 PROCEDURE — 25800025 ZZH RX 258: Performed by: STUDENT IN AN ORGANIZED HEALTH CARE EDUCATION/TRAINING PROGRAM

## 2017-02-11 PROCEDURE — 25000128 H RX IP 250 OP 636: Performed by: STUDENT IN AN ORGANIZED HEALTH CARE EDUCATION/TRAINING PROGRAM

## 2017-02-11 PROCEDURE — 97140 MANUAL THERAPY 1/> REGIONS: CPT | Mod: GO

## 2017-02-11 PROCEDURE — 86902 BLOOD TYPE ANTIGEN DONOR EA: CPT | Performed by: STUDENT IN AN ORGANIZED HEALTH CARE EDUCATION/TRAINING PROGRAM

## 2017-02-11 PROCEDURE — 84100 ASSAY OF PHOSPHORUS: CPT | Performed by: INTERNAL MEDICINE

## 2017-02-11 PROCEDURE — 83735 ASSAY OF MAGNESIUM: CPT | Performed by: INTERNAL MEDICINE

## 2017-02-11 PROCEDURE — 86922 COMPATIBILITY TEST ANTIGLOB: CPT | Performed by: STUDENT IN AN ORGANIZED HEALTH CARE EDUCATION/TRAINING PROGRAM

## 2017-02-11 PROCEDURE — 25000132 ZZH RX MED GY IP 250 OP 250 PS 637: Performed by: INTERNAL MEDICINE

## 2017-02-11 PROCEDURE — 86850 RBC ANTIBODY SCREEN: CPT | Performed by: STUDENT IN AN ORGANIZED HEALTH CARE EDUCATION/TRAINING PROGRAM

## 2017-02-11 PROCEDURE — 25000125 ZZHC RX 250: Performed by: INTERNAL MEDICINE

## 2017-02-11 PROCEDURE — 85018 HEMOGLOBIN: CPT | Performed by: STUDENT IN AN ORGANIZED HEALTH CARE EDUCATION/TRAINING PROGRAM

## 2017-02-11 PROCEDURE — 86900 BLOOD TYPING SEROLOGIC ABO: CPT | Performed by: STUDENT IN AN ORGANIZED HEALTH CARE EDUCATION/TRAINING PROGRAM

## 2017-02-11 PROCEDURE — 00000146 ZZHCL STATISTIC GLUCOSE BY METER IP

## 2017-02-11 PROCEDURE — 99232 SBSQ HOSP IP/OBS MODERATE 35: CPT | Mod: GC

## 2017-02-11 PROCEDURE — 20000004 ZZH R&B ICU UMMC

## 2017-02-11 RX ORDER — DEXTROSE MONOHYDRATE 25 G/50ML
1 INJECTION, SOLUTION INTRAVENOUS
Qty: 100 ML | Refills: 0 | Status: SHIPPED
Start: 2017-02-11

## 2017-02-11 RX ORDER — FLUMAZENIL 0.1 MG/ML
0.2 INJECTION, SOLUTION INTRAVENOUS EVERY 5 MIN PRN
Qty: 2 ML | Refills: 0 | Status: SHIPPED
Start: 2017-02-11 | End: 2017-02-14

## 2017-02-11 RX ORDER — NALOXONE HYDROCHLORIDE 0.4 MG/ML
0.4 INJECTION, SOLUTION INTRAMUSCULAR; INTRAVENOUS; SUBCUTANEOUS EVERY 5 MIN PRN
Qty: 4 ML | Refills: 0 | Status: SHIPPED
Start: 2017-02-11

## 2017-02-11 RX ORDER — ALBUTEROL SULFATE 90 UG/1
2 AEROSOL, METERED RESPIRATORY (INHALATION) 4 TIMES DAILY PRN
Qty: 18 G | Refills: 0 | Status: SHIPPED
Start: 2017-02-11

## 2017-02-11 RX ORDER — NICOTINE POLACRILEX 4 MG
15-30 LOZENGE BUCCAL
Status: DISCONTINUED | OUTPATIENT
Start: 2017-02-11 | End: 2017-02-12 | Stop reason: HOSPADM

## 2017-02-11 RX ORDER — CLONAZEPAM 0.5 MG/1
0.25 TABLET ORAL 3 TIMES DAILY PRN
Qty: 3 TABLET | Refills: 0 | Status: SHIPPED | OUTPATIENT
Start: 2017-02-11

## 2017-02-11 RX ORDER — CLONAZEPAM 0.5 MG/1
0.25 TABLET ORAL 3 TIMES DAILY PRN
Qty: 3 TABLET | Refills: 0 | Status: SHIPPED | OUTPATIENT
Start: 2017-02-11 | End: 2017-02-11

## 2017-02-11 RX ORDER — OXYCODONE HYDROCHLORIDE 5 MG/1
5-10 TABLET ORAL EVERY 4 HOURS PRN
Qty: 8 TABLET | Refills: 0 | Status: SHIPPED | OUTPATIENT
Start: 2017-02-11 | End: 2017-02-11

## 2017-02-11 RX ORDER — LEVOTHYROXINE SODIUM 25 UG/1
175 TABLET ORAL
Qty: 1 TABLET | Refills: 0 | Status: SHIPPED
Start: 2017-02-11

## 2017-02-11 RX ORDER — OXYCODONE HCL 10 MG/1
10 TABLET, FILM COATED, EXTENDED RELEASE ORAL EVERY 12 HOURS
Qty: 2 TABLET | Refills: 0 | Status: SHIPPED | OUTPATIENT
Start: 2017-02-11 | End: 2017-02-11

## 2017-02-11 RX ORDER — DEXTROSE MONOHYDRATE 25 G/50ML
25-50 INJECTION, SOLUTION INTRAVENOUS
Status: DISCONTINUED | OUTPATIENT
Start: 2017-02-11 | End: 2017-02-12 | Stop reason: HOSPADM

## 2017-02-11 RX ORDER — OXYCODONE HYDROCHLORIDE 5 MG/1
5-10 TABLET ORAL EVERY 4 HOURS PRN
Qty: 8 TABLET | Refills: 0 | Status: SHIPPED | OUTPATIENT
Start: 2017-02-11

## 2017-02-11 RX ORDER — LIDOCAINE 50 MG/G
1-3 PATCH TOPICAL EVERY 24 HOURS
Qty: 3 PATCH | Refills: 0 | Status: SHIPPED
Start: 2017-02-11

## 2017-02-11 RX ORDER — OXYMETAZOLINE HYDROCHLORIDE 0.05 G/100ML
2 SPRAY NASAL 2 TIMES DAILY
Qty: 14.7 ML | Refills: 0 | Status: SHIPPED
Start: 2017-02-11

## 2017-02-11 RX ORDER — OXYCODONE HCL 10 MG/1
10 TABLET, FILM COATED, EXTENDED RELEASE ORAL EVERY 12 HOURS
Qty: 2 TABLET | Refills: 0 | Status: SHIPPED | OUTPATIENT
Start: 2017-02-11

## 2017-02-11 RX ORDER — POLYETHYLENE GLYCOL 3350 17 G/17G
17 POWDER, FOR SOLUTION ORAL DAILY
Qty: 1 PACKET | Refills: 0 | Status: SHIPPED
Start: 2017-02-11

## 2017-02-11 RX ORDER — ONDANSETRON 4 MG/1
4 TABLET, FILM COATED ORAL EVERY 6 HOURS PRN
Qty: 18 TABLET | Refills: 0 | Status: SHIPPED
Start: 2017-02-11 | End: 2017-02-15

## 2017-02-11 RX ORDER — NICOTINE POLACRILEX 4 MG
15-30 LOZENGE BUCCAL
Qty: 112.5 G | Refills: 0 | Status: SHIPPED
Start: 2017-02-11

## 2017-02-11 RX ADMIN — VASOPRESSIN 4 UNITS/HR: 20 INJECTION, SOLUTION INTRAMUSCULAR; SUBCUTANEOUS at 13:56

## 2017-02-11 RX ADMIN — LEVOTHYROXINE SODIUM 175 MCG: 25 TABLET ORAL at 08:27

## 2017-02-11 RX ADMIN — PANTOPRAZOLE SODIUM 40 MG: 40 INJECTION, POWDER, FOR SOLUTION INTRAVENOUS at 20:04

## 2017-02-11 RX ADMIN — OXYCODONE HYDROCHLORIDE 10 MG: 5 TABLET ORAL at 21:52

## 2017-02-11 RX ADMIN — Medication: at 20:17

## 2017-02-11 RX ADMIN — DEXTROSE MONOHYDRATE 25 ML: 500 INJECTION PARENTERAL at 15:54

## 2017-02-11 RX ADMIN — OXYCODONE HYDROCHLORIDE 10 MG: 5 TABLET ORAL at 00:53

## 2017-02-11 RX ADMIN — Medication 12.5 ML/KG/HR: at 20:13

## 2017-02-11 RX ADMIN — Medication 1 CAPSULE: at 20:02

## 2017-02-11 RX ADMIN — PANTOPRAZOLE SODIUM 40 MG: 40 INJECTION, POWDER, FOR SOLUTION INTRAVENOUS at 08:24

## 2017-02-11 RX ADMIN — Medication 12.5 ML/KG/HR: at 07:48

## 2017-02-11 RX ADMIN — Medication 0.25 MG: at 03:11

## 2017-02-11 RX ADMIN — PHENOL 1 ML: 1.5 LIQUID ORAL at 18:55

## 2017-02-11 RX ADMIN — OXYCODONE HYDROCHLORIDE 10 MG: 5 TABLET ORAL at 09:55

## 2017-02-11 RX ADMIN — Medication 12.5 ML/KG/HR: at 03:49

## 2017-02-11 RX ADMIN — Medication 12.5 ML/KG/HR: at 16:07

## 2017-02-11 RX ADMIN — Medication 12.5 ML/KG/HR: at 03:48

## 2017-02-11 RX ADMIN — OXYCODONE HYDROCHLORIDE 10 MG: 10 TABLET, FILM COATED, EXTENDED RELEASE ORAL at 08:24

## 2017-02-11 RX ADMIN — LIDOCAINE 1 PATCH: 50 PATCH CUTANEOUS at 08:25

## 2017-02-11 RX ADMIN — MENTHOL 1 PATCH: 205.5 PATCH TOPICAL at 08:27

## 2017-02-11 RX ADMIN — OXYCODONE HYDROCHLORIDE 10 MG: 5 TABLET ORAL at 17:50

## 2017-02-11 RX ADMIN — POLYETHYLENE GLYCOL 3350 17 G: 17 POWDER, FOR SOLUTION ORAL at 08:25

## 2017-02-11 RX ADMIN — VASOPRESSIN 4 UNITS/HR: 20 INJECTION, SOLUTION INTRAMUSCULAR; SUBCUTANEOUS at 04:52

## 2017-02-11 RX ADMIN — PHENOL 1 ML: 1.5 LIQUID ORAL at 16:41

## 2017-02-11 RX ADMIN — Medication 12.5 ML/KG/HR: at 07:53

## 2017-02-11 RX ADMIN — MENTHOL 1 PATCH: 205.5 PATCH TOPICAL at 00:11

## 2017-02-11 RX ADMIN — PHENOL 1 ML: 1.5 LIQUID ORAL at 21:52

## 2017-02-11 RX ADMIN — DEXTROSE MONOHYDRATE 25 ML: 500 INJECTION PARENTERAL at 08:45

## 2017-02-11 RX ADMIN — OXYCODONE HYDROCHLORIDE 10 MG: 5 TABLET ORAL at 13:50

## 2017-02-11 RX ADMIN — Medication 0.25 MG: at 15:46

## 2017-02-11 RX ADMIN — Medication: at 08:27

## 2017-02-11 RX ADMIN — OXYCODONE HYDROCHLORIDE 10 MG: 10 TABLET, FILM COATED, EXTENDED RELEASE ORAL at 20:02

## 2017-02-11 ASSESSMENT — PAIN DESCRIPTION - DESCRIPTORS
DESCRIPTORS: ACHING
DESCRIPTORS: ACHING;DISCOMFORT
DESCRIPTORS: ACHING;CONSTANT
DESCRIPTORS: ACHING
DESCRIPTORS: ACHING;CONSTANT

## 2017-02-11 NOTE — PROGRESS NOTES
Cardiology 1 Progress Note    Samir Rodriguez MRN# [id number]   Age: 46 year old YOB: 1970   Date of Admission: 1/12/2017         Assessment and Plan:   Samir Rodriguez is a 46 year old  male with past medical history of rheumatic heart disease s/p mechanical MVR x 2 (1980s and 1992) on warfarin (INR goal 2.5-3.5), biventricular CHF (EF 25-30%) s/p dual chamber ICD 2008, chronic afib on amiodarone and previously on digoxin (stopped 1/16/17 due to concern for toxicity), WPW ablation at age 11, CKD III, hypothyroid,  who was transferred from [facility] on 01/12 for further eval of CHF and concern of hemolysis in setting of mechanical valve. Subsequently diagnosed with cirrhosis on liver biopsy and was deemed not to be a candidate for valve replacement or for LVAD, now with worsening renal function with plan to initiate dialysis and transfer back to [place] on Sunday morning via ambulance if stable.     #Acute blood loss anemia with known chronic anemia secondary GI bleeding and epistaxis:  - Hb this AM was as low as 6.8, trending up to 7.5.  Received 1U PRBC prior to ERCP.    - Stopped heparin.  - ERCP this afternoon with biiliary stent placement. F/U in 8 weeks for removal.   - CBC Q6H.   - IR for angiogram/embolization if continues to bleed.     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 Hypervolemic  - Stop diuretics 2/10.  - hold lisinopril due to hypotension  - cont holding metoprolol and spironolactone for now    # CARSON - Likely multifactorial secondary to ATN versus cardiorenal. TTE with dilated IVC without respiratory variation.. Pt also received 100mL IV contrast. Worsening fluid retention with minimal UOP despite diuresis. Patient wants to pursue dialysis.  -Daily BMP.   -Tunneled dialysis catheter placed 2/10.    -Initiate CRRT.     # Mechanical mitral valve (MV diastolic gradient 7)  # Aortic Insufficiency (mod - severe)  # TR (moderate)  History of BiV failure attributed to valvular disease. Echocardiogram on admission demonstrated moderate-severe aortic insufficiency which is his most acute cardiac pathology. His tricuspid regurgitation is likely due to his signficantly fluid overloaded state. Mechanical valve maintained on warfarin w/ INR 2.5-3.5 prior to this admission. Angiogram performed on 01/20 revealed nonobstructive CAD. EDEL shows probable restriction of one of the mitral valve leaflets. However, he is not a if candidate for valvular surgery due to cirrhosis diagnosed on liver biopsy  - Will remain off heparin gtt overnight.  Consider restart pending HD/Hgb stability.     #. Cirrhosis and liver nodularity  Demonstrated on CT, although u/s was normal. CT read is concerning for amiodarone toxicity. Liver biopsy 1/30 with evidence of advanced chronic liver disease (stage 3 or 4) of unclear etiology (amiodarone toxicity is possible). Consequently, not a candidate for LVAD or for valve replacements. Discussed these results and their consequence with patient and his son Sid.  -No LVAD  -No CT surgery/ valve replacement    # Lactic acidosis- resolved  # Possible hemobilia  # Mesenteric ischemia    # Anemia and thrombocytopenia  # Right Arm Hematoma   Stable    # Paroxsymal afib:  digoxin level 0.7 on 1/12 and 1/17. 1/16/17 device interrogation: atrial tachycardia to 150s with AV block. Lower rate setting changed to 80bpm from 60bmp and device changed from DDDR to VVIR on 01/16; atrial tachycardia and AV block and V pacing. The patient has been in an atrial flutter that is undersensed, hence the device was switched to VVI mode. There is no point continuing amiodarone at this stage especially with concern for toxicity    # Hyperthyroid: Will need repeat TSH/T4 1-2 months post DC. Decreased levothyroxine from 224mcg to  "175 mcg   - Free T3/T4 checked.   - Endo consult pending.   - Amio stopped, this might help with thyroid issues.     # Anxiety and insomnia: Increased clonazepam to tid    FEN: 2 g Na diet, NPO @MN, 1.0 L fluid restriction  PPX: holding heparin gtt, IV PPI BID  Dispo: Pending transfer to [facility] early AM of 2/12.  Ambulance scheduled.     Code Status: Full CODE      Patient discussed with staff attending, Dr. Skinner.    Amador Carter MD  PGY-2, IM   Pager: [phone]  ------------------------------------------------------------------------------------------------------------------  [facility] Vascular Medicine/Cardiovascular Division Attending  ------------------------------------------------------------------------------------------------------------------    I have seen and examined the patient and the note above reflects our mutual assessment and plan.    I am pleased that the patient has remained, really in all regards, stable today.  While a telemetry challenge made him anxious (false alarms due to ECG poor sensing), he is hemodynamically stable; his hgb has not fallen and he is not requiring many transfused units; he is oxygenating well, and the dialysis catheter has been placed.  UGI/ERCP pending.    Our goal remains to establish the \"care infrastructure\" that will be sustainable in [place], with a safe transfer to be closer to his family and friends.  Samir is very grateful for our coordination and focus on realistic goals, and he sustains optimism, which I share, that whatever length of life is ahead can be rich and fulfilling.     Griffin Skinner MD  Director, Vascular Medicine Program  Professor of Medicine, Epidemiology and Community Health  [employer] [facility] [employer]  [address]    Tel: [phone]  Fax: [phone]  Pager: [phone]    Vascular medicine nurse clinician: Vaughn Weinberg - Office [phone]  : Amie" Deloris - Office [phone]  ------------------------------------------------------------------------------------------------------------------               Interval History/Subjective   Large bloody BM this morning again.  Samir was more anxious this morning and feeling palpitations.  Tele not picking up pacer captures and appeared asystolic, but on device check, pacer functioning normally with normal captured beats.  Denies SOB, fever, chills, nausea, vomiting, or chest pain.  Abdominal pain persists.            Objective   Temp:  [97.5  F (36.4  C)-98.3  F (36.8  C)] 97.5  F (36.4  C)  Heart Rate:  [] 102  Resp:  [6-62] 10  BP: (71-93)/(33-58) 81/44 mmHg  SpO2:  [94 %-100 %] 95 %    Physical exam:  Gen: AA&Ox3, no acute distress  HEENT: NACT, poor dentition with missing teeth  PULM: Normal effort on RA, increasing crackles bilateral bases. No wheezing  CV: RRR; mechanical s2 w/ 2+ systolic murmur at LUSB and LISB: JVD+ to mid neck while sitting at 90 degrees   ABD:  soft, mildly tender to deep palpation in RUQ, nondistended.  BS+  EXT: 1+ sonam edema to waist, 1+ peripheral UE/LE pulses   NEURO: alert and oriented; speech intact, CN II-XII grossly intact.          Data:   CBC    Recent Labs  Lab 02/10/17  0833 02/10/17  0201 02/09/17  1808 02/09/17  1041  02/08/17  0323 02/07/17  1623   WBC  --  10.1  --  7.8  --  6.3 5.5   RBC  --  2.29*  --  1.73*  --  2.39* 2.05*   HGB 7.5* 7.2* 6.7* 5.6*  < > 7.6* 6.8*   HCT  --  20.7* 19.1* 16.4*  --  22.5* 19.6*   MCV  --  90  --  95  --  94 96   MCH  --  31.4  --  32.4  --  31.8 33.2*   MCHC  --  34.8  --  34.1  --  33.8 34.7   RDW  --  18.2*  --  20.5*  --  20.8* 21.6*   PLT  --  121*  --  139*  --  126* 125*   < > = values in this interval not displayed.  CMP    Recent Labs  Lab 02/10/17  0201 02/09/17  1041 02/08/17  0323 02/07/17  0735  02/06/17  0718   * 126* 125* 122*  < > 124*   POTASSIUM 4.1 3.7 4.2 4.0  < > 4.1   CHLORIDE 95 94 92* 87*  < > 90*   CO2  20 20 20 20  < > 21   ANIONGAP 15* 11 14 15*  < > 13   GLC 75 84 95 88  < > 88   BUN 43* 40* 35* 34*  < > 32*   CR 3.22* 3.26* 3.10* 2.85*  < > 2.55*   GFRESTIMATED 21* 20* 22* 24*  < > 27*   GFRESTBLACK 25* 25* 26* 29*  < > 33*   DELFINO 7.3* 7.2* 7.3* 7.7*  < > 7.8*   MAG 2.2 2.2 2.2 2.3  < > 2.2   PROTTOTAL 4.0*  --  5.1* 5.5*  --  5.6*   ALBUMIN 1.6*  --  1.7* 1.9*  --  2.0*   BILITOTAL 11.0*  --  5.3* 6.1*  --  6.3*   ALKPHOS 235*  --  378* 428*  --  449*   *  --  103* 116*  --  130*   ALT 61  --  83* 95*  --  101*   < > = values in this interval not displayed.  INR    Recent Labs  Lab 02/10/17  1221 02/10/17  0201 02/09/17  1041 02/09/17  0254   INR 1.72* 1.78* 1.92* 2.14*            Medications:     Current Facility-Administered Medications   Medication     heparin Lock (1000 units/mL High concentration) 3,000 Units     heparin Lock (1000 units/mL High concentration) 3,000 Units     0.9% sodium chloride BOLUS     No heparin required     calcium gluconate 1 g in D5W 100 mL intermittent infusion     magnesium sulfate 2 g in NS intermittent infusion (PharMEDium or FV Cmpd)     sodium phosphate 20 mmol in D5W intermittent infusion     potassium chloride 20 mEq in 50 mL intermittent infusion     dialysate for CVVHD & CVVHDF (Phoxillum BK4/2.5)     PRE-filter replacement solution for CVVHD & CVVHDF (Phoxillum BK4/2.5)     POST-filter replacement solution for CVVHD & CVVHDF (Phoxillum BK4/2.5)     May continue current IV fluid if patient has IV fluids infusing until discharge.     sodium chloride (PF) 0.9% PF flush 3 mL     naloxone (NARCAN) injection 0.1-0.4 mg     flumazenil (ROMAZICON) injection 0.2 mg     alteplase (CATHFLO ACTIVASE) injection 1 mg     vasopressin (PITRESSIN) 40 Units in D5W 40 mL infusion     NaCl 0.9 % infusion     sennosides (SENOKOT) tablet 1 tablet     oxyCODONE (ROXICODONE) IR tablet 5-10 mg     oxyCODONE (OxyCONTIN) 12 hr tablet 10 mg     B complex-C-folic acid (NEPHROCAPS/TRIPHROCAPS)  capsule 1 capsule     oxymetazoline (AFRIN) 0.05 % spray 2 spray     heparin  drip 25,000 units in 0.45% NaCl 250 mL (see additional administration details for dose)     heparin bolus from infusion pump     polyethylene glycol (MIRALAX/GLYCOLAX) Packet 17 g     heparin lock flush 10 UNIT/ML injection 2-5 mL     lidocaine 1 % injection 0.5-1 mL     lidocaine (LMX4) kit     sodium chloride (PF) 0.9% PF flush 1-10 mL     sodium chloride (PF) 0.9% PF flush 5-10 mL     lidocaine 1 % 1 mL     sodium chloride (PF) 0.9% PF flush 10-20 mL     sodium chloride (PF) 0.9% PF flush 10 mL     pantoprazole (PROTONIX) 40 mg IV push injection     levothyroxine (SYNTHROID/LEVOTHROID) tablet 175 mcg     clonazePAM (klonoPIN) half-tab 0.25 mg     menthol (ICY HOT) 5 % patch 1 patch    And     menthol (ICY HOT) Patch in Place    And     menthol (ICY HOT) patch REMOVAL     ondansetron (ZOFRAN) tablet 4 mg     lidocaine (LIDODERM) 5 % Patch 1-3 patch    And     lidocaine (LIDODERM) patch REMOVAL    And     lidocaine (LIDODERM) Patch in Place     potassium chloride SA (K-DUR/KLOR-CON M) CR tablet 20-40 mEq     potassium chloride (KLOR-CON) Packet 20-40 mEq     potassium chloride 10 mEq in 100 mL intermittent infusion     potassium chloride 10 mEq in 100 mL intermittent infusion with 10 mg lidocaine     potassium chloride 20 mEq in 50 mL intermittent infusion     magnesium sulfate 2 g in NS intermittent infusion (PharMEDium or FV Cmpd)     magnesium sulfate 4 g in 100 mL sterile water (premade)     potassium phosphate 15 mmol in D5W 250 mL intermittent infusion     potassium phosphate 20 mmol in D5W 500 mL intermittent infusion     potassium phosphate 25 mmol in D5W 500 mL intermittent infusion     melatonin tablet 3 mg     miconazole (MICATIN; MICRO GUARD) 2 % powder     sodium chloride (PF) 0.9% PF flush 3 mL     sodium chloride (PF) 0.9% PF flush 3 mL     medication instruction     nitroglycerin (NITROSTAT) sublingual tablet 0.4 mg      alum & mag hydroxide-simethicone (MYLANTA ES/MAALOX  ES) suspension 15-30 mL     albuterol (PROAIR HFA/PROVENTIL HFA/VENTOLIN HFA) Inhaler 2 puff     naloxone (NARCAN) injection 0.1-0.4 mg

## 2017-02-11 NOTE — PROGRESS NOTES
"Cardiology 1 Progress Note  Date of Service 2/11/17    Samir Rodriguez MRN# [id number]   Age: 46 year old YOB: 1970   Date of Admission: 1/12/2017         Assessment and Plan:     Samir Rodriguez is a 46 year old  male with past medical history of rheumatic heart disease s/p mechanical MVR x 2 (1980s and 1992) on warfarin (INR goal 2.5-3.5), biventricular CHF (EF 25-30%) s/p dual chamber ICD 2008, chronic afib on amiodarone and previously on digoxin (stopped 1/16/17 due to concern for toxicity), WPW ablation at age 11, CKD III, hypothyroid, who was transferred from [facility] on 01/12 for further eval of CHF and concern of hemolysis in setting of mechanical valve. Subsequently diagnosed with cirrhosis on liver biopsy and was deemed not to be a candidate for valve replacement or for LVAD, now with worsening renal function requiring continuous renal replacement therapy.  After a long discussion with the patient, he elected to transfer back to [facility] to be closer to his family/support system.  He is aware of the risks and benefits and that he is not a candidate for advanced heart failure therapies at the [facility].  He was aware of the risk of death and severe injury by undergoing transfer and the risk for acute decompensation and possible during transit and requested to transfer to [place]. The [facility] team believes that this risk has been minimized, is reasonable, and that there are likely to be no better future \"windows of opportunity\" for him to continue care near his home and family. The work plan this past week, in collaboration with renal medicine, GI-hepatology medicine, interventional radiology, palliative care and  has been to assure demonstration of clinical stability and optimization of each aspect of care prior to transfer. He is hemodynamically stable; is tolerating dialysis without difficulty, " and the hemobilia should resolve, is not now unstable, and can continue to heal here or in [place] (with a backup hepatic embolization plan, if needed). Core to the patient's quality of life has been our work to assure this high level care does not impede his personal goals (to be near family and children) and spiritual desire to be close to home.     We are grateful to the careful care coordination that has made this plan possible.     Today's plan.   -[facility] critical care transport will  patient at 0600 on 2/12 for transport back to [place].   -Medications for transit have been filled by discharge pharmacy and are on 4A.   -2U PRBC ordered for 0400 on 2/12 to be sent with patient. [facility] confirms cooler will be present for PRBC storage.  Blood bank confirmed receipt of request on evening of 2/11.   -Plan for continuation of vasopressin gtt on transfer as needed to maintain MAPs >55.   -Pt accepted by Dr. Lucien Lewis (Hospital Medicine) at [facility]. Dr. Skinner has discussed [place] care issues directly with Dr. Cherry today.    -Pt declined the cardiology team offer to call his family and discuss transfer to [place] with them.    -Will not transport on CRRT. Pt to be prepped and CRRT stopped with plan for 0600 departure.     #Acute blood loss anemia with known chronic anemia secondary GI bleeding and epistaxis.  Strongly thought to be secondary to hemobilia status-post transjugular liver biopsy. Large clots found in biliary tract on ERCP 2/10/17 with stent placement.  There is a concern that these clots were tamponading hepatic bleeding and that further bleeding could, in theory, occur.  He most recently had a large maroon stool on 2/10/17 prior to his ERCP but none since then.  He has received multiple units of PRBCs during this admission, with the last on the morning of 2/10/17.  It is considered prudent and safe for his cardiac valve anticoagulation to be held for  a period of 1-3 days to permit hepatic healing, and this can then be re-started.  Should he develop further hepatic bleeding in [place], it is recommended that he undergo angiography by interventional radiology to identify and embolize any bleeding sites.  Colonoscopy should be completed if angiography and embolization is not successful in identifying a hepatic bleeding site or in controlling the bleed.  His hemoglobin, HR and BP are stable on the days prior to transfer.  Warfarin was discontinued on 2/8/17 and heparin on 2/10 in order to facilitate care for the GI bleed.    -Pt will be transferred with 2 units of PRBCs to be administered en route for hypotension.    -IR confirmed to have angiography and embolization capabilities in [place] prior to transfer.    -Will transfer with vasopressin. [facility] transport notified of new drip requirements.     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 Due to cirrhosis he was deemed to be ineligible for advanced heart failure therapies or mitral valve replacement upon consultation with cardiothoracic surgery.  He remained relatively fluid overloaded throughout his admission, but hemodynamically stable with good oxygenation and no SOB, with the lowest weight achieved of 184 pounds.    -Beta blocker, ACE inhibitors held due to hypotension.     -Not eligible for advanced heart failure therapies at the [facility] due to cirrhosis.    - Dialysis initiated to permit easier control of volume    # CARSON on CKD- Likely multifactorial secondary to ATN versus cardiorenal due to severe volume overload. TTE with dilated IVC without respiratory variation on 2/9/17. Pt was initially responsive to 4mg bumex IV BID with metolazone; however urine output declined to <500mL/day. Patient wishes to pursue dialysis as he has had good experiences  with it prior to this admission.  Tunneled dialysis catheter line placed 2/10/17 with subsequent continuous renal replacement therapy requiring up to 4units of vasopressin per hour to maintain MAPs>55.    -Recommend further volume reduction per hemodialysis as his blood pressures tolerate this or continuous renal replacement therapy (currently pulling 50-75mL/hr) with vasopressin for hemodynamic support.     -Pt will not transport on CRRT.     # Mechanical mitral valve (MV diastolic gradient 7)  # Aortic Insufficiency (mod - severe)  # TR (moderate)  History of BiV failure attributed to valvular disease. Echocardiogram on admission demonstrated moderate-severe aortic insufficiency which is his most acute cardiac pathology. His tricuspid regurgitation is likely due to his signficantly fluid overloaded state. Mechanical valve care supported by use of warfarin w/ INR 2.5-3.5 prior to this admission. Angiogram performed on 01/20 revealed nonobstructive CAD. EDEL shows probable restriction of one of the mitral valve leaflets. However, he is not a if candidate for valvular surgery due to cirrhosis diagnosed on liver biopsy.  Anticoagulation held starting 2/10 due to recurrent GI bleeding. While this may increase his short term risk of valve leaflet thrombosis, the annual risk is now, and there is no alternative in the context of the post-liver biopsy bleeding. Anticoagulation can be safely re-started when bleeding has terminated.     -Pt will remain off anticoagulation for transfer to [facility] due to bleeding risk in transit.     -Pt should resume anticoagulation when safe to do so from bleeding perspective.      # Cirrhosis and liver nodularity  Demonstrated on CT, although u/s was normal. The CT interpretation was suggestive of possible amiodarone toxicity. Liver biopsy 1/30 with evidence of advanced chronic liver disease (stage 3 or 4) of unclear etiology (amiodarone toxicity is possible).  Consequently, not a candidate for LVAD or for valve replacements. Discussed these results and their consequence.     -No LVAD  -No CT surgery/ valve replacement    # Palliative Care discussion.  In order to best integrate Mr. Rodriguez's values to guide his complex care, we spend considerable time reviewing with the patient those aspects of his care that were likely to be responsive to treatment and those that will remain challenging and potentially life-threatening (MV dysfunction, HF, CARSON on CDK, liver cirrhosis complicated by hepatic bleeding, etc). The patient also enjoyed a comprehensive consultation with the Palliative Care service during his admission at the [facility].  He stated he wished to continue with dialysis, but wants to be closer to home where he has family and [place] support present.  We had lengthy discussions regarding the serious nature of his conditions numerous times, including the unrepairable nature of his heart failure and cirrhosis and likely irreversible renal failure likely requiring lifelong dialysis. During our discussions, he understood that there are no definitive new therapies that will reverse the health challenges due to his multi-organ system failure.  Thus, he requested an opportunity to return home to either an inpatient, or TCU, or home hospice setting.  As there remain acute care issues and some realistic opportunity for short-term improvement (an end to the GI bleeding, re-start of anticoagulation, stabilization of his renal care, etc), we recommended that he return to inpatient medical care at [facility].  While he may experience future deterioriation, he expressed a desire to remain close to his home and not to be transferred back to the [facility].  We clarified that each current medical care intervention being now applied is indeed available at [facility].  He voiced understanding of  "this and was appreciative.  During our discussions regarding transfer back to [facility], his cardiology team offered to call his family several times and discuss his condition with them. The patient declined to have his cardiology team discuss his condition and the pending transfer with his family, as he \"wanted to surprise his family by being back in [place].\"  His wishes were observed and his family was not called to discuss his transfer back to [place].      # Paroxsymal afib:  digoxin level 0.7 on 1/12 and 1/17. 1/16/17 device interrogation: atrial tachycardia to 150s with AV block. Lower rate setting changed to 80bpm from 60bmp and device changed from DDDR to VVIR on 01/16; atrial tachycardia and AV block and V pacing. The patient has been in an atrial flutter that is undersensed, hence the device was switched to VVI mode. Amiodarone discontinued this admission due to concern for toxicity    # Hyperthyroid: Will need repeat TSH/T4 1-2 months post DC. Decreased levothyroxine from 224mcg to 175 mcg    - Amiodarone stopped, this might help with thyroid issues.     # Anxiety and insomnia: Increased clonazepam to tid    FEN: 2 g Na diet, NPO @MN, 1.5 L fluid restriction  PPX: holding heparin gtt, IV PPI BID  Dispo: Pending transfer to [facility] early AM of 2/12.  Ambulance scheduled for 0600 on 2/12.      Code Status: Full CODE      Patient discussed with staff attending, Dr. Skinner.    Amador Carter MD  PGY-2, IM   Pager: [phone]  ------------------------------------------------------------------------------------------------------------------  [facility] Vascular Medicine/Cardiovascular Division Attending  ------------------------------------------------------------------------------------------------------------------    I have seen and examined the patient and the note above reflects our mutual assessment and plan.    I have spent considerable time " each day, including today, with the patient 1:1, with each consultative faculty consultant, and with social service to work to meet Mr. Rodriguez's stated care goals. I also spoke today with Dr. Lucien Cherry at [facility] to provide the clinical background that might best support a safe transfer and resumption of care in [place], including methods to contact us for additional information, as needed, after his arrival.  As noted above, Mr. Rodriguez seems quite pleased with this plan and considers this to be concordant with his values and is in support of his family's need.     Griffin Skinner MD  Director, Vascular Medicine Program  Professor of Medicine, Epidemiology and Community Health  [employer] [place]  [address]    Tel: [phone]  Fax: [phone]  Pager: [phone]    Vascular medicine nurse clinician: Vaughn Weinberg - Office [phone]  : Amie Puentes - Office [phone]  ------------------------------------------------------------------------------------------------------------------         Interval History/Subjective   Continues to feel better.  No bloody BM overnight.  No SOB, CP, wheezing or palpitations.  Had long discussion today regarding risks and benefits of transfer to [place].  He still would like to transfer to [place] to be closer to family.  He does not want us to call his son, Sid, as he wants to surprise his son with his arrival back in [place].            Objective   Temp:  [97.5  F (36.4  C)-98.3  F (36.8  C)] 97.9  F (36.6  C)  Heart Rate:  [] 80  Resp:  [6-62] 16  BP: (62-96)/(32-73) 88/58 mmHg  SpO2:  [92 %-100 %] 96 %    Physical exam:  Gen: AA&Ox3, no acute distress  HEENT: NACT, poor dentition with missing teeth upper and lower   PULM: Normal effort on RA, increasing crackles bilateral bases. No wheezing  CV: RRR; mechanical s2 w/ 2+ systolic murmur at LUSB and LISB: JVD+  to mid neck while sitting at 90 degrees   ABD:  soft, mildly tender to deep palpation in RUQ, nondistended.  BS+  EXT: 1+ sonam edema to waist, 1+ peripheral UE/LE pulses   NEURO: alert and oriented; speech intact, CN II-XII grossly intact.          Data:   CBC    Recent Labs  Lab 02/11/17  0408 02/10/17  2308 02/10/17  1945 02/10/17  0833 02/10/17  0201   WBC 10.3 11.6* 12.1*  --  10.1   RBC 2.48* 2.48* 2.61*  --  2.29*   HGB 7.8* 7.7* 8.0* 7.5* 7.2*   HCT 22.1* 22.2* 23.6*  --  20.7*   MCV 89 90 90  --  90   MCH 31.5 31.0 30.7  --  31.4   MCHC 35.3 34.7 33.9  --  34.8   RDW 18.8* 18.6* 18.8*  --  18.2*   * 99* 124*  --  121*     CMP    Recent Labs  Lab 02/11/17  0408 02/10/17  2308 02/10/17  1945 02/10/17  0201  02/08/17  0323 02/07/17  0735  02/06/17  0718   * 128* 129* 131*  < > 125* 122*  < > 124*   POTASSIUM 4.2 4.3 4.2 4.1  < > 4.2 4.0  < > 4.1   CHLORIDE 97 94 94 95  < > 92* 87*  < > 90*   CO2 21 21 20 20  < > 20 20  < > 21   ANIONGAP 12 13 14 15*  < > 14 15*  < > 13   GLC 60* 71 69* 75  < > 95 88  < > 88   BUN 39* 43* 46* 43*  < > 35* 34*  < > 32*   CR 2.91* 3.58* 3.47* 3.22*  < > 3.10* 2.85*  < > 2.55*   GFRESTIMATED 23* 18* 19* 21*  < > 22* 24*  < > 27*   GFRESTBLACK 28* 22* 23* 25*  < > 26* 29*  < > 33*   DELFINO 7.4* 7.6* 7.6* 7.3*  < > 7.3* 7.7*  < > 7.8*   MAG 2.2 2.1 2.2 2.2  < > 2.2 2.3  < > 2.2   PHOS 5.1* 5.8* 5.8*  --   --   --   --   --   --    PROTTOTAL  --   --   --  4.0*  --  5.1* 5.5*  --  5.6*   ALBUMIN  --   --   --  1.6*  --  1.7* 1.9*  --  2.0*   BILITOTAL  --   --   --  11.0*  --  5.3* 6.1*  --  6.3*   ALKPHOS  --   --   --  235*  --  378* 428*  --  449*   AST  --   --   --  108*  --  103* 116*  --  130*   ALT  --   --   --  61  --  83* 95*  --  101*   < > = values in this interval not displayed.  INR    Recent Labs  Lab 02/11/17  0408 02/10/17  1221 02/10/17  0201 02/09/17  1041   INR 1.88* 1.72* 1.78* 1.92*            Medications:     Current Facility-Administered Medications    Medication     glucose 40 % gel 15-30 g    Or     dextrose 50 % injection 25-50 mL    Or     glucagon injection 1 mg     heparin Lock (1000 units/mL High concentration) 3,000 Units     heparin Lock (1000 units/mL High concentration) 3,000 Units     0.9% sodium chloride BOLUS     No heparin required     calcium gluconate 1 g in D5W 100 mL intermittent infusion     magnesium sulfate 2 g in NS intermittent infusion (PharMEDium or FV Cmpd)     sodium phosphate 20 mmol in D5W intermittent infusion     potassium chloride 20 mEq in 50 mL intermittent infusion     dialysate for CVVHD & CVVHDF (Phoxillum BK4/2.5)     PRE-filter replacement solution for CVVHD & CVVHDF (Phoxillum BK4/2.5)     POST-filter replacement solution for CVVHD & CVVHDF (Phoxillum BK4/2.5)     May continue current IV fluid if patient has IV fluids infusing until discharge.     sodium chloride (PF) 0.9% PF flush 3 mL     naloxone (NARCAN) injection 0.1-0.4 mg     vasopressin (PITRESSIN) 40 Units in D5W 40 mL infusion     sennosides (SENOKOT) tablet 1 tablet     oxyCODONE (ROXICODONE) IR tablet 5-10 mg     oxyCODONE (OxyCONTIN) 12 hr tablet 10 mg     B complex-C-folic acid (NEPHROCAPS/TRIPHROCAPS) capsule 1 capsule     oxymetazoline (AFRIN) 0.05 % spray 2 spray     polyethylene glycol (MIRALAX/GLYCOLAX) Packet 17 g     heparin lock flush 10 UNIT/ML injection 2-5 mL     lidocaine 1 % injection 0.5-1 mL     lidocaine (LMX4) kit     sodium chloride (PF) 0.9% PF flush 1-10 mL     sodium chloride (PF) 0.9% PF flush 5-10 mL     lidocaine 1 % 1 mL     sodium chloride (PF) 0.9% PF flush 10-20 mL     sodium chloride (PF) 0.9% PF flush 10 mL     pantoprazole (PROTONIX) 40 mg IV push injection     levothyroxine (SYNTHROID/LEVOTHROID) tablet 175 mcg     clonazePAM (klonoPIN) half-tab 0.25 mg     menthol (ICY HOT) 5 % patch 1 patch    And     menthol (ICY HOT) Patch in Place    And     menthol (ICY HOT) patch REMOVAL     ondansetron (ZOFRAN) tablet 4 mg      lidocaine (LIDODERM) 5 % Patch 1-3 patch    And     lidocaine (LIDODERM) patch REMOVAL    And     lidocaine (LIDODERM) Patch in Place     potassium chloride SA (K-DUR/KLOR-CON M) CR tablet 20-40 mEq     potassium chloride (KLOR-CON) Packet 20-40 mEq     potassium chloride 10 mEq in 100 mL intermittent infusion     potassium chloride 10 mEq in 100 mL intermittent infusion with 10 mg lidocaine     potassium chloride 20 mEq in 50 mL intermittent infusion     magnesium sulfate 2 g in NS intermittent infusion (PharMEDium or FV Cmpd)     magnesium sulfate 4 g in 100 mL sterile water (premade)     potassium phosphate 15 mmol in D5W 250 mL intermittent infusion     potassium phosphate 20 mmol in D5W 500 mL intermittent infusion     potassium phosphate 25 mmol in D5W 500 mL intermittent infusion     melatonin tablet 3 mg     miconazole (MICATIN; MICRO GUARD) 2 % powder     sodium chloride (PF) 0.9% PF flush 3 mL     sodium chloride (PF) 0.9% PF flush 3 mL     medication instruction     nitroglycerin (NITROSTAT) sublingual tablet 0.4 mg     alum & mag hydroxide-simethicone (MYLANTA ES/MAALOX  ES) suspension 15-30 mL     albuterol (PROAIR HFA/PROVENTIL HFA/VENTOLIN HFA) Inhaler 2 puff     naloxone (NARCAN) injection 0.1-0.4 mg

## 2017-02-11 NOTE — PLAN OF CARE
Problem: Goal Outcome Summary  Goal: Goal Outcome Summary  Edema 4A: Scrotal measurements down from yesterday though penis remains bulbous with moderate edema. Comperm not fitting due to body shape, therefore elevation provided. GCB reapplied to B LEs to knee creases only per pt request. OK for pt to wear compression bandages until therapy returns. However, please remove any compression if it causes numbness, tingling, pain, becomes soiled, or if pt becomes unable to verbalize pain. Will follow up with patient.

## 2017-02-11 NOTE — PROGRESS NOTES
Nephrology Progress Note  02/11/2017         Assessment & Recommendations:     46 year old  male with PMH of rheumatic heart disease status post MVR  X 2 on warfarin, biventricular CHF with EF 25-30 % status post dual chamber ICD in 2008, chronic atrial fibrillation on amiodarone, WPW ablation, CKD stage III , hypothyroidism transferred from [facility] on 1/12/2017 for further management of heart failure. Patient received liver biopsy which showed cirrhosis. Patient not a candidate for Valve replacement or LVAD. Patient with CARSON on admission with serum Cr 2 which improved to 1.2 with IV diuresis likely from cardiorenal physiology. Patient now with worsening renal failure and oliguria refractory to high dose diuretics. Suspect ATN likely from multifactorial causes - hypotension, exposure to IV contrast on 2/2/17, pre renal turned ATN .      Recommendations :-     1. CARSON on CKD :- likely from multifactorial causes as outlined above.  Patient with declining urine output since past few days despite use of high dose of IV diuretics.   - tunneled dialysis catheter placed by IR on 2/10/17.  CRRT initiated on 2/10/2017  for volume and metabolic management.     2. BP :- hypotensive.on vasopressor .    CRRT net negative 25-75 cc/hr. Maintain MAP >60 .      3. Hypervolemic on exam. Stable respiratory status. UF goals as above.    4. No acute electrolyte or acid base issues. Continue fluid restriction of 1 L daily.       Discussed with Dr Corrales.      Recommendations were communicated to primary team via note and directly.         Amber Miller MD   [phone].     Interval History :      Denied SOB.CRRT started yesterday. On vasopressor. Patient post ERCP yesterday.   Review of Systems:     As in interval history.     Physical Exam:   I/O last 3 completed shifts:  In: 1688.88 [P.O.:780; I.V.:908.88]  Out: 572 [Other:572]   BP 96/48 mmHg  Pulse 80  Temp(Src) 98.1  F (36.7  C) (Oral)  Resp 16   Ht 1.829 m (6')  Wt 102.7 kg (226 lb 6.6 oz)  BMI 30.70 kg/m2  SpO2 94%     GENERAL APPEARANCE: alert, no acute distress  EYES:  No  scleral icterus, pupils equal  HENT: mouth without ulcers or lesions  PULM: diminished breath sounds bilaterally.  CV: regular rhythm, normal rate, no rub      -edema 3+  GI: soft, bowel sounds are +  INTEGUMENT: no cyanosis  NEURO:  NAD    Labs:   All labs reviewed by me  Electrolytes/Renal -   Recent Labs   Lab Test  02/11/17   1620  02/11/17   1030  02/11/17   0408   NA  132*  131*  130*   POTASSIUM  4.3  4.2  4.2   CHLORIDE  99  98  97   CO2  20  22  21   BUN  27  23  39*   CR  2.19*  2.42*  2.91*   GLC  51*  72  60*   DELFINO  7.5*  7.5*  7.4*   MAG  2.3  2.2  2.2   PHOS  4.4  4.6*  5.1*       CBC -   Recent Labs   Lab Test  02/11/17   1620  02/11/17   1030  02/11/17   0408   WBC  10.2  9.7  10.3   HGB  8.0*  7.7*  7.8*   PLT  135*  113*  100*       LFTs -   Recent Labs   Lab Test  02/11/17   1030  02/10/17   0201  02/08/17   0323   ALKPHOS  244*  235*  378*   BILITOTAL  7.8*  11.0*  5.3*   ALT  254*  61  83*   AST  757*  108*  103*   PROTTOTAL  4.2*  4.0*  5.1*   ALBUMIN  1.5*  1.6*  1.7*       Iron Panel -   Recent Labs   Lab Test  01/13/17   0650   IRON  51   IRONSAT  32   DORA  2664*         Imaging:  Reviewed.     Current Medications:    heparin Lock (1000 units/mL High concentration)  3 mL Intracatheter Once     heparin Lock (1000 units/mL High concentration)  3 mL Intracatheter Once     sodium chloride 0.9%  1,000 mL CRRT Once     oxyCODONE  10 mg Oral Q12H     B complex-C-folic acid  1 capsule Oral QPM     polyethylene glycol  17 g Oral Daily     sodium chloride (PF)  5-10 mL Intracatheter Q7 Days     sodium chloride (PF)  10 mL Intracatheter Q7 Days     pantoprazole  40 mg Intravenous BID     levothyroxine  175 mcg Oral QAM AC     menthol   Transdermal Q8H     lidocaine  1-3 patch Transdermal Q24h    And     lidocaine   Transdermal Q24H    And     lidocaine   Transdermal  Q8H     miconazole   Topical BID     sodium chloride (PF)  3 mL Intracatheter Q8H       - MEDICATION INSTRUCTIONS -       dialysate for CVVHD & CVVHDF (Phoxillum BK4/2.5) 12.5 mL/kg/hr (02/11/17 1607)     replacement solution for CVVHD & CVVHDF (Phoxillum BK4/2.5) 12.5 mL/kg/hr (02/11/17 1607)     replacement solution for CVVHD & CVVHDF (Phoxillum BK4/2.5) 200 mL/hr at 02/10/17 2327     - MEDICATION INSTRUCTIONS -       vasopressin (PITRESSIN) infusion ADULT (40 mL) 4 Units/hr (02/11/17 1600)     - MEDICATION INSTRUCTIONS -       Amber Miller MD     Attending Note: I have seen and examined this patient and the above note reflects my historical findings, exam findings, and assessment and plan. Continue CRRT 2/11/17  Rola Corrales,

## 2017-02-11 NOTE — DISCHARGE SUMMARY
Medicine Discharge Summary  Samir Rodriguez MRN: [id number]  1970  Home clinic: None  Primary care provider: No Ref-Primary, Physician  ___________________________________          Date of Admission:  1/12/2017  Date of Discharge:  2/12/2017  Admitting Physician:  Shin Cartagena MD  Discharge Physician:  Griffin Skinner MD   Discharging Service:  Cards 1     Primary Provider: No Ref-Primary, Physician         OUTPATIENT FOLLOW-UP     Pt will be transferred to [facility] via critical care ground transport.  Follow-up per [facility] recommendations.           Reason for Admission:     CHF evaluation and concern for hemolysis in setting of mechanical mitral valve.           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  -CARSON on CKD with declining urine output, requiring continuous renal replacement therapy.  -Volume overload.   -Hypotension requiring vasopressin.   -Amiodarone induced cirrhosis  -Hemobilia s/p biliary stent placement 2/10/17.   -Acute blood loss anemia secondary to GI bleeding.  -Hyperthyroid         Procedures & Significant Findings:   ERCP 2/10/17  Impression:          - Evidence of previous hemobilia, now mildly active with                        clot in the common duct likely leading in part to rising                        liver studies due to obstructive jaunce (dilated duct,                        filling defects) managed by stent placement alone given                        the clinical scenario (sphincterotomy would have had                        increased risk of bleeding and removal of clot through                        an intact sphincter would have increased the risk of the                        pancreatitis)                         - No evidence of synchronous foregut lesion, while mid                        and hindgut lesions remain possible   Recommendation:      - The patient may return to the care of the primary team                        when appropriate                        - Follow liver studies expectantly                        - Repeat ERCP in 8 weeks; given his comorbid state this                        may be best done in the setting of preadmission and with                        correction of his coagulation sphincterotomy and sweep                        may follow                        - Optimize coagulation profile as acceptable in the                        setting of bleeding and known cardiovascular issues                        - The findings and recommendations were discussed with                        the patient     US Abdomen complete 2/2/17  Findings:     Liver: The liver demonstrates coarse and echotexture and measures 17 cm. No focal liver lesion.       Extrahepatic portal vein flow is antegrade, measuring 14 cm/sec. Right portal vein flow is antegrade, measuring 17 cm/sec.Left portal vein flow is antegrade, measuring 24 cm/sec.     Flow in the hepatic artery is towards the liver and 203 cm/sec peak systolic 0.78 resistive index.       The splenic vein was not visualized due to overlying gas. The left, middle, and right hepatic veins are patent with flow towards the IVC. The IVC is patent with flow towards the heart.   The aorta is not  dilated.     Gallbladder: Surgically absent     Bile Ducts: Both the intra- and extrahepatic biliary system are of normal caliber.  The common bile duct measures 6 mm in diameter.     Pancreas: Not visualized due to overlying gas     Kidneys:  Left kidney not visualized due to overlying gas. Right kidney measures 9 cm without hydronephrosis or shadowing stone.     Spleen: The spleen measures 12 cm in sagittal dimension.     Fluid: No evidence of ascites or pleural  effusions.                                                                    Impression:    1. Enlarged and coarsened liver echotexture suggestive of chronic liver parenchymal disease.  2. Doppler assessment demonstrates patent liver vasculature. Splenic vein was not visualized due to overlying gas.  3. Left kidney was not seen due to overlying gas.    CT Chest abdomen pelvis 2/1/17  IMPRESSION:    1. Increased density in the biliary tree. Differential includes biliary secretion of contrast and hematobilia.    1a. (Concern for hematobilia given recent transjugular hepatic biopsy, however the density in the biliary tree is predominantly in the left, the biopsy site is in the right making this less likely but still a  consideration with increased density in multiple bowel loops without history of oral contrast. No retroperitoneal or hepatic subcapsular hematoma.    2. Findings of mesenteric infarct, new since prior exam.  3. Severe anasarca and fanny mesentery, most likely secondary to severe volume overload and/or hypoproteinemia. Slightly increased right greater than left pleural effusion and bibasilar atelectasis.  4. Nodular densities in the dependent left lower lobe, may represent aspiration superimposed on atelectasis.  5. Diffuse hyperdense appearance of the liver parenchyma, differential multiple transfusions versus hemachromatosis.    6. In view of rising lactate an new mesenteric infarct, consider repeat CT with IV contrast which would better evaluate bowel.    Liver Biopsy 1/30/17  CLINICAL HISTORY:   46 year old man with biventricular heart failure, status post ICD, chronic atrial fibrillation on amiodarone and digoxin, WPW ablation and chronic kidney disease who presented here for evaluation of CHF and   possible surgical management of his aortic insufficiency with LVAD backup.  Hepatitis B and C serologies are negative.     GROSS:   The specimen is received in formalin with proper patient  "identification, labeled \"liver biopsy\".  The specimen consists of seven yellow-tan soft tissue cores ranging in size from 0.1-0.8 cm in greatest dimension,   which are wrapped and entirely submitted in cassette 1. (Dictated by: Myriam Borges 1/30/2017 05:03 PM)     MICROSCOPIC:   The trichrome stain shows bridging fibrosis with focal nodule formation suggesting possible cirrhosis.  The reticulin stain confirms these findings and highlights regenerative changes.  The parenchyma shows no   significant steatosis or necrosis.  Rare neutrophils are present in the lobule.  Some periportal hepatocytes contain \"ropy\" eosinophilic material in the cytoplasm, compatible with Fátima-Denk bodies.  Small   amount of gold-brown pigment is present as well, predominantly in Kupffer cells, which may indicate mild iron deposition.  Iron stain will be performed.  Focal bile ductular proliferation with associated mild   neutrophilic infiltrate along with some macrophages and lymphocytes is present within the septae.     Although not specific, the presence of Fátima-Denk bodies may be related to amiodarone induced injury.  Clinical correlation is required.     EDEL 1/25/17  Procedure  Transesophageal Echocardiogram with color and spectral Doppler performed. Procedure location Echo Lab. Informed consent for Transesophegeal echo  obtained. EDEL Probe #3 was used during the procedure. Patient was sedated using Fentanyl 75 mcg. Patient was sedated using Versed 1.5 mg. Total sedation  time: 50 minutes of continuous bedside 1:1 monitoring. The Transducer was inserted without difficulty . The patient tolerated the procedure well. Complications None. The patient's rhythm is paced. Good quality two-dimensional was performed and interpreted. Good quality color and spectral Doppler were performed and interpreted.     Left Ventricle  Severely (EF <30%) reduced left ventricular function is present. Severe diffuse hypokinesis is present.     Right " Ventricle  Global right ventricular function is moderately reduced. A pacemaker lead is noted in the right ventricle.     Atria  Severe biatrial enlargement is present. No left atrial mass or thrombus visualized. The atrial septum is intact as assessed by color Doppler . A pacemaker lead is noted in the right atrium.     Mitral Valve  There is a mechanical mitral valve in place. It appears to be a bileaflet prosthesis with severely restricted of one of the lealfets. However, can no exclude a tilting disc valve. The mean gradient across the valve 6 mm Hg at a heart rate of 80 bpm. There is mild paravalvular regurgitation.     Aortic Valve  The aortic valve is tricuspid. Trileaflet aortic sclerosis without stenosis.  Moderate aortic insufficiency is present.     Tricuspid Valve  The tricuspid valve is normal. Mild tricuspid insufficiency is present.     Pulmonic Valve  The pulmonic valve is normal.     Vessels  The thoracic aorta is normal.     Pericardium  No pericardial effusion is present.     Compared to Previous Study  This study was compared with the study from 1/12/17 . The TR appears less severe.    Right and Left Heart Catheterization 1/20/17  CORONARY ANGIOGRAM:    1. Both coronary arteries arise from their respective cusps.  2. Right dominant.  3. LM is without angiographic evidence of disease.    4. LAD large and short, type I vessel, gives rise to septal perforators, very large D1, which bifurcates into 2 large branches, one of which provides dual flow to anterolateral wall.  The entire LAD system has no angiographically significant stenosis.     5. LCX gives rise to small OM1, medium OM2, small OM3 and medium OM4 vessels.  The entire LCx system has <25% disease throughout.     6. RCA gives rise to 2 PL branches and supplies PDA. The entire RCA system is normal and has no disease.    LEFT HEART CATHETERIZATION:  1. LVEDP 25 mmHg.  2. No gradient across the aortic valve on pull back.    Blood  Morphology/Smear 1/12/17  Peripheral Blood Smear:     - Marked normochromic, normocytic anemia; increased erythrocyte regeneration; occasional echinocytes; occasional target cells; very rare red blood cell fragments.     - Slight leukocytosis; toxic neutrophilia     - Lymphocytopenia     - Moderate thrombocytopenia     COMMENT:   Increased erythrocyte regeneration can be seen as a response to blood loss, hemolysis, some mediations, and with marrow regeneration following therapy or a toxic insult. The very rare red cell fragments are   insufficient for a morphologic diagnosis of hemolysis. The paucity of red cell fragments does not rule out the possibility of  disseminated intravascular coagulation (DIC) from the differential diagnosis for thrombocytopenia, which is a clinical diagnosis. If a hemolytic process is a clinical consideration, an LDH, fractionated bilirubin, and a haptoglobin should be obtained. Coagulation testing is more sensitive even in the setting of liver disease for DIC and a fibrinogen, D-dimer, PT and INR should also be obtained in this setting.            Consultations:   Gastroenterology  Palliative Care   Interventional Radiology   Nephrology   Pain Service  Dental   Hematology   Orthopedics   Cardiothoracic Surgery       Hospital Course by Problem:      Samir CARD Michael is a 46 year old  male with past medical history of rheumatic heart disease s/p mechanical MVR x 2 (1980s and 1992) on warfarin (INR goal 2.5-3.5), biventricular CHF (EF 25-30%) s/p dual chamber ICD 2008, chronic afib on amiodarone and previously on digoxin (stopped 1/16/17 due to concern for toxicity), WPW ablation at age 11, CKD III, hypothyroid,  who was transferred from [facility] on 01/12 for further eval of CHF and concern of hemolysis in setting of mechanical valve. Subsequently diagnosed with cirrhosis on liver biopsy and was deemed not to be a candidate for valve replacement or  for LVAD, now with worsening renal function requiring continuous renal replacement therapy.  After a long discussion with the patient, he elected to transfer back to [facility] to be closer to his family/support system.  He is aware of the risks and benefits and that he is not a [facility]  He was aware of the risk of death and severe injury by undergoing transfer and the risk for acute decompensation during transit and requested to transfer to [place].      #Acute blood loss anemia with known chronic anemia secondary GI bleeding and epistaxis.  Suspected to be secondary to hemobilia status post transjugular liver biopsy.  Large clots found in biliary tract on ERCP 2/10/17 with stent placement.  There is a concern that these clots were tamponading hepatic bleeding and that further bleeding could occur.  He most recently had a large maroon stool on 2/10/17 prior to his ERCP.  He has received multiple units of PRBCs during this admission. The last on the morning of 2/10/17.  Should he develop further bleeding in [place], it is recommended that he undergo angiography with interventional radiology and embolization of any bleeding lesions.  Colonoscopy should be completed if embolization is not successful in controlling the bleed.  Hemoglobin stable day prior to transfer.  Warfarin was discontinued on 2/8/17 and heparin on 2/10 due to recurrent GI bleed.   -Pt will be transferred with 2 units of PRBCs to be administered en route for hypotension.   -IR confirmed to have angiography and embolization capabilities in [place] prior to transfer.     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 Due to cirrhosis he was deemed to be ineligible for advanced heart failure therapies or mitral valve replacement  upon consultation with cardiothoracic surgery.  He remained fluid overloaded throughout his admission with lowest weight achieved of 184 pounds.   -Beta blocker, ACE inhibitors held due to hypotension.    -Not eligible for advanced heart failure therapies at the [facility] due to cirrhosis.      # CARSON on CKD- Likely multifactorial secondary to ATN versus cardiorenal due to severe volume overload. TTE with dilated IVC without respiratory variation on 2/9/17. Pt was initially responsive to 4mg bumex IV BID with metolazone; however urine output declined to <500mL/day. Patient wishes to pursue dialysis as he has had good experiences with it prior to this admission.  Tunneled dialysis catheter line placed 2/10/17 with subsequent continuous renal replacement therapy requiring up to 4units of vasopressin per hour to maintain MAPs>55.    -Recommend further volume reduction per hemodialysis if blood pressures tolerate or continuous renal replacement therapy (currently pulling 50-75mL/hr) with vasopressin for hemodynamic support.      # Mechanical mitral valve (MV diastolic gradient 7)  # Aortic Insufficiency (mod - severe)  # TR (moderate)  History of BiV failure attributed to valvular disease. Echocardiogram on admission demonstrated moderate-severe aortic insufficiency which is his most acute cardiac pathology. His tricuspid regurgitation is likely due to his signficantly fluid overloaded state. Mechanical valve maintained on warfarin w/ INR 2.5-3.5 prior to this admission. Angiogram performed on 01/20 revealed nonobstructive CAD. EDEL shows probable restriction of one of the mitral valve leaflets. However, he is not a if candidate for valvular surgery due to cirrhosis diagnosed on liver biopsy.  Anticoagulation held starting 2/10 due to recurrent GI bleeding.    -Pt will remain off anticoagulation for transfer to [facility] due to bleeding risk in transit.    -Pt should resume anticoagulation  "when safe to do so from bleeding perspective.      # Cirrhosis and liver nodularity  Demonstrated on CT, although u/s was normal. CT read is concerning for amiodarone toxicity. Liver biopsy 1/30 with evidence of advanced chronic liver disease (stage 3 or 4) of unclear etiology (amiodarone toxicity is possible). Consequently, not a candidate for LVAD or for valve replacements. Discussed these results and their consequence.    -No LVAD  -No CT surgery/ valve replacement    # Palliative Care discussion.  The patient had initial consultation with palliative care during his admission at the [facility].  He stated he wished to continue with dialysis, but wants to be closer to home where he has family and [place] support present.  We had lengthy discussions regarding the serious nature of his conditions numerous times, including the unrepairable nature of his heart failure and cirrhosis and likely irreversible renal failure likely requiring lifelong dialysis. During our discussions, he understood the lack of therapies available given his multiple failing organs and elected to receive continued medical cares at [facility] and NOT be transferred back to the [facility].  We discussed how we cannot offer any more treatment than is available at [facility], which he voiced understanding.  During our discussions regarding transfer back to [facility], his cardiology team offered to call his family several times and discuss his condition with them.   The patient declined to have his cardiology team discuss his condition and the pending transfer with his family, as he \"wanted to surprise his family by being back in [place].\"  His wishes were observed and his family was not called to discuss his transfer back to [place].     # Paroxsymal afib:  digoxin level 0.7 on 1/12 and 1/17. 1/16/17 device interrogation: atrial " tachycardia to 150s with AV block. Lower rate setting changed to 80bpm from 60bmp and device changed from DDDR to VVIR on 01/16; atrial tachycardia and AV block and V pacing. The patient has been in an atrial flutter that is undersensed, hence the device was switched to VVI mode. Amiodarone discontinued this admission due to concern for toxicity    # Hyperthyroid: Will need repeat TSH/T4 1-2 months post DC. Decreased levothyroxine from 224mcg to 175 mcg    - Amiodarone stopped, this might help with thyroid issues.     # Anxiety and insomnia: Increased clonazepam to tid    Physical Exam on day of Discharge:  Blood pressure 85/46, pulse 80, temperature 98.1  F (36.7  C), temperature source Oral, resp. rate 13, height 1.829 m (6'), weight 102.7 kg (226 lb 6.6 oz), SpO2 95 %.  General: Middle aged man, appears older than stated age, lying in bed, NAD.   HEENT: MMM, no scleral icterus, PERRL  Neck: JVD to jaw at 45 degrees, Supple, no LAD.   Respiratory: Crackles bilateral bases, no wheezing or rales, normal work of breathing on RA.    Heart/CV: irregularly irregular, mechanical S2 with 2+ ABHI at LUSB and LISB  Abdomen/GI: soft, non-distended, mildly tender to deep palpation in RUQ, BS+  Extremities/MSK: Reduced bulk, normal tone.  Cool hands and feet bilaterally. 1+ pitting edema bilateral LE. RLE leg brace present.   Skin: Pressure ulcers on sacrum with clean borders, dressing clean/dry/intact, no purulent exudate.   Neuro: Alert/oriented x4, CN II-XII grossly intact.  Freely moving all 4 extremities.    Psych: Normal mentation, normal goal-oriented speech, flattened affect and tone    Lines/Tubes:  Tunneled hemodialysis catheter, R sided, placed 2/10/17         Pending Results:     Unresulted Labs Ordered in the Past 30 Days of this Admission     No orders found from 11/14/2016 to 1/13/2017.               Discharge Medications:     Current Discharge Medication List      START taking these medications    Details    oxyCODONE (OXYCONTIN) 10 MG 12 hr tablet Take 1 tablet (10 mg) by mouth every 12 hours  Qty: 2 tablet, Refills: 0    Associated Diagnoses: RUQ abdominal pain      oxyCODONE (ROXICODONE) 5 MG IR tablet Take 1-2 tablets (5-10 mg) by mouth every 4 hours as needed for breakthrough pain, moderate to severe pain or severe pain (q2h)  Qty: 8 tablet, Refills: 0    Associated Diagnoses: Chronic bilateral thoracic back pain      albuterol (PROAIR HFA/PROVENTIL HFA/VENTOLIN HFA) 108 (90 BASE) MCG/ACT Inhaler Inhale 2 puffs into the lungs 4 times daily as needed for shortness of breath / dyspnea, wheezing or other (dyspnea)  Qty: 18 g, Refills: 0    Associated Diagnoses: Acute bronchospasm      clonazePAM (KLONOPIN) 0.5 MG tablet Take 0.5 tablets (0.25 mg) by mouth 3 times daily as needed for anxiety  Qty: 3 tablet, Refills: 0    Associated Diagnoses: Anxiety      glucose 40 % GEL gel Take 15-30 g by mouth every hour as needed for low blood sugar  Qty: 112.5 g, Refills: 0    Associated Diagnoses: Hypoglycemia      dextrose 50 % injection Inject 102.7 mLs into the vein every 15 minutes as needed for low blood sugar  Qty: 100 mL, Refills: 0    Associated Diagnoses: Hypoglycemia      flumazenil (ROMAZICON) 0.5 MG/5ML injection Inject 2 mLs (0.2 mg) into the vein every 5 minutes as needed for benzodiazepine reversal (over sedation)  Qty: 2 mL, Refills: 0    Associated Diagnoses: Long term prescription benzodiazepine use      naloxone (NARCAN) 0.4 MG/ML injection Inject 1 mL (0.4 mg) into the vein every 5 minutes as needed for opioid reversal  Qty: 4 mL, Refills: 0    Associated Diagnoses: Uncomplicated opioid dependence (H)      ondansetron (ZOFRAN) 4 MG tablet Take 1 tablet (4 mg) by mouth every 6 hours as needed for nausea or vomiting  Qty: 18 tablet, Refills: 0    Associated Diagnoses: Nausea      oxymetazoline (AFRIN) 0.05 % spray Spray 2 sprays into both nostrils 2 times daily  Qty: 14.7 mL, Refills: 0    Associated  Diagnoses: Epistaxis      lidocaine (LIDODERM) 5 % Patch Place 1-3 patches onto the skin every 24 hours  Qty: 3 patch, Refills: 0    Associated Diagnoses: Chronic bilateral thoracic back pain      menthol (ICY HOT) 5 % PADS Apply 1 patch topically every 8 hours as needed for muscle soreness  Qty: 3 each, Refills: 0    Associated Diagnoses: Chronic bilateral thoracic back pain      miconazole (MICATIN; MICRO GUARD) 2 % powder Apply topically 2 times daily  Qty: 30 g, Refills: 0    Associated Diagnoses: Candidiasis of skin      polyethylene glycol (MIRALAX/GLYCOLAX) Packet Take 17 g by mouth daily  Qty: 1 packet, Refills: 0    Associated Diagnoses: Constipation, unspecified constipation type      vasopressin (PITRESSIN) 40 Units in D5W 40 mL Inject 0.5-4 Units/hr into the vein continuous  Qty: 3 ampule, Refills: 0    Associated Diagnoses: Hemodialysis-associated hypotension      levothyroxine (SYNTHROID/LEVOTHROID) 25 MCG tablet Take 7 tablets (175 mcg) by mouth every morning (before breakfast)  Qty: 1 tablet, Refills: 0    Associated Diagnoses: Other specified hypothyroidism      pantoprazole (PROTONIX) 40 mg IV push injection Inject 40 mg into the vein 2 times daily  Qty: 2 each, Refills: 0    Associated Diagnoses: Gastrointestinal hemorrhage associated with gastritis, unspecified gastritis type                  Discharge Instructions and Follow-Up:     Biliary stent removal in 8 weeks (week of 4/3/17).     IV access PIVx2, R IJ hemodialysis catheter         Discharge Disposition:     Ground transport to [facility].  Patient accepted by Dr. Lucien Lewis on 2/11/17 for transfer.  The patient is to continue care at [facility] to be close to family and does not wish for transfer back to the [facility] where he will be far from family and his social support system. He is not eligible for advanced heart failure therapies due to cirrhosis and therefore does not require  continued care at the [facility].  The patient and care team recognize both the possibility that care in the weeks ahead may permit clinical stabilization and a return to a TCU or home; or continued deterioration and initiation of hospice care.          Condition on Discharge:   Discharge condition: Serious   Code status on discharge: Full Code      Date of service: 2/12/2017    The patient was discussed with Dr. Skinner.    Amador Carter  pager # [phone]    ------------------------------------------------------------------------------------------------------------------  [facility]/Cardiovascular Division Attending  ------------------------------------------------------------------------------------------------------------------    I have seen and examined the patient and the note above reflects our mutual assessment and plan.    The patient is doing well, has been stable from heart, renal and GI perspectives for at least 48 hours, and is ready for discharge to transfer -- per his wishes -- to [facility].   I have carefully reviewed the care plan, medications, and followup plans.  These are understood by the patient, [facility] consultants, [facility] receiving team, and he is ready to return via ambulance to [place].    See extensive progress note from yesterday 2/11/2017 for additional care insights as needed.     Griffin Skinner MD  Cardiovascular Division  [facility] [place] [place] [address]    Page: [phone]  Office: [phone]    ------------------------------------------------------------------------------------------------------------------

## 2017-02-11 NOTE — OR NURSING
Dr. Cordova back by to assess patient.  Aware of blood pressures after phenylephrine gtt stopped.  Writer spoke with Dialysis nurse regarding what to do with (R) chest wall HD catheter once NS infusion stopped.  Writer told to clamp catheter-disconnect fluid and that there was not anything that catheter needed to be flushed with specifically at this time.  IMMANUEL Mason RN

## 2017-02-11 NOTE — PROGRESS NOTES
CRRT INITIATION NOTE    Consent for CRRT Completed:  YES  Patient s Vascular Access: Catheter    Right subclavian          Placement Confirmed: YES  Manufacture:  Nubli   Model:  unknown  Length/[place]ese Size:  14.5 Fr x 23 cm  Flush Volume:  1.9 mL each lumen    DATA:  Procedure:  CVVHDF  Start Time:  2255  Machine#:  7  Filter:  M150  Blood Flow:  180  ML/min  Pre-Replacement Solution:  Phoxilium BK 4/2.5  Post-Replacement Solution:  Phoxilium BK 4/2.5  Dialysate Solution:  Phoxilium BK 4/2.5  Pre-Replacement Solution Rate:  1300 mL/hr  Post-Replacement Solution Rate:  200 mL/hr  Dialysate Flow Rate:  1300 mL/hr   Patient Removal Rate:  25 mL/hr  Anticoagulation Type and Rate:  na    ASSESSMENT:  How Patient Tolerated Initiation:   Vital Signs:  Please see vs flowsheet. On vasopressin  Initial Pressures:  Access:  -51  Filter:  72  Return:  28  TMP:  63  Change in Filter Pressure:  17      INTERVENTIONS:  Started camilo per orders. Will pull fluid as tolerated. Blood warmer and Nicole hugger on    PLAN:  Continue with plan of care. Please call camilo nurse with any questions.     -Carolina Mariee RN 2/11/2017 12:00 AM

## 2017-02-11 NOTE — ANESTHESIA CARE TRANSFER NOTE
Patient: Samir CARD Bringscobyntcale    Procedure(s):  Endoscopic Retrograde Cholangiopancreatogram with Stent Placement, Foreign Body Removal - Wound Class: II-Clean Contaminated   - Wound Class: II-Clean Contaminated    Diagnosis: Hemobilia  Diagnosis Additional Information: No value filed.    Anesthesia Type:   General, ETT     Note:  Airway :Face Mask  Patient transferred to:PACU  Comments: Pt with spont resps, appears comfortable, VSS with phenylephrine infusion. Report given to PACU RN and questions answered. Pt stable and states comfort upon transfer of care.       Vitals: (Last set prior to Anesthesia Care Transfer)    CRNA VITALS  2/10/2017 1734 - 2/10/2017 1809      2/10/2017             Resp Rate (observed): 16                Electronically Signed By: VILMA Jones CRNA  February 10, 2017  6:09 PM

## 2017-02-11 NOTE — ANESTHESIA POSTPROCEDURE EVALUATION
Patient: Samir CADR Bringsplenty    Procedure(s):  Endoscopic Retrograde Cholangiopancreatogram with Stent Placement, Foreign Body Removal - Wound Class: II-Clean Contaminated   - Wound Class: II-Clean Contaminated    Diagnosis:Hemobilia  Diagnosis Additional Information: No value filed.    Anesthesia Type:  General, ETT    Note:  Anesthesia Post Evaluation    Patient location during evaluation: PACU  Patient participation: Able to fully participate in evaluation  Level of consciousness: awake  Pain management: satisfactory to patient  Airway patency: patent  Cardiovascular status: acceptable  Respiratory status: acceptable  Hydration status: acceptable  PONV: none     Anesthetic complications: None          Last vitals:  Filed Vitals:    02/10/17 1500 02/10/17 1530 02/10/17 1538   BP: 93/44 71/33 83/46   Pulse:      Temp:      Resp: 20 20 20   SpO2: 94% 99% 98%         Electronically Signed By: Hector Cordova MD  February 10, 2017  6:24 PM

## 2017-02-11 NOTE — PLAN OF CARE
Problem: Goal Outcome Summary  Goal: Goal Outcome Summary  PT cx. Patient on CRRT with low BP.

## 2017-02-11 NOTE — PROGRESS NOTES
Pt seen personally and management discussed with primary team.    Complicated history of cardiovascular disease. Referred initially to consider surgical intervention which has now been ruled out based upon biopsy proven cirrhosis.    Recent course complicated by ongoing GI bleeding which started after the liver biopsy. CT 2/2 clearly showed filling defect in bile duct consistent with blood in my opinion. Bleeding complicated by need for anticoagulation for valvular heart disease.  Has clinically had ongoing bleeding through this past week, with passage of maroon stool yesterday am. Bilirubin initially had improved but then rising again up to 10 yesterday. Felt that persistent hyperbilirubinemia warranted biliary stenting, which was performed yesterday (without sphincterotomy).  Exam yesterday confirmed presence of blood in biliary tree but not brisk bleeding. No other upper GI source seen.    Since procedure yesterday, has had no further bloody stools and Hgb stable. Has been initiate on CVHD. On vasopressin with currently stable vitals.    Discussed at length with pt and Dr. Skinner.  Situation is complicated and there is clearly a risk of ongoing bleeding from the biliary tree, however the lack of bleeding overnight is very reassuring. There is currently no indication for additional intervention. If he were to have additional bleeding, then may require angiogram.    Goal is for pt to transfer closer to home as has no social support here and was initially transferred for specialized care available only here (CV surgery) which is no longer being considered. Team has confirmed that angiography and/or endoscopic evaluation (standard, including either EGD or colonoscopy) would be available after pending transfer if eventually became needed (please note that none of these are currently needed).    We would support plans for transfer to local institution in this atypical situation.    LAITH Salgado MD  Associate  Professor of Medicine  Division of Gastroenterology, Hepatology and Nutrition  [facility]

## 2017-02-11 NOTE — PROGRESS NOTES
Social Work Services Progress Note    15:45 Addendum - Pt will be transferred tomorrow morning 06:00 2/12/17 to [facility] via [facility] stretcher transport. PCS form on patient's unit chart in ICU. Pt will not need continuous HD. MD to MD report has been completed between  Dr. Skinner and [facility] Dr. Cherry. [facility] transfer center aware of how to reach ICU if they need additional information prior to patient's arrival at their facility.     Hospital Day: 31  Date of Initial Social Work Evaluation:  N/A  Collaborated with:  Charge RN and bedside RN   Data:  Pt is a 46 year old  male who is not a candidate for advanced heart procedures.  Pt has been referred to [facility] as a transfer for services. Patient now bedded in ICU and on continuous dialysis.    Intervention:    Today, SW spoke to charge and bedside RN about patient's disposition.  Assessment:  He is now bedded in the ICU and is on continuous dialysis. Medical team to evaluate later today. Will update SW. If patient medically stable, [facility]. SW consulted with [facility] transport to see if able to accommodate continuous dialysis, they are reviewing their resources to see if even a possibility, will update SW later today.    Weekend SW also aware that family may be coming to visit patient and has requested resources. Unit SW spoke with patient on Friday 2/11/17 and agreed to provide lodging, meal tickets and parking passes. Weekend SW asked bedside RN to update SW if family arrives and needs resources implemented as listed below:  - One hotel room for the nights of 2/11/17 and 2/12/17 at [facility], Reservation: confirmation # is [phone].     - Meal Tickets for four persons (breakfast, lunch and dinner) for 2/11/17 and 2/12/17  - [place] Parking Passes as needed for 2/11/17 and  2/12/17.  Plan:    Anticipated Disposition:  Facility:  [facility] (P) [phone] via [facility] stretcher transportation (P) [phone]    Barriers to d/c plan:  Medical stability for transfer     Follow Up:  SW will wait to consult with medical team about patient's ability to safely transfer to [facility]. Will need to update both hospital and transportation about anticipated d/c.     OMAIRA Herron, [facility]  , Weekend [facility]  Pager: [phone] (Sat & Sun 8 am - 4:30 pm), on-call/after hours pager [phone]

## 2017-02-11 NOTE — PLAN OF CARE
Problem: Goal Outcome Summary  Goal: Goal Outcome Summary  OT/4AB: Cancel OT due to unstable BP and not appropriate for therapy.  Will attempt to see pt on 2-12 as able

## 2017-02-11 NOTE — PLAN OF CARE
Problem: Goal Outcome Summary  Goal: Goal Outcome Summary  Outcome: No Change  D/I:  Hemodynamics with noted hypotension with MAP 40-60. Vasopressin at 4 units/hr IV.  MAP goal per discussion with cardiology is 60 and per nephrology orders greater than 55.  CRRT initiated at approximately 2350 when machine available.  Flud removal set from 0-100 ml/hr with output.  MD notified and by to visit patient with plan to continue to watch as patient asymptomatic with decreased MAP.  Hypoglycemia noted after 0400 labs.  Patient treated with orange juice per his request x2 cartons.  Unclear whether patient suffers from some noted incontinence or if scrotum and penis with edema weeping.  Small serous drainage on underpad noted with turns.  Hemoglobin stable.  INR 1.88 with no anticoagulation at this time.  No bleeding noted.  Specialty  Phone call to patient son per his request and message left for him to return call.  Please see flowsheets for frequent vitals and assessments.  A:  Hemodynamics with MAP less than goal despite vasopressin infusion and CRRT  P:  Continue CRRT with goal fluid removal net negative 25-75 ml/hr.  Plan for lymphedema wraps to be reapplied today per report.  Monitor and treat per orders.  Offer support as able to patient and family.  Activity as tolerated.  Notes that patient may return to [facility] in near future.

## 2017-02-11 NOTE — PLAN OF CARE
Problem: Renal Replacement, Continuous (Adult)  Goal: Signs and Symptoms of Listed Potential Problems Will be Absent or Manageable (Renal Replacement, Continuous)  Signs and symptoms of listed potential problems will be absent or manageable by discharge/transition of care (reference Renal Replacement, Continuous (Adult) CPG).  Outcome: No Change  CRRT STATUS NOTE    DATA:  Time:  5:57 AM  Pressures WNL:  YES  Filter Status:  WDL- started this shift    Problems Reported/Alarms Noted:  none    Supplies Present:  YES    ASSESSMENT:  Patient Net Fluid Balance:  Net up ~130cc since midnight. Anuric. Weight is down 2.5kg from yesterday  Vital Signs:  Paced at 80. SBP 60s-80s with MAP 50s-60s. Afebrile with blood warmer and aydin hugger on. On vasopressin at 4 units per hour.   Labs:  Within patient's parameters. Creatinine 2.91 trending down. Na 130.   Goals of Therapy:  Not met: fluid removal set at 0 d/t hypotension.     INTERVENTIONS:   CRRT initiated last evening.     PLAN:  Continue with Miriam plan of care. Attempt to pull fluid 25-75cc/hour as blood pressure allows. Please call miriam RN with any questions.

## 2017-02-11 NOTE — PROGRESS NOTES
D: OAx4, on CRRT  A/I: PERRLA, move all extremities purposefully-weak. Pt c/o abdominal and flank pain, oxy given q4hr with good relief. Lido patch placed on right hip area also right shoulder blade areas along with icy hot patch. Pt is paced at 100% with runs of PACs and a-fib. HR 80's-low hundreds. Pt is hypotensive on CRRT. Vasopressin at 4 units/hr. LS are clear and diminished at the bases. RA, SpO2 >95%. Moderate cough, no sputum. Bowel sounds are hypoactive, no BM on this shift. Pt is incontinence of urine.   P: CRRT keep MAP >60, pain control. Transfer in the a.m to [facility] in [facility].

## 2017-02-11 NOTE — PROGRESS NOTES
D: Pt returned from OR around 1915.  I/A:  Neuro intact. Given scheduled oxycontin and one dose of prn oxycodone for pain. Also given clonazepam for anxiety x1. V paced. MAPs 40s-50s. Started vasopressin. MD notified around 2200 that MAPS remained in 40s on 2.4U/hr of vasopressin. Room air. Order was changed to a rate at this time. Incontinent on arrival from OR. No bowel movement or void since incontinence episode. Ordered pulsate mattress d/t multiple pressure ulcers. Redressed ulcer on sacrum.     P: Pt transferred to care of another RN to start ARLINE tonight approx 2300.

## 2017-02-11 NOTE — BRIEF OP NOTE
[facility], [place]    Brief Operative Note    Pre-operative diagnosis: Hemobilia  Post-operative diagnosis * No post-op diagnosis entered *  Procedure: Procedure(s):  Endoscopic Retrograde Cholangiopancreatogram with Stent Placement, Foreign Body Removal - Wound Class: II-Clean Contaminated   - Wound Class: II-Clean Contaminated  Surgeon: Surgeon(s) and Role:     * Mark Ellison MD - Primary     * Geraldine Pérez MD - Assisting  Anesthesia: General   Estimated blood loss: Minimal  Drains: None  Specimens: * No specimens in log *  Findings:   large amount of food in the stomach. Evidence of previous hemobilia seen.  cholangio showed retained blood clot. No sphincterotomy done. 10 fr x 9 cm sof-flex stent placed.   Upper endoscopy done: food removed with a koch net, no other synchronous lesion for bleeding noted.     Complications: None.  Implants: None.

## 2017-02-12 VITALS
SYSTOLIC BLOOD PRESSURE: 80 MMHG | WEIGHT: 226.41 LBS | HEART RATE: 80 BPM | RESPIRATION RATE: 15 BRPM | BODY MASS INDEX: 30.67 KG/M2 | HEIGHT: 72 IN | OXYGEN SATURATION: 97 % | DIASTOLIC BLOOD PRESSURE: 49 MMHG | TEMPERATURE: 97.6 F

## 2017-02-12 LAB
ANION GAP SERPL CALCULATED.3IONS-SCNC: 14 MMOL/L (ref 3–14)
ANISOCYTOSIS BLD QL SMEAR: ABNORMAL
BASOPHILS # BLD AUTO: 0 10E9/L (ref 0–0.2)
BASOPHILS # BLD AUTO: 0 10E9/L (ref 0–0.2)
BASOPHILS NFR BLD AUTO: 0 %
BASOPHILS NFR BLD AUTO: 0.1 %
BLD PROD TYP BPU: NORMAL
BLD PROD TYP BPU: NORMAL
BLD UNIT ID BPU: 0
BLD UNIT ID BPU: 0
BLOOD PRODUCT CODE: NORMAL
BLOOD PRODUCT CODE: NORMAL
BPU ID: NORMAL
BPU ID: NORMAL
BUN SERPL-MCNC: 21 MG/DL (ref 7–30)
CA-I SERPL ISE-MCNC: 4.2 MG/DL (ref 4.4–5.2)
CALCIUM SERPL-MCNC: 7.1 MG/DL (ref 8.5–10.1)
CHLORIDE SERPL-SCNC: 100 MMOL/L (ref 94–109)
CO2 SERPL-SCNC: 19 MMOL/L (ref 20–32)
CREAT SERPL-MCNC: 1.82 MG/DL (ref 0.66–1.25)
DIFFERENTIAL METHOD BLD: ABNORMAL
DIFFERENTIAL METHOD BLD: ABNORMAL
EOSINOPHIL # BLD AUTO: 0 10E9/L (ref 0–0.7)
EOSINOPHIL # BLD AUTO: 0 10E9/L (ref 0–0.7)
EOSINOPHIL NFR BLD AUTO: 0 %
EOSINOPHIL NFR BLD AUTO: 0.2 %
ERYTHROCYTE [DISTWIDTH] IN BLOOD BY AUTOMATED COUNT: 19.6 % (ref 10–15)
ERYTHROCYTE [DISTWIDTH] IN BLOOD BY AUTOMATED COUNT: 19.9 % (ref 10–15)
GFR SERPL CREATININE-BSD FRML MDRD: 40 ML/MIN/1.7M2
GLUCOSE SERPL-MCNC: 123 MG/DL (ref 70–99)
HCT VFR BLD AUTO: 21.2 % (ref 40–53)
HCT VFR BLD AUTO: 22 % (ref 40–53)
HGB BLD-MCNC: 7.5 G/DL (ref 13.3–17.7)
HGB BLD-MCNC: 7.5 G/DL (ref 13.3–17.7)
IMM GRANULOCYTES # BLD: 0.1 10E9/L (ref 0–0.4)
IMM GRANULOCYTES NFR BLD: 0.8 %
INR PPP: 2.21 (ref 0.86–1.14)
LACTATE SERPL-SCNC: 4.2 MMOL/L (ref 0.4–2)
LYMPHOCYTES # BLD AUTO: 0.2 10E9/L (ref 0.8–5.3)
LYMPHOCYTES # BLD AUTO: 0.4 10E9/L (ref 0.8–5.3)
LYMPHOCYTES NFR BLD AUTO: 1.8 %
LYMPHOCYTES NFR BLD AUTO: 3.2 %
MACROCYTES BLD QL SMEAR: PRESENT
MAGNESIUM SERPL-MCNC: 2.2 MG/DL (ref 1.6–2.3)
MCH RBC QN AUTO: 31.5 PG (ref 26.5–33)
MCH RBC QN AUTO: 31.9 PG (ref 26.5–33)
MCHC RBC AUTO-ENTMCNC: 34.1 G/DL (ref 31.5–36.5)
MCHC RBC AUTO-ENTMCNC: 35.4 G/DL (ref 31.5–36.5)
MCV RBC AUTO: 90 FL (ref 78–100)
MCV RBC AUTO: 92 FL (ref 78–100)
MONOCYTES # BLD AUTO: 0.4 10E9/L (ref 0–1.3)
MONOCYTES # BLD AUTO: 1.2 10E9/L (ref 0–1.3)
MONOCYTES NFR BLD AUTO: 10.5 %
MONOCYTES NFR BLD AUTO: 3.5 %
MYELOCYTES # BLD: 0.1 10E9/L
MYELOCYTES NFR BLD MANUAL: 0.9 %
NEUTROPHILS # BLD AUTO: 9.7 10E9/L (ref 1.6–8.3)
NEUTROPHILS # BLD AUTO: 9.7 10E9/L (ref 1.6–8.3)
NEUTROPHILS NFR BLD AUTO: 85.2 %
NEUTROPHILS NFR BLD AUTO: 93.8 %
NRBC # BLD AUTO: 0.1 10*3/UL
NRBC # BLD AUTO: 0.1 10*3/UL
NRBC BLD AUTO-RTO: 1 /100
NRBC BLD AUTO-RTO: 1 /100
PHOSPHATE SERPL-MCNC: 4.4 MG/DL (ref 2.5–4.5)
PLATELET # BLD AUTO: 106 10E9/L (ref 150–450)
PLATELET # BLD AUTO: 109 10E9/L (ref 150–450)
PLATELET # BLD EST: ABNORMAL 10*3/UL
POIKILOCYTOSIS BLD QL SMEAR: SLIGHT
POLYCHROMASIA BLD QL SMEAR: SLIGHT
POTASSIUM SERPL-SCNC: 4 MMOL/L (ref 3.4–5.3)
RBC # BLD AUTO: 2.35 10E12/L (ref 4.4–5.9)
RBC # BLD AUTO: 2.38 10E12/L (ref 4.4–5.9)
SODIUM SERPL-SCNC: 133 MMOL/L (ref 133–144)
TRANSFUSION STATUS PATIENT QL: NORMAL
WBC # BLD AUTO: 10.3 10E9/L (ref 4–11)
WBC # BLD AUTO: 11.4 10E9/L (ref 4–11)

## 2017-02-12 PROCEDURE — 25000128 H RX IP 250 OP 636: Performed by: STUDENT IN AN ORGANIZED HEALTH CARE EDUCATION/TRAINING PROGRAM

## 2017-02-12 PROCEDURE — 83735 ASSAY OF MAGNESIUM: CPT | Performed by: INTERNAL MEDICINE

## 2017-02-12 PROCEDURE — 80048 BASIC METABOLIC PNL TOTAL CA: CPT | Performed by: INTERNAL MEDICINE

## 2017-02-12 PROCEDURE — 25000125 ZZHC RX 250: Performed by: HOSPITALIST

## 2017-02-12 PROCEDURE — 85025 COMPLETE CBC W/AUTO DIFF WBC: CPT | Performed by: INTERNAL MEDICINE

## 2017-02-12 PROCEDURE — 84100 ASSAY OF PHOSPHORUS: CPT | Performed by: INTERNAL MEDICINE

## 2017-02-12 PROCEDURE — 25000125 ZZHC RX 250: Performed by: STUDENT IN AN ORGANIZED HEALTH CARE EDUCATION/TRAINING PROGRAM

## 2017-02-12 PROCEDURE — 85610 PROTHROMBIN TIME: CPT | Performed by: INTERNAL MEDICINE

## 2017-02-12 PROCEDURE — 83605 ASSAY OF LACTIC ACID: CPT | Performed by: INTERNAL MEDICINE

## 2017-02-12 PROCEDURE — 25000132 ZZH RX MED GY IP 250 OP 250 PS 637: Performed by: STUDENT IN AN ORGANIZED HEALTH CARE EDUCATION/TRAINING PROGRAM

## 2017-02-12 PROCEDURE — 99238 HOSP IP/OBS DSCHRG MGMT 30/<: CPT | Mod: GC

## 2017-02-12 PROCEDURE — 82330 ASSAY OF CALCIUM: CPT | Performed by: INTERNAL MEDICINE

## 2017-02-12 RX ORDER — SODIUM CHLORIDE 9 MG/ML
INJECTION, SOLUTION INTRAVENOUS
Status: DISCONTINUED
Start: 2017-02-12 | End: 2017-02-12 | Stop reason: HOSPADM

## 2017-02-12 RX ADMIN — Medication 12.5 ML/KG/HR: at 00:17

## 2017-02-12 RX ADMIN — Medication 0.25 MG: at 00:20

## 2017-02-12 RX ADMIN — VASOPRESSIN 4 UNITS/HR: 20 INJECTION, SOLUTION INTRAMUSCULAR; SUBCUTANEOUS at 01:04

## 2017-02-12 RX ADMIN — PHENOL 1 ML: 1.5 LIQUID ORAL at 00:17

## 2017-02-12 NOTE — PLAN OF CARE
Problem: Goal Outcome Summary  Goal: Goal Outcome Summary  Occupational Therapy Discharge Summary     Reason for therapy discharge:    Discharged to OSH     Progress towards therapy goal(s). See goals on Care Plan in [facility] electronic health record for goal details.  Goals partially met.  Barriers to achieving goals:   discharge from facility.     Therapy recommendation(s):    Continued therapy is recommended.  Rationale/Recommendations:  To maximize occupational performance with I/ADL.

## 2017-02-12 NOTE — PROGRESS NOTES
cCRRT STATUS NOTE    DATA:  Time:  6:16 PM  Pressures WNL  Filter Status:  Some clots forming along the perimeter of the filter; TMP and pressure remain WNL    Problems Reported/Alarms Noted: None    Supplies Present: Yes    ASSESSMENT:  Patient Net Fluid Balance:  +341.20  Vital Signs:  Mostly maintaining maps above MAP goal of 60 with the aid of 4 units/hr of vasopressin.  Labs:  stable  Goals of Therapy:  -25 to -75/hr if able to maintain a MAP of greater than 60    INTERVENTIONS:   None    PLAN:  Change set q72H or PRN

## 2017-02-12 NOTE — PLAN OF CARE
Problem: Goal Outcome Summary  Goal: Goal Outcome Summary  Outcome: Adequate for Discharge Date Met:  02/12/17  Pt running on CRRT until 0400, then taken off, returning blood from circuit. Pt is transfering back to [place], [place] to be closer to family and [place]. Pt continued on Vasopressin gtt @4units per hour, attempting to maintain MAP > 55.  On CRRT, pt running at  0cc fluid removal rate 2/2 low MAP's.  Pt's pain treatment continued w/ Oxycontin BID and Oxycodone ~q4hrs PRN.  Pt received 3 doses of Oxycodone 10mg for the last 12hrs.  Pt also received Klonapin for anxiety.  Detailed report was given to the EMS transport service, 24hrs of medications given to transport service, copies of the last 8hrs of VS and a 24hr medication MAR with the last 8hrs of meds given prior to transport.  [facility] was called, report given to ADRI Rea in the CCU.  At the end of report the nurse informed this caller that pt was going to have to go through the ER and be placed from there.  Transport service called and this information was relayed to them and the ER phone number of the accepting facility.  Pt D/C'd on Vasopressin 4units/hr and a NS TKO rider for infusion. Also sent with pt were 2 units of PRBC's that had been typed and crossed for pt. Pt left facility at 0645.

## 2017-02-12 NOTE — PLAN OF CARE
Problem: Goal Outcome Summary  Goal: Goal Outcome Summary  Physical Therapy Discharge Summary     Reason for therapy discharge:    Discharged to OSH     Progress towards therapy goal(s). See goals on Care Plan in [facility] electronic health record for goal details.  Goals partially met.  Barriers to achieving goals:   discharge from facility.     Therapy recommendation(s):    Continued therapy is recommended.  Rationale/Recommendations:  continue PT to work on impaired functional mobility. .

## 2017-02-14 LAB — INTERPRETATION ECG - MUSE: NORMAL

## 2017-06-22 NOTE — PLAN OF CARE
Problem: Goal Outcome Summary  Goal: Goal Outcome Summary  Outcome: No Change  D: Admitted 1/12 from OSH for further evaluation of CHF  I/A: A&Ox4. VSS on RA, except soft BPs at 0400 75s-80s/40s-50s (Cards Cross-cover notified and 250 mL NS bolus infused). V-paced 80s. R leg pain, R shoulder pain and headache partially relieved by PRN oxycodone and scheduled tylenol. PRN klonopin given at HS. Lymph wraps in place. R leg brace removed while patient in bed overnight. +3 R hip edema continues to weep. Assisted to reposition in bed. Mepilex on buttocks in place and CDI. Scrotal edema, antifungal powder and interdry applied to groin folds. Adequate uop. Appeared to sleep well between cares.    P: NPO since [place] for EDEL today. Heart Failure team to evaluate for presurgical LVAD. Possible upcoming valve replacement. Possible tooth extraction prior to heart surgery. Continue to monitor and notify Cards I with questions/concerns.          Statement Selected

## 2017-12-22 ENCOUNTER — TRANSFERRED RECORDS (OUTPATIENT)
Dept: HEALTH INFORMATION MANAGEMENT | Facility: CLINIC | Age: 47
End: 2017-12-22

## 2019-04-16 NOTE — PLAN OF CARE
Problem: Goal Outcome Summary  Goal: Goal Outcome Summary    Pt slept well overnight. Paced, VSS, oxy given at bedtime for leg pain. Pt up in chair and stood to pivot back to bed w/ walker, gait belt, and standby x2 staff. Rt hip continues weeping serous fluid. No acute issues. Pleasant and cooperative w/ cares. Making good urine. Continue to monitor, notify team w/ changes.         No complaints

## 2022-06-08 NOTE — PLAN OF CARE
Problem: Goal Outcome Summary  Goal: Goal Outcome Summary  PT 4E: Pt progressing with activity tolerance and mobility on this date, however still very functionally limited. Performed R LE ROM/strengthening to tolerance and engaged pt in transfer and gait training. Pt requires max A x1 for sit<>stand with 2WW from low surface height and SBA-CGA for gait up to 15' with 2WW. Limited 2/2 to fatigue and c/o pain, however less complaints voiced compared to previous session. Pt hemodynamically stable throughout on RA.     REC: TCU once medically stable to inc IND and safety with functional mobility.          Two call attempts and a letter have been sent in attempt to schedule this patient for a wce. The patient has not responded to our attempts. Please review this patient's chart, contact patient if appropriate, document including specific date and/or timeframe in which the patient is to be seen, labs, imaging, and any other specialty scheduling instructions.     Appointment Notes   16 yr wce due 6/30/22   Communication History   User: ALEXANDER SALAS Date/time: 6/8/22 11:12 AM   Comment: 2nd call attempt, letter sent    Context: Komal Recall Report Outcome: Letter sent   Phone number: [phone] Phone Type:     Comm. type: Telephone Call type: Outgoing   Contact: STEPHANIE GODINEZ  Relation to patient: Mother      User: PARKER MENDIOLA Date/time: 5/19/22 2:03 PM   Comment: 16 yr wce due 6/30/2022   Context: Komal Recall Report Outcome: Left Message   Phone number: [phone] Phone Type:     Comm. type: Telephone Call type: Outgoing   Contact: STEPHANIE GODINEZ  Relation to patient: Mother        Negative

## 2023-06-30 NOTE — PROGRESS NOTES
D: Rheumatic heart disease, [facility] MVR 1980, again in 1992, CHF, Broken bone right leg w/ brace.    I: Monitored vitals and assessed pt status.   Changed: Sacral Mepilex  Running:-  PRN:- Oxycodone,     A: A0x4. VSS. Afebrile. Up with assist 2 and walker. Up with therapy. Generalized, RLE/LLE, scrotal edema, increased edema on right forearm. Adequate urine output.  Headache throughout day. EDEL done. Hepatology consult. Liver biopsy when possible.     P: Continue to monitor Pt status and report changes to treatment team.         Skin Substitute Units (Will Override Primary Defect Units If Greater Than 0): 32

## 2106-12-22 ENCOUNTER — TRANSFERRED RECORDS (OUTPATIENT)
Dept: HEALTH INFORMATION MANAGEMENT | Facility: CLINIC | Age: OVER 89
End: 2106-12-22

## (undated) DEVICE — INTR ENDOSCOPIC STENT FUSION OASIS 09.0FRX200CM

## (undated) DEVICE — SOL WATER IRRIG 1000ML BOTTLE 2F7114

## (undated) DEVICE — DEVICE RETRIEVAL ROTH NET PLATINUM UNIV 2.5MMX230CM 00715050

## (undated) DEVICE — ENDO CAP AND TUBING STERILE FOR ENDOGATOR  100130

## (undated) DEVICE — PACK ENDOSCOPY GI CUSTOM UMMC

## (undated) DEVICE — TAPE DURAPORE 3" SILK 1538-3

## (undated) DEVICE — WIRE GUIDE 0.025"X270CM STR VISIGLIDE G-240-2527S

## (undated) DEVICE — ENDO CONNECTOR ENDOGATOR AUX WATER JET FOR OLYMPUS SCOPE

## (undated) DEVICE — ENDO DEVICE LOCKING AND BIOPSY CAP M00545261

## (undated) DEVICE — ENDO FUSION OMNI-TOME G31903

## (undated) DEVICE — ENDO FORCEP ENDOJAW BIOPSY 2.8MMX160CM FB-220K

## (undated) DEVICE — KIT ENDO FIRST STEP DISINFECTANT 200ML W/POUCH EP-4

## (undated) DEVICE — ENDO TUBING CO2 SMARTCAP STERILE DISP 100145CO2EXT

## (undated) DEVICE — BIOPSY VALVE BIOSHIELD 00711135

## (undated) DEVICE — ENDO BITE BLOCK ADULT OMNI-BLOC

## (undated) RX ORDER — FENTANYL CITRATE 50 UG/ML
INJECTION, SOLUTION INTRAMUSCULAR; INTRAVENOUS
Status: DISPENSED
Start: 2017-01-20

## (undated) RX ORDER — FENTANYL CITRATE 50 UG/ML
INJECTION, SOLUTION INTRAMUSCULAR; INTRAVENOUS
Status: DISPENSED
Start: 2017-02-10

## (undated) RX ORDER — CEFAZOLIN SODIUM 1 G/3ML
INJECTION, POWDER, FOR SOLUTION INTRAMUSCULAR; INTRAVENOUS
Status: DISPENSED
Start: 2017-02-10

## (undated) RX ORDER — VERAPAMIL HYDROCHLORIDE 2.5 MG/ML
INJECTION, SOLUTION INTRAVENOUS
Status: DISPENSED
Start: 2017-01-20

## (undated) RX ORDER — FENTANYL CITRATE 50 UG/ML
INJECTION, SOLUTION INTRAMUSCULAR; INTRAVENOUS
Status: DISPENSED
Start: 2017-01-25

## (undated) RX ORDER — FENTANYL CITRATE 50 UG/ML
INJECTION, SOLUTION INTRAMUSCULAR; INTRAVENOUS
Status: DISPENSED
Start: 2017-01-30

## (undated) RX ORDER — HEPARIN SODIUM 1000 [USP'U]/ML
INJECTION, SOLUTION INTRAVENOUS; SUBCUTANEOUS
Status: DISPENSED
Start: 2017-01-20

## (undated) RX ORDER — HEPARIN SODIUM 1000 [USP'U]/ML
INJECTION, SOLUTION INTRAVENOUS; SUBCUTANEOUS
Status: DISPENSED
Start: 2017-02-10